# Patient Record
Sex: MALE | Race: WHITE | NOT HISPANIC OR LATINO | URBAN - METROPOLITAN AREA
[De-identification: names, ages, dates, MRNs, and addresses within clinical notes are randomized per-mention and may not be internally consistent; named-entity substitution may affect disease eponyms.]

---

## 2020-05-18 ENCOUNTER — INPATIENT (INPATIENT)
Facility: HOSPITAL | Age: 55
LOS: 3 days | Discharge: HOME | DRG: 392 | End: 2020-05-22
Attending: INTERNAL MEDICINE | Admitting: INTERNAL MEDICINE
Payer: MEDICARE

## 2020-05-18 ENCOUNTER — EMERGENCY (EMERGENCY)
Facility: HOSPITAL | Age: 55
LOS: 1 days | Discharge: HOME | End: 2020-05-18
Attending: STUDENT IN AN ORGANIZED HEALTH CARE EDUCATION/TRAINING PROGRAM
Payer: MEDICARE

## 2020-05-18 VITALS
HEART RATE: 71 BPM | OXYGEN SATURATION: 95 % | WEIGHT: 190.04 LBS | TEMPERATURE: 98 F | HEIGHT: 72 IN | SYSTOLIC BLOOD PRESSURE: 170 MMHG | DIASTOLIC BLOOD PRESSURE: 91 MMHG | RESPIRATION RATE: 18 BRPM

## 2020-05-18 VITALS
HEIGHT: 72 IN | RESPIRATION RATE: 20 BRPM | TEMPERATURE: 98 F | HEART RATE: 73 BPM | SYSTOLIC BLOOD PRESSURE: 176 MMHG | WEIGHT: 190.04 LBS | DIASTOLIC BLOOD PRESSURE: 90 MMHG | OXYGEN SATURATION: 97 %

## 2020-05-18 VITALS — HEART RATE: 81 BPM | OXYGEN SATURATION: 98 % | DIASTOLIC BLOOD PRESSURE: 89 MMHG | SYSTOLIC BLOOD PRESSURE: 159 MMHG

## 2020-05-18 LAB
ALBUMIN SERPL ELPH-MCNC: 5.2 G/DL — SIGNIFICANT CHANGE UP (ref 3.3–5.2)
ALBUMIN SERPL ELPH-MCNC: 5.3 G/DL — HIGH (ref 3.3–5.2)
ALP SERPL-CCNC: 94 U/L — SIGNIFICANT CHANGE UP (ref 40–120)
ALP SERPL-CCNC: 97 U/L — SIGNIFICANT CHANGE UP (ref 40–120)
ALT FLD-CCNC: 25 U/L — SIGNIFICANT CHANGE UP
ALT FLD-CCNC: 25 U/L — SIGNIFICANT CHANGE UP
ANION GAP SERPL CALC-SCNC: 15 MMOL/L — SIGNIFICANT CHANGE UP (ref 5–17)
ANION GAP SERPL CALC-SCNC: 18 MMOL/L — HIGH (ref 5–17)
APTT BLD: 29.9 SEC — SIGNIFICANT CHANGE UP (ref 27.5–36.3)
AST SERPL-CCNC: 27 U/L — SIGNIFICANT CHANGE UP
AST SERPL-CCNC: 27 U/L — SIGNIFICANT CHANGE UP
BASOPHILS # BLD AUTO: 0.02 K/UL — SIGNIFICANT CHANGE UP (ref 0–0.2)
BASOPHILS # BLD AUTO: 0.03 K/UL — SIGNIFICANT CHANGE UP (ref 0–0.2)
BASOPHILS NFR BLD AUTO: 0.2 % — SIGNIFICANT CHANGE UP (ref 0–2)
BASOPHILS NFR BLD AUTO: 0.3 % — SIGNIFICANT CHANGE UP (ref 0–2)
BILIRUB SERPL-MCNC: 0.4 MG/DL — SIGNIFICANT CHANGE UP (ref 0.4–2)
BILIRUB SERPL-MCNC: 0.6 MG/DL — SIGNIFICANT CHANGE UP (ref 0.4–2)
BLD GP AB SCN SERPL QL: SIGNIFICANT CHANGE UP
BUN SERPL-MCNC: 12 MG/DL — SIGNIFICANT CHANGE UP (ref 8–20)
BUN SERPL-MCNC: 15 MG/DL — SIGNIFICANT CHANGE UP (ref 8–20)
CALCIUM SERPL-MCNC: 10.1 MG/DL — SIGNIFICANT CHANGE UP (ref 8.6–10.2)
CALCIUM SERPL-MCNC: 9.5 MG/DL — SIGNIFICANT CHANGE UP (ref 8.6–10.2)
CHLORIDE SERPL-SCNC: 97 MMOL/L — LOW (ref 98–107)
CHLORIDE SERPL-SCNC: 98 MMOL/L — SIGNIFICANT CHANGE UP (ref 98–107)
CO2 SERPL-SCNC: 23 MMOL/L — SIGNIFICANT CHANGE UP (ref 22–29)
CO2 SERPL-SCNC: 24 MMOL/L — SIGNIFICANT CHANGE UP (ref 22–29)
CREAT SERPL-MCNC: 0.69 MG/DL — SIGNIFICANT CHANGE UP (ref 0.5–1.3)
CREAT SERPL-MCNC: 0.7 MG/DL — SIGNIFICANT CHANGE UP (ref 0.5–1.3)
EOSINOPHIL # BLD AUTO: 0 K/UL — SIGNIFICANT CHANGE UP (ref 0–0.5)
EOSINOPHIL # BLD AUTO: 0 K/UL — SIGNIFICANT CHANGE UP (ref 0–0.5)
EOSINOPHIL NFR BLD AUTO: 0 % — SIGNIFICANT CHANGE UP (ref 0–6)
EOSINOPHIL NFR BLD AUTO: 0 % — SIGNIFICANT CHANGE UP (ref 0–6)
GLUCOSE SERPL-MCNC: 128 MG/DL — HIGH (ref 70–99)
GLUCOSE SERPL-MCNC: 163 MG/DL — HIGH (ref 70–99)
HCT VFR BLD CALC: 41 % — SIGNIFICANT CHANGE UP (ref 39–50)
HCT VFR BLD CALC: 41.1 % — SIGNIFICANT CHANGE UP (ref 39–50)
HGB BLD-MCNC: 13.5 G/DL — SIGNIFICANT CHANGE UP (ref 13–17)
HGB BLD-MCNC: 13.6 G/DL — SIGNIFICANT CHANGE UP (ref 13–17)
IMM GRANULOCYTES NFR BLD AUTO: 0.2 % — SIGNIFICANT CHANGE UP (ref 0–1.5)
IMM GRANULOCYTES NFR BLD AUTO: 0.4 % — SIGNIFICANT CHANGE UP (ref 0–1.5)
INR BLD: 1.12 RATIO — SIGNIFICANT CHANGE UP (ref 0.88–1.16)
LACTATE BLDV-MCNC: 1.2 MMOL/L — SIGNIFICANT CHANGE UP (ref 0.5–2)
LIDOCAIN IGE QN: 10 U/L — LOW (ref 22–51)
LIDOCAIN IGE QN: 11 U/L — LOW (ref 22–51)
LYMPHOCYTES # BLD AUTO: 0.91 K/UL — LOW (ref 1–3.3)
LYMPHOCYTES # BLD AUTO: 1.27 K/UL — SIGNIFICANT CHANGE UP (ref 1–3.3)
LYMPHOCYTES # BLD AUTO: 11.3 % — LOW (ref 13–44)
LYMPHOCYTES # BLD AUTO: 8.1 % — LOW (ref 13–44)
MCHC RBC-ENTMCNC: 27.3 PG — SIGNIFICANT CHANGE UP (ref 27–34)
MCHC RBC-ENTMCNC: 27.4 PG — SIGNIFICANT CHANGE UP (ref 27–34)
MCHC RBC-ENTMCNC: 32.8 GM/DL — SIGNIFICANT CHANGE UP (ref 32–36)
MCHC RBC-ENTMCNC: 33.2 GM/DL — SIGNIFICANT CHANGE UP (ref 32–36)
MCV RBC AUTO: 82.7 FL — SIGNIFICANT CHANGE UP (ref 80–100)
MCV RBC AUTO: 83.2 FL — SIGNIFICANT CHANGE UP (ref 80–100)
MONOCYTES # BLD AUTO: 0.28 K/UL — SIGNIFICANT CHANGE UP (ref 0–0.9)
MONOCYTES # BLD AUTO: 0.55 K/UL — SIGNIFICANT CHANGE UP (ref 0–0.9)
MONOCYTES NFR BLD AUTO: 2.5 % — SIGNIFICANT CHANGE UP (ref 2–14)
MONOCYTES NFR BLD AUTO: 4.9 % — SIGNIFICANT CHANGE UP (ref 2–14)
NEUTROPHILS # BLD AUTO: 10.01 K/UL — HIGH (ref 1.8–7.4)
NEUTROPHILS # BLD AUTO: 9.34 K/UL — HIGH (ref 1.8–7.4)
NEUTROPHILS NFR BLD AUTO: 83.2 % — HIGH (ref 43–77)
NEUTROPHILS NFR BLD AUTO: 88.9 % — HIGH (ref 43–77)
PLATELET # BLD AUTO: 278 K/UL — SIGNIFICANT CHANGE UP (ref 150–400)
PLATELET # BLD AUTO: 279 K/UL — SIGNIFICANT CHANGE UP (ref 150–400)
POTASSIUM SERPL-MCNC: 3.7 MMOL/L — SIGNIFICANT CHANGE UP (ref 3.5–5.3)
POTASSIUM SERPL-MCNC: 3.9 MMOL/L — SIGNIFICANT CHANGE UP (ref 3.5–5.3)
POTASSIUM SERPL-SCNC: 3.7 MMOL/L — SIGNIFICANT CHANGE UP (ref 3.5–5.3)
POTASSIUM SERPL-SCNC: 3.9 MMOL/L — SIGNIFICANT CHANGE UP (ref 3.5–5.3)
PROT SERPL-MCNC: 8.3 G/DL — SIGNIFICANT CHANGE UP (ref 6.6–8.7)
PROT SERPL-MCNC: 8.6 G/DL — SIGNIFICANT CHANGE UP (ref 6.6–8.7)
PROTHROM AB SERPL-ACNC: 12.7 SEC — SIGNIFICANT CHANGE UP (ref 10–12.9)
RBC # BLD: 4.94 M/UL — SIGNIFICANT CHANGE UP (ref 4.2–5.8)
RBC # BLD: 4.96 M/UL — SIGNIFICANT CHANGE UP (ref 4.2–5.8)
RBC # FLD: 13.7 % — SIGNIFICANT CHANGE UP (ref 10.3–14.5)
RBC # FLD: 13.8 % — SIGNIFICANT CHANGE UP (ref 10.3–14.5)
SODIUM SERPL-SCNC: 137 MMOL/L — SIGNIFICANT CHANGE UP (ref 135–145)
SODIUM SERPL-SCNC: 138 MMOL/L — SIGNIFICANT CHANGE UP (ref 135–145)
TROPONIN T SERPL-MCNC: <0.01 NG/ML — SIGNIFICANT CHANGE UP (ref 0–0.06)
WBC # BLD: 11.22 K/UL — HIGH (ref 3.8–10.5)
WBC # BLD: 11.25 K/UL — HIGH (ref 3.8–10.5)
WBC # FLD AUTO: 11.22 K/UL — HIGH (ref 3.8–10.5)
WBC # FLD AUTO: 11.25 K/UL — HIGH (ref 3.8–10.5)

## 2020-05-18 PROCEDURE — 93010 ELECTROCARDIOGRAM REPORT: CPT | Mod: 76

## 2020-05-18 PROCEDURE — 74177 CT ABD & PELVIS W/CONTRAST: CPT | Mod: 26

## 2020-05-18 PROCEDURE — 71045 X-RAY EXAM CHEST 1 VIEW: CPT | Mod: 26

## 2020-05-18 PROCEDURE — 71260 CT THORAX DX C+: CPT | Mod: 26

## 2020-05-18 PROCEDURE — 99285 EMERGENCY DEPT VISIT HI MDM: CPT | Mod: CS

## 2020-05-18 PROCEDURE — 99285 EMERGENCY DEPT VISIT HI MDM: CPT

## 2020-05-18 RX ORDER — MORPHINE SULFATE 50 MG/1
4 CAPSULE, EXTENDED RELEASE ORAL ONCE
Refills: 0 | Status: DISCONTINUED | OUTPATIENT
Start: 2020-05-18 | End: 2020-05-18

## 2020-05-18 RX ORDER — HYDROMORPHONE HYDROCHLORIDE 2 MG/ML
1 INJECTION INTRAMUSCULAR; INTRAVENOUS; SUBCUTANEOUS ONCE
Refills: 0 | Status: DISCONTINUED | OUTPATIENT
Start: 2020-05-18 | End: 2020-05-18

## 2020-05-18 RX ORDER — HYDROMORPHONE HYDROCHLORIDE 2 MG/ML
0.5 INJECTION INTRAMUSCULAR; INTRAVENOUS; SUBCUTANEOUS ONCE
Refills: 0 | Status: DISCONTINUED | OUTPATIENT
Start: 2020-05-18 | End: 2020-05-18

## 2020-05-18 RX ORDER — METOCLOPRAMIDE HCL 10 MG
10 TABLET ORAL ONCE
Refills: 0 | Status: COMPLETED | OUTPATIENT
Start: 2020-05-18 | End: 2020-05-18

## 2020-05-18 RX ORDER — SODIUM CHLORIDE 9 MG/ML
1000 INJECTION INTRAMUSCULAR; INTRAVENOUS; SUBCUTANEOUS ONCE
Refills: 0 | Status: COMPLETED | OUTPATIENT
Start: 2020-05-18 | End: 2020-05-18

## 2020-05-18 RX ORDER — ONDANSETRON 8 MG/1
4 TABLET, FILM COATED ORAL ONCE
Refills: 0 | Status: COMPLETED | OUTPATIENT
Start: 2020-05-18 | End: 2020-05-18

## 2020-05-18 RX ORDER — PANTOPRAZOLE SODIUM 20 MG/1
80 TABLET, DELAYED RELEASE ORAL ONCE
Refills: 0 | Status: COMPLETED | OUTPATIENT
Start: 2020-05-18 | End: 2020-05-18

## 2020-05-18 RX ADMIN — Medication 10 MILLIGRAM(S): at 05:40

## 2020-05-18 RX ADMIN — ONDANSETRON 4 MILLIGRAM(S): 8 TABLET, FILM COATED ORAL at 03:28

## 2020-05-18 RX ADMIN — HYDROMORPHONE HYDROCHLORIDE 0.5 MILLIGRAM(S): 2 INJECTION INTRAMUSCULAR; INTRAVENOUS; SUBCUTANEOUS at 05:40

## 2020-05-18 RX ADMIN — SODIUM CHLORIDE 1000 MILLILITER(S): 9 INJECTION INTRAMUSCULAR; INTRAVENOUS; SUBCUTANEOUS at 03:28

## 2020-05-18 RX ADMIN — SODIUM CHLORIDE 1000 MILLILITER(S): 9 INJECTION INTRAMUSCULAR; INTRAVENOUS; SUBCUTANEOUS at 22:09

## 2020-05-18 RX ADMIN — SODIUM CHLORIDE 1000 MILLILITER(S): 9 INJECTION INTRAMUSCULAR; INTRAVENOUS; SUBCUTANEOUS at 04:28

## 2020-05-18 RX ADMIN — HYDROMORPHONE HYDROCHLORIDE 1 MILLIGRAM(S): 2 INJECTION INTRAMUSCULAR; INTRAVENOUS; SUBCUTANEOUS at 23:15

## 2020-05-18 RX ADMIN — SODIUM CHLORIDE 1000 MILLILITER(S): 9 INJECTION INTRAMUSCULAR; INTRAVENOUS; SUBCUTANEOUS at 23:09

## 2020-05-18 RX ADMIN — ONDANSETRON 4 MILLIGRAM(S): 8 TABLET, FILM COATED ORAL at 22:09

## 2020-05-18 RX ADMIN — PANTOPRAZOLE SODIUM 80 MILLIGRAM(S): 20 TABLET, DELAYED RELEASE ORAL at 22:09

## 2020-05-18 RX ADMIN — HYDROMORPHONE HYDROCHLORIDE 1 MILLIGRAM(S): 2 INJECTION INTRAMUSCULAR; INTRAVENOUS; SUBCUTANEOUS at 03:28

## 2020-05-18 NOTE — ED PROVIDER NOTE - PHYSICAL EXAMINATION
General:     NAD, well-nourished, well-appearing  Head:     NC/AT, EOMI, oral mucosa moist  Neck:     trachea midline  Lungs:     CTA b/l, no w/r/r  CVS:     S1S2, RRR, no m/g/r  Abd:     (+) TTP at epigastrium, no rebound or guarding, +BS, s/nd, no organomegaly  Ext:    2+ radial and pedal pulses, no c/c/e  Neuro: AAOx3, no sensory/motor deficits General:     NAD,  Head:     NC/AT, EOMI, oral mucosa moist  Neck:     trachea midline  Lungs:     CTA b/l, no w/r/r  CVS:     S1S2, RRR, no m/g/r  Abd:     (+) TTP at epigastrium, no rebound or guarding, +BS, s/nd, no organomegaly, palpable epigastic pulse  Ext:    2+ radial and pedal pulses, no c/c/e  Neuro: grossly intact

## 2020-05-18 NOTE — ED ADULT NURSE NOTE - OBJECTIVE STATEMENT
pt presents to Ed with abdominal pain, N/V x 2 days. pt tried taking tylenol for pain and reglan however no improvement in sx. pt with hx of esophageal ca and removal. pt medicated as ordered. given PO contrast at this time.

## 2020-05-18 NOTE — ED ADULT TRIAGE NOTE - CHIEF COMPLAINT QUOTE
pt presents to the ed a&ox3 c/o epigastric pain, nausea and vomiting x2 days. Pt states hx of esophageal ca 2016 and esophageal removal in 2016. pt also states has not taken htn medications x2 days, unable to keep it down.

## 2020-05-18 NOTE — ED ADULT NURSE REASSESSMENT NOTE - NS ED NURSE REASSESS COMMENT FT1
pt taken to and returned from CT without incident. upon return to unit pt reported increased nausea and pain. pt medicated for both pt currently sleeping at this time,

## 2020-05-18 NOTE — ED PROVIDER NOTE - ATTENDING CONTRIBUTION TO CARE
55 yo male presents for evaluation of 2 days of abdominal discomfort with nausea. I personally saw the patient with the PA, and completed the key components of the history and physical exam. I then discussed the management plan with the PA.

## 2020-05-18 NOTE — ED PROVIDER NOTE - PHYSICAL EXAMINATION
Const: Awake, alert and oriented. In no acute distress. Well appearing.  HEENT: NC/AT. Moist mucous membranes.  Eyes: No scleral icterus. EOMI.  Neck:. Soft and supple. Full ROM without pain.  Cardiac:  +S1/S2. No murmurs. Peripheral pulses 2+ and symmetric. No LE edema.  Resp: Speaking in full sentences. No evidence of respiratory distress. No wheezes, rales or rhonchi.  Abd: Soft, epigastric tenderness> diffuse tenderness on palpation, non-distended.   Back: Spine midline and non-tender. No CVAT.  Skin: No rashes, abrasions or lacerations.  Lymph: No cervical lymphadenopathy.  Neuro: Awake, alert & oriented x 3. Moves all extremities symmetrically.

## 2020-05-18 NOTE — ED PROVIDER NOTE - OBJECTIVE STATEMENT
pt is a 55 y/o male with a pmhx of esophageal cancer (removal of esophagus in 2016), cholecystomy, HTN presenting to the ed with abdominal pain x 2 days. pt admits pain is epigastric region, admits to nausea and multiple episodes of vomiting ( non-bloody, non-bilious). pt states last bowel movement was this morning, passing gas. pt denies cardiac hx. pt denies recent sick contacts, fevers. pt denies sob, palpitations, diarrhea, back pain, dysuria, melena, hematemesis, rectal bleeding pt is a 55 y/o male with a pmhx of esophageal cancer (removal of esophagus in 2016), cholecystomy, HTN presenting to the ed with abdominal pain x 2 days. pt admits pain is epigastric region and radiating into chest, admits to nausea and multiple episodes of vomiting ( non-bloody, non-bilious). pt states last bowel movement was this morning, passing gas. pt denies cardiac hx. pt denies recent sick contacts, fevers. pt denies sob, palpitations, diarrhea, back pain, dysuria, melena, hematemesis, rectal bleeding

## 2020-05-18 NOTE — ED ADULT NURSE NOTE - OBJECTIVE STATEMENT
Chief complaint of abdominal pain. Hx of esophageal resection and ca. Pt states he has had this pain before. +Nausea. Unable to tolerate PO. Denies fever, chills. Abdomen soft, tender, non-distended. Pt alert and oriented x3, respirations even and unlabored, skin warm and dry, color appropriate for ethnicity, speech clear, moving all extremities. Will continue to monitor.

## 2020-05-18 NOTE — ED ADULT NURSE NOTE - NS ED NURSE LEVEL OF CONSCIOUSNESS AFFECT
If still suffering through the next week or so, please call and will either get to PT or at least ortho opinion.    Calm

## 2020-05-18 NOTE — ED PROVIDER NOTE - PATIENT PORTAL LINK FT
You can access the FollowMyHealth Patient Portal offered by [facility] by registering at the following website: [email or web address]. By joining [employer]’s FollowMyHealth portal, you will also be able to view your health information using other applications (apps) compatible with our system.

## 2020-05-18 NOTE — ED PROVIDER NOTE - PROGRESS NOTE DETAILS
reviewed ct results, lab work and urine, pt reports improvement in sxs, abd soft nontender, f/u with GI, carfate and omprezole, incidental findings on ct discussed with patient

## 2020-05-18 NOTE — ED ADULT NURSE NOTE - NSIMPLEMENTINTERV_GEN_ALL_ED
Implemented All Universal Safety Interventions:  [place] to call system. Call bell, personal items and telephone within reach. Instruct patient to call for assistance. Room bathroom lighting operational. Non-slip footwear when patient is off stretcher. Physically safe environment: no spills, clutter or unnecessary equipment. Stretcher in lowest position, wheels locked, appropriate side rails in place.

## 2020-05-18 NOTE — ED ADULT TRIAGE NOTE - CHIEF COMPLAINT QUOTE
pt presents to the ed a&ox3 c/o abdominal pain, nausea and vomiting x3 days. Pt was seen here this am for the same symptoms. Pt unable to hold anything down including water, pt admits to not taking daily medications. no s/s of any other acute distress. hx esophageal removal and esophageal ca.

## 2020-05-18 NOTE — ED PROVIDER NOTE - NS ED ROS FT
Constitutional: (-) fever  (-)chills  (-)sweats  Eyes/ENT: (-) blurry vision, (-) epistaxis  (-)rhinorrhea   (-) sore throat    Cardiovascular: (-) chest pain, (-) palpitations (-) edema   Respiratory: (-) cough, (+) shortness of breath   Gastrointestinal: (+)nausea  (+)vomiting, (-) diarrhea  (+) abdominal pain   :  (-)dysuria, (-)frequency, (-)urgency, (-)hematuria  Musculoskeletal: (-) neck pain, (-) back pain, (-) joint pain  Integumentary: (-) rash, (-) edema  Neurological: (-) headache, (-) altered mental status  (-)LOC

## 2020-05-18 NOTE — ED ADULT NURSE NOTE - NSIMPLEMENTINTERV_GEN_ALL_ED
Implemented All Universal Safety Interventions:  [place] to call system. Call bell, personal items and telephone within reach. Instruct patient to call for assistance. Room bathroom lighting operational. Non-slip footwear when patient is off stretcher. Physically safe environment: no spills, clutter or unnecessary equipment. Stretcher in lowest position, wheels locked, appropriate side rails in place.

## 2020-05-18 NOTE — ED PROVIDER NOTE - SCRIBE NAME
Shaka Winters transferred to inpatient Hospice RM # [id number] from [phone] via bed  Reason for transfer: Comfort care with end of life measures  Explained reason for transfer to Family  Belongings: some belongings transferred with pt family, Kimmy Ramos to bring the rest to Hospice  Soft chart transferred with patient: no  Telemetry box number no, telemetry d/c.   Report given to:Milla via telephone      Electronically signed by Shweta Jett RN on 9/25/2018 at 1:24 PM Sana Resendiz

## 2020-05-18 NOTE — ED PROVIDER NOTE - PROGRESS NOTE DETAILS
PT evaluated by intake physician. HPI/PE/ROS as noted above. Will follow up plan per intake physician. reviewed ct results, lab work, will place patient in observation, second visit to ed not tolerating PO, iv fluids, anti-emetics, GI consult

## 2020-05-19 ENCOUNTER — TRANSCRIPTION ENCOUNTER (OUTPATIENT)
Age: 55
End: 2020-05-19

## 2020-05-19 DIAGNOSIS — R11.2 NAUSEA WITH VOMITING, UNSPECIFIED: ICD-10-CM

## 2020-05-19 DIAGNOSIS — Z90.49 ACQUIRED ABSENCE OF OTHER SPECIFIED PARTS OF DIGESTIVE TRACT: Chronic | ICD-10-CM

## 2020-05-19 DIAGNOSIS — Z85.01 PERSONAL HISTORY OF MALIGNANT NEOPLASM OF ESOPHAGUS: ICD-10-CM

## 2020-05-19 LAB
HCT VFR BLD CALC: 38.1 % — LOW (ref 39–50)
HGB BLD-MCNC: 12.7 G/DL — LOW (ref 13–17)
MCHC RBC-ENTMCNC: 27.7 PG — SIGNIFICANT CHANGE UP (ref 27–34)
MCHC RBC-ENTMCNC: 33.3 GM/DL — SIGNIFICANT CHANGE UP (ref 32–36)
MCV RBC AUTO: 83 FL — SIGNIFICANT CHANGE UP (ref 80–100)
PLATELET # BLD AUTO: 271 K/UL — SIGNIFICANT CHANGE UP (ref 150–400)
RBC # BLD: 4.59 M/UL — SIGNIFICANT CHANGE UP (ref 4.2–5.8)
RBC # FLD: 13.8 % — SIGNIFICANT CHANGE UP (ref 10.3–14.5)
SARS-COV-2 RNA SPEC QL NAA+PROBE: SIGNIFICANT CHANGE UP
WBC # BLD: 11.38 K/UL — HIGH (ref 3.8–10.5)
WBC # FLD AUTO: 11.38 K/UL — HIGH (ref 3.8–10.5)

## 2020-05-19 PROCEDURE — 99218: CPT | Mod: CS

## 2020-05-19 PROCEDURE — 74174 CTA ABD&PLVS W/CONTRAST: CPT | Mod: 26

## 2020-05-19 PROCEDURE — 99223 1ST HOSP IP/OBS HIGH 75: CPT | Mod: AI

## 2020-05-19 PROCEDURE — 71275 CT ANGIOGRAPHY CHEST: CPT | Mod: 26

## 2020-05-19 PROCEDURE — 99223 1ST HOSP IP/OBS HIGH 75: CPT

## 2020-05-19 RX ORDER — METOCLOPRAMIDE HCL 10 MG
10 TABLET ORAL EVERY 6 HOURS
Refills: 0 | Status: DISCONTINUED | OUTPATIENT
Start: 2020-05-19 | End: 2020-05-19

## 2020-05-19 RX ORDER — ENOXAPARIN SODIUM 100 MG/ML
40 INJECTION SUBCUTANEOUS DAILY
Refills: 0 | Status: DISCONTINUED | OUTPATIENT
Start: 2020-05-19 | End: 2020-05-19

## 2020-05-19 RX ORDER — METOCLOPRAMIDE HCL 10 MG
10 TABLET ORAL ONCE
Refills: 0 | Status: COMPLETED | OUTPATIENT
Start: 2020-05-19 | End: 2020-05-19

## 2020-05-19 RX ORDER — PANTOPRAZOLE SODIUM 20 MG/1
40 TABLET, DELAYED RELEASE ORAL EVERY 12 HOURS
Refills: 0 | Status: DISCONTINUED | OUTPATIENT
Start: 2020-05-19 | End: 2020-05-22

## 2020-05-19 RX ORDER — SUCRALFATE 1 G
1 TABLET ORAL ONCE
Refills: 0 | Status: COMPLETED | OUTPATIENT
Start: 2020-05-19 | End: 2020-05-19

## 2020-05-19 RX ORDER — MORPHINE SULFATE 50 MG/1
4 CAPSULE, EXTENDED RELEASE ORAL ONCE
Refills: 0 | Status: DISCONTINUED | OUTPATIENT
Start: 2020-05-19 | End: 2020-05-19

## 2020-05-19 RX ORDER — ONDANSETRON 8 MG/1
4 TABLET, FILM COATED ORAL EVERY 6 HOURS
Refills: 0 | Status: DISCONTINUED | OUTPATIENT
Start: 2020-05-19 | End: 2020-05-22

## 2020-05-19 RX ORDER — MORPHINE SULFATE 50 MG/1
2 CAPSULE, EXTENDED RELEASE ORAL EVERY 4 HOURS
Refills: 0 | Status: DISCONTINUED | OUTPATIENT
Start: 2020-05-19 | End: 2020-05-20

## 2020-05-19 RX ORDER — ACETAMINOPHEN 500 MG
650 TABLET ORAL ONCE
Refills: 0 | Status: COMPLETED | OUTPATIENT
Start: 2020-05-19 | End: 2020-05-19

## 2020-05-19 RX ORDER — AMLODIPINE BESYLATE 2.5 MG/1
5 TABLET ORAL DAILY
Refills: 0 | Status: DISCONTINUED | OUTPATIENT
Start: 2020-05-19 | End: 2020-05-22

## 2020-05-19 RX ORDER — SODIUM CHLORIDE 9 MG/ML
1000 INJECTION INTRAMUSCULAR; INTRAVENOUS; SUBCUTANEOUS
Refills: 0 | Status: DISCONTINUED | OUTPATIENT
Start: 2020-05-19 | End: 2020-05-22

## 2020-05-19 RX ADMIN — SODIUM CHLORIDE 125 MILLILITER(S): 9 INJECTION INTRAMUSCULAR; INTRAVENOUS; SUBCUTANEOUS at 07:56

## 2020-05-19 RX ADMIN — SODIUM CHLORIDE 125 MILLILITER(S): 9 INJECTION INTRAMUSCULAR; INTRAVENOUS; SUBCUTANEOUS at 02:26

## 2020-05-19 RX ADMIN — Medication 1 GRAM(S): at 02:26

## 2020-05-19 RX ADMIN — ONDANSETRON 4 MILLIGRAM(S): 8 TABLET, FILM COATED ORAL at 20:09

## 2020-05-19 RX ADMIN — AMLODIPINE BESYLATE 5 MILLIGRAM(S): 2.5 TABLET ORAL at 12:14

## 2020-05-19 RX ADMIN — ONDANSETRON 4 MILLIGRAM(S): 8 TABLET, FILM COATED ORAL at 13:41

## 2020-05-19 RX ADMIN — ENOXAPARIN SODIUM 40 MILLIGRAM(S): 100 INJECTION SUBCUTANEOUS at 15:44

## 2020-05-19 RX ADMIN — MORPHINE SULFATE 2 MILLIGRAM(S): 50 CAPSULE, EXTENDED RELEASE ORAL at 22:54

## 2020-05-19 RX ADMIN — PANTOPRAZOLE SODIUM 40 MILLIGRAM(S): 20 TABLET, DELAYED RELEASE ORAL at 15:45

## 2020-05-19 RX ADMIN — Medication 10 MILLIGRAM(S): at 12:01

## 2020-05-19 RX ADMIN — MORPHINE SULFATE 4 MILLIGRAM(S): 50 CAPSULE, EXTENDED RELEASE ORAL at 08:05

## 2020-05-19 RX ADMIN — Medication 10 MILLIGRAM(S): at 02:57

## 2020-05-19 RX ADMIN — Medication 104 MILLIGRAM(S): at 02:27

## 2020-05-19 RX ADMIN — MORPHINE SULFATE 2 MILLIGRAM(S): 50 CAPSULE, EXTENDED RELEASE ORAL at 15:45

## 2020-05-19 RX ADMIN — SODIUM CHLORIDE 125 MILLILITER(S): 9 INJECTION INTRAMUSCULAR; INTRAVENOUS; SUBCUTANEOUS at 13:20

## 2020-05-19 NOTE — H&P ADULT - ASSESSMENT
1. Gastroparesis vs. Upper GI bleed  -  pt presents with coffee-ground emesis likely from a bleed\  - H/H noted to be down (12.7/38.1) since arriving to ED yesterday.  - will consult GI for possible EGD eval  -pt on omeprazole 40mg at home. will switch to protonix 40mg during stay  - c/w reglan 10mg   - monitor daily cbc to trend H/H    2. Suspect for covid-19 Pna  - pt reports mild sob  - COVID panel pending  - CTA showed b/l opacities     3. leukocytosis  - pt is afebrile  - likely 2/2 underlying infection  - will trend CBC and monitor    4. HTN  - monitor bp  - continue on amlodipine 5mg once daily    ppx: will initiate Lovenox 40mg 1. Intractable N/V likely Gastroparesis  - admit to medicine   - continue IVF for hydration and clear liquid diet as tolerated  - antiemetic prn   - GI recs appreciated:  started Protonix 40mg IV BID, for possible EGD on this admission GI to determine   - monitor daily cbc to trend H/H    2. Mild leukocytosis  - pt is afebrile, likely reactive from above  - will trend CBC and monitor    3. HTN  - monitor bp  - continue on amlodipine 5mg once daily    4. Prophylactic measures  - DVT ppx: Lovenox 40mg SQ daily  - GI ppx: on PPI

## 2020-05-19 NOTE — H&P ADULT - NSHPREVIEWOFSYSTEMS_GEN_ALL_CORE
CONSTITUTIONAL: No weakness, fevers/chills, or change in weight, night sweats  EYES/ENT: +dry mouth. No visual changes;  No vertigo or throat pain   NECK: No pain or stiffness  RESPIRATORY: No cough, wheezing, hemoptysis; No shortness of breath  CARDIOVASCULAR: No chest pain or palpitations  GASTROINTESTINAL:  + generalized abdominal pain, + nausea, bilious vomiting, and hematemesis; No diarrhea or constipation. No melena or hematochezia.  GENITOURINARY: No dysuria, frequency or hematuria  NEUROLOGICAL: No numbness or weakness  SKIN: No itching, rashes CONSTITUTIONAL: No weakness, fevers/chills, or change in weight, night sweats  EYES/ENT: +dry mouth. No visual changes;  No vertigo or throat pain   NECK: No pain or stiffness  RESPIRATORY: + SOB. No cough or wheezing.  CARDIOVASCULAR: No chest pain or palpitations  GASTROINTESTINAL:  + generalized abdominal pain, + nausea, bilious vomiting, and hematemesis; No diarrhea or constipation. No melena or hematochezia.  GENITOURINARY: No dysuria, frequency or hematuria  NEUROLOGICAL: No numbness or weakness  SKIN: No itching, rashes

## 2020-05-19 NOTE — H&P ADULT - NSHPPHYSICALEXAM_GEN_ALL_CORE
GENERAL: NAD, well-groomed, well-developed  HEAD:  Atraumatic, Normocephalic  EYES: EOMI, PERRLA, conjunctiva and sclera clear  ENMT: No tonsillar erythema, exudates, or enlargement; Moist mucous membranes, Good dentition, No lesions  NECK: Supple, No JVD, Normal thyroid  RESPIRATORY: Clear to auscultation bilaterally; No rales, rhonchi, wheezing, or rubs  CARDIOVASCULAR: Regular rate; No murmurs, rubs, or gallops  GI: Soft, tender to palpation of upper abdomen and RLQ. BS present x 4  EXTREMITIES:  2+ Peripheral Pulses, No clubbing, cyanosis, or edema, FROM  LYMPH: No cervical, submandibular, supraclavicular, axial or femoral lymphadenopathy noted  RECTAL: No masses, prostate normal size and smooth, Guiac negative   BREAST: No palpatble masses, skin no lesions no retractions, no discharages. adenexa no palpable masses noted   GYN: uterus normal size, adenexa no palpable masses, no CMT, no uterine discharge   NERVOUS SYSTEM:  Alert & Oriented X3, Good concentration; Motor Strength 5/5 B/L upper and lower extremities; DTRs 2+ intact and symmetric  SKIN: No suspicious rashes or lesions noted Vital Signs Last 24 Hrs  T(C): 37.3 (19 May 2020 06:48), Max: 37.3 (19 May 2020 06:48)  T(F): 99.1 (19 May 2020 06:48), Max: 99.1 (19 May 2020 06:48)  HR: 76 (19 May 2020 06:48) (68 - 76)  BP: 134/79 (19 May 2020 06:48) (134/79 - 170/91)  BP(mean): --  RR: 20 (19 May 2020 06:48) (16 - 20)  SpO2: 97% (19 May 2020 06:48) (95% - 97%)      GENERAL: NAD, well-groomed, well-developed  HEAD:  Atraumatic, Normocephalic  EYES: EOMI, PERRLA, conjunctiva and sclera clear  ENMT: No tonsillar erythema, exudates, or enlargement; Moist mucous membranes, Good dentition, No lesions  NECK: Supple, No JVD, Normal thyroid  RESPIRATORY: Clear to auscultation bilaterally; No rales, rhonchi, wheezing, or rubs  CARDIOVASCULAR: Regular rate; No murmurs, rubs, or gallops  GI: Soft, tender to palpation of upper abdomen and RLQ. BS present x 4  EXTREMITIES:  2+ Peripheral Pulses, No clubbing, cyanosis, or edema, FROM  LYMPH: No cervical, submandibular, supraclavicular, axial or femoral lymphadenopathy noted  RECTAL: No masses, prostate normal size and smooth, Guiac negative   BREAST: No palpatble masses, skin no lesions no retractions, no discharages. adenexa no palpable masses noted   GYN: uterus normal size, adenexa no palpable masses, no CMT, no uterine discharge   NERVOUS SYSTEM:  Alert & Oriented X3, Good concentration; Motor Strength 5/5 B/L upper and lower extremities; DTRs 2+ intact and symmetric  SKIN: No suspicious rashes or lesions noted Vital Signs Last 24 Hrs  T(C): 37.3 (19 May 2020 06:48), Max: 37.3 (19 May 2020 06:48)  T(F): 99.1 (19 May 2020 06:48), Max: 99.1 (19 May 2020 06:48)  HR: 76 (19 May 2020 06:48) (68 - 76)  BP: 134/79 (19 May 2020 06:48) (134/79 - 170/91)  BP(mean): --  RR: 20 (19 May 2020 06:48) (16 - 20)  SpO2: 97% (19 May 2020 06:48) (95% - 97%)    GENERAL: NAD, well-groomed, well-developed  HEAD:  Atraumatic, Normocephalic  EYES: EOMI, PERRLA, conjunctiva and sclera clear  ENMT: No tonsillar erythema, exudates, or enlargement; Moist mucous membranes, Good dentition, No lesions  NECK: Supple, No JVD, Normal thyroid  RESPIRATORY: Clear to auscultation bilaterally; No rales, rhonchi, wheezing, or rubs  CARDIOVASCULAR: Regular rate; No murmurs, rubs, or gallops  GI: Soft, tender to palpation of upper abdomen and RLQ. BS present x 4  EXTREMITIES:  2+ Peripheral Pulses, No clubbing, cyanosis, or edema, FROM  LYMPH: No cervical, submandibular, supraclavicular, axial or femoral lymphadenopathy noted  NERVOUS SYSTEM:  Alert & Oriented X3, Good concentration; Motor Strength 5/5 B/L upper and lower extremities; DTRs 2+ intact and symmetric  SKIN: No suspicious rashes or lesions noted

## 2020-05-19 NOTE — ED CDU PROVIDER INITIAL DAY NOTE - PHYSICAL EXAMINATION
General:     NAD,  Head:     NC/AT, EOMI, oral mucosa moist  Neck:     trachea midline  Lungs:     CTA b/l, no w/r/r  CVS:     S1S2, RRR, no m/g/r  Abd:     (+) TTP at epigastrium, no rebound or guarding, +BS, s/nd, no organomegaly, palpable epigastic pulse  Ext:    2+ radial and pedal pulses, no c/c/e  Neuro: grossly intact

## 2020-05-19 NOTE — ED CDU PROVIDER INITIAL DAY NOTE - ATTENDING CONTRIBUTION TO CARE
55 y/o male with PMHx of Esophageal cancer(s/p resection @[facility], now on Omeprazole, Reglan, Erythromycin), HTN, HLD, CAD(s/p stent x7); presents to ED c/o intractable abd pain pain and vomiting.  A/P: IV fluids, antiemetics, GI consult

## 2020-05-19 NOTE — ED CDU PROVIDER DISPOSITION NOTE - ATTENDING CONTRIBUTION TO CARE
Pt. awake and alert. Pt. with mild abdominal discomfort. Pt. will be admitted under the medicine service. I, Dr. Quispe, performed a face to face bedside interview with this patient regarding history of present illness, review of symptoms and relevant past medical, social and family history.  I completed an independent physical examination.  I have also reviewed the ACP's note(s) and discussed the plan with the ACP.

## 2020-05-19 NOTE — CONSULT NOTE ADULT - PROBLEM SELECTOR RECOMMENDATION 9
Likely secondary to gastroparesis which is chronic and secondary to gastric pull-up surgery for esophageal cancer. Pt. unable to keep anything down. Recommend admission with IVF and IV Reglan. May need repeat EGD while here. Possibly tomorrow. Would rule out COVID as well if not already done. IV Pantoprazole 40 mg. IVP every 12 hours. Clear liquids as tolerated.

## 2020-05-19 NOTE — ED ADULT NURSE REASSESSMENT NOTE - NS ED NURSE REASSESS COMMENT FT1
Patient recd this morning, A/Ox3, VSS, respirations even and unlabored, reports cramp like pain throughout entire abdomen.  Patient states he can't take tylenol because he can't keep anything done.  Remains on monitor, will address pain control.
Patient sitting in chair watching TV, pain is controlled, VSS, will continue to monitor.
Pt c/o pain offered Tylenol, pt refused. PA made aware.
Pt ambulated to get water, water provided. Pt returned to [facility].  A & Ox 4
Pt c/o pain to abdominal and nausea, PA aware, pt medicated per PA orders, pt placed on observation was able to tolerate PO med.  NS at 125cc/hr infusing w/o incident.
Pt resting in stretcher, breathing even & unlabored. Vitals WNL pt offers no complaints at this time.
Assumed pt care at this time, pt A & OX4. Pt waiting for CT scan, aware of plan of care.

## 2020-05-19 NOTE — CONSULT NOTE ADULT - PROBLEM SELECTOR RECOMMENDATION 2
Status post distal esophagectomy with gastric pull-up in 2016 w/o adjuvant chemotherapy with recent EGD 8 to 9 months ago which showed no tumor recurrence. May proceed with EGD tomorrow if COVID ruled out. NPO after MN tonight. Repeat labs ordered for the AM.

## 2020-05-19 NOTE — ED CDU PROVIDER INITIAL DAY NOTE - OBJECTIVE STATEMENT
55 y/o male with PMHx of Esophageal cancer(s/p resection @[facility], now on Omeprazole, Reglan, Erythromycin), HTN, HLD, CAD(s/p stent x7); presents to ED c/o intractable abd pain pain and vomiting. Patient reports abdominal pain, unable to tolerate PO.  Patient was seen in [facility] ED last night, was given pain medications and fluids, then d/c home. Now back with worsening symptoms. patient reports after going home, abd pain recurred--epigastric, cramping, pressure, non-radiating, associated with coffee ground emesis x4.  denies melena or dark stools. Last bowel movement today, yellow-light brown in color.  denies chest pain.  PMH: CAD, HTN, HLD, GERD, hx of Esoph CA  GI: Dr. Combs ([place])  Surgeon: Dr. Tapia([facility]), last endoscopy was 8-9 months ago

## 2020-05-19 NOTE — H&P ADULT - NSHPLABSRESULTS_GEN_ALL_CORE
Complete Blood Count (05.19.20 @ 07:55)    WBC Count: 11.38 K/uL    RBC Count: 4.59 M/uL    Hemoglobin: 12.7 g/dL    Hematocrit: 38.1 %    Mean Cell Volume: 83.0 fl    Mean Cell Hemoglobin: 27.7 pg    Mean Cell Hemoglobin Conc: 33.3 gm/dL    Red Cell Distrib Width: 13.8 %    Platelet Count - Automated: 271 K/uL  Lipase, Serum (05.18.20 @ 22:38)    Lipase, Serum: 10 U/L  Comprehensive Metabolic Panel (05.18.20 @ 22:38)    Sodium, Serum: 137 mmol/L    Potassium, Serum: 3.7 mmol/L    Chloride, Serum: 98 mmol/L    Carbon Dioxide, Serum: 24.0 mmol/L    Anion Gap, Serum: 15 mmol/L    Blood Urea Nitrogen, Serum: 15.0 mg/dL    Creatinine, Serum: 0.69 mg/dL    Glucose, Serum: 128: Reference Range for Glucose has been amended as of 1/21/2020 mg/dL    Calcium, Total Serum: 9.5 mg/dL    Protein Total, Serum: 8.3 g/dL    Albumin, Serum: 5.2 g/dL    Bilirubin Total, Serum: 0.6 mg/dL    Alkaline Phosphatase, Serum: 94 U/L    Aspartate Aminotransferase (AST/SGOT): 27 U/L    Alanine Aminotransferase (ALT/SGPT): 25 U/L    eGFR if Non : 109: Interpretative comment  The units for eGFR are mL/min/1.73M2 (normalized body surface area). The  eGFR is calculated from a serum creatinine using the CKD-EPI equation.  Other variables required for calculation are race, age and sex. Among  patients with chronic kidney disease (CKD), the eGFR is useful in  determining the stage of disease according to KDOQI CKD classification.  All eGFR results are reported numerically with the following  interpretation.          GFR                    With                 Without     (ml/min/1.73 m2)    Kidney Damage       Kidney Damage        >= 90                    Stage 1                     Normal        60-89                    Stage 2                     Decreased GFR        30-59     Stage 3                     Stage 3        15-29                    Stage 4                     Stage 4        < 15                      Stage 5                     Stage 5  Each stage of CKD assumes that the associated GFR level has been in  effect for at least 3 months. Determination of stages one and two (with  eGFR > 59 ml/min/m2) requires estimation of kidney damage for at least 3  months as defined by structural or functional abnormalities.  Limitations: All estimates of GFR will be less accurate for patients at  extremes of muscle mass (including but not limited to frail elderly,  critically ill, or cancer patients), those with unusual diets, and those  with conditions associated with reduced secretion or extrarenal  elimination of creatinine. The eGFR equation is not recommended for use  in patients with unstable creatinine levels. mL/min/1.73M2    eGFR if African American: 126 mL/min/1.73M2    CTA Ab/P  < from: CT Angio Abdomen and Pelvis w/ IV Cont (05.19.20 @ 01:09) >    IMPRESSION:    No evidence of aortic dissection, aortic aneurysm or mural hematoma.     Small patchy groundglass airspace opacities at the lung bases which are not significantly changed on the prior study dated 5/18/2020. This may reflect atypical/viral pneumonia and/or small airways disease.    Status post gastric pull-through. No small bowel obstruction or active bowel inflammation.    < end of copied text >

## 2020-05-19 NOTE — ED CDU PROVIDER DISPOSITION NOTE - CLINICAL COURSE
55 y/o male presents with chief c/o intractable N/V since Sunday, s/p distal esophagectomy with gastric pull-up in 2016 w/o adjuvant chemotherapy or RT. Pt w/o emergent findings on CTA chest / abdomen / pelvis. Pt w/o improvement in symptoms during CDU stay and was recommended for admission by GI for IVF and possible repeat EGD.

## 2020-05-19 NOTE — H&P ADULT - ATTENDING COMMENTS
Pt seen and examined     PHYSICAL EXAM:  Vital Signs Last 24 Hrs  T(C): 37.3 (19 May 2020 06:48), Max: 37.3 (19 May 2020 06:48)  T(F): 99.1 (19 May 2020 06:48), Max: 99.1 (19 May 2020 06:48)  HR: 76 (19 May 2020 06:48) (68 - 76)  BP: 134/79 (19 May 2020 06:48) (134/79 - 170/91)  BP(mean): --  RR: 20 (19 May 2020 06:48) (16 - 20)  SpO2: 97% (19 May 2020 06:48) (95% - 97%)    GENERAL: NAD, well-groomed, well-developed  HEAD:  Atraumatic, Normocephalic  EYES: EOMI, PERRLA, conjunctiva and sclera clear  ENMT: No tonsillar erythema, exudates, or enlargement; Moist mucous membranes  NERVOUS SYSTEM:  Alert & Oriented X3, Good concentration; Motor Strength 5/5 B/L upper and lower extremities  CHEST/LUNG: Clear to auscultation bilaterally; No rales, rhonchi, wheezing  HEART: Regular rate and rhythm; No murmurs  ABDOMEN: Soft, Nontender, Nondistended; Bowel sounds present  EXTREMITIES:  2+ Peripheral Pulses, No clubbing, cyanosis, or edema    1) Intractable N/V likely Gastroparesis   - agree to continue IVF for hydration  - clear liquid diet for now  - antiemetic prn   - per GI Protonix IV BID, possible EGD in am, NPO past midnight    I agree with remaining assessment and plan above.

## 2020-05-19 NOTE — H&P ADULT - HISTORY OF PRESENT ILLNESS
55 y/o male with history of esophageal cancer s/p resection in 2016, HTN, HLD, CAD s/p stents x 7 presented to ED complaining of intractable generalized abdominal pain with associated inability to tolerate PO intake (solids and fluids), nausea and vomiting Pt reports that the pain started three days ago (5/16) with multiple episodes of bilious and coffee-ground emesis. Pt reports that abdominal pain came on gradually, sharp in nature, non-radiating and is generalized. Pt reports having 2 similar episodes of abdominal pain in the past, with the last on in 2018, in which he was started on erythromycin pt reports that erythromycin was stopped 10 days ago by .    Pt reports that last BM was yesterday, which was yellow in color. pt reports that their was no bleed with stool or constipation, however that the BM was difficult to pass.     ED course:  Pt was given pain medications and fluids, and d/c home. pt returned to ED with worsening symptoms and was placed on observation unit prior to admission. 55 y/o male with history of esophageal cancer s/p resection in 2016, HTN, cholecystomy s/p 2016 presented to ED complaining of intractable generalized abdominal pain with associated inability to tolerate PO intake (solids and fluids), nausea and vomiting Pt reports that the pain started three days ago (5/16) with multiple episodes of bilious and coffee-ground emesis. Pt reports that abdominal pain came on gradually, sharp in nature, non-radiating and is generalized. Pt reports having 2 similar episodes of abdominal pain in the past, with the last on in 2018, in which he was started on erythromycin pt reports that erythromycin was stopped 10 days ago by Dr jones, who is his oncologist at [facility].    Pt reports that last BM was yesterday, which was yellow in color. pt reports that their was no bleed with stool or constipation, however that the BM was difficult to pass.     ED course:  Pt was given pain medications and fluids, and d/c home. pt returned to ED with worsening symptoms and was placed on observation unit prior to admission. 53 y/o male with history of esophageal cancer s/p resection in 2016, HTN, cholecystomy s/p 2016 presented to ED complaining of intractable generalized abdominal pain with associated inability to tolerate PO intake (solids and fluids), nausea and vomiting Pt reports that the pain started three days ago (5/16) with multiple episodes of bilious and coffee-ground emesis. Pt reports that abdominal pain came on gradually, sharp in nature, non-radiating and is generalized. Pt reports having 2 similar episodes of abdominal pain in the past, with the last on in 2018, in which he was started on erythromycin pt reports that erythromycin was stopped 10 days ago by Dr Tapia who is his oncologist at [facility].  Pt reports that last BM was yesterday, which was yellow in color. pt reports that their was no bleed with stool or constipation, however that the BM was difficult to pass.     ED course:  Pt was given pain medications and fluids, and d/c home. pt returned to ED with worsening symptoms and was placed on observation unit waiting admission for further work-up. 55 y/o male with history of esophageal cancer s/p resection in 2016, HTN, cholecystomy s/p 2016 presented to ED complaining of intractable generalized abdominal pain with associated inability to tolerate PO intake (solids and fluids), nausea and vomiting Pt reports that the pain started three days ago (5/16) with multiple episodes of bilious and coffee-ground emesis. Pt reports that abdominal pain came on gradually, sharp in nature, non-radiating and is generalized. Pt reports having 2 similar episodes of abdominal pain in the past, with the last on in 2018, in which he was started on erythromycin pt reports that erythromycin was stopped 10 days ago by Dr Tapia who is his oncologist at [facility].  Pt reports that last BM was yesterday, which was yellow in color. pt reports that their was no bleed with stool or constipation, however that the BM was difficult to pass.     Pt also states has a mild sob, no cough, no wheeze, no chest pain, no fevers.    ED course:  Pt was given pain medications and fluids, and d/c home. pt returned to ED with worsening symptoms and was placed on observation unit waiting admission for further work-up.

## 2020-05-19 NOTE — CONSULT NOTE ADULT - SUBJECTIVE AND OBJECTIVE BOX
Patient is a 54y old  Male who presents with a chief complaint of intractable nausea and vomiting    HPI: 53 y/o male presents with chief c/o intractable N/V since Sunday, s/p distal esophagectomy with gastric pull-up in 2016 w/o adjuvant chemotherapy or RT. He has been suffering from gastroparesis since and has been intermittently treated with PO Reglan and Erythromycin. His OPT gastroenterologist is Dr. Combs and his last EGD was in done roughly 8 to 9 months ago which showed no evidence of tumor recurrence. CT Angio of chest, abdomen and pelvis negative for obstruction.      REVIEW OF SYSTEMS:  Constitutional: No fever, weight loss or fatigue  ENMT:  No difficulty hearing, tinnitus, vertigo; No sinus or throat pain  Respiratory: No cough, wheezing, chills or hemoptysis  Cardiovascular: No chest pain, palpitations, dizziness or leg swelling  Gastrointestinal: As per HPI. No abdominal or epigastric pain. No hematemesis; No diarrhea or constipation. No melena or hematochezia.  Skin: No itching, burning, rashes or lesions   Musculoskeletal: No joint pain or swelling; No muscle, back or extremity pain  Patient has no cardiopulmonary, peripheral vascular, musculoskeletal, dermatological, neurological, or psychological symptoms or complaints at this time.    PAST MEDICAL & SURGICAL HISTORY:  Esophageal Cancer s/p distal esophagectomy with gastric pull-up in 2016 w/o adjuvant chemotherapy or RT.      FAMILY HISTORY: N/C for GI disease      SOCIAL HISTORY:  Smoking Status: [ ] Current, [ x] Former, [ ] Never  Pack Years: > 20. Stopped smoking and drinking in 2007. + cocaine use stopped everything in 2007.    MEDICATIONS:  MEDICATIONS  (STANDING):  sodium chloride 0.9%. 1000 milliLiter(s) (125 mL/Hr) IV Continuous <Continuous>    MEDICATIONS  (PRN):      Allergies    No Known Allergies    Intolerances        Vital Signs Last 24 Hrs  T(C): 37.3 (19 May 2020 06:48), Max: 37.3 (19 May 2020 06:48)  T(F): 99.1 (19 May 2020 06:48), Max: 99.1 (19 May 2020 06:48)  HR: 76 (19 May 2020 06:48) (68 - 76)  BP: 134/79 (19 May 2020 06:48) (134/79 - 170/91)  BP(mean): --  RR: 20 (19 May 2020 06:48) (16 - 20)  SpO2: 97% (19 May 2020 06:48) (95% - 97%)        PHYSICAL EXAM:    General: Well developed; well nourished; in no acute distress  HEENT: MMM, conjunctiva pink and sclera anicteric.  Lungs: Clear bilaterally.  Cor: RRR S1, S2 only.  Gastrointestinal: Abdomen: Soft, non-tender non-distended; Normal bowel sounds; No rebound or guarding or HSM.  GONZALEZ: Yellow brown stool  Extremities: Normal range of motion, No clubbing, cyanosis or edema  Neurological: Alert and oriented x3  Skin: Warm and dry. No obvious rash      LABS:                        13.6   11.22 )-----------( 278      ( 18 May 2020 22:38 )             41.0     05-18    137  |  98  |  15.0  ----------------------------<  128<H>  3.7   |  24.0  |  0.69    Ca    9.5      18 May 2020 22:38    TPro  8.3  /  Alb  5.2  /  TBili  0.6  /  DBili  x   /  AST  27  /  ALT  25  /  AlkPhos  94  05-18          RADIOLOGY & ADDITIONAL STUDIES:   CT results noted above.

## 2020-05-19 NOTE — H&P ADULT - NSHPSOCIALHISTORY_GEN_ALL_CORE
Alcohol: stopped in 2007. reports history of drinking a 12-pack of beer per day.  Tobacco: quit in 2007. reports smoking 1 PPD for 20 years.  Drugs: denies current illicit drug use. reports past use of marijuana.

## 2020-05-20 ENCOUNTER — RESULT REVIEW (OUTPATIENT)
Age: 55
End: 2020-05-20

## 2020-05-20 LAB
ANION GAP SERPL CALC-SCNC: 13 MMOL/L — SIGNIFICANT CHANGE UP (ref 5–17)
APPEARANCE UR: CLEAR — SIGNIFICANT CHANGE UP
BASOPHILS # BLD AUTO: 0.03 K/UL — SIGNIFICANT CHANGE UP (ref 0–0.2)
BASOPHILS NFR BLD AUTO: 0.3 % — SIGNIFICANT CHANGE UP (ref 0–2)
BILIRUB UR-MCNC: NEGATIVE — SIGNIFICANT CHANGE UP
BUN SERPL-MCNC: 12 MG/DL — SIGNIFICANT CHANGE UP (ref 8–20)
CALCIUM SERPL-MCNC: 8.5 MG/DL — LOW (ref 8.6–10.2)
CHLORIDE SERPL-SCNC: 98 MMOL/L — SIGNIFICANT CHANGE UP (ref 98–107)
CO2 SERPL-SCNC: 24 MMOL/L — SIGNIFICANT CHANGE UP (ref 22–29)
COLOR SPEC: YELLOW — SIGNIFICANT CHANGE UP
CREAT SERPL-MCNC: 0.54 MG/DL — SIGNIFICANT CHANGE UP (ref 0.5–1.3)
DIFF PNL FLD: ABNORMAL
EOSINOPHIL # BLD AUTO: 0 K/UL — SIGNIFICANT CHANGE UP (ref 0–0.5)
EOSINOPHIL NFR BLD AUTO: 0 % — SIGNIFICANT CHANGE UP (ref 0–6)
EPI CELLS # UR: SIGNIFICANT CHANGE UP
GLUCOSE SERPL-MCNC: 114 MG/DL — HIGH (ref 70–99)
GLUCOSE UR QL: NEGATIVE MG/DL — SIGNIFICANT CHANGE UP
HCT VFR BLD CALC: 38.4 % — LOW (ref 39–50)
HGB BLD-MCNC: 12.7 G/DL — LOW (ref 13–17)
IMM GRANULOCYTES NFR BLD AUTO: 0.7 % — SIGNIFICANT CHANGE UP (ref 0–1.5)
KETONES UR-MCNC: ABNORMAL
LEUKOCYTE ESTERASE UR-ACNC: NEGATIVE — SIGNIFICANT CHANGE UP
LYMPHOCYTES # BLD AUTO: 1.54 K/UL — SIGNIFICANT CHANGE UP (ref 1–3.3)
LYMPHOCYTES # BLD AUTO: 16.5 % — SIGNIFICANT CHANGE UP (ref 13–44)
MAGNESIUM SERPL-MCNC: 2 MG/DL — SIGNIFICANT CHANGE UP (ref 1.6–2.6)
MCHC RBC-ENTMCNC: 27 PG — SIGNIFICANT CHANGE UP (ref 27–34)
MCHC RBC-ENTMCNC: 33.1 GM/DL — SIGNIFICANT CHANGE UP (ref 32–36)
MCV RBC AUTO: 81.7 FL — SIGNIFICANT CHANGE UP (ref 80–100)
MONOCYTES # BLD AUTO: 0.81 K/UL — SIGNIFICANT CHANGE UP (ref 0–0.9)
MONOCYTES NFR BLD AUTO: 8.7 % — SIGNIFICANT CHANGE UP (ref 2–14)
NEUTROPHILS # BLD AUTO: 6.9 K/UL — SIGNIFICANT CHANGE UP (ref 1.8–7.4)
NEUTROPHILS NFR BLD AUTO: 73.8 % — SIGNIFICANT CHANGE UP (ref 43–77)
NITRITE UR-MCNC: NEGATIVE — SIGNIFICANT CHANGE UP
PH UR: 6.5 — SIGNIFICANT CHANGE UP (ref 5–8)
PLATELET # BLD AUTO: 235 K/UL — SIGNIFICANT CHANGE UP (ref 150–400)
POTASSIUM SERPL-MCNC: 3.4 MMOL/L — LOW (ref 3.5–5.3)
POTASSIUM SERPL-SCNC: 3.4 MMOL/L — LOW (ref 3.5–5.3)
PROT UR-MCNC: 15 MG/DL
RBC # BLD: 4.7 M/UL — SIGNIFICANT CHANGE UP (ref 4.2–5.8)
RBC # FLD: 13.2 % — SIGNIFICANT CHANGE UP (ref 10.3–14.5)
RBC CASTS # UR COMP ASSIST: ABNORMAL /HPF (ref 0–4)
SODIUM SERPL-SCNC: 135 MMOL/L — SIGNIFICANT CHANGE UP (ref 135–145)
SP GR SPEC: 1.01 — SIGNIFICANT CHANGE UP (ref 1.01–1.02)
UROBILINOGEN FLD QL: NEGATIVE MG/DL — SIGNIFICANT CHANGE UP
WBC # BLD: 9.35 K/UL — SIGNIFICANT CHANGE UP (ref 3.8–10.5)
WBC # FLD AUTO: 9.35 K/UL — SIGNIFICANT CHANGE UP (ref 3.8–10.5)
WBC UR QL: NEGATIVE — SIGNIFICANT CHANGE UP

## 2020-05-20 PROCEDURE — 99233 SBSQ HOSP IP/OBS HIGH 50: CPT | Mod: 25

## 2020-05-20 PROCEDURE — 88305 TISSUE EXAM BY PATHOLOGIST: CPT | Mod: 26

## 2020-05-20 PROCEDURE — 88342 IMHCHEM/IMCYTCHM 1ST ANTB: CPT | Mod: 26

## 2020-05-20 PROCEDURE — 43239 EGD BIOPSY SINGLE/MULTIPLE: CPT

## 2020-05-20 PROCEDURE — 99232 SBSQ HOSP IP/OBS MODERATE 35: CPT

## 2020-05-20 RX ORDER — SUCRALFATE 1 G
1 TABLET ORAL
Refills: 0 | Status: DISCONTINUED | OUTPATIENT
Start: 2020-05-20 | End: 2020-05-22

## 2020-05-20 RX ORDER — METOCLOPRAMIDE HCL 10 MG
10 TABLET ORAL ONCE
Refills: 0 | Status: COMPLETED | OUTPATIENT
Start: 2020-05-20 | End: 2020-05-20

## 2020-05-20 RX ORDER — POTASSIUM CHLORIDE 20 MEQ
10 PACKET (EA) ORAL ONCE
Refills: 0 | Status: COMPLETED | OUTPATIENT
Start: 2020-05-20 | End: 2020-05-20

## 2020-05-20 RX ORDER — HYDRALAZINE HCL 50 MG
10 TABLET ORAL ONCE
Refills: 0 | Status: COMPLETED | OUTPATIENT
Start: 2020-05-20 | End: 2020-05-20

## 2020-05-20 RX ORDER — MORPHINE SULFATE 50 MG/1
3 CAPSULE, EXTENDED RELEASE ORAL EVERY 4 HOURS
Refills: 0 | Status: DISCONTINUED | OUTPATIENT
Start: 2020-05-20 | End: 2020-05-22

## 2020-05-20 RX ORDER — MORPHINE SULFATE 50 MG/1
2 CAPSULE, EXTENDED RELEASE ORAL EVERY 4 HOURS
Refills: 0 | Status: DISCONTINUED | OUTPATIENT
Start: 2020-05-20 | End: 2020-05-22

## 2020-05-20 RX ORDER — ONDANSETRON 8 MG/1
4 TABLET, FILM COATED ORAL ONCE
Refills: 0 | Status: COMPLETED | OUTPATIENT
Start: 2020-05-20 | End: 2020-05-20

## 2020-05-20 RX ORDER — HYDROMORPHONE HYDROCHLORIDE 2 MG/ML
1 INJECTION INTRAMUSCULAR; INTRAVENOUS; SUBCUTANEOUS EVERY 4 HOURS
Refills: 0 | Status: DISCONTINUED | OUTPATIENT
Start: 2020-05-20 | End: 2020-05-20

## 2020-05-20 RX ORDER — ACETAMINOPHEN 500 MG
1000 TABLET ORAL ONCE
Refills: 0 | Status: COMPLETED | OUTPATIENT
Start: 2020-05-20 | End: 2020-05-20

## 2020-05-20 RX ADMIN — HYDROMORPHONE HYDROCHLORIDE 1 MILLIGRAM(S): 2 INJECTION INTRAMUSCULAR; INTRAVENOUS; SUBCUTANEOUS at 05:19

## 2020-05-20 RX ADMIN — SODIUM CHLORIDE 125 MILLILITER(S): 9 INJECTION INTRAMUSCULAR; INTRAVENOUS; SUBCUTANEOUS at 00:35

## 2020-05-20 RX ADMIN — ONDANSETRON 4 MILLIGRAM(S): 8 TABLET, FILM COATED ORAL at 11:05

## 2020-05-20 RX ADMIN — MORPHINE SULFATE 3 MILLIGRAM(S): 50 CAPSULE, EXTENDED RELEASE ORAL at 20:40

## 2020-05-20 RX ADMIN — Medication 10 MILLIGRAM(S): at 02:18

## 2020-05-20 RX ADMIN — SODIUM CHLORIDE 125 MILLILITER(S): 9 INJECTION INTRAMUSCULAR; INTRAVENOUS; SUBCUTANEOUS at 08:35

## 2020-05-20 RX ADMIN — ONDANSETRON 4 MILLIGRAM(S): 8 TABLET, FILM COATED ORAL at 01:14

## 2020-05-20 RX ADMIN — Medication 100 MILLIEQUIVALENT(S): at 16:59

## 2020-05-20 RX ADMIN — Medication 10 MILLIGRAM(S): at 20:40

## 2020-05-20 RX ADMIN — HYDROMORPHONE HYDROCHLORIDE 1 MILLIGRAM(S): 2 INJECTION INTRAMUSCULAR; INTRAVENOUS; SUBCUTANEOUS at 11:05

## 2020-05-20 RX ADMIN — Medication 10 MILLIGRAM(S): at 01:14

## 2020-05-20 RX ADMIN — PANTOPRAZOLE SODIUM 40 MILLIGRAM(S): 20 TABLET, DELAYED RELEASE ORAL at 17:02

## 2020-05-20 RX ADMIN — PANTOPRAZOLE SODIUM 40 MILLIGRAM(S): 20 TABLET, DELAYED RELEASE ORAL at 04:54

## 2020-05-20 RX ADMIN — SODIUM CHLORIDE 125 MILLILITER(S): 9 INJECTION INTRAMUSCULAR; INTRAVENOUS; SUBCUTANEOUS at 19:56

## 2020-05-20 RX ADMIN — SODIUM CHLORIDE 125 MILLILITER(S): 9 INJECTION INTRAMUSCULAR; INTRAVENOUS; SUBCUTANEOUS at 16:58

## 2020-05-20 RX ADMIN — Medication 400 MILLIGRAM(S): at 19:54

## 2020-05-20 NOTE — PROGRESS NOTE ADULT - SUBJECTIVE AND OBJECTIVE BOX
Patient: MIAH GIL [id number] 54y Male                           Internal Medicine Hospitalist Progress Note    Seen in CDU.  Denies complaints.  No pain / nausea / vomiting at present.  On IVF.     ____________________PHYSICAL EXAM:  GENERAL:  NAD Alert and Oriented x 3 Pleasant.   HEENT: NCAT  CARDIOVASCULAR:  S1, S2  LUNGS: CTAB  ABDOMEN:  soft, (-) tenderness, (-) distension, (+) bowel sounds, (-) guarding, (-) rebound (-) rigidity  EXTREMITIES:  no cyanosis / clubbing / edema.   ____________________     VITALS:  Vital Signs Last 24 Hrs  T(C): 36.6 (20 May 2020 11:15), Max: 37.2 (20 May 2020 04:28)  T(F): 97.9 (20 May 2020 11:15), Max: 98.9 (20 May 2020 04:28)  HR: 70 (20 May 2020 11:15) (70 - 95)  BP: 165/89 (20 May 2020 11:15) (149/88 - 182/90)  BP(mean): --  RR: 18 (20 May 2020 11:15) (18 - 20)  SpO2: 96% (20 May 2020 11:15) (96% - 99%) Daily     Daily   CAPILLARY BLOOD GLUCOSE        I&O's Summary    19 May 2020 07:01  -  20 May 2020 07:00  --------------------------------------------------------  IN: 750 mL / OUT: 0 mL / NET: 750 mL          BACKGROUND:  HEALTH ISSUES - PROBLEM Dx:  History of esophageal cancer: History of esophageal cancer  Nausea and vomiting, intractability of vomiting not specified, unspecified vomiting type: Nausea and vomiting, intractability of vomiting not specified, unspecified vomiting type        Allergies    No Known Allergies    Intolerances      PAST MEDICAL & SURGICAL HISTORY:  Esophageal cancer  Hypertension  S/P cholecystectomy        LABS:                        12.7   9.35  )-----------( 235      ( 20 May 2020 07:23 )             38.4     05-20    135  |  98  |  12.0  ----------------------------<  114<H>  3.4<L>   |  24.0  |  0.54    Ca    8.5<L>      20 May 2020 07:23  Mg     2.0     05-    TPro  8.3  /  Alb  5.2  /  TBili  0.6  /  DBili  x   /  AST  27  /  ALT  25  /  AlkPhos  94  05-18    PT/INR - ( 18 May 2020 22:38 )   PT: 12.7 sec;   INR: 1.12 ratio         PTT - ( 18 May 2020 22:38 )  PTT:29.9 sec  LIVER FUNCTIONS - ( 18 May 2020 22:38 )  Alb: 5.2 g/dL / Pro: 8.3 g/dL / ALK PHOS: 94 U/L / ALT: 25 U/L / AST: 27 U/L / GGT: x           Urinalysis Basic - ( 20 May 2020 00:32 )    Color: Yellow / Appearance: Clear / S.010 / pH: x  Gluc: x / Ketone: Large  / Bili: Negative / Urobili: Negative mg/dL   Blood: x / Protein: 15 mg/dL / Nitrite: Negative   Leuk Esterase: Negative / RBC: 3-5 /HPF / WBC Negative   Sq Epi: x / Non Sq Epi: Occasional / Bacteria: x      CARDIAC MARKERS ( 18 May 2020 22:38 )  x     / <0.01 ng/mL / x     / x     / x              MEDICATIONS:  MEDICATIONS  (STANDING):  amLODIPine   Tablet 5 milliGRAM(s) Oral daily  pantoprazole  Injectable 40 milliGRAM(s) IV Push every 12 hours  sodium chloride 0.9%. 1000 milliLiter(s) (125 mL/Hr) IV Continuous <Continuous>    MEDICATIONS  (PRN):  HYDROmorphone  Injectable 1 milliGRAM(s) IV Push every 4 hours PRN Severe Pain (7 - 10)  ondansetron Injectable 4 milliGRAM(s) IV Push every 6 hours PRN Nausea and/or Vomiting

## 2020-05-20 NOTE — BRIEF OPERATIVE NOTE - OPERATION/FINDINGS
Patient ID: Nahed Stokes is a 81 y o  male  Assessment/Plan:    Parkinson's disease [facility])  Patient is a 81year-old left handed man who presents to establish care for his Parkinson's disease with symptom onset around 2013 (65yo) with right hand tremor and right shoulder pain  The patient's course is complicated by motor fluctuations with wearing off and possible dyskinesias  He found good tremor reduction with the addition of amantadine but is having some ankle swelling as a side effect  - Will decrease amantadine to 1 tab TID and monitor the ankle swelling   - Consider switching QHS sinemet to CR next time   - Pt with note worthy anxiety and depression (anhedonia ect) will try another SSRI in the future    -Would resume PT for gait and balance once able from a an insurance perspective       There are no diagnoses linked to this encounter  Total time spent today 60 minutes  Greater than 50% of total time was spent with the patient and / or family counseling and / or coordination of care    Subjective:    HPI    I had the pleasure of seeing your patient, Nahed Stokes in the [facility] at the [address] [place] [address] Neuroscience  As you know, Mr Darrick Hahn  is a 81y o  year old left handed male who presents to establish care for his Parkinson's disease  The patient reports that his symptoms began with right hand tremor and right shoulder pain around 2013  He has been evaluated by multiple neurologists in the past including most recently at the movement [address] [place] [address]  He is still following with a local, general neurologist as well                                   Previous medication trials:   - Sinemet 1 tab 5 times per day @ 8 11 3:30 7:30 QHS (current)  - Amantadine 1 tab 4 times per day  (ankle swelling) (current)   - Nupro patch: dizzy after one dose   - Mirapex: 2-3 doses caused leg swelling   - Azilect didn't want to try with his crohn's medications    Exposure to neuroleptics, pesticides, toxins, metals: No    Regarding motor symptoms:   Tremor: presenting symptom  Under decent control now  Variable  Slowness: very slow with everything  Stiffness: considerable on the right   Dystonia: right arm "locks" less often now  Changes in gait: slow  Balance issues   Falls: Yes, last week when on vacation  Once per week  PT helped  Freezing: not clear   Trouble with swallowing: no   Clumsiness/dexterity: yes     Regarding non-motor symptoms:   Anosmia: Yes  Constipation: yes  Urinary sx: frequency over night  Lightheadedness: sometimes  Changes in Mood: frustrated  No to much depression or anxiety  Trouble with sleep: poor  REM behavior Disorder: talk in your sleep  Memory trouble: No significant   Hallucinations: no     Regarding medication complication:   Wearing off: "I can tell when its time" lasts about 3 hours  Dyskinesias: Yes  Better with amantadine        Time since last dose: 2hr  Medications and timing:  - Sinemet 1 tab 5 times per day @ 8 11 3:30 7:30 QHS  - Amantadine 1 tab 4 times per day  (ankle swelling)      Objective:    Blood pressure 108/64, height 5' 11" (1 803 m), weight 73 5 kg (162 lb)  Physical Exam    Neurological Exam    GENERAL MEDICAL EXAMINATION:  General appearance: alert, in no apparent distress  Appropriately dressed and groomed  Conversing and interacting appropriately  HEENT: Sclera are non-injected  Mucous membranes are moist    CV: Heart rate is regular  Pulmonary: Breathing comfortably on RA   Musculoskeletal: No evidence of deformities  No contractures  No Edema  Skin: No visible rashes  Warm and well perfused  NEUROLOGICAL EXAMINATION:  Mental Status: Alert and oriented to person place and time  The patient has some difficulty recalling the details of his history, specifically regarding timing of prior medication trials and symptom onset  Attentive: able to name JUAN DAVID backward without difficulty   Language is fluent and appropriate with normal prosody  There were no paraphasic errors  Speech was mildly dysarthric  There was no pallilalia noted  Able to follow both midline and appendicular commands  Cranial Nerves:  II:  Visual fields full to finger counting  III-IV-VI: Full and conjugate, extra-ocular eye movements in all directions of gaze  No nystagmus or diplopia  Normal horizontal and vertical saccades (initiation and velocity)  Intact smooth pursuit  V: Symmetric perception of LT in V1-3  VII:   Some decreased activation of the left face  IX-X: Palate elevates symmetrically  XII: No tongue deviation or fasciculations    Motor: Overall  mildly reduced spontaneous movements  Normal muscle bulk throughout  Mild axial rigidity  Mild hypomimia was noted  Speech was not hypophonic  There were no dyskinesias or dystonia noted  No pronator drift or rebound  No asterixis or myoclonus noted      Rigidity - Upper Extremity (Right)  3   Rigdity - Upper Extremity (Left)   2   Rigidity - Lower Extremity (Right)  3   Rigidity - Lower Extremity (Left)   3   Finger Taps (Right)   3   Finger Taps (Left)   2   Hand Movement (Right)  3   Hand Movement (Left)   3   Pronation/Supination (Right)  2   Pronation/Supination (Left)   2   Toe Tapping (Right) 3   Toe Tapping (Left) 3   Leg Agility (Right)  2   Leg Agility (Left)   2   Postural Tremor (Right) 0   Postural Tremor (Left) 0   Kinetic Tremor (Right)  2cm   Kinetic Tremor (Left)  1cm   Rest tremor amplitude RUE 4cm   Rest tremor amplitude LUE 0   Rest tremor amplitude RLE 2cm   Reset tremor amplitude LLE 0   Lip/Jaw Tremor  0   Consistency of tremor >75%     Strength:    Delt Bi    Tri  WE  FE    FF          C5   C6   C7   C6   C7 C8/T1   R 5    5      5      5      5     5         L 5    5      5      5      5     5              IP   Quad [place]st  TA      L2   L3   L4-S1    L4    R 5     5      5          5     L 5     5     5           5       Reflexes:  2+ and symmetric throughout    Sensory: normal and symmetric perception of light touch and temperature  Coordination: Finger to nose without dysmetria bilaterally  No intention tremor  Gait: Able to rise from a chair without the use of arms - with some trouble  Posture was mild to moderately stooped  Narrow-base and stable stance  Normal initiation  Reduced stride length, step height and arm swing  The patient carries his right arm in a flexed posture  Turn completed in 4 steps  ROS:    Review of Systems   Constitutional: Positive for appetite change and unexpected weight change  Negative for fever  HENT: Positive for voice change  Negative for hearing loss, tinnitus and trouble swallowing  Eyes: Negative  Negative for photophobia and pain  Respiratory: Negative  Negative for shortness of breath  Cardiovascular: Negative  Negative for palpitations  Gastrointestinal: Negative  Negative for nausea and vomiting  Endocrine: Negative  Negative for cold intolerance and heat intolerance  Genitourinary: Positive for frequency and urgency  Negative for dysuria  Musculoskeletal: Negative  Negative for myalgias and neck pain  Skin: Negative  Negative for rash  Neurological: Positive for speech difficulty  Negative for dizziness, tremors, seizures, syncope, facial asymmetry, weakness, light-headedness, numbness and headaches  Clumsiness  Balance problems  Trouble falling asleep  Waking up at night  Facial numbness  Twitching    Hematological: Negative  Does not bruise/bleed easily  Psychiatric/Behavioral: Negative  Negative for confusion, hallucinations and sleep disturbance  Minor erythema at the surgical anastamosis in the chest at 25 cm.  No recurrent tumor or PUD.   No stricture.  No gastric dilitation or pyloric stenosis.  Normal Duodenum.

## 2020-05-20 NOTE — BRIEF OPERATIVE NOTE - NSICDXBRIEFPREOP_GEN_ALL_CORE_FT
PRE-OP DIAGNOSIS:  H/O esophagectomy 20-May-2020 16:11:56  Mo Rees  Nausea & vomiting 20-May-2020 16:11:38  Mo Rees

## 2020-05-20 NOTE — PROGRESS NOTE ADULT - SUBJECTIVE AND OBJECTIVE BOX
Patient is a 54y old  Male who presents with a chief complaint of nausea/vomitting (19 May 2020 08:34)      HPI:  53 y/o male with history of esophageal cancer s/p resection in 2016, HTN, cholecystomy s/p 2016. Still with upper abdominal pain.  Vomited again overnight- bilious material           REVIEW OF SYSTEMS:  Constitutional: No fever, weight loss or fatigue  ENMT:  No difficulty hearing, tinnitus, vertigo; No sinus or throat pain  Respiratory: No cough, wheezing, chills or hemoptysis  Cardiovascular: No chest pain, palpitations, dizziness or leg swelling  Gastrointestinal: Upper  abdominal  pain. + nausea, vomiting ; No diarrhea or constipation. No melena or hematochezia.  Skin: No itching, burning, rashes or lesions   Musculoskeletal: No joint pain or swelling; No muscle, back or extremity pain    PAST MEDICAL & SURGICAL HISTORY:  Esophageal cancer  Hypertension  S/P cholecystectomy      FAMILY HISTORY:  FH: HTN (hypertension)      SOCIAL HISTORY:  Smoking Status: [ ] Current, [ ] Former, [ ] Never  Pack Years:  [  ] EtOH-no  [  ] IVDA    MEDICATIONS:  MEDICATIONS  (STANDING):  amLODIPine   Tablet 5 milliGRAM(s) Oral daily  pantoprazole  Injectable 40 milliGRAM(s) IV Push every 12 hours  sodium chloride 0.9%. 1000 milliLiter(s) (125 mL/Hr) IV Continuous <Continuous>    MEDICATIONS  (PRN):  HYDROmorphone  Injectable 1 milliGRAM(s) IV Push every 4 hours PRN Severe Pain (7 - 10)  ondansetron Injectable 4 milliGRAM(s) IV Push every 6 hours PRN Nausea and/or Vomiting      Allergies    No Known Allergies    Intolerances        Vital Signs Last 24 Hrs  T(C): 36.7 (20 May 2020 07:58), Max: 37.2 (20 May 2020 04:28)  T(F): 98 (20 May 2020 07:58), Max: 98.9 (20 May 2020 04:28)  HR: 89 (20 May 2020 07:58) (71 - 95)  BP: 182/90 (20 May 2020 07:58) (149/88 - 182/90)  BP(mean): --  RR: 18 (20 May 2020 07:58) (18 - 20)  SpO2: 96% (20 May 2020 07:58) (96% - 99%)    05-19 @ 07:01  -  05-20 @ 07:00  --------------------------------------------------------  IN: 750 mL / OUT: 0 mL / NET: 750 mL          PHYSICAL EXAM:    General: Well developed; well nourished; in no acute distress  HEENT: MMM, conjunctiva and sclera clear  H- RRR  L- CTA  Gastrointestinal: Soft, non-tender non-distended; Normal bowel sounds; No rebound or guarding  Extremities: Normal range of motion, No clubbing, cyanosis or edema  Neurological: Alert and oriented x3  Skin: Warm and dry. No obvious rash      LABS:                        12.7   9.35  )-----------( 235      ( 20 May 2020 07:23 )             38.4     20 May 2020 07:23    135    |  98     |  12.0   ----------------------------<  114    3.4     |  24.0   |  0.54     Ca    8.5        20 May 2020 07:23  Mg     2.0       20 May 2020 07:23    TPro  8.3    /  Alb  5.2    /  TBili  0.6    /  DBili  x      /  AST  27     /  ALT  25     /  AlkPhos  94     / Amylase x      /Lipase 10     18 May 2020 22:38              RADIOLOGY & ADDITIONAL STUDIES:     < from: CT Angio Abdomen and Pelvis w/ IV Cont (05.19.20 @ 01:09) >  MPRESSION:    No evidence of aortic dissection, aortic aneurysm or mural hematoma.     Small patchy groundglass airspace opacities at the lung bases which are not significantly changed on the prior study dated 5/18/2020. This may reflect atypical/viral pneumonia and/or small airways disease.    Status post gastric pull-through. No small bowel obstruction or active bowel inflammation.                MARGRET HERNANDEZ M.D., ATTENDING RADIOLOGIST  This document has been electronically signed.May 19 2020  2:08AM        < end of copied text >

## 2020-05-20 NOTE — BRIEF OPERATIVE NOTE - COMMENTS
Tolerated well.  Normal post op anatomy.  No stricture or PUD or recurrent tumor.  Trial of Carafate and diet advancement.

## 2020-05-21 DIAGNOSIS — R10.10 UPPER ABDOMINAL PAIN, UNSPECIFIED: ICD-10-CM

## 2020-05-21 LAB
ANION GAP SERPL CALC-SCNC: 18 MMOL/L — HIGH (ref 5–17)
BUN SERPL-MCNC: 11 MG/DL — SIGNIFICANT CHANGE UP (ref 8–20)
CALCIUM SERPL-MCNC: 8.8 MG/DL — SIGNIFICANT CHANGE UP (ref 8.6–10.2)
CHLORIDE SERPL-SCNC: 96 MMOL/L — LOW (ref 98–107)
CO2 SERPL-SCNC: 20 MMOL/L — LOW (ref 22–29)
CREAT SERPL-MCNC: 0.6 MG/DL — SIGNIFICANT CHANGE UP (ref 0.5–1.3)
GLUCOSE SERPL-MCNC: 115 MG/DL — HIGH (ref 70–99)
HCT VFR BLD CALC: 39.8 % — SIGNIFICANT CHANGE UP (ref 39–50)
HGB BLD-MCNC: 13.4 G/DL — SIGNIFICANT CHANGE UP (ref 13–17)
MCHC RBC-ENTMCNC: 27.3 PG — SIGNIFICANT CHANGE UP (ref 27–34)
MCHC RBC-ENTMCNC: 33.7 GM/DL — SIGNIFICANT CHANGE UP (ref 32–36)
MCV RBC AUTO: 81.2 FL — SIGNIFICANT CHANGE UP (ref 80–100)
PLATELET # BLD AUTO: 283 K/UL — SIGNIFICANT CHANGE UP (ref 150–400)
POTASSIUM SERPL-MCNC: 3.1 MMOL/L — LOW (ref 3.5–5.3)
POTASSIUM SERPL-SCNC: 3.1 MMOL/L — LOW (ref 3.5–5.3)
RBC # BLD: 4.9 M/UL — SIGNIFICANT CHANGE UP (ref 4.2–5.8)
RBC # FLD: 13.3 % — SIGNIFICANT CHANGE UP (ref 10.3–14.5)
SODIUM SERPL-SCNC: 134 MMOL/L — LOW (ref 135–145)
WBC # BLD: 10.61 K/UL — HIGH (ref 3.8–10.5)
WBC # FLD AUTO: 10.61 K/UL — HIGH (ref 3.8–10.5)

## 2020-05-21 PROCEDURE — 99232 SBSQ HOSP IP/OBS MODERATE 35: CPT

## 2020-05-21 PROCEDURE — 99233 SBSQ HOSP IP/OBS HIGH 50: CPT

## 2020-05-21 RX ORDER — POTASSIUM CHLORIDE 20 MEQ
10 PACKET (EA) ORAL ONCE
Refills: 0 | Status: COMPLETED | OUTPATIENT
Start: 2020-05-21 | End: 2020-05-21

## 2020-05-21 RX ORDER — METOCLOPRAMIDE HCL 10 MG
10 TABLET ORAL ONCE
Refills: 0 | Status: COMPLETED | OUTPATIENT
Start: 2020-05-21 | End: 2020-05-21

## 2020-05-21 RX ORDER — METOCLOPRAMIDE HCL 10 MG
10 TABLET ORAL EVERY 6 HOURS
Refills: 0 | Status: DISCONTINUED | OUTPATIENT
Start: 2020-05-21 | End: 2020-05-22

## 2020-05-21 RX ORDER — POTASSIUM CHLORIDE 20 MEQ
40 PACKET (EA) ORAL EVERY 4 HOURS
Refills: 0 | Status: DISCONTINUED | OUTPATIENT
Start: 2020-05-21 | End: 2020-05-21

## 2020-05-21 RX ORDER — HYDRALAZINE HCL 50 MG
10 TABLET ORAL ONCE
Refills: 0 | Status: COMPLETED | OUTPATIENT
Start: 2020-05-21 | End: 2020-05-21

## 2020-05-21 RX ADMIN — MORPHINE SULFATE 3 MILLIGRAM(S): 50 CAPSULE, EXTENDED RELEASE ORAL at 02:29

## 2020-05-21 RX ADMIN — PANTOPRAZOLE SODIUM 40 MILLIGRAM(S): 20 TABLET, DELAYED RELEASE ORAL at 04:28

## 2020-05-21 RX ADMIN — MORPHINE SULFATE 3 MILLIGRAM(S): 50 CAPSULE, EXTENDED RELEASE ORAL at 06:52

## 2020-05-21 RX ADMIN — Medication 10 MILLIGRAM(S): at 04:50

## 2020-05-21 RX ADMIN — MORPHINE SULFATE 2 MILLIGRAM(S): 50 CAPSULE, EXTENDED RELEASE ORAL at 17:23

## 2020-05-21 RX ADMIN — MORPHINE SULFATE 2 MILLIGRAM(S): 50 CAPSULE, EXTENDED RELEASE ORAL at 11:58

## 2020-05-21 RX ADMIN — Medication 1 GRAM(S): at 23:07

## 2020-05-21 RX ADMIN — SODIUM CHLORIDE 125 MILLILITER(S): 9 INJECTION INTRAMUSCULAR; INTRAVENOUS; SUBCUTANEOUS at 17:22

## 2020-05-21 RX ADMIN — Medication 10 MILLIGRAM(S): at 23:07

## 2020-05-21 RX ADMIN — PANTOPRAZOLE SODIUM 40 MILLIGRAM(S): 20 TABLET, DELAYED RELEASE ORAL at 17:24

## 2020-05-21 RX ADMIN — Medication 100 MILLIEQUIVALENT(S): at 14:42

## 2020-05-21 RX ADMIN — Medication 10 MILLIGRAM(S): at 17:23

## 2020-05-21 RX ADMIN — MORPHINE SULFATE 3 MILLIGRAM(S): 50 CAPSULE, EXTENDED RELEASE ORAL at 21:24

## 2020-05-21 RX ADMIN — Medication 10 MILLIGRAM(S): at 11:59

## 2020-05-21 RX ADMIN — Medication 100 MILLIEQUIVALENT(S): at 17:23

## 2020-05-21 RX ADMIN — SODIUM CHLORIDE 125 MILLILITER(S): 9 INJECTION INTRAMUSCULAR; INTRAVENOUS; SUBCUTANEOUS at 04:27

## 2020-05-21 NOTE — PROGRESS NOTE ADULT - PROBLEM SELECTOR PLAN 2
unclear etiology but possibly just post op phenomena or gastric distention which is much less likely

## 2020-05-21 NOTE — PROGRESS NOTE ADULT - SUBJECTIVE AND OBJECTIVE BOX
Patient: MIAH GIL [id number] 54y Male                           Internal Medicine Hospitalist Progress Note    Ambulating independently.  Reports nausea / vomiting / abdominal pain improving.  Tolerating some po.    ____________________PHYSICAL EXAM:  GENERAL:  NAD Alert and Oriented x 3 Pleasant.   HEENT: NCAT  CARDIOVASCULAR:  S1, S2  LUNGS: CTAB  ABDOMEN:  soft, (-) tenderness, (-) distension, (+) bowel sounds, (-) guarding, (-) rebound (-) rigidity  EXTREMITIES:  no cyanosis / clubbing / edema.   ____________________     VITALS:  Vital Signs Last 24 Hrs  T(C): 36.9 (21 May 2020 08:34), Max: 37.3 (20 May 2020 19:58)  T(F): 98.5 (21 May 2020 08:34), Max: 99.2 (20 May 2020 19:58)  HR: 100 (21 May 2020 08:34) (73 - 100)  BP: 162/88 (21 May 2020 08:34) (135/75 - 181/93)  BP(mean): 122 (21 May 2020 04:56) (122 - 122)  RR: 18 (21 May 2020 08:34) (16 - 19)  SpO2: 97% (21 May 2020 08:34) (95% - 99%) Daily     Daily   CAPILLARY BLOOD GLUCOSE        I&O's Summary      LABS:                        13.4   10.61 )-----------( 283      ( 21 May 2020 08:00 )             39.8     05-21    134<L>  |  96<L>  |  11.0  ----------------------------<  115<H>  3.1<L>   |  20.0<L>  |  0.60    Ca    8.8      21 May 2020 08:00  Mg     2.0     05-20          Urinalysis Basic - ( 20 May 2020 00:32 )    Color: Yellow / Appearance: Clear / S.010 / pH: x  Gluc: x / Ketone: Large  / Bili: Negative / Urobili: Negative mg/dL   Blood: x / Protein: 15 mg/dL / Nitrite: Negative   Leuk Esterase: Negative / RBC: 3-5 /HPF / WBC Negative   Sq Epi: x / Non Sq Epi: Occasional / Bacteria: x              MEDICATIONS:  amLODIPine   Tablet 5 milliGRAM(s) Oral daily  metoclopramide Injectable 10 milliGRAM(s) IV Push every 6 hours  morphine  - Injectable 2 milliGRAM(s) IV Push every 4 hours PRN  morphine  - Injectable 3 milliGRAM(s) IV Push every 4 hours PRN  ondansetron Injectable 4 milliGRAM(s) IV Push every 6 hours PRN  pantoprazole  Injectable 40 milliGRAM(s) IV Push every 12 hours  sodium chloride 0.9%. 1000 milliLiter(s) IV Continuous <Continuous>  sucralfate suspension 1 Gram(s) Oral four times a day

## 2020-05-21 NOTE — PATIENT PROFILE ADULT - DISASTER - NSASFALLATTEMPTOOB_GEN_A_NUR
Contacted mom in regards to an e advice message. Pt was seen for reflux on 2/4/20 and had a follow up on 2/20/20. Pts symptoms resolved after a month of being on omeprazole 20mg. Mom states pt has been off omeprazole for about a month now. Her stomach/lower chest is bothering her again when she wakes up in the mornings for the last few weeks. No vomiting in the mornings.  Initially she would complain when she ran or jumped, and sometimes in the mornings. Mom is wondering if Annika should go back on the omeprazole. They are currently out of the medication. She is eating well. She stooling daily. No other illness. Please advise. Thank you!    Last WCE 12/20/19   no

## 2020-05-21 NOTE — PROGRESS NOTE ADULT - PROBLEM SELECTOR PLAN 1
possible intermittant impaired gastric emptying. Will try reglan which apparently has helped in the past with clear liquids.

## 2020-05-21 NOTE — PROGRESS NOTE ADULT - SUBJECTIVE AND OBJECTIVE BOX
Pt seen and examined f/u for lower chest and subxyphoid pain, nausea and vomiting.    EGD yesterday essentially unremarkable except post op changes. Still notes nausea immediately after any PO even pills and then regurgitation so still has not eaten. Still with same vague [ain.    REVIEW OF SYSTEMS:    CONSTITUTIONAL: No fever, weight loss, or fatigue  EYES: No eye pain, visual disturbances, or discharge  ENMT:  No difficulty hearing, tinnitus, vertigo; No sinus or throat pain  RESPIRATORY: No cough, wheezing, chills or hemoptysis; No shortness of breath  CARDIOVASCULAR: No chest pain, palpitations, dizziness, or leg swelling  GASTROINTESTINAL: as above    MEDICATIONS:  MEDICATIONS  (STANDING):  amLODIPine   Tablet 5 milliGRAM(s) Oral daily  pantoprazole  Injectable 40 milliGRAM(s) IV Push every 12 hours  sodium chloride 0.9%. 1000 milliLiter(s) (125 mL/Hr) IV Continuous <Continuous>  sucralfate suspension 1 Gram(s) Oral four times a day    MEDICATIONS  (PRN):  morphine  - Injectable 2 milliGRAM(s) IV Push every 4 hours PRN Mild to moderate pain  morphine  - Injectable 3 milliGRAM(s) IV Push every 4 hours PRN For severe pain  ondansetron Injectable 4 milliGRAM(s) IV Push every 6 hours PRN Nausea and/or Vomiting      Allergies    No Known Allergies    Intolerances        Vital Signs Last 24 Hrs  T(C): 36.7 (21 May 2020 06:07), Max: 37.3 (20 May 2020 19:58)  T(F): 98.1 (21 May 2020 06:07), Max: 99.2 (20 May 2020 19:58)  HR: 91 (21 May 2020 06:07) (70 - 91)  BP: 135/75 (21 May 2020 06:07) (135/75 - 182/90)  BP(mean): 122 (21 May 2020 04:56) (122 - 122)  RR: 19 (21 May 2020 06:07) (16 - 19)  SpO2: 96% (21 May 2020 06:07) (95% - 99%)      PHYSICAL EXAM:    General: Well developed; well nourished; in no acute distress  HEENT: MMM, conjunctiva and sclera clear  Gastrointestinal:Abdomen: Soft non-tender non-distended; Normal bowel sounds; No hepatosplenomegaly  Extremities: no cyanosis, clubbing or edema.  Skin: Warm and dry. No obvious rash    LABS:      CBC Full  -  ( 20 May 2020 07:23 )  WBC Count : 9.35 K/uL  RBC Count : 4.70 M/uL  Hemoglobin : 12.7 g/dL  Hematocrit : 38.4 %  Platelet Count - Automated : 235 K/uL  Mean Cell Volume : 81.7 fl  Mean Cell Hemoglobin : 27.0 pg  Mean Cell Hemoglobin Concentration : 33.1 gm/dL  Auto Neutrophil # : 6.90 K/uL  Auto Lymphocyte # : 1.54 K/uL  Auto Monocyte # : 0.81 K/uL  Auto Eosinophil # : 0.00 K/uL  Auto Basophil # : 0.03 K/uL  Auto Neutrophil % : 73.8 %  Auto Lymphocyte % : 16.5 %  Auto Monocyte % : 8.7 %  Auto Eosinophil % : 0.0 %  Auto Basophil % : 0.3 %        135  |  98  |  12.0  ----------------------------<  114<H>  3.4<L>   |  24.0  |  0.54    Ca    8.5<L>      20 May 2020 07:23  Mg     2.0                 Urinalysis Basic - ( 20 May 2020 00:32 )    Color: Yellow / Appearance: Clear / S.010 / pH: x  Gluc: x / Ketone: Large  / Bili: Negative / Urobili: Negative mg/dL   Blood: x / Protein: 15 mg/dL / Nitrite: Negative   Leuk Esterase: Negative / RBC: 3-5 /HPF / WBC Negative   Sq Epi: x / Non Sq Epi: Occasional / Bacteria: x

## 2020-05-22 ENCOUNTER — TRANSCRIPTION ENCOUNTER (OUTPATIENT)
Age: 55
End: 2020-05-22

## 2020-05-22 VITALS
TEMPERATURE: 98 F | RESPIRATION RATE: 16 BRPM | HEART RATE: 77 BPM | DIASTOLIC BLOOD PRESSURE: 83 MMHG | SYSTOLIC BLOOD PRESSURE: 142 MMHG | OXYGEN SATURATION: 97 %

## 2020-05-22 PROBLEM — Z00.00 ENCOUNTER FOR PREVENTIVE HEALTH EXAMINATION: Status: ACTIVE | Noted: 2020-05-22

## 2020-05-22 LAB — SURGICAL PATHOLOGY STUDY: SIGNIFICANT CHANGE UP

## 2020-05-22 PROCEDURE — 99239 HOSP IP/OBS DSCHRG MGMT >30: CPT

## 2020-05-22 PROCEDURE — 99233 SBSQ HOSP IP/OBS HIGH 50: CPT

## 2020-05-22 RX ORDER — METOCLOPRAMIDE HCL 10 MG
10 TABLET ORAL
Qty: 0 | Refills: 0 | DISCHARGE
Start: 2020-05-22

## 2020-05-22 RX ORDER — AMLODIPINE BESYLATE 2.5 MG/1
5 TABLET ORAL ONCE
Refills: 0 | Status: COMPLETED | OUTPATIENT
Start: 2020-05-22 | End: 2020-05-22

## 2020-05-22 RX ORDER — SUCRALFATE 1 G
10 TABLET ORAL
Qty: 0 | Refills: 0 | DISCHARGE
Start: 2020-05-22

## 2020-05-22 RX ORDER — METOCLOPRAMIDE HCL 10 MG
0 TABLET ORAL
Qty: 0 | Refills: 0 | DISCHARGE

## 2020-05-22 RX ADMIN — MORPHINE SULFATE 3 MILLIGRAM(S): 50 CAPSULE, EXTENDED RELEASE ORAL at 02:31

## 2020-05-22 RX ADMIN — Medication 1 GRAM(S): at 05:38

## 2020-05-22 RX ADMIN — SODIUM CHLORIDE 125 MILLILITER(S): 9 INJECTION INTRAMUSCULAR; INTRAVENOUS; SUBCUTANEOUS at 02:31

## 2020-05-22 RX ADMIN — AMLODIPINE BESYLATE 5 MILLIGRAM(S): 2.5 TABLET ORAL at 10:03

## 2020-05-22 RX ADMIN — PANTOPRAZOLE SODIUM 40 MILLIGRAM(S): 20 TABLET, DELAYED RELEASE ORAL at 05:39

## 2020-05-22 RX ADMIN — AMLODIPINE BESYLATE 5 MILLIGRAM(S): 2.5 TABLET ORAL at 05:38

## 2020-05-22 RX ADMIN — Medication 10 MILLIGRAM(S): at 05:38

## 2020-05-22 NOTE — PROGRESS NOTE ADULT - ASSESSMENT
53 y/o male with history of esophageal cancer s/p resection in 2016, HTN, cholecystomy s/p 2016 presented to ED complaining of intractable generalized abdominal pain with associated inability to tolerate PO intake (solids and fluids), nausea and vomiting Pt reports that the pain started three days ago (5/16) with multiple episodes of bilious and coffee-ground emesis. Pt reports that abdominal pain came on gradually, sharp in nature, non-radiating and is generalized. Pt reports having 2 similar episodes of abdominal pain in the past, with the last on in 2018, in which he was started on erythromycin pt reports that erythromycin was stopped 10 days ago by Dr [place] [facility] [place]  Pt was given pain medications and fluids, and d/c home.  He returned to ED with worsening symptoms.  s/p EGD showing: Minor erythema at the surgical anastamosis in the chest at 25 cm.  No recurrent tumor or PUD.   No stricture.  No gastric dilitation or pyloric stenosis.  Normal Duodenum.    # Intractable N/V likely Gastroparesis - seen by GI.  IVF, clear liquid diet.  Advance as tolerated  Antiemetics prn.  PPI.  EGD per GI.  # Abdominal Pain - review of [facility]SI - pt had been in Suboxone in near past.  Counselled pt on avoidance of opiates if possible.    # Mild leukocytosis - pt is afebrile, likely reactive from above - will trend CBC and monitor  # HTN - monitor bp - continue on amlodipine 5mg once daily  # Prophylactic measures- DVT ppx: Lovenox 40mg SQ daily - GI ppx: on PPI
55 y/o male with history of esophageal cancer s/p resection in 2016, HTN, cholecystomy s/p 2016 presented to ED complaining of intractable generalized abdominal pain with associated inability to tolerate PO intake (solids and fluids), nausea and vomiting Pt reports that the pain started three days ago (5/16) with multiple episodes of bilious and coffee-ground emesis. Pt reports that abdominal pain came on gradually, sharp in nature, non-radiating and is generalized. Pt reports having 2 similar episodes of abdominal pain in the past, with the last on in 2018, in which he was started on erythromycin pt reports that erythromycin was stopped 10 days ago by Dr Tapia who is his oncologist at [facility] [place].  Pt was given pain medications and fluids, and d/c home.  He returned to ED with worsening symptoms.      # Intractable N/V likely Gastroparesis 0 seen by GI.  IVF, clear liquid diet.  Antiemetics prn.  PPI.  EGD per GI.  # Mild leukocytosis - pt is afebrile, likely reactive from above - will trend CBC and monitor  # HTN - monitor bp - continue on amlodipine 5mg once daily  # Prophylactic measures- DVT ppx: Lovenox 40mg SQ daily - GI ppx: on PPI
55 y/o male with history of esophageal cancer s/p resection in 2016, HTN, cholecystomy s/p 2016 presented to ED complaining of intractable generalized abdominal pain with associated inability to tolerate PO intake (solids and fluids), nausea and vomiting Pt reports that the pain started three days ago (5/16) with multiple episodes of bilious and coffee-ground emesis. Pt reports that abdominal pain came on gradually, sharp in nature, non-radiating and is generalized. Pt reports having 2 similar episodes of abdominal pain in the past, with the last on in 2018, in which he was started on erythromycin pt reports that erythromycin was stopped 10 days ago by Dr Tapia who is his oncologist at [facility] [place].  Pt was given pain medications and fluids, and d/c home.  He returned to ED with worsening symptoms.  s/p EGD showing: Minor erythema at the surgical anastamosis in the chest at 25 cm.  No recurrent tumor or PUD.   No stricture.  No gastric dilitation or pyloric stenosis.  Normal Duodenum.    # Intractable N/V likely Gastroparesis - seen by GI.  Tolerated regular diet. Discussed with GI, will continue Reglan 10 mg TID and Carafate, follow up with private GI as outpatient.   # Abdominal Pain - review of [facility]SI - pt had been in Suboxone in near past.  Counselled pt on avoidance of opiates if possible.    # Mild leukocytosis - pt is afebrile, likely reactive from above -  # HTN - continue on amlodipine 5mg once daily    Dispo: DC home

## 2020-05-22 NOTE — PROGRESS NOTE ADULT - SUBJECTIVE AND OBJECTIVE BOX
CC: Nausea/Vomiting        INTERVAL HPI/OVERNIGHT EVENTS: Patient seen and examined    Vital Signs Last 24 Hrs  T(C): 36.7 (22 May 2020 07:46), Max: 36.8 (21 May 2020 15:54)  T(F): 98 (22 May 2020 07:46), Max: 98.3 (21 May 2020 15:54)  HR: 75 (22 May 2020 07:46) (75 - 81)  BP: 162/83 (22 May 2020 07:46) (162/83 - 169/89)  BP(mean): --  RR: 18 (22 May 2020 07:46) (16 - 18)  SpO2: 98% (22 May 2020 07:46) (95% - 98%)  I&O's Detail                                13.4   10.61 )-----------( 283      ( 21 May 2020 08:00 )             39.8     21 May 2020 08:00    134    |  96     |  11.0   ----------------------------<  115    3.1     |  20.0   |  0.60     Ca    8.8        21 May 2020 08:00        CAPILLARY BLOOD GLUCOSE              MEDICATIONS  (STANDING):  amLODIPine   Tablet 5 milliGRAM(s) Oral daily  metoclopramide Injectable 10 milliGRAM(s) IV Push every 6 hours  pantoprazole  Injectable 40 milliGRAM(s) IV Push every 12 hours  sucralfate suspension 1 Gram(s) Oral four times a day    MEDICATIONS  (PRN):  morphine  - Injectable 2 milliGRAM(s) IV Push every 4 hours PRN Mild to moderate pain  morphine  - Injectable 3 milliGRAM(s) IV Push every 4 hours PRN For severe pain  ondansetron Injectable 4 milliGRAM(s) IV Push every 6 hours PRN Nausea and/or Vomiting      RADIOLOGY & ADDITIONAL TESTS: CC: Nausea/Vomiting        INTERVAL HPI/OVERNIGHT EVENTS: Patient seen and examined. Ate chicken salad last night and ate breakfast without difficulty. Ambulates without difficulty. Wants to go home.     Vital Signs Last 24 Hrs  T(C): 36.7 (22 May 2020 07:46), Max: 36.8 (21 May 2020 15:54)  T(F): 98 (22 May 2020 07:46), Max: 98.3 (21 May 2020 15:54)  HR: 75 (22 May 2020 07:46) (75 - 81)  BP: 162/83 (22 May 2020 07:46) (162/83 - 169/89)  BP(mean): --  RR: 18 (22 May 2020 07:46) (16 - 18)  SpO2: 98% (22 May 2020 07:46) (95% - 98%)    ROS:  Constitutional, Eyes, ENT, Cardiovascular, Respiratory,  Gastrointestinal, Genitourinary, Musculoskeletal, Neurological, Integumentary, Psychiatric, Endocrine, Heme/Lymph are otherwise negative.    PHYSICAL EXAM:  GENERAL:  NAD Alert and Oriented x 3 Pleasant.   HEENT: NCAT  CARDIOVASCULAR:  S1, S2  LUNGS: CTAB  ABDOMEN:  soft, (-) tenderness, (-) distension, (+) bowel sounds, (-) guarding, (-) rebound (-) rigidity  EXTREMITIES:  no cyanosis / clubbing / edema.       I&O's Detail                                13.4   10.61 )-----------( 283      ( 21 May 2020 08:00 )             39.8     21 May 2020 08:00    134    |  96     |  11.0   ----------------------------<  115    3.1     |  20.0   |  0.60     Ca    8.8        21 May 2020 08:00        CAPILLARY BLOOD GLUCOSE              MEDICATIONS  (STANDING):  amLODIPine   Tablet 5 milliGRAM(s) Oral daily  metoclopramide Injectable 10 milliGRAM(s) IV Push every 6 hours  pantoprazole  Injectable 40 milliGRAM(s) IV Push every 12 hours  sucralfate suspension 1 Gram(s) Oral four times a day    MEDICATIONS  (PRN):  morphine  - Injectable 2 milliGRAM(s) IV Push every 4 hours PRN Mild to moderate pain  morphine  - Injectable 3 milliGRAM(s) IV Push every 4 hours PRN For severe pain  ondansetron Injectable 4 milliGRAM(s) IV Push every 6 hours PRN Nausea and/or Vomiting      RADIOLOGY & ADDITIONAL TESTS:

## 2020-05-22 NOTE — DISCHARGE NOTE PROVIDER - NSDCFUADDINST_GEN_ALL_CORE_FT
It is important to see your primary physician as well as the physicians noted below within the next week to perform a comprehensive medical review.  Call their offices for an appointment as soon as you leave the hospital.  If you do not have a primary physician, contact the [facility] Physician Referral Service at [phone].  Your medical issues appear to be stable at this time, but if your symptoms recur or worsen, contact your physicians and/or return to the hospital if necessary.  If you encounter any issues or questions with your medication, call your physicians before stopping the medications

## 2020-05-22 NOTE — DISCHARGE NOTE PROVIDER - NSDCMRMEDTOKEN_GEN_ALL_CORE_FT
amLODIPine 5 mg oral tablet: 1 tab(s) orally once a day  metoclopramide: 10 milligram(s) orally 3 times a day continue for 4-5 days then taper down to as needed  omeprazole 40 mg oral delayed release capsule: 1 cap(s) orally once a day  sucralfate 1 g/10 mL oral suspension: 10 milliliter(s) orally 4 times a day continue x 2 weeks, then stop

## 2020-05-22 NOTE — DISCHARGE NOTE PROVIDER - HOSPITAL COURSE
55 y/o male with history of esophageal cancer s/p resection in 2016, HTN, cholecystomy s/p 2016 presented to ED complaining of intractable generalized abdominal pain with associated inability to tolerate PO intake (solids and fluids), nausea and vomiting Pt reports having 2 similar episodes of abdominal pain in the past, with the last on in 2018, in which he was started on erythromycin pt reports that erythromycin was stopped 10 days ago by Dr Tapia who is his oncologist at [facility]. The patient was seen in consultation by GI. s/p EGD showing: Minor erythema at the surgical anastamosis in the chest at 25 cm.  No recurrent tumor or PUD.   No stricture.  No gastric dilitation or pyloric stenosis.  Normal Duodenum. Improved with Reglan. Now tolerating regular diet. The patient is medically stable for discharge. 53 y/o male with history of esophageal cancer s/p resection in 2016, HTN, cholecystomy s/p 2016 presented to ED complaining of intractable generalized abdominal pain with associated inability to tolerate PO intake (solids and fluids), nausea and vomiting Pt reports having 2 similar episodes of abdominal pain in the past, with the last on in 2018, in which he was started on erythromycin pt reports that erythromycin was stopped 10 days ago by Dr Tapia who is his oncologist at [facility]. The patient was seen in consultation by GI. s/p EGD showing: Minor erythema at the surgical anastamosis in the chest at 25 cm.  No recurrent tumor or PUD.   No stricture.  No gastric dilitation or pyloric stenosis.  Normal Duodenum. Improved with Reglan. Now tolerating regular diet. The patient is medically stable for discharge.         Disposition: Stable for discharge.  Outpatient followup discussed.    Total time spent on discharge is  35  minutes.

## 2020-05-22 NOTE — DISCHARGE NOTE PROVIDER - NSDCCPCAREPLAN_GEN_ALL_CORE_FT
PRINCIPAL DISCHARGE DIAGNOSIS  Diagnosis: Nausea and vomiting, intractability of vomiting not specified, unspecified vomiting type  Assessment and Plan of Treatment: Nausea and vomiting, intractability of vomiting not specified, unspecified vomiting type  > Due to gastroparesis  >s/p EGD showing: Minor erythema at the surgical anastamosis in the chest at 25 cm.  No recurrent tumor or PUD.   No stricture.  No gastric dilitation or pyloric stenosis.  Normal Duodenum.  >Continue Reglan 10 mg TID for 4-5 days, then decrease dose to as needed  >Continue Carafate for 2 weeks then may stop  >Follow up with GI 1-2 weeks, sooner if needed      SECONDARY DISCHARGE DIAGNOSES  Diagnosis: HTN (hypertension)  Assessment and Plan of Treatment: Resume home medication  Follow up with PMD for further management

## 2020-05-22 NOTE — DISCHARGE NOTE NURSING/CASE MANAGEMENT/SOCIAL WORK - PATIENT PORTAL LINK FT
You can access the FollowMyHealth Patient Portal offered by [facility] by registering at the following website: [email or web address]. By joining [employer]s FollowMyHealth portal, you will also be able to view your health information using other applications (apps) compatible with our system.

## 2020-05-22 NOTE — DISCHARGE NOTE PROVIDER - PROVIDER TOKENS
FREE:[LAST:[Dr. Eliot GAVIRIA],PHONE:[(   )    -],FAX:[(   )    -]],FREE:[LAST:[PMD],PHONE:[(   )    -],FAX:[(   )    -]]

## 2020-05-22 NOTE — DISCHARGE NOTE PROVIDER - CARE PROVIDER_API CALL
Dr. Mccabe GI,   Phone: (   )    -  Fax: (   )    -  Follow Up Time:     PMD,   Phone: (   )    -  Fax: (   )    -  Follow Up Time:

## 2020-05-22 NOTE — PROGRESS NOTE ADULT - ATTENDING COMMENTS
I have personally seen and examined patient.  Above note reviewed and discussed with NP, modified where appropriate.    No complaints.  Nausea, vomiting significantly improved.  PHYSICAL EXAMINATION:  GENERAL: NAD, Alert   CARDIOVASCULAR: S1, S2.  LUNGS: CTAB, - rales, - wheezing, - rhonchi.  BACK: - CVA tenderness.  ABDOMEN: Soft, - tenderness, - distension, + BS.  EXTREMITIES: - cyanosis, - clubbing, - edema.    A/P  See above  N/V due to suspected gastroparesis, now significantly improved.  d/c on Reglan.  Discussed outpatient f/u.  He acknowledges understanding.

## 2020-05-22 NOTE — PROGRESS NOTE ADULT - REASON FOR ADMISSION
nausea/vomitting

## 2020-05-22 NOTE — PROGRESS NOTE ADULT - SUBJECTIVE AND OBJECTIVE BOX
INTERVAL HPI/OVERNIGHT EVENTS:  Patient with esophageal cancer s/p esophagectomy who presents with N/V/abdominal pain secondary to gastroparesis.     ROS wnl     PAST MEDICAL & SURGICAL HISTORY:  Esophageal cancer  Hypertension  S/P cholecystectomy      Home Medications:  amLODIPine 5 mg oral tablet: 1 tab(s) orally once a day (19 May 2020 09:20)  omeprazole 40 mg oral delayed release capsule: 1 cap(s) orally once a day (19 May 2020 09:20)  Reglan:  (19 May 2020 09:20)      MEDICATIONS  (STANDING):  amLODIPine   Tablet 5 milliGRAM(s) Oral daily  metoclopramide Injectable 10 milliGRAM(s) IV Push every 6 hours  pantoprazole  Injectable 40 milliGRAM(s) IV Push every 12 hours  sodium chloride 0.9%. 1000 milliLiter(s) (125 mL/Hr) IV Continuous <Continuous>  sucralfate suspension 1 Gram(s) Oral four times a day    MEDICATIONS  (PRN):  morphine  - Injectable 2 milliGRAM(s) IV Push every 4 hours PRN Mild to moderate pain  morphine  - Injectable 3 milliGRAM(s) IV Push every 4 hours PRN For severe pain  ondansetron Injectable 4 milliGRAM(s) IV Push every 6 hours PRN Nausea and/or Vomiting      Allergies    No Known Allergies    Intolerances          PHYSICAL EXAM:   Vital Signs:  Vital Signs Last 24 Hrs  T(C): 36.7 (22 May 2020 07:46), Max: 36.8 (21 May 2020 15:54)  T(F): 98 (22 May 2020 07:46), Max: 98.3 (21 May 2020 15:54)  HR: 75 (22 May 2020 07:46) (75 - 81)  BP: 162/83 (22 May 2020 07:46) (162/83 - 169/89)  BP(mean): --  RR: 18 (22 May 2020 07:46) (16 - 18)  SpO2: 98% (22 May 2020 07:46) (95% - 98%)  Daily     Daily     GENERAL:  no distress  HEENT:  NC/AT,  anicteric  CHEST:   no increased effort, breath sounds clear  HEART:  Regular rhythm  ABDOMEN:  Soft, non-tender, non-distended, normoactive bowel sounds  EXTREMITIES  no cyanosis      LABS:                        13.4   10.61 )-----------( 283      ( 21 May 2020 08:00 )             39.8       Hemoglobin: 13.4 g/dL (05-21-20 @ 08:00)  Hemoglobin: 12.7 g/dL (05-20-20 @ 07:23)      05-21    134<L>  |  96<L>  |  11.0  ----------------------------<  115<H>  3.1<L>   |  20.0<L>  |  0.60    Ca    8.8      21 May 2020 08:00        RADIOLOGY & ADDITIONAL TESTS:  < from: CT Angio Abdomen and Pelvis w/ IV Cont (05.19.20 @ 01:09) >  FINDINGS:    There is no evidence of aortic dissection, aortic aneurysm or mural hematoma. Mild prominence of the ascending thoracic aorta, measuring 4 cm.    The thyroid is normal.    There is no supraclavicular, axillary, mediastinal or hilar lymphadenopathy.    The heart is normal in size. There is no pericardial effusion.    Small patchy groundglass airspace opacities at the lung bases which are not significantly changed on the prior study dated 5/18/2020.    There are no pulmonary nodules or focal consolidations. No pleural effusions are present. The tracheobronchial tree is patent.     The liver, spleen, pancreas, and biliary tree are unremarkable. The gallbladder is surgically absent.    The kidneys enhance symmetrically. There is no hydronephrosis. The adrenal glands are unremarkable.    The patient is status post gastric pull-through surgery. There is no bowel obstruction or ascites. The appendix is normal.    The bladder, prostate and seminal vesicles are unremarkable.    There is no abdominal or pelvic lymphadenopathy.    An infrarenal IVC filter is noted.    Partially visualized cervical fixation hardware. Chronic right-sided rib fractures noted. Chronic pubic deformity. Degenerative changes of the spine.    IMPRESSION:    No evidence of aortic dissection, aortic aneurysm or mural hematoma.   Small patchy groundglass airspace opacities at the lung bases which are not significantly changed on the prior study dated 5/18/2020. This may reflect atypical/viral pneumonia and/or small airways disease.  Status post gastric pull-through. No small bowel obstruction or active bowel inflammation.    < end of copied text >

## 2020-06-02 PROCEDURE — 88305 TISSUE EXAM BY PATHOLOGIST: CPT

## 2020-06-02 PROCEDURE — 85730 THROMBOPLASTIN TIME PARTIAL: CPT

## 2020-06-02 PROCEDURE — 96375 TX/PRO/DX INJ NEW DRUG ADDON: CPT | Mod: XU

## 2020-06-02 PROCEDURE — 84484 ASSAY OF TROPONIN QUANT: CPT

## 2020-06-02 PROCEDURE — 93005 ELECTROCARDIOGRAM TRACING: CPT

## 2020-06-02 PROCEDURE — 85610 PROTHROMBIN TIME: CPT

## 2020-06-02 PROCEDURE — 36415 COLL VENOUS BLD VENIPUNCTURE: CPT

## 2020-06-02 PROCEDURE — 80048 BASIC METABOLIC PNL TOTAL CA: CPT

## 2020-06-02 PROCEDURE — 71275 CT ANGIOGRAPHY CHEST: CPT

## 2020-06-02 PROCEDURE — 83690 ASSAY OF LIPASE: CPT

## 2020-06-02 PROCEDURE — 71260 CT THORAX DX C+: CPT

## 2020-06-02 PROCEDURE — 71045 X-RAY EXAM CHEST 1 VIEW: CPT

## 2020-06-02 PROCEDURE — 81001 URINALYSIS AUTO W/SCOPE: CPT

## 2020-06-02 PROCEDURE — 96376 TX/PRO/DX INJ SAME DRUG ADON: CPT | Mod: XU

## 2020-06-02 PROCEDURE — 86850 RBC ANTIBODY SCREEN: CPT

## 2020-06-02 PROCEDURE — 74174 CTA ABD&PLVS W/CONTRAST: CPT

## 2020-06-02 PROCEDURE — U0003: CPT

## 2020-06-02 PROCEDURE — 83605 ASSAY OF LACTIC ACID: CPT

## 2020-06-02 PROCEDURE — 96365 THER/PROPH/DIAG IV INF INIT: CPT | Mod: XU

## 2020-06-02 PROCEDURE — 99284 EMERGENCY DEPT VISIT MOD MDM: CPT | Mod: 25

## 2020-06-02 PROCEDURE — 80053 COMPREHEN METABOLIC PANEL: CPT

## 2020-06-02 PROCEDURE — 86900 BLOOD TYPING SEROLOGIC ABO: CPT

## 2020-06-02 PROCEDURE — 88342 IMHCHEM/IMCYTCHM 1ST ANTB: CPT

## 2020-06-02 PROCEDURE — 86901 BLOOD TYPING SEROLOGIC RH(D): CPT

## 2020-06-02 PROCEDURE — 74177 CT ABD & PELVIS W/CONTRAST: CPT

## 2020-06-02 PROCEDURE — G0378: CPT

## 2020-06-02 PROCEDURE — 99285 EMERGENCY DEPT VISIT HI MDM: CPT | Mod: 25

## 2020-06-02 PROCEDURE — 85027 COMPLETE CBC AUTOMATED: CPT

## 2020-06-02 PROCEDURE — 96361 HYDRATE IV INFUSION ADD-ON: CPT | Mod: XU

## 2020-06-02 PROCEDURE — 83735 ASSAY OF MAGNESIUM: CPT

## 2020-06-02 PROCEDURE — 96374 THER/PROPH/DIAG INJ IV PUSH: CPT | Mod: XU

## 2021-02-11 ENCOUNTER — INPATIENT (INPATIENT)
Facility: HOSPITAL | Age: 56
LOS: 0 days | Discharge: HOME | DRG: 392 | End: 2021-02-12
Attending: INTERNAL MEDICINE | Admitting: INTERNAL MEDICINE
Payer: COMMERCIAL

## 2021-02-11 VITALS
OXYGEN SATURATION: 98 % | HEART RATE: 86 BPM | RESPIRATION RATE: 20 BRPM | WEIGHT: 190.04 LBS | DIASTOLIC BLOOD PRESSURE: 94 MMHG | TEMPERATURE: 98 F | SYSTOLIC BLOOD PRESSURE: 163 MMHG | HEIGHT: 72 IN

## 2021-02-11 DIAGNOSIS — K31.84 GASTROPARESIS: ICD-10-CM

## 2021-02-11 DIAGNOSIS — Z90.49 ACQUIRED ABSENCE OF OTHER SPECIFIED PARTS OF DIGESTIVE TRACT: Chronic | ICD-10-CM

## 2021-02-11 DIAGNOSIS — Z98.890 OTHER SPECIFIED POSTPROCEDURAL STATES: Chronic | ICD-10-CM

## 2021-02-11 PROBLEM — C15.9 MALIGNANT NEOPLASM OF ESOPHAGUS, UNSPECIFIED: Chronic | Status: ACTIVE | Noted: 2020-05-19

## 2021-02-11 PROBLEM — I10 ESSENTIAL (PRIMARY) HYPERTENSION: Chronic | Status: ACTIVE | Noted: 2020-05-19

## 2021-02-11 LAB
ALBUMIN SERPL ELPH-MCNC: 4.9 G/DL — SIGNIFICANT CHANGE UP (ref 3.3–5.2)
ALP SERPL-CCNC: 130 U/L — HIGH (ref 40–120)
ALT FLD-CCNC: 34 U/L — SIGNIFICANT CHANGE UP
ANION GAP SERPL CALC-SCNC: 13 MMOL/L — SIGNIFICANT CHANGE UP (ref 5–17)
AST SERPL-CCNC: 22 U/L — SIGNIFICANT CHANGE UP
BASOPHILS # BLD AUTO: 0.03 K/UL — SIGNIFICANT CHANGE UP (ref 0–0.2)
BASOPHILS NFR BLD AUTO: 0.3 % — SIGNIFICANT CHANGE UP (ref 0–2)
BILIRUB SERPL-MCNC: 0.4 MG/DL — SIGNIFICANT CHANGE UP (ref 0.4–2)
BUN SERPL-MCNC: 12 MG/DL — SIGNIFICANT CHANGE UP (ref 8–20)
CALCIUM SERPL-MCNC: 9.7 MG/DL — SIGNIFICANT CHANGE UP (ref 8.6–10.2)
CHLORIDE SERPL-SCNC: 98 MMOL/L — SIGNIFICANT CHANGE UP (ref 98–107)
CO2 SERPL-SCNC: 23 MMOL/L — SIGNIFICANT CHANGE UP (ref 22–29)
CREAT SERPL-MCNC: 0.67 MG/DL — SIGNIFICANT CHANGE UP (ref 0.5–1.3)
EOSINOPHIL # BLD AUTO: 0 K/UL — SIGNIFICANT CHANGE UP (ref 0–0.5)
EOSINOPHIL NFR BLD AUTO: 0 % — SIGNIFICANT CHANGE UP (ref 0–6)
GLUCOSE SERPL-MCNC: 151 MG/DL — HIGH (ref 70–99)
HCT VFR BLD CALC: 39.9 % — SIGNIFICANT CHANGE UP (ref 39–50)
HGB BLD-MCNC: 12.9 G/DL — LOW (ref 13–17)
IMM GRANULOCYTES NFR BLD AUTO: 0.5 % — SIGNIFICANT CHANGE UP (ref 0–1.5)
LIDOCAIN IGE QN: 12 U/L — LOW (ref 22–51)
LYMPHOCYTES # BLD AUTO: 0.92 K/UL — LOW (ref 1–3.3)
LYMPHOCYTES # BLD AUTO: 8.3 % — LOW (ref 13–44)
MCHC RBC-ENTMCNC: 25.5 PG — LOW (ref 27–34)
MCHC RBC-ENTMCNC: 32.3 GM/DL — SIGNIFICANT CHANGE UP (ref 32–36)
MCV RBC AUTO: 79 FL — LOW (ref 80–100)
MONOCYTES # BLD AUTO: 0.46 K/UL — SIGNIFICANT CHANGE UP (ref 0–0.9)
MONOCYTES NFR BLD AUTO: 4.1 % — SIGNIFICANT CHANGE UP (ref 2–14)
NEUTROPHILS # BLD AUTO: 9.64 K/UL — HIGH (ref 1.8–7.4)
NEUTROPHILS NFR BLD AUTO: 86.8 % — HIGH (ref 43–77)
PLATELET # BLD AUTO: 288 K/UL — SIGNIFICANT CHANGE UP (ref 150–400)
POTASSIUM SERPL-MCNC: 4.2 MMOL/L — SIGNIFICANT CHANGE UP (ref 3.5–5.3)
POTASSIUM SERPL-SCNC: 4.2 MMOL/L — SIGNIFICANT CHANGE UP (ref 3.5–5.3)
PROT SERPL-MCNC: 8.5 G/DL — SIGNIFICANT CHANGE UP (ref 6.6–8.7)
RBC # BLD: 5.05 M/UL — SIGNIFICANT CHANGE UP (ref 4.2–5.8)
RBC # FLD: 14.2 % — SIGNIFICANT CHANGE UP (ref 10.3–14.5)
SARS-COV-2 RNA SPEC QL NAA+PROBE: SIGNIFICANT CHANGE UP
SODIUM SERPL-SCNC: 133 MMOL/L — LOW (ref 135–145)
TROPONIN T SERPL-MCNC: <0.01 NG/ML — SIGNIFICANT CHANGE UP (ref 0–0.06)
WBC # BLD: 11.11 K/UL — HIGH (ref 3.8–10.5)
WBC # FLD AUTO: 11.11 K/UL — HIGH (ref 3.8–10.5)

## 2021-02-11 PROCEDURE — 99285 EMERGENCY DEPT VISIT HI MDM: CPT

## 2021-02-11 PROCEDURE — 74177 CT ABD & PELVIS W/CONTRAST: CPT | Mod: 26

## 2021-02-11 PROCEDURE — 71045 X-RAY EXAM CHEST 1 VIEW: CPT | Mod: 26

## 2021-02-11 PROCEDURE — 99223 1ST HOSP IP/OBS HIGH 75: CPT

## 2021-02-11 RX ORDER — SUCRALFATE 1 G
1 TABLET ORAL
Refills: 0 | Status: DISCONTINUED | OUTPATIENT
Start: 2021-02-11 | End: 2021-02-12

## 2021-02-11 RX ORDER — KETOROLAC TROMETHAMINE 30 MG/ML
30 SYRINGE (ML) INJECTION ONCE
Refills: 0 | Status: DISCONTINUED | OUTPATIENT
Start: 2021-02-11 | End: 2021-02-11

## 2021-02-11 RX ORDER — MORPHINE SULFATE 50 MG/1
2 CAPSULE, EXTENDED RELEASE ORAL ONCE
Refills: 0 | Status: DISCONTINUED | OUTPATIENT
Start: 2021-02-11 | End: 2021-02-11

## 2021-02-11 RX ORDER — MORPHINE SULFATE 50 MG/1
4 CAPSULE, EXTENDED RELEASE ORAL ONCE
Refills: 0 | Status: DISCONTINUED | OUTPATIENT
Start: 2021-02-11 | End: 2021-02-11

## 2021-02-11 RX ORDER — FAMOTIDINE 10 MG/ML
20 INJECTION INTRAVENOUS ONCE
Refills: 0 | Status: COMPLETED | OUTPATIENT
Start: 2021-02-11 | End: 2021-02-11

## 2021-02-11 RX ORDER — AMLODIPINE BESYLATE 2.5 MG/1
5 TABLET ORAL DAILY
Refills: 0 | Status: DISCONTINUED | OUTPATIENT
Start: 2021-02-11 | End: 2021-02-12

## 2021-02-11 RX ORDER — SODIUM CHLORIDE 9 MG/ML
1000 INJECTION INTRAMUSCULAR; INTRAVENOUS; SUBCUTANEOUS ONCE
Refills: 0 | Status: COMPLETED | OUTPATIENT
Start: 2021-02-11 | End: 2021-02-11

## 2021-02-11 RX ORDER — ONDANSETRON 8 MG/1
4 TABLET, FILM COATED ORAL ONCE
Refills: 0 | Status: COMPLETED | OUTPATIENT
Start: 2021-02-11 | End: 2021-02-11

## 2021-02-11 RX ORDER — ONDANSETRON 8 MG/1
8 TABLET, FILM COATED ORAL ONCE
Refills: 0 | Status: COMPLETED | OUTPATIENT
Start: 2021-02-11 | End: 2021-02-11

## 2021-02-11 RX ORDER — LIDOCAINE 4 G/100G
5 CREAM TOPICAL ONCE
Refills: 0 | Status: COMPLETED | OUTPATIENT
Start: 2021-02-11 | End: 2021-02-11

## 2021-02-11 RX ORDER — METOCLOPRAMIDE HCL 10 MG
10 TABLET ORAL ONCE
Refills: 0 | Status: COMPLETED | OUTPATIENT
Start: 2021-02-11 | End: 2021-02-11

## 2021-02-11 RX ORDER — SUCRALFATE 1 G
1 TABLET ORAL ONCE
Refills: 0 | Status: COMPLETED | OUTPATIENT
Start: 2021-02-11 | End: 2021-02-11

## 2021-02-11 RX ORDER — MORPHINE SULFATE 50 MG/1
6 CAPSULE, EXTENDED RELEASE ORAL ONCE
Refills: 0 | Status: DISCONTINUED | OUTPATIENT
Start: 2021-02-11 | End: 2021-02-11

## 2021-02-11 RX ORDER — PROCHLORPERAZINE MALEATE 5 MG
10 TABLET ORAL ONCE
Refills: 0 | Status: COMPLETED | OUTPATIENT
Start: 2021-02-11 | End: 2021-02-11

## 2021-02-11 RX ORDER — METOCLOPRAMIDE HCL 10 MG
10 TABLET ORAL THREE TIMES A DAY
Refills: 0 | Status: DISCONTINUED | OUTPATIENT
Start: 2021-02-11 | End: 2021-02-12

## 2021-02-11 RX ORDER — ERYTHROMYCIN ETHYLSUCCINATE 400 MG
500 TABLET ORAL ONCE
Refills: 0 | Status: COMPLETED | OUTPATIENT
Start: 2021-02-11 | End: 2021-02-11

## 2021-02-11 RX ADMIN — ONDANSETRON 8 MILLIGRAM(S): 8 TABLET, FILM COATED ORAL at 05:43

## 2021-02-11 RX ADMIN — MORPHINE SULFATE 4 MILLIGRAM(S): 50 CAPSULE, EXTENDED RELEASE ORAL at 04:01

## 2021-02-11 RX ADMIN — Medication 10 MILLIGRAM(S): at 04:01

## 2021-02-11 RX ADMIN — Medication 30 MILLIGRAM(S): at 08:50

## 2021-02-11 RX ADMIN — MORPHINE SULFATE 2 MILLIGRAM(S): 50 CAPSULE, EXTENDED RELEASE ORAL at 19:57

## 2021-02-11 RX ADMIN — SODIUM CHLORIDE 1000 MILLILITER(S): 9 INJECTION INTRAMUSCULAR; INTRAVENOUS; SUBCUTANEOUS at 08:51

## 2021-02-11 RX ADMIN — Medication 104 MILLIGRAM(S): at 08:50

## 2021-02-11 RX ADMIN — Medication 10 MILLIGRAM(S): at 10:43

## 2021-02-11 RX ADMIN — MORPHINE SULFATE 6 MILLIGRAM(S): 50 CAPSULE, EXTENDED RELEASE ORAL at 08:50

## 2021-02-11 RX ADMIN — Medication 30 MILLILITER(S): at 09:48

## 2021-02-11 RX ADMIN — Medication 500 MILLIGRAM(S): at 13:56

## 2021-02-11 RX ADMIN — SODIUM CHLORIDE 1000 MILLILITER(S): 9 INJECTION INTRAMUSCULAR; INTRAVENOUS; SUBCUTANEOUS at 10:43

## 2021-02-11 RX ADMIN — LIDOCAINE 5 MILLILITER(S): 4 CREAM TOPICAL at 09:48

## 2021-02-11 RX ADMIN — SODIUM CHLORIDE 1000 MILLILITER(S): 9 INJECTION INTRAMUSCULAR; INTRAVENOUS; SUBCUTANEOUS at 05:01

## 2021-02-11 RX ADMIN — ONDANSETRON 4 MILLIGRAM(S): 8 TABLET, FILM COATED ORAL at 19:57

## 2021-02-11 RX ADMIN — Medication 25 MILLIGRAM(S): at 09:48

## 2021-02-11 RX ADMIN — Medication 250 MILLIGRAM(S): at 10:43

## 2021-02-11 RX ADMIN — Medication 1 GRAM(S): at 09:48

## 2021-02-11 RX ADMIN — FAMOTIDINE 20 MILLIGRAM(S): 10 INJECTION INTRAVENOUS at 04:01

## 2021-02-11 RX ADMIN — SODIUM CHLORIDE 1000 MILLILITER(S): 9 INJECTION INTRAMUSCULAR; INTRAVENOUS; SUBCUTANEOUS at 04:01

## 2021-02-11 RX ADMIN — MORPHINE SULFATE 4 MILLIGRAM(S): 50 CAPSULE, EXTENDED RELEASE ORAL at 05:43

## 2021-02-11 NOTE — ED PROVIDER NOTE - CLINICAL SUMMARY MEDICAL DECISION MAKING FREE TEXT BOX
55y M w/ hx gastroparesis, esophageal cancer s/p esophagectomy, presenting with epigastric pain and vomiting consistent with prior episodes of gastroparesis.  Pt appears well, stable VS.  Will check labs, EKG, CXR.  Treat with morphine, reglan, pepcid, fluids, reassess.

## 2021-02-11 NOTE — H&P ADULT - HISTORY OF PRESENT ILLNESS
56 yo male with history of esophageal cancer with esophagectomy in 2016 (no chemo/radiation) who is due for follow up with Dr. Tapia at [facility] and Dr. Combs for CT chest and endoscopy next week. Patient reports 2-3 days ago started having N/V and epigastric pain. vomiting bilious emesis with no blood and denies fever, chills, diarrhea, constipaiton. Patient has been having cough for past week. No sick contacts, no SOB. Denies chest pain. Denies leg pain/swelling.    In the ED received maalox, famotidine iv, viscous lidocaine, reglan iv, and multiple doses of morphine IV. s/p 2 L of NS.

## 2021-02-11 NOTE — ED ADULT TRIAGE NOTE - CHIEF COMPLAINT QUOTE
A&Ox4, PT C/O abd pain x1 day that radiates up to his chest and head.  +Nausea and vomiting.  Unable to tolerate PO intake.  Denies fevers, SOB or diarrhea.  Hx of esophagus CA with partial removal.

## 2021-02-11 NOTE — ED PROVIDER NOTE - PROGRESS NOTE DETAILS
Grady: Pt with persistent n/v and epigastric pain despite initial treatment.  Given additional morphine and zofran, but still unable to tolerate PO.  Will give compazine and reassess. Michael: Assessed patient after taking sign out from night team. Patient states that he is still not able to drink water. Will try Compazine to see if this improves the patient's symptoms. CT Abd/Pelvis reveals no acute pathology. Patient having difficulty keeping down PO medications. Plan to admit the patient to medicine for intractable vomiting.

## 2021-02-11 NOTE — ED ADULT NURSE REASSESSMENT NOTE - NS ED NURSE REASSESS COMMENT FT1
assumed care of tp @ 8161, report received from Danny LIN. pt AOx4 c/o 10/10 generalized abd pain and nausea. pt reports no relief from previous medications, unable to tolerate PO at this time. respirations even and unlabored, denies chest discomfort. abd soft and nondistended. skin WNL for race. MD murry at bedside for assessment, MD ordering additional medication for discomfort, see MAR. safety maintained.

## 2021-02-11 NOTE — ED PROVIDER NOTE - ATTENDING CONTRIBUTION TO CARE
54yo M with hx of esophageal cancer s/p esophagectomy with resulting gastroparesis presents to ED c/o increased nausea with multiple episodes of vomiting with resulting chest/abdominal pain since last evening.  Pt with no relief with meds at home secondary to vomiting. On exam pt awake and alert and appears uncomfortable, mm moist, Neck supple, Cor Reg, Lungs clear b/l, abd soft, +epigastric tenderness, no rebound, Ext FROM, Neuro non-focal, EKG with no acute changes, will check labs, CXR and re-eval after meds 56yo M with hx of esophageal cancer s/p esophagectomy with resulting gastroparesis presents to ED c/o increased nausea with multiple episodes of vomiting with resulting chest/abdominal pain since last evening.  Pt with no relief with meds at home secondary to vomiting. On exam pt awake and alert and appears uncomfortable, mm moist, Neck supple, Cor Reg, Lungs clear b/l, abd soft, +epigastric tenderness, no rebound, Ext FROM, Neuro non-focal, EKG with no acute changes, will check labs, CXR and re-eval after meds.

## 2021-02-11 NOTE — ED PROVIDER NOTE - PHYSICAL EXAMINATION
Constitutional: Awake, alert, in no acute distress  Eyes: no scleral icterus  HENT: normocephalic, atraumatic, moist oral mucosa  Neck: supple  CV: RRR, no murmur  Pulm: non-labored respirations, CTAB  Abdomen: soft, +epigastric tenderness, no rebound or guarding, non-distended, no CVAT  Extremities: no edema, no deformity  Skin: no rash, no jaundice  Neuro: AAOx3, moving all extremities equally

## 2021-02-11 NOTE — ED PROVIDER NOTE - RATE
History and Physical          Pt Name  Megan Acosta   Date of Birth 1991   Medical Record Number  [id number]      Age  23 y.o. PCP Letitia Mora MD   Admit date:  8/14/2017    Room Number  ER22/22  @ [facility]   Date of Service  8/14/2017     Admission Diagnoses:  DKA (diabetic ketoacidoses) [facility])     Certification: We are admitting Megna Acosta 23 y.o. female with a principle diagnosis of DKA (diabetic ketoacidoses) ([facility] [address]  This patient also suffers from other comorbidities listed below. I have a high level of concern for rapid decline in hemodynamics leading to life threatening complications      Assessment and plan:  DKA (diabetic ketoacidoses) ([facility] [address]  Admit to PCU   Generous IVF   IV Regular insulin per DKA protocol   Watch chemistry Q4H   Clear liquid diet and advance to diabetic diet tomorrow - if tolerated      Lactic acidosis  Most likely due to SIRS from non infectious systemic response to acidosis. We will repeat     Type 1 diabetes mellitus with diabetic autonomic neuropathy (HCC)  Pt reports that she was not able to afford Lantus which had worked well for her. She signed up with [employer] card and hopefully she will be able to get that drug. Meantime, I did NOT order basal insulin yet - since her gap is not closed. - this needs to be re-started     Menometrorrhagia  Pt reports her periods started yesterday     Marijuana abuse  Pt reported that she took [facility] store bought detox juices to help her stop taking Marijuana. ! Possible UTI with yeast   We will start her on PO fluconazole for five days     Severe protein jazz malnutrition   Body mass index is 17.14 kg/(m^2).           CODE STATUS  Full     Functional Status  Pt lives [place] [address] [place], [place] [email or web address] with her mother   She works at the [employer]: Pt's mother    Prophylaxis  SCD's   Discharge Plan: Home w/Family,     There are currently no Active Isolations Payor: SELF PAY / Plan: [facility] SELF PAY / Product Type: Self Pay /    Social issues  Date Comment    11:02 PM  No family here at this time       Prognosis  Fair      Subjective Data         History of Present Illness : The pt has had several hospitalizations within the last few months. She reports to me today that she was fine until this morning when she felt sick to her stomach and developed recurrent episodes of nausea and vomiting. She is now noted to have DKA with wide anion gap, ketone in her urine. The pt reports that she lost her insurance and now she was not able to purchase lantus which worked best for her, and she is obligated to use mixed 70/30 insulin     Workup also revealed yeast in her urine. Review of Systems - History obtained from the patient  General ROS: positive for  - fatigue  negative for - chills, fever, weight gain or weight loss  Psychological ROS: negative for - anxiety or behavioral disorder  Ophthalmic ROS: positive for - blurry vision  Respiratory ROS: no cough, shortness of breath, or wheezing  Cardiovascular ROS: no chest pain or dyspnea on exertion  Gastrointestinal ROS: positive for - abdominal pain and nausea/vomiting  negative for - hematemesis or melena  Genito-Urinary ROS: positive for - dysuria  Musculoskeletal ROS: negative  Neurological ROS: no TIA or stroke symptoms  Dermatological ROS: negative     Past Medical History:   Diagnosis Date    Chronic kidney disease     kidney stones    Depression     Diabetes ([facility] [address] 3/22/12    Gastrointestinal disorder     Pt reports having Acid Reflux.     Gastroparesis     Headaches, cluster     HX OTHER MEDICAL     Seasonal Allergies    Marijuana abuse     Other ill-defined conditions     \"constant menstural cycle\" x 2 years         Past Surgical History:   Procedure Laterality Date    HX APPENDECTOMY  9/11/14     Dr. Krzysztof Nair SKIN BIOPSY  2016         Social History   Substance Use Topics    Smoking status: Former Smoker Types: Cigarettes    Smokeless tobacco: Never Used    Alcohol use No         Family History   Problem Relation Age of Onset    Asthma Sister     Asthma Brother     Hypertension Mother     Heart Disease Father      Murmur    Diabetes Paternal Grandmother     Ovarian Cancer Maternal Grandmother      GM was diagnosed with DM and Ov Cancer at age 25    Cancer Maternal Grandmother      Uterine and Melanoma    Liver Disease Maternal Grandmother      Hepatitis C    Diabetes Maternal Grandmother     Heart Disease Other      great GM had Open Heart Surgery    Diabetes Maternal Aunt            Objective data     Comment:  Pt is lying in bed in no distress   Her nurse is at her side      Patient Vitals for the past 24 hrs:   Temp   08/14/17 1726 99.1 °F (37.3 °C)    Patient Vitals for the past 24 hrs:   Pulse   08/14/17 1900 (!) 112   08/14/17 1830 (!) 119   08/14/17 1800 (!) 136   08/14/17 1726 (!) 143    Patient Vitals for the past 24 hrs:   Resp   08/14/17 1900 17   08/14/17 1830 16   08/14/17 1800 27   08/14/17 1726 16    Patient Vitals for the past 24 hrs:   BP   08/14/17 1900 133/89   08/14/17 1830 (!) 148/98   08/14/17 1726 (!) 145/104        SpO2 Readings from Last 6 Encounters:   08/14/17 100%   07/05/17 100%   06/17/17 100%   06/02/17 100%   05/16/17 100%   03/17/17 100%         O2 Device: Room air   Body mass index is 17.14 kg/(m^2). - Wt Readings from Last 10 Encounters:   08/14/17 42.5 kg (93 lb 11.1 oz)   07/03/17 48.1 kg (106 lb 0.7 oz)   06/14/17 48.1 kg (106 lb)   05/31/17 49.9 kg (110 lb)   05/13/17 45.4 kg (100 lb)   03/17/17 48.4 kg (106 lb 11.2 oz)   03/01/17 51.7 kg (114 lb)   01/20/17 54.1 kg (119 lb 4.8 oz)   12/07/16 52.5 kg (115 lb 12.8 oz)   11/28/16 54.4 kg (120 lb)          Physical Exam:             General:  Alert, cooperative,   MAL  noursished,   well developed,   appears stated age    Ears/Eyes:  Hearing intact  Sclera anicteric.    Pupils equal   Mouth/Throat:  Mucous membranes normal pink and dry   Oral pharynx clear    Neck:     Lungs:  Trachea midline  Chest excursion symmetrical   Auscultation B/L Symmetrical with   Vesicular breath sounds     No Crepitations noted   Percussion note resonant on mid Clavicular line; no sign of pneumothorax        CVS:  Regular rate and rhythm   Tachycardia noted   no  murmur,   No click, rub or gallop  S1 normal   S2 normal   Pedal pulses  b/l symmetrical   Abdomen:    Soft, non-tender  Bowel sounds normal  No distension   Percussion note tympanitic   Extremities:    No cyanosis, jaundice  No edema noted   No sign of DVT/cord like lesion on palpation  No sign of acute trauma   Age appropriate OA changes noted. Skin:    Skin color, texture, turgor normal. no acute rash or lesions    Lymph nodes:     Musculoskeletal Muscle bulk B/L symmetrical   Neuro Cranial nerves are intact,   motor movement b/l symmetrical,   Sensory evaluation b/l symmetrical    Psych:  Alert and oriented, normal mood & affect given the clinical scenario          Medications reviewed     Current Facility-Administered Medications   Medication Dose Route Frequency    0.9% sodium chloride infusion 1,000 mL  1,000 mL IntraVENous CONTINUOUS    sodium chloride (NS) flush 5-10 mL  5-10 mL IntraVENous PRN    insulin regular (NOVOLIN R, HUMULIN R) 100 Units in 0.9% sodium chloride 100 mL infusion  0.1 Units/kg/hr IntraVENous CONTINUOUS    0.9% sodium chloride infusion 1,000 mL  1,000 mL IntraVENous ONCE    morphine 2 mg/mL injection        dextrose 5% - 0.45% NaCl with KCl 20 mEq/L infusion  200 mL/hr IntraVENous CONTINUOUS     Current Outpatient Prescriptions   Medication Sig    insulin syringe,safetyneedle 0.3 mL 29 x 1/2\" syrg With meals. Use for insulin    insulin NPH (NOVOLIN N, HUMULIN N) 100 unit/mL injection Take with breakfast and dinner.  insulin regular (NOVOLIN R, HUMULIN R) 100 unit/mL injection Take 5 units with meals.   If glucometer reading is <150 pre-meal, do not take.  Blood-Glucose Meter monitoring kit Use according instructions.  glucose blood VI test strips (BLOOD GLUCOSE TEST) strip Use  pre meal and when when ymptoms of high or low sugars are present.  famotidine (PEPCID) 20 mg tablet Take 1 Tab by mouth two (2) times a day.  acyclovir (ZOVIRAX) 5 % ointment Apply  to affected area five (5) times daily.  gabapentin (NEURONTIN) 600 mg tablet Take 600 mg by mouth three (3) times daily. Relevant other informations: Other medical conditions listed in this following [facility] hospital problem list section; all of these and other pertinent data were taken into consideration when treatment plan is developed and customized to this patient's unique overall circumstances and needs. We have reviewed available old medical records within the constraints of this admission process. Present on Admission:   DKA (diabetic ketoacidoses) ([facility] [address]   Menometrorrhagia   Lactic acidosis   Marijuana abuse   Type 1 diabetes mellitus with diabetic autonomic neuropathy ([facility]       Data Review:   Recent Days:  All Micro Results     None          Recent Labs      08/14/17   1805   WBC  13.7*   HGB  13.1   HCT  41.0   PLT  458*     Recent Labs      08/14/17   1805   NA  129*   K  3.7   CL  97   CO2  10*   GLU  322*   BUN  9   CREA  1.13*   CA  9.5   MG  2.1   ALB  4.7   TBILI  1.0   SGOT  42*   ALT  34      Lab Results   Component Value Date/Time    TSH 0.28 07/04/2017 03:40 AM         Care Plan discussed with: Patient/Family and Nurse   Other medical conditions are listed in the active hospital problem list section; these and other pertinent data were taken into consideration when the treatment plan was developed and customized to this patient's unique overall circumstances and needs. High complexity decision making was performed for this patient who is at high risk for decompensation with multiple organ involvement.      Today total floor/unit time was 45  minutes while caring for this patient and greater than 50% of that time was spent with patient (and/or family) coordinating patients clinical issues.      Zahra Soto MD MPH  FACP                               8/14/2017       __________________________________________________________     FOR SOUND PHYSICIAN ADMINISTRATIVE USE ONLY     MDM  Number of Diagnoses or Management Options  new, needed workup     Amount and/or Complexity of Data Reviewed  Clinical lab tests: reviewed and ordered  Tests in the radiology section of CPT®: reviewed  Independent visualization of images, tracings, or specimens: yes (EKG showing sinus tachycardia )    Risk of Complications, Morbidity, and/or Mortality  Presenting problems: high  Diagnostic procedures: moderate  Management options: high    Critical Care  Total time providing critical care: 30-74 minutes             NPT 0063 [facility] MD MPH FACP   11:00 PM  8/14/2017 63

## 2021-02-11 NOTE — ED ADULT NURSE REASSESSMENT NOTE - NS ED NURSE REASSESS COMMENT FT1
pt denies relief from nausea at this time, admits to emesis episode following PO medications. MD at bedside for reeval, new order received for CT. pt made aware. safety maintained.

## 2021-02-11 NOTE — ED PROVIDER NOTE - NS ED ROS FT
Constitutional: no fever, no chills  Eyes: no vision changes  ENT: no nasal congestion, no sore throat  CV: +chest pain  Resp: no cough, no shortness of breath  GI: +abdominal pain, +vomiting, no diarrhea  : no dysuria  MSK: no joint pain  Skin: no rash  Neuro: no headache, no weakness, no paresthesias

## 2021-02-11 NOTE — ED ADULT NURSE NOTE - OBJECTIVE STATEMENT
Pt presents to ED with abdominal pain/N/V and inability to tolerate PO intake. Pt states that he has PMHx of esophogeal ca s/p resection and has gastritis. Pt connected to cardiac monitor and MD HAIDER at bedside for eval.

## 2021-02-11 NOTE — ED PROVIDER NOTE - OBJECTIVE STATEMENT
55y M w/ hx esophageal cancer s/p esophagectomy, gastroparesis, cholecystectomy, HTN, presenting for chest/abdominal pain and vomiting.  Pt reports onset at 6 PM yesterday of epigastric discomfort radiating up to the neck, associated with multiple episodes of NBNB vomiting.  Pt has hx of prior similar episodes that were attributed to gastroparesis.  Is on erythromycin and reglan PRN; was unable to tolerate PO reglan.  Denies fever, chills, SOB, change in bowel habits.

## 2021-02-11 NOTE — H&P ADULT - ASSESSMENT
56 yo male with history of esophageal cancer with esophagectomy in 2016 (no chemo/radiation) who is due for follow up with Dr. Tapia at [facility] and Dr. Combs for CT chest and endoscopy next week. Patient reports 2-3 days ago started having N/V and epigastric pain. vomiting bilious emesis with no blood and denies fever, chills, diarrhea, constipaiton. Patient has been having cough for past week. No sick contacts, no SOB. Denies chest pain. Denies leg pain/swelling. In the ED received maalox, famotidine iv, viscous lidocaine, reglan iv, and multiple doses of morphine IV. s/p 2 L of NS.    #intractable N/V  - reglan TID prior to meals  - CLD for now  - advance as tolerates    #HTN  - continue amlodipine 5mg daily    #esophgeal cancer s/p esophagectomy  - contineu sucralfate  - consider GI consutl if N/V persistent  - otherwise outpatient follow up on discharge    #DVT ppx  - out of bed and ambulation

## 2021-02-12 ENCOUNTER — TRANSCRIPTION ENCOUNTER (OUTPATIENT)
Age: 56
End: 2021-02-12

## 2021-02-12 VITALS
SYSTOLIC BLOOD PRESSURE: 129 MMHG | RESPIRATION RATE: 20 BRPM | TEMPERATURE: 98 F | DIASTOLIC BLOOD PRESSURE: 80 MMHG | OXYGEN SATURATION: 95 % | HEART RATE: 99 BPM

## 2021-02-12 DIAGNOSIS — C15.9 MALIGNANT NEOPLASM OF ESOPHAGUS, UNSPECIFIED: ICD-10-CM

## 2021-02-12 LAB
ANION GAP SERPL CALC-SCNC: 16 MMOL/L — SIGNIFICANT CHANGE UP (ref 5–17)
BUN SERPL-MCNC: 18 MG/DL — SIGNIFICANT CHANGE UP (ref 8–20)
CALCIUM SERPL-MCNC: 9.2 MG/DL — SIGNIFICANT CHANGE UP (ref 8.6–10.2)
CHLORIDE SERPL-SCNC: 98 MMOL/L — SIGNIFICANT CHANGE UP (ref 98–107)
CO2 SERPL-SCNC: 23 MMOL/L — SIGNIFICANT CHANGE UP (ref 22–29)
CREAT SERPL-MCNC: 0.68 MG/DL — SIGNIFICANT CHANGE UP (ref 0.5–1.3)
GLUCOSE SERPL-MCNC: 121 MG/DL — HIGH (ref 70–99)
HCT VFR BLD CALC: 40.4 % — SIGNIFICANT CHANGE UP (ref 39–50)
HGB BLD-MCNC: 13 G/DL — SIGNIFICANT CHANGE UP (ref 13–17)
MCHC RBC-ENTMCNC: 25.2 PG — LOW (ref 27–34)
MCHC RBC-ENTMCNC: 32.2 GM/DL — SIGNIFICANT CHANGE UP (ref 32–36)
MCV RBC AUTO: 78.4 FL — LOW (ref 80–100)
PLATELET # BLD AUTO: 346 K/UL — SIGNIFICANT CHANGE UP (ref 150–400)
POTASSIUM SERPL-MCNC: 4.1 MMOL/L — SIGNIFICANT CHANGE UP (ref 3.5–5.3)
POTASSIUM SERPL-SCNC: 4.1 MMOL/L — SIGNIFICANT CHANGE UP (ref 3.5–5.3)
RBC # BLD: 5.15 M/UL — SIGNIFICANT CHANGE UP (ref 4.2–5.8)
RBC # FLD: 14.3 % — SIGNIFICANT CHANGE UP (ref 10.3–14.5)
SARS-COV-2 IGG SERPL QL IA: NEGATIVE — SIGNIFICANT CHANGE UP
SARS-COV-2 IGM SERPL IA-ACNC: 0.07 INDEX — SIGNIFICANT CHANGE UP
SODIUM SERPL-SCNC: 136 MMOL/L — SIGNIFICANT CHANGE UP (ref 135–145)
WBC # BLD: 12.61 K/UL — HIGH (ref 3.8–10.5)
WBC # FLD AUTO: 12.61 K/UL — HIGH (ref 3.8–10.5)

## 2021-02-12 PROCEDURE — 99239 HOSP IP/OBS DSCHRG MGMT >30: CPT

## 2021-02-12 PROCEDURE — 80048 BASIC METABOLIC PNL TOTAL CA: CPT

## 2021-02-12 PROCEDURE — 96376 TX/PRO/DX INJ SAME DRUG ADON: CPT

## 2021-02-12 PROCEDURE — 80053 COMPREHEN METABOLIC PANEL: CPT

## 2021-02-12 PROCEDURE — U0005: CPT

## 2021-02-12 PROCEDURE — 85025 COMPLETE CBC W/AUTO DIFF WBC: CPT

## 2021-02-12 PROCEDURE — 93005 ELECTROCARDIOGRAM TRACING: CPT

## 2021-02-12 PROCEDURE — 96366 THER/PROPH/DIAG IV INF ADDON: CPT

## 2021-02-12 PROCEDURE — U0003: CPT

## 2021-02-12 PROCEDURE — 99285 EMERGENCY DEPT VISIT HI MDM: CPT | Mod: 25

## 2021-02-12 PROCEDURE — 96365 THER/PROPH/DIAG IV INF INIT: CPT

## 2021-02-12 PROCEDURE — 96361 HYDRATE IV INFUSION ADD-ON: CPT

## 2021-02-12 PROCEDURE — 99284 EMERGENCY DEPT VISIT MOD MDM: CPT

## 2021-02-12 PROCEDURE — 84484 ASSAY OF TROPONIN QUANT: CPT

## 2021-02-12 PROCEDURE — 74177 CT ABD & PELVIS W/CONTRAST: CPT

## 2021-02-12 PROCEDURE — 96367 TX/PROPH/DG ADDL SEQ IV INF: CPT

## 2021-02-12 PROCEDURE — 71045 X-RAY EXAM CHEST 1 VIEW: CPT

## 2021-02-12 PROCEDURE — 86769 SARS-COV-2 COVID-19 ANTIBODY: CPT

## 2021-02-12 PROCEDURE — 83690 ASSAY OF LIPASE: CPT

## 2021-02-12 PROCEDURE — 96375 TX/PRO/DX INJ NEW DRUG ADDON: CPT

## 2021-02-12 PROCEDURE — 36415 COLL VENOUS BLD VENIPUNCTURE: CPT

## 2021-02-12 PROCEDURE — 85027 COMPLETE CBC AUTOMATED: CPT

## 2021-02-12 RX ORDER — MORPHINE SULFATE 50 MG/1
2 CAPSULE, EXTENDED RELEASE ORAL ONCE
Refills: 0 | Status: DISCONTINUED | OUTPATIENT
Start: 2021-02-12 | End: 2021-02-12

## 2021-02-12 RX ORDER — SUCRALFATE 1 G
10 TABLET ORAL
Qty: 800 | Refills: 0
Start: 2021-02-12 | End: 2021-03-03

## 2021-02-12 RX ADMIN — Medication 10 MILLIGRAM(S): at 11:53

## 2021-02-12 RX ADMIN — Medication 1 GRAM(S): at 01:45

## 2021-02-12 RX ADMIN — Medication 10 MILLIGRAM(S): at 05:53

## 2021-02-12 RX ADMIN — Medication 1 GRAM(S): at 05:53

## 2021-02-12 RX ADMIN — Medication 10 MILLIGRAM(S): at 00:25

## 2021-02-12 RX ADMIN — Medication 1 GRAM(S): at 11:53

## 2021-02-12 RX ADMIN — AMLODIPINE BESYLATE 5 MILLIGRAM(S): 2.5 TABLET ORAL at 05:53

## 2021-02-12 RX ADMIN — MORPHINE SULFATE 2 MILLIGRAM(S): 50 CAPSULE, EXTENDED RELEASE ORAL at 01:45

## 2021-02-12 NOTE — DISCHARGE NOTE PROVIDER - PROVIDER TOKENS
PROVIDER:[TOKEN:[[id number]]],PROVIDER:[TOKEN:[[id number]]],FREE:[LAST:[PMD],PHONE:[(   )    -],FAX:[(   )    -]]

## 2021-02-12 NOTE — DISCHARGE NOTE PROVIDER - NSDCPNSUBOBJ_GEN_ALL_CORE
54 yo male with history of esophageal cancer with esophagectomy in 2016 (no chemo/radiation) who is due for follow up with Dr. Tapia at [facility] and Dr. Combs for CT chest and endoscopy next week.  Patient states he has been eating heavily lately.  Starting 3 days ago, pt had multiple episodes of  bilious nausea and vomiting.   Had epigastric pain as well.  NO constipation, no diarrhea.  IN ER had maloxx, pepcid and reglan, morphine. Pain resolved this am.  At home pt take reglan and e Mycin  prn . ( AMounts to 2 times a week ) . COVID negative.  CT showed no acute  changes     Vital Signs Last 24 Hrs  T(C): 36.8 (02-12-21 @ 07:41), Max: 36.8 (02-12-21 @ 03:40)  T(F): 98.2 (02-12-21 @ 07:41), Max: 98.2 (02-12-21 @ 03:40)  HR: 99 (02-12-21 @ 07:41) (66 - 99)  BP: 129/80 (02-12-21 @ 07:41) (124/80 - 175/89)  BP(mean): --  RR: 20 (02-12-21 @ 07:41) (18 - 20)  SpO2: 95% (02-12-21 @ 07:41) (94% - 99%)    SUBJECTIVE:  pt states he overate at home and so ended up with vomtis and now its all resolved and that he feels better and that he knows his body and wants to go home    GENERAL: able to tolerate liquids well  HEAD:  Atraumatic, Normocephalic  EYES: EOMI, PERRLA, conjunctiva and sclera clear  NECK: Supple, No JVD, Normal thyroid  NERVOUS SYSTEM:  Alert & Oriented X3, Motor Strength 5/5 B/L upper and lower extremities  CHEST/LUNG: Clear to percussion bilaterally; No rales, rhonchi, wheezing, or rubs  HEART: Regular rate and rhythm; No murmurs, rubs, or gallops  ABDOMEN: Soft, Nontender, Nondistended; Bowel sounds present  EXTREMITIES:  2+ Peripheral Pulses, No clubbing, cyanosis, or edema  SKIN: No rashes or lesions    LABS:                        13.0   12.61 )-----------( 346      ( 12 Feb 2021 07:19 )             40.4     12 Feb 2021 07:19    136    |  98     |  18.0   ----------------------------<  121    4.1     |  23.0   |  0.68     Ca    9.2        12 Feb 2021 07:19    TPro  8.5    /  Alb  4.9    /  TBili  0.4    /  DBili  x      /  AST  22     /  ALT  34     /  AlkPhos  130    / Amylase x      /Lipase 12     11 Feb 2021 04:21    Assessment and Recommendation:    54 yo male with history of esophageal cancer with esophagectomy in 2016 (no chemo/radiation) who is due for follow up with Dr. Tapia at [facility] and Dr. Combs for CT chest and endoscopy next week. Patient reports 2-3 days ago started having N/V and epigastric pain. vomiting bilious emesis with no blood and denies fever, chills, diarrhea, constipation. Patient has been having cough for past week. No sick contacts, no SOB. Denies chest pain. Denies leg pain/swelling. In the ED received Maalox, famotidine iv, viscous lidocaine, Reglan iv, and multiple doses of morphine IV. s/p 2 L of NS.    #s/p Intractable N/V- resolved  - Reglan TID prior to meals  - liquids   - advance as tolerates  - takes erythromycin and reglan at home    #HTN  - continue amlodipine 5mg daily    #esophogeal cancer s/p esophagectomy  - continue sucralfate  - consider GI consult if N/V persistent  - otherwise outpatient follow up on discharge    discharge home  requesting I give sucralfate. scripts sent

## 2021-02-12 NOTE — CONSULT NOTE ADULT - PROBLEM SELECTOR RECOMMENDATION 9
nausea, vomting abdominal pain resolved  - May D/C home. F/U with Dr Nguyen for EGD next week  - may continue his current meds- prilosec 40 mg qd  reglan tid prn E mycin prn

## 2021-02-12 NOTE — DISCHARGE NOTE PROVIDER - NSDCCPCAREPLAN_GEN_ALL_CORE_FT
PRINCIPAL DISCHARGE DIAGNOSIS  Diagnosis: Intractable nausea and vomiting  Assessment and Plan of Treatment:       SECONDARY DISCHARGE DIAGNOSES  Diagnosis: Gastroparesis  Assessment and Plan of Treatment:     Diagnosis: Malignant neoplasm of esophagus, unspecified location  Assessment and Plan of Treatment: Malignant neoplasm of esophagus, unspecified location

## 2021-02-12 NOTE — DISCHARGE NOTE PROVIDER - NSDCMRMEDTOKEN_GEN_ALL_CORE_FT
amLODIPine 5 mg oral tablet: 1 tab(s) orally once a day  metoclopramide: 10 milligram(s) orally 3 times a day continue for 4-5 days then taper down to as needed  omeprazole 40 mg oral delayed release capsule: 1 cap(s) orally once a day  sucralfate 1 g/10 mL oral suspension: 10 milliliter(s) orally 4 times a day

## 2021-02-12 NOTE — CONSULT NOTE ADULT - SUBJECTIVE AND OBJECTIVE BOX
Patient is a 55y old  Male who presents with a chief complaint of     HPI:  56 yo male with history of esophageal cancer with esophagectomy in 2016 (no chemo/radiation) who is due for follow up with Dr. Tapia at \[facility]\"" and Dr. Combs for CT chest and endoscopy next week.  Patient states he has been eating heavily lately.  Starting 3 days ago, pt had multiple episodes of  bilious nausea and vomiting.   Had epigastric pain as well.  NO constipation, no diarrhea.  IN ER had maloxx, pepcid and reglan, morphine. Pain resolved this am.  At home pt take reglan and e Mycin  prn . ( AMounts to 2 times a week ) . COVID negative.  CT showed no acute  changes     REVIEW OF SYSTEMS:  Constitutional: No fever, weight loss or fatigue  ENMT:  No difficulty hearing, tinnitus, vertigo; No sinus or throat pain  Respiratory: No cough, wheezing, chills or hemoptysis  Cardiovascular: No chest pain, palpitations, dizziness or leg swelling  Gastrointestinal: No abdominal or epigastric pain. + nausea, vomiting No diarrhea or constipation. No melena or hematochezia.  Skin: No itching, burning, rashes or lesions   Musculoskeletal: No joint pain or swelling; No muscle, back or extremity pain    PAST MEDICAL & SURGICAL HISTORY:  Gastroparesis    Esophageal cancer    Hypertension    H/O esophagectomy    S/P cholecystectomy        FAMILY HISTORY:  FH: HTN (hypertension)        SOCIAL HISTORY:  Smoking Status: [ ] Current, [ ] Former, [ ] Never  Pack Years:  [  ] EtOH-no  [  ] IVDA    MEDICATIONS:  MEDICATIONS  (STANDING):  amLODIPine   Tablet 5 milliGRAM(s) Oral daily  metoclopramide Injectable 10 milliGRAM(s) IV Push three times a day  sucralfate 1 Gram(s) Oral four times a day    MEDICATIONS  (PRN):      Allergies    No Known Allergies    Intolerances        Vital Signs Last 24 Hrs  T(C): 36.8 (12 Feb 2021 07:41), Max: 36.8 (12 Feb 2021 03:40)  T(F): 98.2 (12 Feb 2021 07:41), Max: 98.2 (12 Feb 2021 03:40)  HR: 99 (12 Feb 2021 07:41) (66 - 99)  BP: 129/80 (12 Feb 2021 07:41) (124/80 - 175/89)  BP(mean): --  RR: 20 (12 Feb 2021 07:41) (18 - 20)  SpO2: 95% (12 Feb 2021 07:41) (94% - 99%)        PHYSICAL EXAM:    General: Well developed; well nourished; in no acute distress  HEENT: MMM, conjunctiva and sclera clear  H- RRR  L- CTA  Gastrointestinal: Soft, non-tender non-distended; Normal bowel sounds; No rebound or guarding  Extremities: Normal range of motion, No clubbing, cyanosis or edema  Neurological: Alert and oriented x3  Skin: Warm and dry. No obvious rash      LABS:                        13.0   12.61 )-----------( 346      ( 12 Feb 2021 07:19 )             40.4     12 Feb 2021 07:19    136    |  98     |  18.0   ----------------------------<  121    4.1     |  23.0   |  0.68     Ca    9.2        12 Feb 2021 07:19    TPro  8.5    /  Alb  4.9    /  TBili  0.4    /  DBili  x      /  AST  22     /  ALT  34     /  AlkPhos  130    / Amylase x      /Lipase 12     11 Feb 2021 04:21              RADIOLOGY & ADDITIONAL STUDIES:     < from: CT Abdomen and Pelvis w/ IV Cont (02.11.21 @ 12:24) >  No acute abdominal pathology.  A few nodular opacities in the right lower lobe, most likely infectious.    < end of copied text >

## 2021-02-12 NOTE — DISCHARGE NOTE NURSING/CASE MANAGEMENT/SOCIAL WORK - PATIENT PORTAL LINK FT
You can access the FollowMyHealth Patient Portal offered by [facility] by registering at the following website: [email or web address]. By joining [employer]’s FollowMyHealth portal, you will also be able to view your health information using other applications (apps) compatible with our system.

## 2021-02-12 NOTE — DISCHARGE NOTE PROVIDER - HOSPITAL COURSE
admitted with intractable N/V  resolved  Medically stable and agreeable with discharge and follow up plan. Patient was advised to return to ED if any symptoms occur or worsen.  time 55 mins

## 2021-02-12 NOTE — CHART NOTE - NSCHARTNOTEFT_GEN_A_CORE
Called by RN after pt c/o epigastric pain.  Pt with hx of esophageal ca, s/p esophagectomy admitted for gastroparesis without acute abdominal pathology on CT scan, been c/o epigastric pain controlled well with morphine.  Pt denies worsening of pain since admission, N/V better controlled with Reglan, denies hematochezia/ melena, radiation of pain, diarrhea.  Pt is scheduled for endoscopy/CT with GI/ Oncologists next week.  VSS.  Morphine 2mg IVP ordered.  Continue to monitor and reassess prn.

## 2021-02-12 NOTE — DISCHARGE NOTE PROVIDER - CARE PROVIDER_API CALL
KATELYN MERAZ  Gastroenterology  [address], [address]  [place] [address]  Phone: [phone]  Fax: [phone]  Follow Up Time:     EZEQUIEL SOLIS  Hematology/Oncology  [address]  [place] [address]  Phone: [phone]  Fax: [phone]  Follow Up Time:     PMD,   Phone: (   )    -  Fax: (   )    -  Follow Up Time:

## 2021-04-28 ENCOUNTER — INPATIENT (INPATIENT)
Facility: HOSPITAL | Age: 56
LOS: 2 days | Discharge: HOME | DRG: 392 | End: 2021-05-01
Attending: HOSPITALIST | Admitting: INTERNAL MEDICINE
Payer: MEDICARE

## 2021-04-28 ENCOUNTER — EMERGENCY (EMERGENCY)
Facility: HOSPITAL | Age: 56
LOS: 1 days | Discharge: LEFT BEFORE DISCHARGE | End: 2021-04-28
Payer: MEDICARE

## 2021-04-28 VITALS
TEMPERATURE: 98 F | WEIGHT: 195.99 LBS | SYSTOLIC BLOOD PRESSURE: 171 MMHG | HEIGHT: 72 IN | DIASTOLIC BLOOD PRESSURE: 96 MMHG | HEART RATE: 85 BPM | RESPIRATION RATE: 79 BRPM | OXYGEN SATURATION: 99 %

## 2021-04-28 VITALS — HEIGHT: 72 IN

## 2021-04-28 DIAGNOSIS — Z98.890 OTHER SPECIFIED POSTPROCEDURAL STATES: Chronic | ICD-10-CM

## 2021-04-28 DIAGNOSIS — Z90.49 ACQUIRED ABSENCE OF OTHER SPECIFIED PARTS OF DIGESTIVE TRACT: Chronic | ICD-10-CM

## 2021-04-28 LAB
ALBUMIN SERPL ELPH-MCNC: 5.2 G/DL — SIGNIFICANT CHANGE UP (ref 3.3–5.2)
ALP SERPL-CCNC: 117 U/L — SIGNIFICANT CHANGE UP (ref 40–120)
ALT FLD-CCNC: 29 U/L — SIGNIFICANT CHANGE UP
ANION GAP SERPL CALC-SCNC: 20 MMOL/L — HIGH (ref 5–17)
AST SERPL-CCNC: 30 U/L — SIGNIFICANT CHANGE UP
BASE EXCESS BLDV CALC-SCNC: -1.2 MMOL/L — SIGNIFICANT CHANGE UP (ref -2–2)
BASOPHILS # BLD AUTO: 0.06 K/UL — SIGNIFICANT CHANGE UP (ref 0–0.2)
BASOPHILS NFR BLD AUTO: 0.5 % — SIGNIFICANT CHANGE UP (ref 0–2)
BILIRUB SERPL-MCNC: 0.4 MG/DL — SIGNIFICANT CHANGE UP (ref 0.4–2)
BUN SERPL-MCNC: 10 MG/DL — SIGNIFICANT CHANGE UP (ref 8–20)
CA-I SERPL-SCNC: 1.19 MMOL/L — SIGNIFICANT CHANGE UP (ref 1.15–1.33)
CALCIUM SERPL-MCNC: 10.3 MG/DL — HIGH (ref 8.6–10.2)
CHLORIDE BLDV-SCNC: 104 MMOL/L — SIGNIFICANT CHANGE UP (ref 98–107)
CHLORIDE SERPL-SCNC: 100 MMOL/L — SIGNIFICANT CHANGE UP (ref 98–107)
CO2 SERPL-SCNC: 20 MMOL/L — LOW (ref 22–29)
CREAT SERPL-MCNC: 0.76 MG/DL — SIGNIFICANT CHANGE UP (ref 0.5–1.3)
EOSINOPHIL # BLD AUTO: 0 K/UL — SIGNIFICANT CHANGE UP (ref 0–0.5)
EOSINOPHIL NFR BLD AUTO: 0 % — SIGNIFICANT CHANGE UP (ref 0–6)
GAS PNL BLDV: 145 MMOL/L — SIGNIFICANT CHANGE UP (ref 135–145)
GAS PNL BLDV: SIGNIFICANT CHANGE UP
GAS PNL BLDV: SIGNIFICANT CHANGE UP
GLUCOSE BLDV-MCNC: 144 MG/DL — HIGH (ref 70–99)
GLUCOSE SERPL-MCNC: 144 MG/DL — HIGH (ref 70–99)
HCO3 BLDV-SCNC: 23 MMOL/L — SIGNIFICANT CHANGE UP (ref 21–29)
HCT VFR BLD CALC: 42.6 % — SIGNIFICANT CHANGE UP (ref 39–50)
HCT VFR BLDA CALC: 44 — SIGNIFICANT CHANGE UP (ref 39–50)
HGB BLD CALC-MCNC: 14.2 G/DL — SIGNIFICANT CHANGE UP (ref 13–17)
HGB BLD-MCNC: 13.5 G/DL — SIGNIFICANT CHANGE UP (ref 13–17)
IMM GRANULOCYTES NFR BLD AUTO: 0.2 % — SIGNIFICANT CHANGE UP (ref 0–1.5)
LACTATE BLDV-MCNC: 1.7 MMOL/L — SIGNIFICANT CHANGE UP (ref 0.5–2)
LACTATE BLDV-MCNC: 3 MMOL/L — HIGH (ref 0.5–2)
LIDOCAIN IGE QN: 15 U/L — LOW (ref 22–51)
LYMPHOCYTES # BLD AUTO: 1.36 K/UL — SIGNIFICANT CHANGE UP (ref 1–3.3)
LYMPHOCYTES # BLD AUTO: 11.2 % — LOW (ref 13–44)
MCHC RBC-ENTMCNC: 24.4 PG — LOW (ref 27–34)
MCHC RBC-ENTMCNC: 31.7 GM/DL — LOW (ref 32–36)
MCV RBC AUTO: 77 FL — LOW (ref 80–100)
MONOCYTES # BLD AUTO: 0.46 K/UL — SIGNIFICANT CHANGE UP (ref 0–0.9)
MONOCYTES NFR BLD AUTO: 3.8 % — SIGNIFICANT CHANGE UP (ref 2–14)
NEUTROPHILS # BLD AUTO: 10.19 K/UL — HIGH (ref 1.8–7.4)
NEUTROPHILS NFR BLD AUTO: 84.3 % — HIGH (ref 43–77)
OTHER CELLS CSF MANUAL: 15 ML/DL — LOW (ref 18–22)
PCO2 BLDV: 44 MMHG — SIGNIFICANT CHANGE UP (ref 35–50)
PH BLDV: 7.36 — SIGNIFICANT CHANGE UP (ref 7.32–7.43)
PLATELET # BLD AUTO: 353 K/UL — SIGNIFICANT CHANGE UP (ref 150–400)
PO2 BLDV: 45 MMHG — SIGNIFICANT CHANGE UP (ref 25–45)
POTASSIUM BLDV-SCNC: 3.8 MMOL/L — SIGNIFICANT CHANGE UP (ref 3.4–4.5)
POTASSIUM SERPL-MCNC: 3.8 MMOL/L — SIGNIFICANT CHANGE UP (ref 3.5–5.3)
POTASSIUM SERPL-SCNC: 3.8 MMOL/L — SIGNIFICANT CHANGE UP (ref 3.5–5.3)
PROT SERPL-MCNC: 8.8 G/DL — HIGH (ref 6.6–8.7)
RBC # BLD: 5.53 M/UL — SIGNIFICANT CHANGE UP (ref 4.2–5.8)
RBC # FLD: 14.9 % — HIGH (ref 10.3–14.5)
SAO2 % BLDV: 77 % — SIGNIFICANT CHANGE UP
SODIUM SERPL-SCNC: 140 MMOL/L — SIGNIFICANT CHANGE UP (ref 135–145)
WBC # BLD: 12.1 K/UL — HIGH (ref 3.8–10.5)
WBC # FLD AUTO: 12.1 K/UL — HIGH (ref 3.8–10.5)

## 2021-04-28 PROCEDURE — L9992: CPT

## 2021-04-28 PROCEDURE — G1004: CPT

## 2021-04-28 PROCEDURE — 74177 CT ABD & PELVIS W/CONTRAST: CPT | Mod: 26,ME

## 2021-04-28 RX ORDER — SODIUM CHLORIDE 9 MG/ML
1000 INJECTION INTRAMUSCULAR; INTRAVENOUS; SUBCUTANEOUS ONCE
Refills: 0 | Status: COMPLETED | OUTPATIENT
Start: 2021-04-28 | End: 2021-04-28

## 2021-04-28 RX ORDER — FAMOTIDINE 10 MG/ML
20 INJECTION INTRAVENOUS ONCE
Refills: 0 | Status: COMPLETED | OUTPATIENT
Start: 2021-04-28 | End: 2021-04-28

## 2021-04-28 RX ORDER — METOCLOPRAMIDE HCL 10 MG
10 TABLET ORAL ONCE
Refills: 0 | Status: COMPLETED | OUTPATIENT
Start: 2021-04-28 | End: 2021-04-28

## 2021-04-28 RX ORDER — MORPHINE SULFATE 50 MG/1
4 CAPSULE, EXTENDED RELEASE ORAL ONCE
Refills: 0 | Status: DISCONTINUED | OUTPATIENT
Start: 2021-04-28 | End: 2021-04-28

## 2021-04-28 RX ORDER — IOHEXOL 300 MG/ML
30 INJECTION, SOLUTION INTRAVENOUS ONCE
Refills: 0 | Status: COMPLETED | OUTPATIENT
Start: 2021-04-28 | End: 2021-04-28

## 2021-04-28 RX ORDER — ONDANSETRON 8 MG/1
4 TABLET, FILM COATED ORAL ONCE
Refills: 0 | Status: COMPLETED | OUTPATIENT
Start: 2021-04-28 | End: 2021-04-28

## 2021-04-28 RX ORDER — SODIUM CHLORIDE 9 MG/ML
1000 INJECTION, SOLUTION INTRAVENOUS ONCE
Refills: 0 | Status: COMPLETED | OUTPATIENT
Start: 2021-04-28 | End: 2021-04-28

## 2021-04-28 RX ADMIN — ONDANSETRON 4 MILLIGRAM(S): 8 TABLET, FILM COATED ORAL at 20:56

## 2021-04-28 RX ADMIN — SODIUM CHLORIDE 1000 MILLILITER(S): 9 INJECTION, SOLUTION INTRAVENOUS at 21:26

## 2021-04-28 RX ADMIN — Medication 104 MILLIGRAM(S): at 22:15

## 2021-04-28 RX ADMIN — IOHEXOL 30 MILLILITER(S): 300 INJECTION, SOLUTION INTRAVENOUS at 20:56

## 2021-04-28 RX ADMIN — SODIUM CHLORIDE 1000 MILLILITER(S): 9 INJECTION INTRAMUSCULAR; INTRAVENOUS; SUBCUTANEOUS at 20:56

## 2021-04-28 RX ADMIN — MORPHINE SULFATE 4 MILLIGRAM(S): 50 CAPSULE, EXTENDED RELEASE ORAL at 20:56

## 2021-04-28 RX ADMIN — FAMOTIDINE 20 MILLIGRAM(S): 10 INJECTION INTRAVENOUS at 22:15

## 2021-04-28 NOTE — ED ADULT NURSE NOTE - CHIEF COMPLAINT QUOTE
PT C/O of middle ABD pain associated with nausea and vomiting.  Was here early for same complaint but left without being seen by MD in hopes of feeling better at home.  NO PO intake since last night.  Hx of esophagus CA and had his Esophagus removed in 2016.  Denies fevers, chills or SOB.

## 2021-04-28 NOTE — ED STATDOCS - ATTENDING CONTRIBUTION TO CARE
I, Jovanna Zhao, performed the initial face to face bedside interview with this patient regarding history of present illness, review of symptoms and relevant past medical, social and family history.  I completed an independent physical examination.  I was the initial provider who evaluated this patient. I have signed out the follow up of any pending tests (i.e. labs, radiological studies) to the ACP.  I have communicated the patient’s plan of care and disposition with the ACP.

## 2021-04-28 NOTE — ED STATDOCS - PHYSICAL EXAMINATION
Head: atraumatic, normacephalic  Face: atraumatic, no crepitus no orbiral/maxillary/mandibular ttp  throat: uvula midline no exudates  eyes: perrla eomi  heart: rrr s1s2  lungs: ctab  abd: soft, nt nd +bs no rebound/guarding no cva ttp  skin: warm  LE: no swelling, no calf ttp  back: no midline cervical/thoracic/lumbar ttp Head: atraumatic, normacephalic  Face: atraumatic, no crepitus no orbiral/maxillary/mandibular ttp  throat: uvula midline no exudates  eyes: perrla eomi  heart: rrr s1s2  lungs: ctab  abd: soft, diffuse TTP, nd +bs no rebound/guarding no cva ttp  skin: warm  LE: no swelling, no calf ttp  back: no midline cervical/thoracic/lumbar ttp

## 2021-04-28 NOTE — ED STATDOCS - OBJECTIVE STATEMENT
56 y/o male with PMHx of Esophageal cancer s/p removal of esophagus in 2016, Gastroparesis, and HTN presents to ED c/o abdominal pain. Patient reports nausea, 6 episodes of vomiting, and abdominal pain that started 2 days ago, but worsened today. Patient reports he has episodes like this every couple months.  Patient had an episode of vomiting during exam.     Denies f/c/cp/sob/palpitations/cough/rash/headache/dizziness/d/c/dysuria/hematuria/urinary symptoms 56 y/o male with PMHx of Esophageal cancer s/p removal of esophagus in 2016, Gastroparesis, and HTN s/p cholecystectomy presents to ED c/o abdominal pain. Patient reports nausea, 6 episodes of vomiting, and abdominal pain that started 2 days ago, but worsened today. Patient reports he has episodes like this every couple months.  Patient had an episode of vomiting during exam.     Denies f/c/cp/sob/palpitations/cough/rash/headache/dizziness/d/c/dysuria/hematuria/urinary symptoms

## 2021-04-28 NOTE — ED STATDOCS - CLINICAL SUMMARY MEDICAL DECISION MAKING FREE TEXT BOX
gastroparesis vs sbo, given hx of abdominal surgery will check labs, CT abdomen, pain control, and re-assess.

## 2021-04-28 NOTE — ED ADULT TRIAGE NOTE - CHIEF COMPLAINT QUOTE
PT C/O of middle ABD pain associated with nausea and vomiting.  Was here early for same complaint but left without being seen by MD in hopes of feeling better at home.  NO PO intake since last night.  Hx of sephgas CA and had his Esophagus removed in 2016.  Denies fevers, chills or SOB. PT C/O of middle ABD pain associated with nausea and vomiting.  Was here early for same complaint but left without being seen by MD in hopes of feeling better at home.  NO PO intake since last night.  Hx of esophagus CA and had his Esophagus removed in 2016.  Denies fevers, chills or SOB.

## 2021-04-28 NOTE — ED ADULT NURSE NOTE - OBJECTIVE STATEMENT
Pt. respirations even and unlabored. Pt. complaining of upper/ generalized abdominal pain. Pt. states hes been N/V since last night. Pt. unable ot tolerate PO. HX of esophagus cancer.

## 2021-04-28 NOTE — ED STATDOCS - PROGRESS NOTE DETAILS
KALEIGH Rodriguez: PT evaluated by intake physician. HPI/PE/ROS as noted above. Will follow up plan per intake physician. Pt re-assessed, still noting nausea and abd pain. Pepcid ordered. Lactate 3.0, likely from frequent vomiting today. will trend lactate after 2L IVF KALEIGH Rodriguez: CT unremarkable. Pt re-assessed, still symptomatic, will keep pt in obs

## 2021-04-29 PROBLEM — K31.84 GASTROPARESIS: Chronic | Status: ACTIVE | Noted: 2021-02-11

## 2021-04-29 LAB
ALBUMIN SERPL ELPH-MCNC: 4.9 G/DL — SIGNIFICANT CHANGE UP (ref 3.3–5.2)
ALP SERPL-CCNC: 103 U/L — SIGNIFICANT CHANGE UP (ref 40–120)
ALT FLD-CCNC: 22 U/L — SIGNIFICANT CHANGE UP
ANION GAP SERPL CALC-SCNC: 15 MMOL/L — SIGNIFICANT CHANGE UP (ref 5–17)
AST SERPL-CCNC: 26 U/L — SIGNIFICANT CHANGE UP
BILIRUB SERPL-MCNC: 0.6 MG/DL — SIGNIFICANT CHANGE UP (ref 0.4–2)
BUN SERPL-MCNC: 9 MG/DL — SIGNIFICANT CHANGE UP (ref 8–20)
BUN SERPL-MCNC: 9 MG/DL — SIGNIFICANT CHANGE UP (ref 8–20)
CALCIUM SERPL-MCNC: 9.2 MG/DL — SIGNIFICANT CHANGE UP (ref 8.6–10.2)
CALCIUM SERPL-MCNC: 9.4 MG/DL — SIGNIFICANT CHANGE UP (ref 8.6–10.2)
CHLORIDE SERPL-SCNC: 98 MMOL/L — SIGNIFICANT CHANGE UP (ref 98–107)
CHLORIDE SERPL-SCNC: 99 MMOL/L — SIGNIFICANT CHANGE UP (ref 98–107)
CO2 SERPL-SCNC: 23 MMOL/L — SIGNIFICANT CHANGE UP (ref 22–29)
CO2 SERPL-SCNC: 24 MMOL/L — SIGNIFICANT CHANGE UP (ref 22–29)
CREAT SERPL-MCNC: 0.62 MG/DL — SIGNIFICANT CHANGE UP (ref 0.5–1.3)
CREAT SERPL-MCNC: 0.66 MG/DL — SIGNIFICANT CHANGE UP (ref 0.5–1.3)
GLUCOSE SERPL-MCNC: 144 MG/DL — HIGH (ref 70–99)
GLUCOSE SERPL-MCNC: 149 MG/DL — HIGH (ref 70–99)
HCT VFR BLD CALC: 38.9 % — LOW (ref 39–50)
HGB BLD-MCNC: 12.3 G/DL — LOW (ref 13–17)
MCHC RBC-ENTMCNC: 24.3 PG — LOW (ref 27–34)
MCHC RBC-ENTMCNC: 31.6 GM/DL — LOW (ref 32–36)
MCV RBC AUTO: 76.9 FL — LOW (ref 80–100)
PLATELET # BLD AUTO: 278 K/UL — SIGNIFICANT CHANGE UP (ref 150–400)
POTASSIUM SERPL-MCNC: 3.7 MMOL/L — SIGNIFICANT CHANGE UP (ref 3.5–5.3)
POTASSIUM SERPL-MCNC: 4 MMOL/L — SIGNIFICANT CHANGE UP (ref 3.5–5.3)
POTASSIUM SERPL-SCNC: 3.7 MMOL/L — SIGNIFICANT CHANGE UP (ref 3.5–5.3)
POTASSIUM SERPL-SCNC: 4 MMOL/L — SIGNIFICANT CHANGE UP (ref 3.5–5.3)
PROT SERPL-MCNC: 7.9 G/DL — SIGNIFICANT CHANGE UP (ref 6.6–8.7)
RBC # BLD: 5.06 M/UL — SIGNIFICANT CHANGE UP (ref 4.2–5.8)
RBC # FLD: 14.8 % — HIGH (ref 10.3–14.5)
SODIUM SERPL-SCNC: 136 MMOL/L — SIGNIFICANT CHANGE UP (ref 135–145)
SODIUM SERPL-SCNC: 137 MMOL/L — SIGNIFICANT CHANGE UP (ref 135–145)
WBC # BLD: 11.02 K/UL — HIGH (ref 3.8–10.5)
WBC # FLD AUTO: 11.02 K/UL — HIGH (ref 3.8–10.5)

## 2021-04-29 RX ORDER — AMLODIPINE BESYLATE 2.5 MG/1
5 TABLET ORAL DAILY
Refills: 0 | Status: DISCONTINUED | OUTPATIENT
Start: 2021-04-29 | End: 2021-05-01

## 2021-04-29 RX ORDER — OXYCODONE HYDROCHLORIDE 5 MG/1
5 TABLET ORAL EVERY 6 HOURS
Refills: 0 | Status: DISCONTINUED | OUTPATIENT
Start: 2021-04-29 | End: 2021-05-01

## 2021-04-29 RX ORDER — PANTOPRAZOLE SODIUM 20 MG/1
40 TABLET, DELAYED RELEASE ORAL
Refills: 0 | Status: DISCONTINUED | OUTPATIENT
Start: 2021-04-29 | End: 2021-04-30

## 2021-04-29 RX ORDER — SODIUM CHLORIDE 9 MG/ML
1000 INJECTION, SOLUTION INTRAVENOUS
Refills: 0 | Status: DISCONTINUED | OUTPATIENT
Start: 2021-04-29 | End: 2021-05-01

## 2021-04-29 RX ORDER — MORPHINE SULFATE 50 MG/1
4 CAPSULE, EXTENDED RELEASE ORAL ONCE
Refills: 0 | Status: DISCONTINUED | OUTPATIENT
Start: 2021-04-29 | End: 2021-04-29

## 2021-04-29 RX ORDER — METOCLOPRAMIDE HCL 10 MG
10 TABLET ORAL EVERY 8 HOURS
Refills: 0 | Status: DISCONTINUED | OUTPATIENT
Start: 2021-04-29 | End: 2021-04-30

## 2021-04-29 RX ORDER — ONDANSETRON 8 MG/1
4 TABLET, FILM COATED ORAL EVERY 6 HOURS
Refills: 0 | Status: DISCONTINUED | OUTPATIENT
Start: 2021-04-29 | End: 2021-04-30

## 2021-04-29 RX ORDER — KETOROLAC TROMETHAMINE 30 MG/ML
15 SYRINGE (ML) INJECTION ONCE
Refills: 0 | Status: DISCONTINUED | OUTPATIENT
Start: 2021-04-29 | End: 2021-04-29

## 2021-04-29 RX ORDER — FAMOTIDINE 10 MG/ML
20 INJECTION INTRAVENOUS DAILY
Refills: 0 | Status: DISCONTINUED | OUTPATIENT
Start: 2021-04-29 | End: 2021-05-01

## 2021-04-29 RX ORDER — ONDANSETRON 8 MG/1
4 TABLET, FILM COATED ORAL ONCE
Refills: 0 | Status: COMPLETED | OUTPATIENT
Start: 2021-04-29 | End: 2021-04-29

## 2021-04-29 RX ORDER — OXYCODONE HYDROCHLORIDE 5 MG/1
5 TABLET ORAL EVERY 4 HOURS
Refills: 0 | Status: DISCONTINUED | OUTPATIENT
Start: 2021-04-29 | End: 2021-04-29

## 2021-04-29 RX ADMIN — Medication 30 MILLILITER(S): at 00:58

## 2021-04-29 RX ADMIN — Medication 30 MILLILITER(S): at 21:29

## 2021-04-29 RX ADMIN — ONDANSETRON 4 MILLIGRAM(S): 8 TABLET, FILM COATED ORAL at 05:12

## 2021-04-29 RX ADMIN — ONDANSETRON 4 MILLIGRAM(S): 8 TABLET, FILM COATED ORAL at 18:07

## 2021-04-29 RX ADMIN — OXYCODONE HYDROCHLORIDE 5 MILLIGRAM(S): 5 TABLET ORAL at 21:29

## 2021-04-29 RX ADMIN — Medication 15 MILLIGRAM(S): at 00:58

## 2021-04-29 RX ADMIN — OXYCODONE HYDROCHLORIDE 5 MILLIGRAM(S): 5 TABLET ORAL at 14:30

## 2021-04-29 RX ADMIN — ONDANSETRON 4 MILLIGRAM(S): 8 TABLET, FILM COATED ORAL at 21:51

## 2021-04-29 RX ADMIN — OXYCODONE HYDROCHLORIDE 5 MILLIGRAM(S): 5 TABLET ORAL at 13:31

## 2021-04-29 RX ADMIN — OXYCODONE HYDROCHLORIDE 5 MILLIGRAM(S): 5 TABLET ORAL at 23:12

## 2021-04-29 RX ADMIN — MORPHINE SULFATE 4 MILLIGRAM(S): 50 CAPSULE, EXTENDED RELEASE ORAL at 05:12

## 2021-04-29 RX ADMIN — FAMOTIDINE 20 MILLIGRAM(S): 10 INJECTION INTRAVENOUS at 11:30

## 2021-04-29 RX ADMIN — Medication 104 MILLIGRAM(S): at 05:12

## 2021-04-29 RX ADMIN — ONDANSETRON 4 MILLIGRAM(S): 8 TABLET, FILM COATED ORAL at 00:59

## 2021-04-29 RX ADMIN — SODIUM CHLORIDE 100 MILLILITER(S): 9 INJECTION, SOLUTION INTRAVENOUS at 03:23

## 2021-04-29 RX ADMIN — PANTOPRAZOLE SODIUM 40 MILLIGRAM(S): 20 TABLET, DELAYED RELEASE ORAL at 06:14

## 2021-04-29 RX ADMIN — AMLODIPINE BESYLATE 5 MILLIGRAM(S): 2.5 TABLET ORAL at 10:49

## 2021-04-29 RX ADMIN — Medication 30 MILLILITER(S): at 11:30

## 2021-04-29 RX ADMIN — ONDANSETRON 4 MILLIGRAM(S): 8 TABLET, FILM COATED ORAL at 11:30

## 2021-04-29 RX ADMIN — OXYCODONE HYDROCHLORIDE 5 MILLIGRAM(S): 5 TABLET ORAL at 18:11

## 2021-04-29 RX ADMIN — OXYCODONE HYDROCHLORIDE 5 MILLIGRAM(S): 5 TABLET ORAL at 08:11

## 2021-04-29 NOTE — ED CDU PROVIDER INITIAL DAY NOTE - ATTENDING CONTRIBUTION TO CARE
Pt seen and evaluated with PA.  Agree with observation and treatment plan Pt seen and evaluated with ACP.  Agree with observation and treatment plan

## 2021-04-29 NOTE — ED CDU PROVIDER INITIAL DAY NOTE - MEDICAL DECISION MAKING DETAILS
Pt with intractable vomiting/nausea, inability to tolerate PO intake. Will keep in obs for IV hydration, meds and AM labs.

## 2021-04-29 NOTE — ED CDU PROVIDER INITIAL DAY NOTE - OBJECTIVE STATEMENT
56yo M pmhx of esophageal cancer s/p resection in 2016, gastroparesis, HTN, s/p cholecystomy 2016 presented to ED c/o nausea and vomiting. Patient reports nausea, 6 episodes of vomiting, and abdominal pain that started 2 days ago, but worsened today. Patient reports he has episodes like this every couple months, requiring admission in February for symptoms. Follows with GI Dr. Nick. In ED had ct scan which was negative for intraabdominal pathology. Given IV pepcid, zofran x 2, morphine, reglan and maalox without significant improvement. Pt still noting nausea and inability to tolerate PO intake. Will keep pt in obs for symptom control. Denies fever, hematemesis, diarrhea, recent travel, sick contacts.

## 2021-04-29 NOTE — ED CDU PROVIDER INITIAL DAY NOTE - PHYSICAL EXAMINATION
Gen: No acute distress  HEENT: Mucous membranes moist, pink conjunctivae, EOMI  CV: RRR, nl s1/s2.  Resp: CTAB, normal rate and effort  GI: Abdomen soft, diffusely ttp. No rebound, no guarding  : No CVAT  Neuro: A&O x 3, moving all 4 extremities  MSK: No spine or joint tenderness to palpation  Skin: No rashes. intact and perfused.

## 2021-04-30 ENCOUNTER — RESULT REVIEW (OUTPATIENT)
Age: 56
End: 2021-04-30

## 2021-04-30 DIAGNOSIS — C15.9 MALIGNANT NEOPLASM OF ESOPHAGUS, UNSPECIFIED: ICD-10-CM

## 2021-04-30 DIAGNOSIS — K31.84 GASTROPARESIS: ICD-10-CM

## 2021-04-30 DIAGNOSIS — R11.2 NAUSEA WITH VOMITING, UNSPECIFIED: ICD-10-CM

## 2021-04-30 LAB — SARS-COV-2 RNA SPEC QL NAA+PROBE: SIGNIFICANT CHANGE UP

## 2021-04-30 PROCEDURE — 88305 TISSUE EXAM BY PATHOLOGIST: CPT | Mod: 26

## 2021-04-30 PROCEDURE — 88342 IMHCHEM/IMCYTCHM 1ST ANTB: CPT | Mod: 26

## 2021-04-30 PROCEDURE — 99223 1ST HOSP IP/OBS HIGH 75: CPT | Mod: AI

## 2021-04-30 PROCEDURE — 99223 1ST HOSP IP/OBS HIGH 75: CPT | Mod: 25

## 2021-04-30 PROCEDURE — 43239 EGD BIOPSY SINGLE/MULTIPLE: CPT

## 2021-04-30 RX ORDER — ZOLPIDEM TARTRATE 10 MG/1
5 TABLET ORAL AT BEDTIME
Refills: 0 | Status: DISCONTINUED | OUTPATIENT
Start: 2021-04-30 | End: 2021-05-01

## 2021-04-30 RX ORDER — ONDANSETRON 8 MG/1
4 TABLET, FILM COATED ORAL EVERY 6 HOURS
Refills: 0 | Status: DISCONTINUED | OUTPATIENT
Start: 2021-04-30 | End: 2021-05-01

## 2021-04-30 RX ORDER — PANTOPRAZOLE SODIUM 20 MG/1
40 TABLET, DELAYED RELEASE ORAL
Refills: 0 | Status: DISCONTINUED | OUTPATIENT
Start: 2021-04-30 | End: 2021-05-01

## 2021-04-30 RX ORDER — HYDRALAZINE HCL 50 MG
10 TABLET ORAL EVERY 6 HOURS
Refills: 0 | Status: DISCONTINUED | OUTPATIENT
Start: 2021-04-30 | End: 2021-05-01

## 2021-04-30 RX ORDER — METOCLOPRAMIDE HCL 10 MG
10 TABLET ORAL EVERY 8 HOURS
Refills: 0 | Status: DISCONTINUED | OUTPATIENT
Start: 2021-04-30 | End: 2021-05-01

## 2021-04-30 RX ORDER — ONDANSETRON 8 MG/1
4 TABLET, FILM COATED ORAL EVERY 6 HOURS
Refills: 0 | Status: DISCONTINUED | OUTPATIENT
Start: 2021-04-30 | End: 2021-04-30

## 2021-04-30 RX ORDER — MORPHINE SULFATE 50 MG/1
2 CAPSULE, EXTENDED RELEASE ORAL EVERY 4 HOURS
Refills: 0 | Status: DISCONTINUED | OUTPATIENT
Start: 2021-04-30 | End: 2021-05-01

## 2021-04-30 RX ORDER — METOCLOPRAMIDE HCL 10 MG
5 TABLET ORAL THREE TIMES A DAY
Refills: 0 | Status: DISCONTINUED | OUTPATIENT
Start: 2021-04-30 | End: 2021-04-30

## 2021-04-30 RX ORDER — SUCRALFATE 1 G
1 TABLET ORAL EVERY 6 HOURS
Refills: 0 | Status: DISCONTINUED | OUTPATIENT
Start: 2021-04-30 | End: 2021-05-01

## 2021-04-30 RX ADMIN — ONDANSETRON 4 MILLIGRAM(S): 8 TABLET, FILM COATED ORAL at 06:54

## 2021-04-30 RX ADMIN — ONDANSETRON 4 MILLIGRAM(S): 8 TABLET, FILM COATED ORAL at 22:07

## 2021-04-30 RX ADMIN — MORPHINE SULFATE 2 MILLIGRAM(S): 50 CAPSULE, EXTENDED RELEASE ORAL at 22:37

## 2021-04-30 RX ADMIN — Medication 10 MILLIGRAM(S): at 01:00

## 2021-04-30 RX ADMIN — SODIUM CHLORIDE 100 MILLILITER(S): 9 INJECTION, SOLUTION INTRAVENOUS at 17:17

## 2021-04-30 RX ADMIN — SODIUM CHLORIDE 100 MILLILITER(S): 9 INJECTION, SOLUTION INTRAVENOUS at 05:05

## 2021-04-30 RX ADMIN — AMLODIPINE BESYLATE 5 MILLIGRAM(S): 2.5 TABLET ORAL at 05:04

## 2021-04-30 RX ADMIN — Medication 30 MILLILITER(S): at 05:04

## 2021-04-30 RX ADMIN — MORPHINE SULFATE 2 MILLIGRAM(S): 50 CAPSULE, EXTENDED RELEASE ORAL at 14:00

## 2021-04-30 RX ADMIN — MORPHINE SULFATE 2 MILLIGRAM(S): 50 CAPSULE, EXTENDED RELEASE ORAL at 22:07

## 2021-04-30 RX ADMIN — Medication 10 MILLIGRAM(S): at 12:54

## 2021-04-30 RX ADMIN — MORPHINE SULFATE 2 MILLIGRAM(S): 50 CAPSULE, EXTENDED RELEASE ORAL at 18:04

## 2021-04-30 RX ADMIN — FAMOTIDINE 20 MILLIGRAM(S): 10 INJECTION INTRAVENOUS at 12:54

## 2021-04-30 RX ADMIN — ZOLPIDEM TARTRATE 5 MILLIGRAM(S): 10 TABLET ORAL at 22:07

## 2021-04-30 RX ADMIN — Medication 104 MILLIGRAM(S): at 00:31

## 2021-04-30 RX ADMIN — PANTOPRAZOLE SODIUM 40 MILLIGRAM(S): 20 TABLET, DELAYED RELEASE ORAL at 17:12

## 2021-04-30 RX ADMIN — ONDANSETRON 4 MILLIGRAM(S): 8 TABLET, FILM COATED ORAL at 04:32

## 2021-04-30 RX ADMIN — PANTOPRAZOLE SODIUM 40 MILLIGRAM(S): 20 TABLET, DELAYED RELEASE ORAL at 05:04

## 2021-04-30 RX ADMIN — Medication 10 MILLIGRAM(S): at 22:07

## 2021-04-30 RX ADMIN — MORPHINE SULFATE 2 MILLIGRAM(S): 50 CAPSULE, EXTENDED RELEASE ORAL at 13:30

## 2021-04-30 RX ADMIN — OXYCODONE HYDROCHLORIDE 5 MILLIGRAM(S): 5 TABLET ORAL at 06:57

## 2021-04-30 RX ADMIN — MORPHINE SULFATE 2 MILLIGRAM(S): 50 CAPSULE, EXTENDED RELEASE ORAL at 17:34

## 2021-04-30 RX ADMIN — ONDANSETRON 4 MILLIGRAM(S): 8 TABLET, FILM COATED ORAL at 17:12

## 2021-04-30 RX ADMIN — OXYCODONE HYDROCHLORIDE 5 MILLIGRAM(S): 5 TABLET ORAL at 04:28

## 2021-04-30 NOTE — ED CDU PROVIDER DISPOSITION NOTE - CLINICAL COURSE
55 year old male with PMHx esophageal CA s/p esophagectomy presents to the ED for epigastric abd pain associated with nausea and vomiting. Pt placed in obs for 30 hours and still unable to tolerate PO. Pt seen by GI who will preform EGD on pt. Will admit.

## 2021-04-30 NOTE — ED CDU PROVIDER SUBSEQUENT DAY NOTE - MEDICAL DECISION MAKING DETAILS
Pt has been administered multiple rounds of Maalox, Pepcid, pain control, Reglan and zofran. Labs WNL, CT abdomen with no emergent findings. Pt will continue to be pain controlled, continue to PO challenge, will reassess in the morning for discharge vs. admission. Pt will continue to be pain controlled, continue to PO challenge, will reassess in the morning for discharge vs. admission.

## 2021-04-30 NOTE — ED ADULT NURSE REASSESSMENT NOTE - NS ED NURSE REASSESS COMMENT FT1
Assumed pt care at 0730.  Pt a&ox4 resting with VSS, reporting slight decrease in pain, was able to tolerate PO medication, will continue to monitor
Care endorsed to RILEY CHARLES. Patient in understanding of plan of care of being admitted to the hospital. Remains NPO pending EGD. Report gven to Endo RN. IVF infusing as per orders. RN with no further questions.
Pt status remains unchanged, still c/o N/V and abdominal pain. Pt states that he has been seen by GI and "scoped" multiple times but that they are never able to find something. Pt reports that this is a reoccurring problem since the surgery where part of the esophagus was removed. Pt pending GI consult.
Pt. aware he will be stay for observation for N/V. Pt. moved into stretcher into room and made comfortably. Pt. nausea under control at this time.
Received pt from Marcio LIN. Pt c/o N but no active emesis noted at this time, pt AAOx4 and offers no other complaints at this time. Will CTM.
Assumed care of the patient at 0730. Verbal report received from Danny LIN ED. Patient A&Ox4. Patient with persistent epigastric pain 10/10, +intermittent nausea, KALEIGH Hawkins notified, awaiting orders. PIV patent. IVF infusing as per orders. Remains NPO for possible EGD this am. Patient in understanding of plan of care. Patient with no further questions for the RN. Resting in comfort. Call bell within reach and encouraged to use when assistance needed. Will continue to monitor.

## 2021-04-30 NOTE — H&P ADULT - HISTORY OF PRESENT ILLNESS
Patient is a 54yo M with pmh of esophageal cancer s/p resection in 2016 with gastric pull-up, gastroparesis, HTN, s/p cholecystomy 2016 presents to the ed with intractable nausea and vomiting. Patient states this occurs frequently and he has required multiple similar admissions in the past. Patient was admitted as an obs initially and ct abd was within normal limits. Patient then seen by GI who recommended an egd and is now being admitted. Patient seen post egd and continues to complain of gas and pain.

## 2021-04-30 NOTE — H&P ADULT - NSHPREVIEWOFSYSTEMS_GEN_ALL_CORE
REVIEW OF SYSTEMS:    CONSTITUTIONAL: fatigue  EYES: No eye pain, visual disturbances, or discharge  ENMT:  No difficulty hearing, tinnitus, vertigo; No sinus or throat pain  NECK: No pain or stiffness  BREASTS: No pain, masses, or nipple discharge  RESPIRATORY: No cough, wheezing, chills or hemoptysis; No shortness of breath  CARDIOVASCULAR: No chest pain, palpitations, dizziness, or leg swelling  GASTROINTESTINAL: pain, nausea. gas  GENITOURINARY: No dysuria, frequency, hematuria, or incontinence  NEUROLOGICAL: No headaches, memory loss, loss of strength, numbness, or tremors  SKIN: No itching, burning, rashes, or lesions   LYMPH NODES: No enlarged glands  ENDOCRINE: No heat or cold intolerance; No hair loss  MUSCULOSKELETAL: No joint pain or swelling; No muscle, back, or extremity pain  PSYCHIATRIC: No depression, anxiety, mood swings, or difficulty sleeping  HEME/LYMPH: No easy bruising, or bleeding gums  ALLERY AND IMMUNOLOGIC: No hives or eczema

## 2021-04-30 NOTE — H&P ADULT - ASSESSMENT
#intractable n/v - s/p egd today with biopsy  - GI consult appreciated  - andrade reglan and zofran atc  - ppi and carafate  -> clears and advance as tolerated    #htn - norvasc  - iv hydralazine prn     #dvt prop - scds/ambualtion     #diet - clears

## 2021-04-30 NOTE — BRIEF OPERATIVE NOTE - NSICDXBRIEFPREOP_GEN_ALL_CORE_FT
PRE-OP DIAGNOSIS:  Nausea and vomiting 30-Apr-2021 11:17:15  Phuong Reinoso  Acute abdominal pain 30-Apr-2021 11:17:27  Phunog Reinoso

## 2021-04-30 NOTE — ED CDU PROVIDER SUBSEQUENT DAY NOTE - ATTENDING CONTRIBUTION TO CARE
I, Denis Avalos, performed the initial face to face bedside interview with this patient regarding history of present illness, review of symptoms and relevant past medical, social and family history.  I completed an independent physical examination.  I was the initial provider who evaluated this patient. I have signed out the follow up of any pending tests (i.e. labs, radiological studies) to the ACP.  I have communicated the patient’s plan of care and disposition with the ACP.

## 2021-04-30 NOTE — CONSULT NOTE ADULT - SUBJECTIVE AND OBJECTIVE BOX
Patient is a 55y old  Male who presents with a chief complaint of     HPI: Patient with hx of Esophageal cancer 2016- esophagectomy with gastric pull up, ?gastrparesis listed on H+P, HTN, S/P fela. .  Having nausea, vomiting X 4 days.  Started 4/26 with mild nausea.   Then non bloody emesis X 3 times  2 days ago. Started with severe upper abdominal pain.  Then 4-5 episodes of vomiting yesterday.   Last normal BM 2 days ago. CT  showed gastric pull up, no acute pathology.  Has been on pepcid, protonix,Maloxx, reglan . Having "dry heaves now and upper abdominal discomfort continues.  Has baseline heartburn at home, but controlled with Prilosec 40 mg qd       REVIEW OF SYSTEMS:  Constitutional: No fever, weight loss or fatigue  ENMT:  No difficulty hearing, tinnitus, vertigo; No sinus or throat pain  Respiratory: No cough, wheezing, chills or hemoptysis  Cardiovascular: No chest pain, palpitations, dizziness or leg swelling  Gastrointestinal: + abdominal pain. + nausea, vomiting ; No diarrhea or constipation. No melena or hematochezia.  Skin: No itching, burning, rashes or lesions   Musculoskeletal: No joint pain or swelling; No muscle, back or extremity pain    PAST MEDICAL & SURGICAL HISTORY:  Hypertension    Esophageal cancer    Gastroparesis    S/P cholecystectomy    H/O esophagectomy        FAMILY HISTORY:  FH: HTN (hypertension) (Father)        SOCIAL HISTORY:  Smoking Status: [ ] Current, [ ] Former, [ ] Never  Pack Years:  [  ] EtOH-no  [  ] IVDA    MEDICATIONS:  MEDICATIONS  (STANDING):  amLODIPine   Tablet 5 milliGRAM(s) Oral daily  famotidine Injectable 20 milliGRAM(s) IV Push daily  lactated ringers. 1000 milliLiter(s) (100 mL/Hr) IV Continuous <Continuous>  oxyCODONE    IR 5 milliGRAM(s) Oral every 6 hours  pantoprazole    Tablet 40 milliGRAM(s) Oral before breakfast    MEDICATIONS  (PRN):  aluminum hydroxide/magnesium hydroxide/simethicone Suspension 30 milliLiter(s) Oral every 6 hours PRN Dyspepsia  metoclopramide  IVPB 10 milliGRAM(s) IV Intermittent every 8 hours PRN vomiting  ondansetron Injectable 4 milliGRAM(s) IV Push every 6 hours PRN Nausea and/or Vomiting      Allergies    No Known Allergies    Intolerances        Vital Signs Last 24 Hrs  T(C): 37.1 (30 Apr 2021 04:37), Max: 37.1 (29 Apr 2021 17:23)  T(F): 98.7 (30 Apr 2021 04:37), Max: 98.8 (29 Apr 2021 17:23)  HR: 80 (30 Apr 2021 04:37) (79 - 88)  BP: 168/98 (30 Apr 2021 04:37) (133/82 - 171/92)  BP(mean): --  RR: 18 (29 Apr 2021 21:30) (18 - 19)  SpO2: 97% (30 Apr 2021 04:37) (96% - 98%)        PHYSICAL EXAM:    General: Well developed; well nourished; appears uncomfortable   HEENT: MMM, conjunctiva and sclera clear  H- RRR  L- CTA   Gastrointestinal: Soft, non-tender non-distended; Normal bowel sounds; No rebound or guarding  Extremities: Normal range of motion, No clubbing, cyanosis or edema  Neurological: Alert and oriented x3  Skin: Warm and dry. No obvious rash      LABS:                        12.3   11.02 )-----------( 278      ( 29 Apr 2021 06:15 )             38.9     29 Apr 2021 06:15    137    |  98     |  9.0    ----------------------------<  144    3.7     |  24.0   |  0.62     Ca    9.2        29 Apr 2021 06:15    TPro  7.9    /  Alb  4.9    /  TBili  0.6    /  DBili  x      /  AST  26     /  ALT  22     /  AlkPhos  103    / Amylase x      /Lipase x      29 Apr 2021 06:15              RADIOLOGY & ADDITIONAL STUDIES:     < from: CT Abdomen and Pelvis w/ Oral Cont and w/ IV Cont (04.28.21 @ 23:53) >  MPRESSION:  No acute intra-abdominal pathology on CT.      < end of copied text >

## 2021-04-30 NOTE — ED CDU PROVIDER SUBSEQUENT DAY NOTE - HISTORY
56yo M pmhx of esophageal cancer s/p resection in 2016, gastroparesis, HTN, presented to ED c/o nausea, vomiting and PO intolerable. 56yo M pmhx of esophageal cancer s/p resection in 2016, gastroparesis, HTN, presented to ED c/o nausea, vomiting and PO intolerable. Pt has been administered multiple rounds of Maalox, Pepcid, pain control, Reglan and zofran. Labs WNL, CT abdomen with no emergent findings.

## 2021-04-30 NOTE — CONSULT NOTE ADULT - PROBLEM SELECTOR RECOMMENDATION 9
Patient with upper abdominal pain   now with "dry heaves ". Last Egd months ago- normal post op stomach as per pt   zofran around the clock, reglan tid protonix bid and pepcid q hs  NPO for now ( possible EGD if schedule allows - pt is add on )

## 2021-04-30 NOTE — H&P ADULT - NSHPPHYSICALEXAM_GEN_ALL_CORE
PHYSICAL EXAM:    GENERAL: anixous, speaking in full sentences  HEAD:  Atraumatic, Normocephalic  EYES: EOMI, PERRLA, conjunctiva and sclera clear  ENMT: No tonsillar erythema, exudates, or enlargement; Moist mucous membranes, Good dentition, No lesions  NECK: Supple, No JVD, Normal thyroid  NERVOUS SYSTEM:  Alert & Oriented X3, Good concentration; Motor Strength 5/5 B/L upper and lower extremities;   CHEST/LUNG: Clear to percussion bilaterally; No rales, rhonchi, wheezing, or rubs  HEART: Regular rate and rhythm; No murmurs, rubs, or gallops  ABDOMEN: Soft, Nontender, Nondistended; Bowel sounds present  EXTREMITIES:  2+ Peripheral Pulses, No clubbing, cyanosis, or edema  LYMPH: No lymphadenopathy noted  SKIN: No rashes or lesions

## 2021-04-30 NOTE — BRIEF OPERATIVE NOTE - COMMENTS
A/P  S/P gastric pull up for esophageal cancer 2016  Has  erosion at the anastamosis site  Needs Carafate  and PPI bid, Zofran around the clock , Reglan tid around the clock   clear liquids, advance as tolerated

## 2021-04-30 NOTE — ED CDU PROVIDER SUBSEQUENT DAY NOTE - PROGRESS NOTE DETAILS
Pt comfortable at 8pm, stated he attempted to eat, was able to hold small amount of food down but was very nauseous, later he complained of nausea and pain was given medications and fell asleep. I will re-evaluate pt in the morning. Possible admission.

## 2021-04-30 NOTE — BRIEF OPERATIVE NOTE - OPERATION/FINDINGS
Patient  with neoesophagus ( gastric pull up )  .  The anastomosis site was 25 cm from incisors- sutures seen. There was an erosion at the anastamosis site. In the stomach, there was some dark bilious material. Biopsies taken of antrum and body r/o HP. D1, D2 mild duodenitis.

## 2021-04-30 NOTE — PATIENT PROFILE ADULT - HOW PATIENT ADDRESSED, PROFILE
Quality 265: Biopsy Follow-Up: Biopsy results reviewed, communicated, tracked, and documented
Detail Level: Detailed
Quality 402: Tobacco Use And Help With Quitting Among Adolescents: Patient screened for tobacco and never smoked
Quality 431: Preventive Care And Screening: Unhealthy Alcohol Use - Screening: Patient screened for unhealthy alcohol use using a single question and scores less than 2 times per year
Quality 110: Preventive Care And Screening: Influenza Immunization: Influenza Immunization Ordered or Recommended, but not Administered due to system reason
Esdras

## 2021-05-01 ENCOUNTER — TRANSCRIPTION ENCOUNTER (OUTPATIENT)
Age: 56
End: 2021-05-01

## 2021-05-01 VITALS
HEART RATE: 77 BPM | RESPIRATION RATE: 18 BRPM | OXYGEN SATURATION: 95 % | SYSTOLIC BLOOD PRESSURE: 147 MMHG | DIASTOLIC BLOOD PRESSURE: 87 MMHG | TEMPERATURE: 98 F

## 2021-05-01 LAB
ALBUMIN SERPL ELPH-MCNC: 4.3 G/DL — SIGNIFICANT CHANGE UP (ref 3.3–5.2)
ALP SERPL-CCNC: 93 U/L — SIGNIFICANT CHANGE UP (ref 40–120)
ALT FLD-CCNC: 22 U/L — SIGNIFICANT CHANGE UP
ANION GAP SERPL CALC-SCNC: 11 MMOL/L — SIGNIFICANT CHANGE UP (ref 5–17)
AST SERPL-CCNC: 17 U/L — SIGNIFICANT CHANGE UP
BASOPHILS # BLD AUTO: 0.03 K/UL — SIGNIFICANT CHANGE UP (ref 0–0.2)
BASOPHILS NFR BLD AUTO: 0.3 % — SIGNIFICANT CHANGE UP (ref 0–2)
BILIRUB SERPL-MCNC: 0.6 MG/DL — SIGNIFICANT CHANGE UP (ref 0.4–2)
BUN SERPL-MCNC: 16 MG/DL — SIGNIFICANT CHANGE UP (ref 8–20)
CALCIUM SERPL-MCNC: 8.9 MG/DL — SIGNIFICANT CHANGE UP (ref 8.6–10.2)
CHLORIDE SERPL-SCNC: 96 MMOL/L — LOW (ref 98–107)
CO2 SERPL-SCNC: 25 MMOL/L — SIGNIFICANT CHANGE UP (ref 22–29)
COVID-19 SPIKE DOMAIN AB INTERP: POSITIVE
COVID-19 SPIKE DOMAIN ANTIBODY RESULT: >250 U/ML — HIGH
CREAT SERPL-MCNC: 0.65 MG/DL — SIGNIFICANT CHANGE UP (ref 0.5–1.3)
EOSINOPHIL # BLD AUTO: 0 K/UL — SIGNIFICANT CHANGE UP (ref 0–0.5)
EOSINOPHIL NFR BLD AUTO: 0 % — SIGNIFICANT CHANGE UP (ref 0–6)
GLUCOSE SERPL-MCNC: 113 MG/DL — HIGH (ref 70–99)
HCT VFR BLD CALC: 37.3 % — LOW (ref 39–50)
HGB BLD-MCNC: 12.3 G/DL — LOW (ref 13–17)
IMM GRANULOCYTES NFR BLD AUTO: 0.5 % — SIGNIFICANT CHANGE UP (ref 0–1.5)
LYMPHOCYTES # BLD AUTO: 1.33 K/UL — SIGNIFICANT CHANGE UP (ref 1–3.3)
LYMPHOCYTES # BLD AUTO: 13.3 % — SIGNIFICANT CHANGE UP (ref 13–44)
MAGNESIUM SERPL-MCNC: 2.3 MG/DL — SIGNIFICANT CHANGE UP (ref 1.6–2.6)
MCHC RBC-ENTMCNC: 25.2 PG — LOW (ref 27–34)
MCHC RBC-ENTMCNC: 33 GM/DL — SIGNIFICANT CHANGE UP (ref 32–36)
MCV RBC AUTO: 76.3 FL — LOW (ref 80–100)
MONOCYTES # BLD AUTO: 0.92 K/UL — HIGH (ref 0–0.9)
MONOCYTES NFR BLD AUTO: 9.2 % — SIGNIFICANT CHANGE UP (ref 2–14)
NEUTROPHILS # BLD AUTO: 7.7 K/UL — HIGH (ref 1.8–7.4)
NEUTROPHILS NFR BLD AUTO: 76.7 % — SIGNIFICANT CHANGE UP (ref 43–77)
PLATELET # BLD AUTO: 300 K/UL — SIGNIFICANT CHANGE UP (ref 150–400)
POTASSIUM SERPL-MCNC: 4.4 MMOL/L — SIGNIFICANT CHANGE UP (ref 3.5–5.3)
POTASSIUM SERPL-SCNC: 4.4 MMOL/L — SIGNIFICANT CHANGE UP (ref 3.5–5.3)
PROT SERPL-MCNC: 7.2 G/DL — SIGNIFICANT CHANGE UP (ref 6.6–8.7)
RBC # BLD: 4.89 M/UL — SIGNIFICANT CHANGE UP (ref 4.2–5.8)
RBC # FLD: 15 % — HIGH (ref 10.3–14.5)
SARS-COV-2 IGG+IGM SERPL QL IA: >250 U/ML — HIGH
SARS-COV-2 IGG+IGM SERPL QL IA: POSITIVE
SODIUM SERPL-SCNC: 132 MMOL/L — LOW (ref 135–145)
WBC # BLD: 10.03 K/UL — SIGNIFICANT CHANGE UP (ref 3.8–10.5)
WBC # FLD AUTO: 10.03 K/UL — SIGNIFICANT CHANGE UP (ref 3.8–10.5)

## 2021-05-01 PROCEDURE — 88305 TISSUE EXAM BY PATHOLOGIST: CPT

## 2021-05-01 PROCEDURE — 83690 ASSAY OF LIPASE: CPT

## 2021-05-01 PROCEDURE — 85018 HEMOGLOBIN: CPT

## 2021-05-01 PROCEDURE — 82803 BLOOD GASES ANY COMBINATION: CPT

## 2021-05-01 PROCEDURE — 85027 COMPLETE CBC AUTOMATED: CPT

## 2021-05-01 PROCEDURE — 80053 COMPREHEN METABOLIC PANEL: CPT

## 2021-05-01 PROCEDURE — 82330 ASSAY OF CALCIUM: CPT

## 2021-05-01 PROCEDURE — 80048 BASIC METABOLIC PNL TOTAL CA: CPT

## 2021-05-01 PROCEDURE — U0005: CPT

## 2021-05-01 PROCEDURE — U0003: CPT

## 2021-05-01 PROCEDURE — 85025 COMPLETE CBC W/AUTO DIFF WBC: CPT

## 2021-05-01 PROCEDURE — 82435 ASSAY OF BLOOD CHLORIDE: CPT

## 2021-05-01 PROCEDURE — 88342 IMHCHEM/IMCYTCHM 1ST ANTB: CPT

## 2021-05-01 PROCEDURE — 82947 ASSAY GLUCOSE BLOOD QUANT: CPT

## 2021-05-01 PROCEDURE — 85014 HEMATOCRIT: CPT

## 2021-05-01 PROCEDURE — 83605 ASSAY OF LACTIC ACID: CPT

## 2021-05-01 PROCEDURE — 84132 ASSAY OF SERUM POTASSIUM: CPT

## 2021-05-01 PROCEDURE — 96375 TX/PRO/DX INJ NEW DRUG ADDON: CPT

## 2021-05-01 PROCEDURE — 99281 EMR DPT VST MAYX REQ PHY/QHP: CPT

## 2021-05-01 PROCEDURE — 36415 COLL VENOUS BLD VENIPUNCTURE: CPT

## 2021-05-01 PROCEDURE — 84295 ASSAY OF SERUM SODIUM: CPT

## 2021-05-01 PROCEDURE — 74177 CT ABD & PELVIS W/CONTRAST: CPT

## 2021-05-01 PROCEDURE — 96374 THER/PROPH/DIAG INJ IV PUSH: CPT

## 2021-05-01 PROCEDURE — 99285 EMERGENCY DEPT VISIT HI MDM: CPT | Mod: 25

## 2021-05-01 PROCEDURE — 86769 SARS-COV-2 COVID-19 ANTIBODY: CPT

## 2021-05-01 PROCEDURE — 99239 HOSP IP/OBS DSCHRG MGMT >30: CPT

## 2021-05-01 PROCEDURE — 83735 ASSAY OF MAGNESIUM: CPT

## 2021-05-01 PROCEDURE — 99233 SBSQ HOSP IP/OBS HIGH 50: CPT

## 2021-05-01 RX ORDER — AMLODIPINE BESYLATE 2.5 MG/1
10 TABLET ORAL DAILY
Refills: 0 | Status: DISCONTINUED | OUTPATIENT
Start: 2021-05-01 | End: 2021-05-01

## 2021-05-01 RX ORDER — SUCRALFATE 1 G
1 TABLET ORAL
Qty: 120 | Refills: 0
Start: 2021-05-01 | End: 2021-05-30

## 2021-05-01 RX ORDER — AMLODIPINE BESYLATE 2.5 MG/1
1 TABLET ORAL
Qty: 0 | Refills: 0 | DISCHARGE

## 2021-05-01 RX ORDER — METOCLOPRAMIDE HCL 10 MG
1 TABLET ORAL
Qty: 90 | Refills: 0
Start: 2021-05-01 | End: 2021-05-30

## 2021-05-01 RX ORDER — ONDANSETRON 8 MG/1
1 TABLET, FILM COATED ORAL
Qty: 120 | Refills: 0
Start: 2021-05-01 | End: 2021-05-30

## 2021-05-01 RX ORDER — OXYCODONE HYDROCHLORIDE 5 MG/1
1 TABLET ORAL
Qty: 12 | Refills: 0
Start: 2021-05-01 | End: 2021-05-03

## 2021-05-01 RX ORDER — AMLODIPINE BESYLATE 2.5 MG/1
1 TABLET ORAL
Qty: 0 | Refills: 0 | DISCHARGE
Start: 2021-05-01

## 2021-05-01 RX ADMIN — AMLODIPINE BESYLATE 5 MILLIGRAM(S): 2.5 TABLET ORAL at 06:25

## 2021-05-01 RX ADMIN — MORPHINE SULFATE 2 MILLIGRAM(S): 50 CAPSULE, EXTENDED RELEASE ORAL at 04:03

## 2021-05-01 RX ADMIN — Medication 10 MILLIGRAM(S): at 06:26

## 2021-05-01 RX ADMIN — MORPHINE SULFATE 2 MILLIGRAM(S): 50 CAPSULE, EXTENDED RELEASE ORAL at 03:33

## 2021-05-01 RX ADMIN — PANTOPRAZOLE SODIUM 40 MILLIGRAM(S): 20 TABLET, DELAYED RELEASE ORAL at 06:26

## 2021-05-01 RX ADMIN — MORPHINE SULFATE 2 MILLIGRAM(S): 50 CAPSULE, EXTENDED RELEASE ORAL at 07:50

## 2021-05-01 RX ADMIN — ONDANSETRON 4 MILLIGRAM(S): 8 TABLET, FILM COATED ORAL at 11:39

## 2021-05-01 RX ADMIN — ONDANSETRON 4 MILLIGRAM(S): 8 TABLET, FILM COATED ORAL at 06:25

## 2021-05-01 RX ADMIN — AMLODIPINE BESYLATE 10 MILLIGRAM(S): 2.5 TABLET ORAL at 11:39

## 2021-05-01 RX ADMIN — MORPHINE SULFATE 2 MILLIGRAM(S): 50 CAPSULE, EXTENDED RELEASE ORAL at 07:55

## 2021-05-01 NOTE — DISCHARGE NOTE PROVIDER - CARE PROVIDER_API CALL
Phuong Reinoso (DO)  Gastroenterology  [address], [address]  [place] [address]  Phone: [phone]  Fax: [phone]  Follow Up Time:

## 2021-05-01 NOTE — DISCHARGE NOTE NURSING/CASE MANAGEMENT/SOCIAL WORK - PATIENT PORTAL LINK FT
You can access the FollowMyHealth Patient Portal offered by [facility] by registering at the following website: [email or web address]. By joining [employer]’s FollowMyHealth portal, you will also be able to view your health information using other applications (apps) compatible with our system.

## 2021-05-01 NOTE — DISCHARGE NOTE PROVIDER - HOSPITAL COURSE
Patient is a 56yo M with pmh of esophageal cancer s/p resection in 2016 with gastric pull-up, gastroparesis, HTN, s/p cholecystomy 2016 presents to the ed with intractable nausea and vomiting. Patient states this occurs frequently and he has required multiple similar admissions in the past. Patient was admitted as an obs initially and ct abd was within normal limits. Patient then seen by GI who recommended an egd and is now being admitted. Patient seen post egd and continues to complain of gas and pain.       egd:  · Operative Findings  Patient  with neoesophagus ( gastric pull up )  .  The anastomosis site was 25 cm from incisors- sutures seen. There was an erosion at the anastamosis site. In the stomach, there was some dark bilious material. Biopsies taken of antrum and body r/o HP. D1, D2 mild duodenitis.    pe    GENERAL: nad  HEAD:  Atraumatic, Normocephalic  EYES: EOMI, PERRLA, conjunctiva and sclera clear  ENMT: No tonsillar erythema, exudates, or enlargement; Moist mucous membranes, Good dentition, No lesions  NECK: Supple, No JVD, Normal thyroid  NERVOUS SYSTEM:  Alert & Oriented X3, Good concentration; Motor Strength 5/5 B/L upper and lower extremities;   CHEST/LUNG: Clear to percussion bilaterally; No rales, rhonchi, wheezing, or rubs  HEART: Regular rate and rhythm; No murmurs, rubs, or gallops  ABDOMEN: Soft, Nontender, Nondistended; Bowel sounds present  EXTREMITIES:  2+ Peripheral Pulses, No clubbing, cyanosis, or edema  LYMPH: No lymphadenopathy noted  SKIN: No rashes or lesions    time spent on dc 34 minutes

## 2021-05-01 NOTE — PROGRESS NOTE ADULT - PROBLEM SELECTOR PLAN 1
hx of esophageal cancer  S/P gastric pull up in 2016  nausea better- advance to soft solid diet as tolerated  may D/C home on PPI bid, Pepci  40 mg q hs and carafate qid prn

## 2021-05-01 NOTE — DISCHARGE NOTE PROVIDER - NSDCCPCAREPLAN_GEN_ALL_CORE_FT
PRINCIPAL DISCHARGE DIAGNOSIS  Diagnosis: Gastroparesis  Assessment and Plan of Treatment:       SECONDARY DISCHARGE DIAGNOSES  Diagnosis: Intractable vomiting with nausea  Assessment and Plan of Treatment: Intractable vomiting with nausea

## 2021-05-01 NOTE — PROGRESS NOTE ADULT - SUBJECTIVE AND OBJECTIVE BOX
Patient is a 55y old  Male who presents with a chief complaint of nausea and vomiting (30 Apr 2021 08:08)      HPI:  Patient is a 54yo M with pmh of esophageal cancer s/p resection in 2016 with gastric pull-up, gastroparesis, HTN, s/p cholecystomy 2016 presents to the ed with intractable nausea and vomiting.  Yesterday nausea  continued. Today feels much better.  EGD showed erosion in the esophagus.     REVIEW OF SYSTEMS:  Constitutional: No fever, weight loss or fatigue  ENMT:  No difficulty hearing, tinnitus, vertigo; No sinus or throat pain  Respiratory: No cough, wheezing, chills or hemoptysis  Cardiovascular: No chest pain, palpitations, dizziness or leg swelling  Gastrointestinal: No abdominal or epigastric pain. + nausea, vomiting or hematemesis; No diarrhea or constipation. No melena or hematochezia.  Skin: No itching, burning, rashes or lesions   Musculoskeletal: No joint pain or swelling; No muscle, back or extremity pain    PAST MEDICAL & SURGICAL HISTORY:  Hypertension    Esophageal cancer    Gastroparesis    S/P cholecystectomy    H/O esophagectomy        FAMILY HISTORY:  FH: HTN (hypertension) (Father)        SOCIAL HISTORY:  Smoking Status: [ ] Current, [ ] Former, [ ] Never  Pack Years:  [  ] EtOH-no  [  ] IVDA    MEDICATIONS:  MEDICATIONS  (STANDING):  amLODIPine   Tablet 10 milliGRAM(s) Oral daily  famotidine Injectable 20 milliGRAM(s) IV Push daily  lactated ringers. 1000 milliLiter(s) (100 mL/Hr) IV Continuous <Continuous>  metoclopramide Injectable 10 milliGRAM(s) IV Push every 8 hours  ondansetron Injectable 4 milliGRAM(s) IV Push every 6 hours  oxyCODONE    IR 5 milliGRAM(s) Oral every 6 hours  pantoprazole  Injectable 40 milliGRAM(s) IV Push two times a day  sucralfate 1 Gram(s) Oral every 6 hours  zolpidem 5 milliGRAM(s) Oral at bedtime    MEDICATIONS  (PRN):  hydrALAZINE Injectable 10 milliGRAM(s) IV Push every 6 hours PRN for sbp above 160 mmhg  LORazepam   Injectable 0.5 milliGRAM(s) IV Push two times a day PRN Agitation  morphine  - Injectable 2 milliGRAM(s) IV Push every 4 hours PRN Moderate Pain (4 - 6)      Allergies    No Known Allergies    Intolerances        Vital Signs Last 24 Hrs  T(C): 37.3 (01 May 2021 04:57), Max: 37.3 (01 May 2021 04:57)  T(F): 99.1 (01 May 2021 04:57), Max: 99.1 (01 May 2021 04:57)  HR: 96 (01 May 2021 07:53) (82 - 96)  BP: 153/91 (01 May 2021 07:53) (113/65 - 167/92)  BP(mean): --  RR: 16 (01 May 2021 04:57) (16 - 20)  SpO2: 94% (01 May 2021 04:57) (94% - 97%)    04-30 @ 07:01  -  05-01 @ 07:00  --------------------------------------------------------  IN: 1200 mL / OUT: 0 mL / NET: 1200 mL          PHYSICAL EXAM:    General: Well developed; well nourished; in no acute distress  HEENT: MMM, conjunctiva and sclera clear  H- RRR  L- CTA  Gastrointestinal: Soft, non-tender non-distended; Normal bowel sounds; No rebound or guarding  Extremities: Normal range of motion, No clubbing, cyanosis or edema  Neurological: Alert and oriented x3  Skin: Warm and dry. No obvious rash      LABS:                        12.3   10.03 )-----------( 300      ( 01 May 2021 06:01 )             37.3     01 May 2021 06:01    132    |  96     |  16.0   ----------------------------<  113    4.4     |  25.0   |  0.65     Ca    8.9        01 May 2021 06:01  Mg     2.3       01 May 2021 06:01    TPro  7.2    /  Alb  4.3    /  TBili  0.6    /  DBili  x      /  AST  17     /  ALT  22     /  AlkPhos  93     / Amylase x      /Lipase x      01 May 2021 06:01              RADIOLOGY & ADDITIONAL STUDIES:     < from: CT Abdomen and Pelvis w/ Oral Cont and w/ IV Cont (04.28.21 @ 23:53) >  MPRESSION:  No acute intra-abdominal pathology on CT.    < end of copied text >

## 2021-05-01 NOTE — DISCHARGE NOTE PROVIDER - NSDCMRMEDTOKEN_GEN_ALL_CORE_FT
amLODIPine 10 mg oral tablet: 1 tab(s) orally once a day  omeprazole 40 mg oral delayed release capsule: 1 cap(s) orally once a day  oxyCODONE 5 mg oral tablet: 1 tab(s) orally every 6 hours, As Needed MDD:4 tabs  Reglan 10 mg oral tablet: 1 tab(s) orally every 8 hours   sucralfate 1 g oral tablet: 1 tab(s) orally every 6 hours  Zofran 4 mg oral tablet: 1 tab(s) orally every 6 hours

## 2021-05-05 LAB — SURGICAL PATHOLOGY STUDY: SIGNIFICANT CHANGE UP

## 2022-01-01 ENCOUNTER — EMERGENCY (EMERGENCY)
Facility: HOSPITAL | Age: 57
LOS: 1 days | Discharge: HOME | End: 2022-01-01
Attending: EMERGENCY MEDICINE
Payer: MEDICARE

## 2022-01-01 VITALS
SYSTOLIC BLOOD PRESSURE: 182 MMHG | RESPIRATION RATE: 24 BRPM | WEIGHT: 195.11 LBS | TEMPERATURE: 98 F | OXYGEN SATURATION: 96 % | HEIGHT: 72 IN | DIASTOLIC BLOOD PRESSURE: 101 MMHG | HEART RATE: 82 BPM

## 2022-01-01 DIAGNOSIS — Z98.890 OTHER SPECIFIED POSTPROCEDURAL STATES: Chronic | ICD-10-CM

## 2022-01-01 DIAGNOSIS — Z90.49 ACQUIRED ABSENCE OF OTHER SPECIFIED PARTS OF DIGESTIVE TRACT: Chronic | ICD-10-CM

## 2022-01-01 LAB
ALBUMIN SERPL ELPH-MCNC: 5 G/DL — SIGNIFICANT CHANGE UP (ref 3.3–5.2)
ALP SERPL-CCNC: 120 U/L — SIGNIFICANT CHANGE UP (ref 40–120)
ALT FLD-CCNC: 32 U/L — SIGNIFICANT CHANGE UP
ANION GAP SERPL CALC-SCNC: 18 MMOL/L — HIGH (ref 5–17)
ANISOCYTOSIS BLD QL: SLIGHT — SIGNIFICANT CHANGE UP
AST SERPL-CCNC: 32 U/L — SIGNIFICANT CHANGE UP
BASOPHILS # BLD AUTO: 0 K/UL — SIGNIFICANT CHANGE UP (ref 0–0.2)
BASOPHILS NFR BLD AUTO: 0 % — SIGNIFICANT CHANGE UP (ref 0–2)
BILIRUB SERPL-MCNC: 0.3 MG/DL — LOW (ref 0.4–2)
BUN SERPL-MCNC: 13.7 MG/DL — SIGNIFICANT CHANGE UP (ref 8–20)
CALCIUM SERPL-MCNC: 9.2 MG/DL — SIGNIFICANT CHANGE UP (ref 8.6–10.2)
CHLORIDE SERPL-SCNC: 100 MMOL/L — SIGNIFICANT CHANGE UP (ref 98–107)
CO2 SERPL-SCNC: 19 MMOL/L — LOW (ref 22–29)
CREAT SERPL-MCNC: 0.61 MG/DL — SIGNIFICANT CHANGE UP (ref 0.5–1.3)
EOSINOPHIL # BLD AUTO: 0 K/UL — SIGNIFICANT CHANGE UP (ref 0–0.5)
EOSINOPHIL NFR BLD AUTO: 0 % — SIGNIFICANT CHANGE UP (ref 0–6)
GIANT PLATELETS BLD QL SMEAR: PRESENT — SIGNIFICANT CHANGE UP
GLUCOSE SERPL-MCNC: 165 MG/DL — HIGH (ref 70–99)
HCT VFR BLD CALC: 38.8 % — LOW (ref 39–50)
HGB BLD-MCNC: 12.3 G/DL — LOW (ref 13–17)
LIDOCAIN IGE QN: 18 U/L — LOW (ref 22–51)
LYMPHOCYTES # BLD AUTO: 0.58 K/UL — LOW (ref 1–3.3)
LYMPHOCYTES # BLD AUTO: 5.2 % — LOW (ref 13–44)
MACROCYTES BLD QL: SLIGHT — SIGNIFICANT CHANGE UP
MANUAL SMEAR VERIFICATION: SIGNIFICANT CHANGE UP
MCHC RBC-ENTMCNC: 23.5 PG — LOW (ref 27–34)
MCHC RBC-ENTMCNC: 31.7 GM/DL — LOW (ref 32–36)
MCV RBC AUTO: 74 FL — LOW (ref 80–100)
MICROCYTES BLD QL: SLIGHT — SIGNIFICANT CHANGE UP
MONOCYTES # BLD AUTO: 0.39 K/UL — SIGNIFICANT CHANGE UP (ref 0–0.9)
MONOCYTES NFR BLD AUTO: 3.5 % — SIGNIFICANT CHANGE UP (ref 2–14)
NEUTROPHILS # BLD AUTO: 10.15 K/UL — HIGH (ref 1.8–7.4)
NEUTROPHILS NFR BLD AUTO: 91.3 % — HIGH (ref 43–77)
OVALOCYTES BLD QL SMEAR: SLIGHT — SIGNIFICANT CHANGE UP
PLAT MORPH BLD: NORMAL — SIGNIFICANT CHANGE UP
PLATELET # BLD AUTO: 246 K/UL — SIGNIFICANT CHANGE UP (ref 150–400)
PLATELET COUNT - ESTIMATE: NORMAL — SIGNIFICANT CHANGE UP
POIKILOCYTOSIS BLD QL AUTO: SLIGHT — SIGNIFICANT CHANGE UP
POLYCHROMASIA BLD QL SMEAR: SLIGHT — SIGNIFICANT CHANGE UP
POTASSIUM SERPL-MCNC: 4.3 MMOL/L — SIGNIFICANT CHANGE UP (ref 3.5–5.3)
POTASSIUM SERPL-SCNC: 4.3 MMOL/L — SIGNIFICANT CHANGE UP (ref 3.5–5.3)
PROT SERPL-MCNC: 8.5 G/DL — SIGNIFICANT CHANGE UP (ref 6.6–8.7)
RBC # BLD: 5.24 M/UL — SIGNIFICANT CHANGE UP (ref 4.2–5.8)
RBC # FLD: 16 % — HIGH (ref 10.3–14.5)
RBC BLD AUTO: NORMAL — SIGNIFICANT CHANGE UP
SODIUM SERPL-SCNC: 137 MMOL/L — SIGNIFICANT CHANGE UP (ref 135–145)
WBC # BLD: 11.12 K/UL — HIGH (ref 3.8–10.5)
WBC # FLD AUTO: 11.12 K/UL — HIGH (ref 3.8–10.5)

## 2022-01-01 PROCEDURE — 96375 TX/PRO/DX INJ NEW DRUG ADDON: CPT

## 2022-01-01 PROCEDURE — G1004: CPT

## 2022-01-01 PROCEDURE — 74177 CT ABD & PELVIS W/CONTRAST: CPT | Mod: MG

## 2022-01-01 PROCEDURE — 99284 EMERGENCY DEPT VISIT MOD MDM: CPT | Mod: 25

## 2022-01-01 PROCEDURE — 80053 COMPREHEN METABOLIC PANEL: CPT

## 2022-01-01 PROCEDURE — 83690 ASSAY OF LIPASE: CPT

## 2022-01-01 PROCEDURE — 36415 COLL VENOUS BLD VENIPUNCTURE: CPT

## 2022-01-01 PROCEDURE — 74177 CT ABD & PELVIS W/CONTRAST: CPT | Mod: 26,MG

## 2022-01-01 PROCEDURE — 99285 EMERGENCY DEPT VISIT HI MDM: CPT | Mod: GC

## 2022-01-01 PROCEDURE — 96366 THER/PROPH/DIAG IV INF ADDON: CPT

## 2022-01-01 PROCEDURE — 96365 THER/PROPH/DIAG IV INF INIT: CPT | Mod: XU

## 2022-01-01 PROCEDURE — 85025 COMPLETE CBC W/AUTO DIFF WBC: CPT

## 2022-01-01 RX ORDER — METOCLOPRAMIDE HCL 10 MG
10 TABLET ORAL ONCE
Refills: 0 | Status: COMPLETED | OUTPATIENT
Start: 2022-01-01 | End: 2022-01-01

## 2022-01-01 RX ORDER — MORPHINE SULFATE 50 MG/1
4 CAPSULE, EXTENDED RELEASE ORAL ONCE
Refills: 0 | Status: DISCONTINUED | OUTPATIENT
Start: 2022-01-01 | End: 2022-01-01

## 2022-01-01 RX ORDER — METOCLOPRAMIDE HCL 10 MG
10 TABLET ORAL ONCE
Refills: 0 | Status: DISCONTINUED | OUTPATIENT
Start: 2022-01-01 | End: 2022-01-01

## 2022-01-01 RX ORDER — SODIUM CHLORIDE 9 MG/ML
1000 INJECTION, SOLUTION INTRAVENOUS ONCE
Refills: 0 | Status: COMPLETED | OUTPATIENT
Start: 2022-01-01 | End: 2022-01-01

## 2022-01-01 RX ORDER — ACETAMINOPHEN 500 MG
1000 TABLET ORAL ONCE
Refills: 0 | Status: COMPLETED | OUTPATIENT
Start: 2022-01-01 | End: 2022-01-01

## 2022-01-01 RX ORDER — IOHEXOL 300 MG/ML
30 INJECTION, SOLUTION INTRAVENOUS ONCE
Refills: 0 | Status: COMPLETED | OUTPATIENT
Start: 2022-01-01 | End: 2022-01-01

## 2022-01-01 RX ORDER — FAMOTIDINE 10 MG/ML
20 INJECTION INTRAVENOUS ONCE
Refills: 0 | Status: COMPLETED | OUTPATIENT
Start: 2022-01-01 | End: 2022-01-01

## 2022-01-01 RX ADMIN — FAMOTIDINE 20 MILLIGRAM(S): 10 INJECTION INTRAVENOUS at 08:37

## 2022-01-01 RX ADMIN — Medication 104 MILLIGRAM(S): at 08:37

## 2022-01-01 RX ADMIN — SODIUM CHLORIDE 1000 MILLILITER(S): 9 INJECTION, SOLUTION INTRAVENOUS at 08:38

## 2022-01-01 RX ADMIN — Medication 10 MILLIGRAM(S): at 08:40

## 2022-01-01 RX ADMIN — MORPHINE SULFATE 4 MILLIGRAM(S): 50 CAPSULE, EXTENDED RELEASE ORAL at 12:46

## 2022-01-01 RX ADMIN — IOHEXOL 30 MILLILITER(S): 300 INJECTION, SOLUTION INTRAVENOUS at 10:23

## 2022-01-01 RX ADMIN — Medication 1000 MILLIGRAM(S): at 15:59

## 2022-01-01 RX ADMIN — Medication 400 MILLIGRAM(S): at 13:03

## 2022-01-01 RX ADMIN — MORPHINE SULFATE 4 MILLIGRAM(S): 50 CAPSULE, EXTENDED RELEASE ORAL at 13:03

## 2022-01-01 RX ADMIN — MORPHINE SULFATE 4 MILLIGRAM(S): 50 CAPSULE, EXTENDED RELEASE ORAL at 08:38

## 2022-01-01 RX ADMIN — SODIUM CHLORIDE 1000 MILLILITER(S): 9 INJECTION, SOLUTION INTRAVENOUS at 08:40

## 2022-01-01 RX ADMIN — MORPHINE SULFATE 4 MILLIGRAM(S): 50 CAPSULE, EXTENDED RELEASE ORAL at 16:00

## 2022-01-01 NOTE — ED PROVIDER NOTE - PATIENT PORTAL LINK FT
You can access the FollowMyHealth Patient Portal offered by [facility] by registering at the following website: [email or web address]. By joining [facility]’s FollowMyHealth portal, you will also be able to view your health information using other applications (apps) compatible with our system.

## 2022-01-01 NOTE — ED ADULT NURSE REASSESSMENT NOTE - NS ED NURSE REASSESS COMMENT FT1
pt c/o 9/10 pain dr Vincent Rodriguez give him 4mg more of morphine and 1000mg of tylenol iv pt received waiting for order

## 2022-01-01 NOTE — ED ADULT TRIAGE NOTE - CHIEF COMPLAINT QUOTE
had esophagus removed I 2016 c/o abd pain with n/v pt states at times he has trouble holding down foods since surgery. Mastectomy with axillary lymph node dissection

## 2022-01-01 NOTE — ED PROVIDER NOTE - ATTENDING CONTRIBUTION TO CARE
Pt with h/o esophageal cancer, HTN, gastroparesis p/w nausea and vomiting. Has been seen several times in the past for similar symptoms. Abd minimally tender epigastric region. CT a/p no acute findings. Patient now feeling better, tolerating PO. Will dc home with GI f/u. Ann Morillo DO     I performed a history and physical exam of the patient and discussed their management with the resident. I reviewed the resident's note and agree with the documented findings and plan of care. My medical decision making and observations are found above.

## 2022-01-01 NOTE — ED PROVIDER NOTE - NSFOLLOWUPINSTRUCTIONS_ED_ALL_ED_FT
follow with your GI doctor within 48 hours     Conversation had with patient regarding results of lab work and imaging. Patient agrees with plan for discharge at this time. Patient agrees to comply with follow up with PCP. Return to ED precautions and discharge instructions given to patient.      WHAT YOU NEED TO KNOW:    Acute nausea and vomiting start suddenly, worsen quickly, and last a short time.    DISCHARGE INSTRUCTIONS:    Seek care immediately if:   •You see blood in your vomit or your bowel movements.      •You have sudden, severe pain in your chest and upper abdomen after hard vomiting or retching.      •You have swelling in your neck and chest.       •You are dizzy, cold, and thirsty and your eyes and mouth are dry.      •You are urinating very little or not at all.      •You have muscle weakness, leg cramps, and trouble breathing.       •Your heart is beating much faster than normal.       •You continue to vomit for more than 48 hours.       Contact your healthcare provider if:   •You have frequent dry heaves (vomiting but nothing comes out).      •Your nausea and vomiting does not get better or go away after you use medicine.      •You have questions or concerns about your condition or treatment.      Medicines: You may need any of the following:   •Medicines may be given to calm your stomach and stop your vomiting. You may also need medicines to help you feel more relaxed or to stop nausea and vomiting caused by motion sickness.      •Gastrointestinal stimulants are used to help empty your stomach and bowels. This may help decrease nausea and vomiting.      •Take your medicine as directed. Contact your healthcare provider if you think your medicine is not helping or if you have side effects. Tell him or her if you are allergic to any medicine. Keep a list of the medicines, vitamins, and herbs you take. Include the amounts, and when and why you take them. Bring the list or the pill bottles to follow-up visits. Carry your medicine list with you in case of an emergency.      Prevent or manage acute nausea and vomiting:   •Do not drink alcohol. Alcohol may upset or irritate your stomach. Too much alcohol can also cause acute nausea and vomiting.      •Control stress. Headaches due to stress may cause nausea and vomiting. Find ways to relax and manage your stress. Get more rest and sleep.      •Drink more liquids as directed. Vomiting can lead to dehydration. It is important to drink more liquids to help replace lost body fluids. Ask your healthcare provider how much liquid to drink each day and which liquids are best for you. Your provider may recommend that you drink an oral rehydration solution (ORS). ORS contains water, salts, and sugar that are needed to replace the lost body fluids. Ask what kind of ORS to use, how much to drink, and where to get it.      •Eat smaller meals, more often. Eat small amounts of food every 2 to 3 hours, even if you are not hungry. Food in your stomach may decrease your nausea.      •Talk to your healthcare provider before you take over-the-counter (OTC) medicines. These medicines can cause serious problems if you use certain other medicines, or you have a medical condition. You may have problems if you use too much or use them for longer than the label says. Follow directions on the label carefully.      Follow up with your healthcare provider as directed: Write down your questions so you remember to ask them during your visits.

## 2022-01-01 NOTE — ED ADULT NURSE NOTE - CHIEF COMPLAINT QUOTE
had esophagus removed I 2016 c/o abd pain with n/v pt states at times he has trouble holding down foods since surgery.

## 2022-01-01 NOTE — ED PROVIDER NOTE - CLINICAL SUMMARY MEDICAL DECISION MAKING FREE TEXT BOX
55yo male with history of HTN, gastroparesis, s/p esophagectomy (2016) presenting with multiple episodes of nausea, vomiting, abdominal pain since last night 10PM. No cp, sob, fever, focal weakness. On examination abdomen is soft, mild diffuse ttp, non-distended, no guarding or rebound.  Labs, ecg, IVF, nausea/pain control, CT, reassess.

## 2022-01-01 NOTE — ED PROVIDER NOTE - OBJECTIVE STATEMENT
57yo male with history of HTN, gastroparesis, s/p esophagectomy (2016) presenting with multiple episodes of nausea, vomiting, abdominal pain since last night 10PM after eating hummus. Patient states these episodes are common and his symptoms are similar to past ED visits for intractable nausea and vomiting. Denies chest pain, shortness of breath, cough, fever, urinary symptoms, focal weakness.

## 2022-01-01 NOTE — ED PROVIDER NOTE - PHYSICAL EXAMINATION
General: Well appearing in no acute distress. Alert and cooperative.   Head: Normocephalic, atraumatic.  Eyes: PERRLA. No conjunctival injection. No scleral icterus. EOMI  ENMT: Atraumatic external nose and ears.  Neck: Soft and supple.   Cardiac: Regular rate and regular rhythm. No murmurs. Peripheral pulses 2+ and symmetric in all extremities. No LE edema.  Resp: Unlabored respiratory effort. Lungs CTAB. No wheezes.  Abd: Soft, mild diffuse ttp, non-distended. No guarding or rebound.   MSK: Spine midline and non-tender.  Skin: Warm and dry.   Neuro: AO x 3. Moves all extremities symmetrically. Motor strength and sensation grossly intact.

## 2022-01-01 NOTE — ED PROVIDER NOTE - PROGRESS NOTE DETAILS
Labs unremarkable, lipase negative. on reassessment patient with improved symptoms. no episodes on emesis since ED arrival. pending CT. -DO Christa CT shows Prior gastric pull-through. No bowel obstruction. On reassessment patient continues to feel improved. -Christa,  patient attempted to drink water and ambulate but feels nauseous again and requesting anti nausea meds. -Christa,  patient requesting to go home and will try PO medication at home. Patient has not had episodes of emesis and passed PO challenge. patient to follow with GI. -Christa, DO

## 2022-01-04 ENCOUNTER — EMERGENCY (EMERGENCY)
Facility: HOSPITAL | Age: 57
LOS: 1 days | End: 2022-01-04
Attending: EMERGENCY MEDICINE
Payer: MEDICARE

## 2022-01-04 VITALS
OXYGEN SATURATION: 96 % | SYSTOLIC BLOOD PRESSURE: 186 MMHG | DIASTOLIC BLOOD PRESSURE: 111 MMHG | RESPIRATION RATE: 20 BRPM | TEMPERATURE: 99 F | HEART RATE: 84 BPM | WEIGHT: 190.04 LBS | HEIGHT: 72 IN

## 2022-01-04 DIAGNOSIS — Z98.890 OTHER SPECIFIED POSTPROCEDURAL STATES: Chronic | ICD-10-CM

## 2022-01-04 DIAGNOSIS — Z90.49 ACQUIRED ABSENCE OF OTHER SPECIFIED PARTS OF DIGESTIVE TRACT: Chronic | ICD-10-CM

## 2022-01-04 PROCEDURE — 99284 EMERGENCY DEPT VISIT MOD MDM: CPT | Mod: GC

## 2022-01-04 PROCEDURE — 96376 TX/PRO/DX INJ SAME DRUG ADON: CPT

## 2022-01-04 PROCEDURE — 96374 THER/PROPH/DIAG INJ IV PUSH: CPT

## 2022-01-04 PROCEDURE — 96375 TX/PRO/DX INJ NEW DRUG ADDON: CPT

## 2022-01-04 PROCEDURE — 99284 EMERGENCY DEPT VISIT MOD MDM: CPT | Mod: 25

## 2022-01-04 RX ORDER — ERYTHROMYCIN ETHYLSUCCINATE 400 MG
1 TABLET ORAL
Qty: 20 | Refills: 0
Start: 2022-01-04 | End: 2022-01-08

## 2022-01-04 RX ORDER — METOCLOPRAMIDE HCL 10 MG
10 TABLET ORAL ONCE
Refills: 0 | Status: COMPLETED | OUTPATIENT
Start: 2022-01-04 | End: 2022-01-04

## 2022-01-04 RX ORDER — OXYCODONE AND ACETAMINOPHEN 5; 325 MG/1; MG/1
1 TABLET ORAL
Qty: 6 | Refills: 0
Start: 2022-01-04 | End: 2022-01-05

## 2022-01-04 RX ORDER — MORPHINE SULFATE 50 MG/1
4 CAPSULE, EXTENDED RELEASE ORAL ONCE
Refills: 0 | Status: DISCONTINUED | OUTPATIENT
Start: 2022-01-04 | End: 2022-01-04

## 2022-01-04 RX ORDER — SODIUM CHLORIDE 9 MG/ML
1000 INJECTION INTRAMUSCULAR; INTRAVENOUS; SUBCUTANEOUS ONCE
Refills: 0 | Status: COMPLETED | OUTPATIENT
Start: 2022-01-04 | End: 2022-01-04

## 2022-01-04 RX ORDER — OXYCODONE HYDROCHLORIDE 5 MG/1
1 TABLET ORAL
Qty: 6 | Refills: 0
Start: 2022-01-04 | End: 2022-01-05

## 2022-01-04 RX ORDER — ONDANSETRON 8 MG/1
4 TABLET, FILM COATED ORAL ONCE
Refills: 0 | Status: COMPLETED | OUTPATIENT
Start: 2022-01-04 | End: 2022-01-04

## 2022-01-04 RX ADMIN — MORPHINE SULFATE 4 MILLIGRAM(S): 50 CAPSULE, EXTENDED RELEASE ORAL at 06:09

## 2022-01-04 RX ADMIN — ONDANSETRON 4 MILLIGRAM(S): 8 TABLET, FILM COATED ORAL at 05:33

## 2022-01-04 RX ADMIN — Medication 10 MILLIGRAM(S): at 05:33

## 2022-01-04 RX ADMIN — SODIUM CHLORIDE 1000 MILLILITER(S): 9 INJECTION INTRAMUSCULAR; INTRAVENOUS; SUBCUTANEOUS at 05:33

## 2022-01-04 RX ADMIN — MORPHINE SULFATE 4 MILLIGRAM(S): 50 CAPSULE, EXTENDED RELEASE ORAL at 05:33

## 2022-01-04 NOTE — ED PROVIDER NOTE - OBJECTIVE STATEMENT
57yo M w/pmh esophageal ca s/p esophagectomy, HTN presents for abdominal pain, n/v. Reports onset after he ate hummus 3d ago on Saturday, presented to ER at that time, reports morphine helped his pain, CT at that time was negative, d/c'd feeling improved. Reports gradual worsening of the symptoms since then. Tried home zofran, reglan, and tylenol without relief. Reports inability to tolerate PO. Feels just like prior episodes.

## 2022-01-04 NOTE — ED ADULT TRIAGE NOTE - CHIEF COMPLAINT QUOTE
C/O of abd pain for the past few days associated with nausea, vomiting and diarrhea.  Was seen here Saturday for the same compliant and sent home. Hx of esophageal CA.

## 2022-01-04 NOTE — ED PROVIDER NOTE - DOMESTIC TRAVEL HIGH RISK QUESTION
[address] [facility] Karen Concepcion  MR#: [id number]  : 1960  ACCOUNT #: [de-identified]   DATE OF SERVICE: 2018    REFERRING PHYSICIAN:  Dr. Jacob Wong:  Chest pain, atrial fibrillation. FINDINGS:  Resting heart rate 57, blood pressure 127/74. Resting EKG shows sinus bradycardia, no ST or T-wave changes, otherwise normal EKG. PHARMACOLOGIC STRESS PORTION:  The patient underwent Lexiscan infusion 0.4 mg intravenously per protocol via the left antecubital IV and then swung her legs. The patient remained in sinus rhythm with no ST segment change, making this a negative EKG portion of the stress test.    PERFUSION IMAGING:  The patient received intravenous technetium-99m-sestamibi 10.8 mCi intravenously per protocol via the left antecubital IV for resting images and then 32.0 mCi via the same IV an hour later for stress images. Tomographic views of the left ventricle were obtained and compared. Cavity size is normal during both rest and stress images. There was no transient ischemic dilatation present. There was fairly uniform radiotracer uptake throughout the left ventricle myocardium during both stress and rest imaging. There were no perfusion image abnormalities concerning for ischemia or infarction. Gated analysis demonstrates normal LV size, normal systolic function, normal regional wall motion. Ejection fraction calculated at 67%. CONCLUSIONS:  1. Normal and low risk pharmacological nuclear stress test.  2.  No perfusion imaging abnormalities concerning for ischemia or infarction. 3.  Normal left ventricular size, normal systolic function, ejection fraction calculated at 67%.       MD RAMON Valentino / EDUIN  D: 2018 11:13     T: 2018 15:26  JOB #: [id number]  CC: Jaiden Sneed MD  CC: Gela Garcia MD No

## 2022-01-04 NOTE — ED PROVIDER NOTE - PROGRESS NOTE DETAILS
Resident Carolyne Hernandez: reassessed pt, pain slightly improved, still c/o abdominal pain, additional 4 morphine ordered. Resident Carolyne Hernandez: reassessed pt, symptoms improved. Ambulatory around ED.

## 2022-01-04 NOTE — ED PROVIDER NOTE - ATTENDING CONTRIBUTION TO CARE
I personally saw the patient with the resident, and completed the key components of the history and physical exam. I then discussed the management plan with the resident.   gen in nad resp clear cardiac no murmur abd soft nt neuro intact

## 2022-01-04 NOTE — ED PROVIDER NOTE - PHYSICAL EXAMINATION
General: well appearing, NAD  Head: NC/AT  Cardiac: RRR, no m/r/g  Respiratory: CTABL, equal chest wall expansions, no conversational dyspnea  Abdomen: soft, ND, diffusely ttp  Neuro: AAOx3,coordinated movement of all 4 extremities  Psych: calm, cooperative, normal affect  Skin: warm and dry

## 2022-01-04 NOTE — ED PROVIDER NOTE - CLINICAL SUMMARY MEDICAL DECISION MAKING FREE TEXT BOX
57yo M w/pmh esophageal ca s/p esophagectomy, HTN presents for abdominal pain, n/v similar to prior episodes, workup 3d ago including CT was negative. Zofran, reglan, morphine for symptom control.

## 2022-01-04 NOTE — ED PROVIDER NOTE - NS ED ROS FT
Constitutional: no fever, no sweats, and no chills.  CV: no chest pain, no edema.  Resp: no cough, no dyspnea  GI: +abdominal pain, +nausea and +vomiting.  MSK: no msk pain, no weakness  Skin: no lesions, and no rashes.  Neuro: no LOC, no headache, no sensory deficits, and no dizziness  ROS otherwise negative except as noted in HPI.

## 2022-01-04 NOTE — ED ADULT NURSE REASSESSMENT NOTE - NS ED NURSE REASSESS COMMENT FT1
Pt alert and oriented x 4, c/o of abd pain meds given as ordered. IV fluids given and nausea meds. MD d/c patient home.

## 2022-01-04 NOTE — ED PROVIDER NOTE - NSFOLLOWUPINSTRUCTIONS_ED_ALL_ED_FT
1. Take zofran and reglan as needed for nausea.  2. Take tylenol every 6 hours for pain. Add oxycodone as needed, maximum 1 pill every 8 hours.   3. Follow up with your doctor within 2-3 days.   4. Return to the ED for any new or worsening symptoms.

## 2022-01-04 NOTE — ED PROVIDER NOTE - PATIENT PORTAL LINK FT
You can access the FollowMyHealth Patient Portal offered by [facility] by registering at the following website: [email or web address]. By joining [employer]’s FollowMyHealth portal, you will also be able to view your health information using other applications (apps) compatible with our system.

## 2022-01-04 NOTE — ED PROVIDER NOTE - CARE PROVIDER_API CALL
Judd Moe)  Gastroenterology; Internal Medicine  [address] [place] [address]  Phone: [phone]  Fax: [phone]  Follow Up Time:

## 2022-03-24 ENCOUNTER — EMERGENCY (EMERGENCY)
Facility: HOSPITAL | Age: 57
LOS: 1 days | Discharge: HOME | End: 2022-03-24
Attending: EMERGENCY MEDICINE
Payer: MEDICARE

## 2022-03-24 VITALS
HEART RATE: 63 BPM | OXYGEN SATURATION: 98 % | WEIGHT: 195.99 LBS | SYSTOLIC BLOOD PRESSURE: 173 MMHG | TEMPERATURE: 98 F | RESPIRATION RATE: 20 BRPM | DIASTOLIC BLOOD PRESSURE: 98 MMHG | HEIGHT: 72 IN

## 2022-03-24 VITALS
OXYGEN SATURATION: 98 % | DIASTOLIC BLOOD PRESSURE: 92 MMHG | RESPIRATION RATE: 18 BRPM | TEMPERATURE: 99 F | SYSTOLIC BLOOD PRESSURE: 178 MMHG | HEART RATE: 71 BPM

## 2022-03-24 DIAGNOSIS — Z98.890 OTHER SPECIFIED POSTPROCEDURAL STATES: Chronic | ICD-10-CM

## 2022-03-24 DIAGNOSIS — Z90.49 ACQUIRED ABSENCE OF OTHER SPECIFIED PARTS OF DIGESTIVE TRACT: Chronic | ICD-10-CM

## 2022-03-24 LAB
ACANTHOCYTES BLD QL SMEAR: SLIGHT — SIGNIFICANT CHANGE UP
ALBUMIN SERPL ELPH-MCNC: 4.8 G/DL — SIGNIFICANT CHANGE UP (ref 3.3–5.2)
ALP SERPL-CCNC: 122 U/L — HIGH (ref 40–120)
ALT FLD-CCNC: 43 U/L — HIGH
ANION GAP SERPL CALC-SCNC: 17 MMOL/L — SIGNIFICANT CHANGE UP (ref 5–17)
AST SERPL-CCNC: 28 U/L — SIGNIFICANT CHANGE UP
BASOPHILS # BLD AUTO: 0.13 K/UL — SIGNIFICANT CHANGE UP (ref 0–0.2)
BASOPHILS NFR BLD AUTO: 0.9 % — SIGNIFICANT CHANGE UP (ref 0–2)
BILIRUB SERPL-MCNC: 0.3 MG/DL — LOW (ref 0.4–2)
BUN SERPL-MCNC: 9.6 MG/DL — SIGNIFICANT CHANGE UP (ref 8–20)
CALCIUM SERPL-MCNC: 9.4 MG/DL — SIGNIFICANT CHANGE UP (ref 8.6–10.2)
CHLORIDE SERPL-SCNC: 99 MMOL/L — SIGNIFICANT CHANGE UP (ref 98–107)
CO2 SERPL-SCNC: 21 MMOL/L — LOW (ref 22–29)
CREAT SERPL-MCNC: 0.74 MG/DL — SIGNIFICANT CHANGE UP (ref 0.5–1.3)
EGFR: 108 ML/MIN/1.73M2 — SIGNIFICANT CHANGE UP
ELLIPTOCYTES BLD QL SMEAR: SLIGHT — SIGNIFICANT CHANGE UP
EOSINOPHIL # BLD AUTO: 0.24 K/UL — SIGNIFICANT CHANGE UP (ref 0–0.5)
EOSINOPHIL NFR BLD AUTO: 1.7 % — SIGNIFICANT CHANGE UP (ref 0–6)
GIANT PLATELETS BLD QL SMEAR: PRESENT — SIGNIFICANT CHANGE UP
GLUCOSE SERPL-MCNC: 155 MG/DL — HIGH (ref 70–99)
HCT VFR BLD CALC: 38 % — LOW (ref 39–50)
HGB BLD-MCNC: 11.9 G/DL — LOW (ref 13–17)
LIDOCAIN IGE QN: 20 U/L — LOW (ref 22–51)
LYMPHOCYTES # BLD AUTO: 0.24 K/UL — LOW (ref 1–3.3)
LYMPHOCYTES # BLD AUTO: 1.7 % — LOW (ref 13–44)
MAGNESIUM SERPL-MCNC: 1.5 MG/DL — LOW (ref 1.6–2.6)
MANUAL SMEAR VERIFICATION: SIGNIFICANT CHANGE UP
MCHC RBC-ENTMCNC: 23.2 PG — LOW (ref 27–34)
MCHC RBC-ENTMCNC: 31.3 GM/DL — LOW (ref 32–36)
MCV RBC AUTO: 73.9 FL — LOW (ref 80–100)
MONOCYTES # BLD AUTO: 0.74 K/UL — SIGNIFICANT CHANGE UP (ref 0–0.9)
MONOCYTES NFR BLD AUTO: 5.2 % — SIGNIFICANT CHANGE UP (ref 2–14)
NEUTROPHILS # BLD AUTO: 12.82 K/UL — HIGH (ref 1.8–7.4)
NEUTROPHILS NFR BLD AUTO: 89.6 % — HIGH (ref 43–77)
NT-PROBNP SERPL-SCNC: 42 PG/ML — SIGNIFICANT CHANGE UP (ref 0–300)
OVALOCYTES BLD QL SMEAR: SLIGHT — SIGNIFICANT CHANGE UP
PLAT MORPH BLD: NORMAL — SIGNIFICANT CHANGE UP
PLATELET # BLD AUTO: 259 K/UL — SIGNIFICANT CHANGE UP (ref 150–400)
POIKILOCYTOSIS BLD QL AUTO: SLIGHT — SIGNIFICANT CHANGE UP
POLYCHROMASIA BLD QL SMEAR: SLIGHT — SIGNIFICANT CHANGE UP
POTASSIUM SERPL-MCNC: 3.6 MMOL/L — SIGNIFICANT CHANGE UP (ref 3.5–5.3)
POTASSIUM SERPL-SCNC: 3.6 MMOL/L — SIGNIFICANT CHANGE UP (ref 3.5–5.3)
PROT SERPL-MCNC: 8.3 G/DL — SIGNIFICANT CHANGE UP (ref 6.6–8.7)
RAPID RVP RESULT: SIGNIFICANT CHANGE UP
RBC # BLD: 5.14 M/UL — SIGNIFICANT CHANGE UP (ref 4.2–5.8)
RBC # FLD: 16.3 % — HIGH (ref 10.3–14.5)
RBC BLD AUTO: ABNORMAL
SARS-COV-2 RNA SPEC QL NAA+PROBE: SIGNIFICANT CHANGE UP
SODIUM SERPL-SCNC: 137 MMOL/L — SIGNIFICANT CHANGE UP (ref 135–145)
TROPONIN T SERPL-MCNC: <0.01 NG/ML — SIGNIFICANT CHANGE UP (ref 0–0.06)
TROPONIN T SERPL-MCNC: <0.01 NG/ML — SIGNIFICANT CHANGE UP (ref 0–0.06)
VARIANT LYMPHS # BLD: 0.9 % — SIGNIFICANT CHANGE UP (ref 0–6)
WBC # BLD: 14.31 K/UL — HIGH (ref 3.8–10.5)
WBC # FLD AUTO: 14.31 K/UL — HIGH (ref 3.8–10.5)

## 2022-03-24 PROCEDURE — 99285 EMERGENCY DEPT VISIT HI MDM: CPT | Mod: GC

## 2022-03-24 PROCEDURE — 96376 TX/PRO/DX INJ SAME DRUG ADON: CPT | Mod: XU

## 2022-03-24 PROCEDURE — 71045 X-RAY EXAM CHEST 1 VIEW: CPT

## 2022-03-24 PROCEDURE — 85025 COMPLETE CBC W/AUTO DIFF WBC: CPT

## 2022-03-24 PROCEDURE — 83880 ASSAY OF NATRIURETIC PEPTIDE: CPT

## 2022-03-24 PROCEDURE — 36415 COLL VENOUS BLD VENIPUNCTURE: CPT

## 2022-03-24 PROCEDURE — 99285 EMERGENCY DEPT VISIT HI MDM: CPT | Mod: 25

## 2022-03-24 PROCEDURE — 93010 ELECTROCARDIOGRAM REPORT: CPT

## 2022-03-24 PROCEDURE — 83735 ASSAY OF MAGNESIUM: CPT

## 2022-03-24 PROCEDURE — 93005 ELECTROCARDIOGRAM TRACING: CPT

## 2022-03-24 PROCEDURE — 96374 THER/PROPH/DIAG INJ IV PUSH: CPT | Mod: XU

## 2022-03-24 PROCEDURE — 0225U NFCT DS DNA&RNA 21 SARSCOV2: CPT

## 2022-03-24 PROCEDURE — 80053 COMPREHEN METABOLIC PANEL: CPT

## 2022-03-24 PROCEDURE — 71045 X-RAY EXAM CHEST 1 VIEW: CPT | Mod: 26

## 2022-03-24 PROCEDURE — 71275 CT ANGIOGRAPHY CHEST: CPT | Mod: 26,MA

## 2022-03-24 PROCEDURE — 84484 ASSAY OF TROPONIN QUANT: CPT

## 2022-03-24 PROCEDURE — 71275 CT ANGIOGRAPHY CHEST: CPT | Mod: MA

## 2022-03-24 PROCEDURE — 96375 TX/PRO/DX INJ NEW DRUG ADDON: CPT | Mod: XU

## 2022-03-24 PROCEDURE — 83690 ASSAY OF LIPASE: CPT

## 2022-03-24 RX ORDER — MAGNESIUM SULFATE 500 MG/ML
2 VIAL (ML) INJECTION ONCE
Refills: 0 | Status: COMPLETED | OUTPATIENT
Start: 2022-03-24 | End: 2022-03-24

## 2022-03-24 RX ORDER — ONDANSETRON 8 MG/1
4 TABLET, FILM COATED ORAL ONCE
Refills: 0 | Status: COMPLETED | OUTPATIENT
Start: 2022-03-24 | End: 2022-03-24

## 2022-03-24 RX ORDER — HYDROMORPHONE HYDROCHLORIDE 2 MG/ML
1 INJECTION INTRAMUSCULAR; INTRAVENOUS; SUBCUTANEOUS ONCE
Refills: 0 | Status: DISCONTINUED | OUTPATIENT
Start: 2022-03-24 | End: 2022-03-24

## 2022-03-24 RX ORDER — DEXAMETHASONE 0.5 MG/5ML
10 ELIXIR ORAL ONCE
Refills: 0 | Status: COMPLETED | OUTPATIENT
Start: 2022-03-24 | End: 2022-03-24

## 2022-03-24 RX ORDER — SODIUM CHLORIDE 9 MG/ML
1000 INJECTION, SOLUTION INTRAVENOUS ONCE
Refills: 0 | Status: COMPLETED | OUTPATIENT
Start: 2022-03-24 | End: 2022-03-24

## 2022-03-24 RX ORDER — METOCLOPRAMIDE HCL 10 MG
10 TABLET ORAL ONCE
Refills: 0 | Status: COMPLETED | OUTPATIENT
Start: 2022-03-24 | End: 2022-03-24

## 2022-03-24 RX ORDER — MORPHINE SULFATE 50 MG/1
4 CAPSULE, EXTENDED RELEASE ORAL ONCE
Refills: 0 | Status: DISCONTINUED | OUTPATIENT
Start: 2022-03-24 | End: 2022-03-24

## 2022-03-24 RX ADMIN — HYDROMORPHONE HYDROCHLORIDE 1 MILLIGRAM(S): 2 INJECTION INTRAMUSCULAR; INTRAVENOUS; SUBCUTANEOUS at 11:10

## 2022-03-24 RX ADMIN — HYDROMORPHONE HYDROCHLORIDE 1 MILLIGRAM(S): 2 INJECTION INTRAMUSCULAR; INTRAVENOUS; SUBCUTANEOUS at 12:35

## 2022-03-24 RX ADMIN — Medication 25 GRAM(S): at 10:05

## 2022-03-24 RX ADMIN — SODIUM CHLORIDE 1000 MILLILITER(S): 9 INJECTION, SOLUTION INTRAVENOUS at 10:05

## 2022-03-24 RX ADMIN — MORPHINE SULFATE 4 MILLIGRAM(S): 50 CAPSULE, EXTENDED RELEASE ORAL at 08:25

## 2022-03-24 RX ADMIN — Medication 102 MILLIGRAM(S): at 12:36

## 2022-03-24 RX ADMIN — SODIUM CHLORIDE 1000 MILLILITER(S): 9 INJECTION, SOLUTION INTRAVENOUS at 08:25

## 2022-03-24 RX ADMIN — ONDANSETRON 4 MILLIGRAM(S): 8 TABLET, FILM COATED ORAL at 08:25

## 2022-03-24 RX ADMIN — ONDANSETRON 4 MILLIGRAM(S): 8 TABLET, FILM COATED ORAL at 11:10

## 2022-03-24 RX ADMIN — Medication 104 MILLIGRAM(S): at 12:36

## 2022-03-24 RX ADMIN — Medication 110 MILLIGRAM(S): at 12:35

## 2022-03-24 RX ADMIN — HYDROMORPHONE HYDROCHLORIDE 1 MILLIGRAM(S): 2 INJECTION INTRAMUSCULAR; INTRAVENOUS; SUBCUTANEOUS at 15:45

## 2022-03-24 NOTE — ED ADULT NURSE NOTE - OBJECTIVE STATEMENT
Patient comes in complaining of abdominal pain, chest pain, and 6 episodes of vomiting since this morning. Pt states he has been feeling abd pain on and off since 2016 due to having an esophagectomy s/t to CA. Patient comes in complaining of abdominal pain, chest pain, and 6 episodes of vomiting since this morning. Pt states he has been feeling abd pain on and off since 2016 due to having an esophagectomy s/t to CA. Pt states the pain is relieved by coming to the hospital and getting IV pain meds, and is worsened by vomiting. Pt also endorses chest pain but says he thinks its due to vomiting. Pt denies diahhrea, fevers, headache, recent COVID exposure or recent travel. Patient is diaphoretic and grimacing with pain. Patient comes in complaining of abdominal pain, chest pain, and 6 episodes of vomiting since this morning. Pt states he has been feeling abd pain on and off since 2016 due to having an esophagectomy s/t to CA. Pt states the pain is relieved by coming to the hospital and getting IV pain meds, and is worsened by vomiting. Pt also endorses chest pain but says he thinks its due to vomiting. Pt denies diahhrea, fevers, headache, recent COVID exposure or recent travel. Patient is diaphoretic and grimacing with pain. currently NSR to 70 on cardiac monitor, sat to 97.

## 2022-03-24 NOTE — ED PROVIDER NOTE - PROGRESS NOTE DETAILS
labs reviewed, cxr normal. pending CTA and will replete mg. on reassessment patient stable. -DO Christa improved symptoms. dc with pcp follow up. -DO Christa

## 2022-03-24 NOTE — ED PROVIDER NOTE - CLINICAL SUMMARY MEDICAL DECISION MAKING FREE TEXT BOX
57yo male with history of HTN, gastroparesis, esophageal cancer, s/p esophagectomy (2016) presenting with nausea, multiple episodes of vomiting, chest pain since 2AM with no shortness of breath, palpitations, cough, fever. Similar symptoms in the past with multiple ED visits. RRR, lungs clear, abdomen soft, no LE edema. Labs, ecg, cxr, meds, reassess.

## 2022-03-24 NOTE — ED ADULT TRIAGE NOTE - CHIEF COMPLAINT QUOTE
Pt arrives to ED c/o vomiting since 2 am today " my esophagus is removed  I have esophagus CA- It happens a lot "

## 2022-03-24 NOTE — ED PROVIDER NOTE - PHYSICAL EXAMINATION
General: Nontoxic appearing in no acute distress. Alert and cooperative.   Head: Normocephalic, atraumatic.  Eyes: PERRLA. No conjunctival injection.   ENMT: Atraumatic external nose and ears.  Neck: Soft and supple.   Cardiac: Regular rate and regular rhythm. No murmurs. No LE edema.  Resp: Unlabored respiratory effort. Lungs CTAB. Speaking in full sentences.   Abd: Soft, non-tender, non-distended.   MSK: Spine midline and non-tender.   Skin: Warm and dry.   Neuro: AO x 3. Moves all extremities symmetrically. Motor strength and sensation grossly intact.

## 2022-03-24 NOTE — ED PROVIDER NOTE - PATIENT PORTAL LINK FT
You can access the FollowMyHealth Patient Portal offered by [facility] by registering at the following website: [email or web address]. By joining [employer]’s FollowMyHealth portal, you will also be able to view your health information using other applications (apps) compatible with our system.

## 2022-03-24 NOTE — ED ADULT NURSE REASSESSMENT NOTE - NS ED NURSE REASSESS COMMENT FT1
pt hemodynamically stable. Pt awaiting dispo from MD. Patient complains of pain, 1mg Dilaudid given. Pt verbalizes relief of pain. Will continue to monitor.

## 2022-03-24 NOTE — ED PROVIDER NOTE - ATTENDING CONTRIBUTION TO CARE
The patient seen and examined    Atypical chest pain    I, Denis Avalos, performed the initial face to face bedside interview with this patient regarding history of present illness, review of symptoms and relevant past medical, social and family history.  I completed an independent physical examination.  I was the initial provider who evaluated this patient. I have signed out the follow up of any pending tests (i.e. labs, radiological studies) to the resident.  I have communicated the patient’s plan of care and disposition with the resident.

## 2022-03-24 NOTE — ED PROVIDER NOTE - OBJECTIVE STATEMENT
57yo male with history of HTN, gastroparesis, esophageal cancer, s/p esophagectomy (2016) presenting with nausea, multiple episodes of vomiting, chest pain since 2AM with no shortness of breath, palpitations, cough, fever. Patient reports similar symptoms in the past. Patient denies new foods or change in medications. Denies headache, diarrhea, hematuria, dysuria, dark stools, focal neurologic symptoms.

## 2022-03-26 ENCOUNTER — EMERGENCY (EMERGENCY)
Facility: HOSPITAL | Age: 57
LOS: 1 days | Discharge: HOME | End: 2022-03-26
Attending: EMERGENCY MEDICINE
Payer: MEDICARE

## 2022-03-26 VITALS
DIASTOLIC BLOOD PRESSURE: 77 MMHG | RESPIRATION RATE: 18 BRPM | HEART RATE: 98 BPM | SYSTOLIC BLOOD PRESSURE: 122 MMHG | OXYGEN SATURATION: 97 % | TEMPERATURE: 98 F

## 2022-03-26 VITALS
RESPIRATION RATE: 16 BRPM | OXYGEN SATURATION: 98 % | HEART RATE: 109 BPM | SYSTOLIC BLOOD PRESSURE: 180 MMHG | WEIGHT: 195.11 LBS | HEIGHT: 72 IN | DIASTOLIC BLOOD PRESSURE: 117 MMHG

## 2022-03-26 DIAGNOSIS — Z90.49 ACQUIRED ABSENCE OF OTHER SPECIFIED PARTS OF DIGESTIVE TRACT: Chronic | ICD-10-CM

## 2022-03-26 DIAGNOSIS — Z98.890 OTHER SPECIFIED POSTPROCEDURAL STATES: Chronic | ICD-10-CM

## 2022-03-26 LAB
ALBUMIN SERPL ELPH-MCNC: 4.9 G/DL — SIGNIFICANT CHANGE UP (ref 3.3–5.2)
ALP SERPL-CCNC: 114 U/L — SIGNIFICANT CHANGE UP (ref 40–120)
ALT FLD-CCNC: 37 U/L — SIGNIFICANT CHANGE UP
ANION GAP SERPL CALC-SCNC: 18 MMOL/L — HIGH (ref 5–17)
AST SERPL-CCNC: 24 U/L — SIGNIFICANT CHANGE UP
BASOPHILS # BLD AUTO: 0.02 K/UL — SIGNIFICANT CHANGE UP (ref 0–0.2)
BASOPHILS NFR BLD AUTO: 0.1 % — SIGNIFICANT CHANGE UP (ref 0–2)
BILIRUB SERPL-MCNC: 0.5 MG/DL — SIGNIFICANT CHANGE UP (ref 0.4–2)
BUN SERPL-MCNC: 24.2 MG/DL — HIGH (ref 8–20)
CALCIUM SERPL-MCNC: 9.5 MG/DL — SIGNIFICANT CHANGE UP (ref 8.6–10.2)
CHLORIDE SERPL-SCNC: 97 MMOL/L — LOW (ref 98–107)
CO2 SERPL-SCNC: 19 MMOL/L — LOW (ref 22–29)
CREAT SERPL-MCNC: 0.76 MG/DL — SIGNIFICANT CHANGE UP (ref 0.5–1.3)
EGFR: 107 ML/MIN/1.73M2 — SIGNIFICANT CHANGE UP
EOSINOPHIL # BLD AUTO: 0 K/UL — SIGNIFICANT CHANGE UP (ref 0–0.5)
EOSINOPHIL NFR BLD AUTO: 0 % — SIGNIFICANT CHANGE UP (ref 0–6)
GLUCOSE SERPL-MCNC: 180 MG/DL — HIGH (ref 70–99)
HCT VFR BLD CALC: 41 % — SIGNIFICANT CHANGE UP (ref 39–50)
HGB BLD-MCNC: 13.3 G/DL — SIGNIFICANT CHANGE UP (ref 13–17)
IMM GRANULOCYTES NFR BLD AUTO: 0.5 % — SIGNIFICANT CHANGE UP (ref 0–1.5)
LIDOCAIN IGE QN: 11 U/L — LOW (ref 22–51)
LYMPHOCYTES # BLD AUTO: 1.1 K/UL — SIGNIFICANT CHANGE UP (ref 1–3.3)
LYMPHOCYTES # BLD AUTO: 7.1 % — LOW (ref 13–44)
MCHC RBC-ENTMCNC: 23.5 PG — LOW (ref 27–34)
MCHC RBC-ENTMCNC: 32.4 GM/DL — SIGNIFICANT CHANGE UP (ref 32–36)
MCV RBC AUTO: 72.6 FL — LOW (ref 80–100)
MONOCYTES # BLD AUTO: 0.9 K/UL — SIGNIFICANT CHANGE UP (ref 0–0.9)
MONOCYTES NFR BLD AUTO: 5.8 % — SIGNIFICANT CHANGE UP (ref 2–14)
NEUTROPHILS # BLD AUTO: 13.39 K/UL — HIGH (ref 1.8–7.4)
NEUTROPHILS NFR BLD AUTO: 86.5 % — HIGH (ref 43–77)
PLATELET # BLD AUTO: 346 K/UL — SIGNIFICANT CHANGE UP (ref 150–400)
POTASSIUM SERPL-MCNC: 4.2 MMOL/L — SIGNIFICANT CHANGE UP (ref 3.5–5.3)
POTASSIUM SERPL-SCNC: 4.2 MMOL/L — SIGNIFICANT CHANGE UP (ref 3.5–5.3)
PROT SERPL-MCNC: 8.6 G/DL — SIGNIFICANT CHANGE UP (ref 6.6–8.7)
RBC # BLD: 5.65 M/UL — SIGNIFICANT CHANGE UP (ref 4.2–5.8)
RBC # FLD: 17.3 % — HIGH (ref 10.3–14.5)
SODIUM SERPL-SCNC: 134 MMOL/L — LOW (ref 135–145)
WBC # BLD: 15.49 K/UL — HIGH (ref 3.8–10.5)
WBC # FLD AUTO: 15.49 K/UL — HIGH (ref 3.8–10.5)

## 2022-03-26 PROCEDURE — 80053 COMPREHEN METABOLIC PANEL: CPT

## 2022-03-26 PROCEDURE — 96375 TX/PRO/DX INJ NEW DRUG ADDON: CPT

## 2022-03-26 PROCEDURE — 71045 X-RAY EXAM CHEST 1 VIEW: CPT

## 2022-03-26 PROCEDURE — 71045 X-RAY EXAM CHEST 1 VIEW: CPT | Mod: 26

## 2022-03-26 PROCEDURE — 36415 COLL VENOUS BLD VENIPUNCTURE: CPT

## 2022-03-26 PROCEDURE — 99283 EMERGENCY DEPT VISIT LOW MDM: CPT | Mod: 25

## 2022-03-26 PROCEDURE — 99284 EMERGENCY DEPT VISIT MOD MDM: CPT

## 2022-03-26 PROCEDURE — 83690 ASSAY OF LIPASE: CPT

## 2022-03-26 PROCEDURE — 96374 THER/PROPH/DIAG INJ IV PUSH: CPT

## 2022-03-26 PROCEDURE — 85025 COMPLETE CBC W/AUTO DIFF WBC: CPT

## 2022-03-26 RX ORDER — HYDROMORPHONE HYDROCHLORIDE 2 MG/ML
0.5 INJECTION INTRAMUSCULAR; INTRAVENOUS; SUBCUTANEOUS ONCE
Refills: 0 | Status: DISCONTINUED | OUTPATIENT
Start: 2022-03-26 | End: 2022-03-26

## 2022-03-26 RX ORDER — OXYCODONE HYDROCHLORIDE 5 MG/1
1 TABLET ORAL
Qty: 12 | Refills: 0
Start: 2022-03-26 | End: 2022-03-28

## 2022-03-26 RX ORDER — SODIUM CHLORIDE 9 MG/ML
3 INJECTION INTRAMUSCULAR; INTRAVENOUS; SUBCUTANEOUS ONCE
Refills: 0 | Status: COMPLETED | OUTPATIENT
Start: 2022-03-26 | End: 2022-03-26

## 2022-03-26 RX ORDER — SODIUM CHLORIDE 9 MG/ML
2000 INJECTION, SOLUTION INTRAVENOUS ONCE
Refills: 0 | Status: COMPLETED | OUTPATIENT
Start: 2022-03-26 | End: 2022-03-26

## 2022-03-26 RX ORDER — PROCHLORPERAZINE MALEATE 5 MG
10 TABLET ORAL ONCE
Refills: 0 | Status: COMPLETED | OUTPATIENT
Start: 2022-03-26 | End: 2022-03-26

## 2022-03-26 RX ORDER — DIPHENHYDRAMINE HCL 50 MG
25 CAPSULE ORAL ONCE
Refills: 0 | Status: COMPLETED | OUTPATIENT
Start: 2022-03-26 | End: 2022-03-26

## 2022-03-26 RX ORDER — HYDROMORPHONE HYDROCHLORIDE 2 MG/ML
1 INJECTION INTRAMUSCULAR; INTRAVENOUS; SUBCUTANEOUS ONCE
Refills: 0 | Status: DISCONTINUED | OUTPATIENT
Start: 2022-03-26 | End: 2022-03-26

## 2022-03-26 RX ORDER — ONDANSETRON 8 MG/1
4 TABLET, FILM COATED ORAL ONCE
Refills: 0 | Status: COMPLETED | OUTPATIENT
Start: 2022-03-26 | End: 2022-03-26

## 2022-03-26 RX ADMIN — SODIUM CHLORIDE 1000 MILLILITER(S): 9 INJECTION, SOLUTION INTRAVENOUS at 02:26

## 2022-03-26 RX ADMIN — HYDROMORPHONE HYDROCHLORIDE 0.5 MILLIGRAM(S): 2 INJECTION INTRAMUSCULAR; INTRAVENOUS; SUBCUTANEOUS at 06:51

## 2022-03-26 RX ADMIN — HYDROMORPHONE HYDROCHLORIDE 0.5 MILLIGRAM(S): 2 INJECTION INTRAMUSCULAR; INTRAVENOUS; SUBCUTANEOUS at 07:40

## 2022-03-26 RX ADMIN — SODIUM CHLORIDE 3 MILLILITER(S): 9 INJECTION INTRAMUSCULAR; INTRAVENOUS; SUBCUTANEOUS at 02:22

## 2022-03-26 RX ADMIN — HYDROMORPHONE HYDROCHLORIDE 1 MILLIGRAM(S): 2 INJECTION INTRAMUSCULAR; INTRAVENOUS; SUBCUTANEOUS at 06:35

## 2022-03-26 RX ADMIN — HYDROMORPHONE HYDROCHLORIDE 1 MILLIGRAM(S): 2 INJECTION INTRAMUSCULAR; INTRAVENOUS; SUBCUTANEOUS at 02:26

## 2022-03-26 RX ADMIN — ONDANSETRON 4 MILLIGRAM(S): 8 TABLET, FILM COATED ORAL at 06:40

## 2022-03-26 RX ADMIN — Medication 25 MILLIGRAM(S): at 02:26

## 2022-03-26 RX ADMIN — Medication 104 MILLIGRAM(S): at 02:36

## 2022-03-26 NOTE — ED ADULT NURSE NOTE - OBJECTIVE STATEMENT
Presents to ED c/o N/V and abdominal pain s/p PMHx significant for cancer. As per pt this is a reoccurring problem and it comes in waves, not on pain control medication at home.

## 2022-03-26 NOTE — ED PROVIDER NOTE - OBJECTIVE STATEMENT
55 y/o male with PMHx of HTN, cholecystectomy gastroparesis, esophageal cancer, s/p esophagectomy (2016) c/o abdominal pain, N/V. Pt states every so often he has episode of N/V and when he is able to tolerate PO. Pt was here yesterday for similar but states he has been unable to tolerate PO at home. Pt denies chest pain, SOB.

## 2022-03-26 NOTE — ED ADULT NURSE NOTE - CHIEF COMPLAINT QUOTE
Ambulatory reporting severe abdominal pain and N/V related to surgical relocation of his stomach and removal of his esophagus related to esophageal CA in 2016. Patient just seen here Thursday for same, states this is a frequent problem.

## 2022-03-26 NOTE — ED PROVIDER NOTE - CLINICAL SUMMARY MEDICAL DECISION MAKING FREE TEXT BOX
labs and CT results reviewed from yesterday pt with intractable vomiting at home will hydrate, check labs, anti-emetics, pain meds, repeat chest x-ray, re-evaluate

## 2022-03-26 NOTE — ED PROVIDER NOTE - NSFOLLOWUPINSTRUCTIONS_ED_ALL_ED_FT
Fluids and diet as tolerated  Oxycontin 5 mg every 6 hrs as needed for pain  Start Doxycycline as instructed  Follow up with your medical doctor and GI on Monday  Return sooner for any problems

## 2022-03-26 NOTE — ED PROVIDER NOTE - PATIENT PORTAL LINK FT
You can access the FollowMyHealth Patient Portal offered by [facility] by registering at the following website: [email or web address]. By joining [facility]’s FollowMyHealth portal, you will also be able to view your health information using other applications (apps) compatible with our system.

## 2022-03-26 NOTE — ED ADULT TRIAGE NOTE - CHIEF COMPLAINT QUOTE
Pt calling needing refill on lisinopril 5mg and toprol 25 mg    Ambulatory reporting severe abdominal pain and N/V related to surgical relocation of his stomach and removal of his esophagus related to esophageal CA in 2016. Patient just seen here Thursday for same, states this is a frequent problem.

## 2022-03-26 NOTE — ED PROVIDER NOTE - CHIEF COMPLAINT
Writer called Efrem, son C2C,   Left message to return call to Triage RN.     On callback please give Efrem referral numbers:  Pulmonology: [phone]  Pain clinic: [phone].    Sharon Zaragoza RN  [facility]     The patient is a 56y Male complaining of abdominal pain.

## 2022-03-26 NOTE — ED PROVIDER NOTE - PROGRESS NOTE DETAILS
Pt tolerating po ginger ale without recurrent vomiting and feels well for d/c at this time.  Rx Oxycontin 5mg #12 with GI f/u as outpt

## 2022-09-02 ENCOUNTER — INPATIENT (INPATIENT)
Facility: HOSPITAL | Age: 57
LOS: 0 days | Discharge: LEFT BEFORE DISCHARGE | DRG: 392 | End: 2022-09-03
Attending: FAMILY MEDICINE | Admitting: HOSPITALIST
Payer: MEDICARE

## 2022-09-02 VITALS
TEMPERATURE: 98 F | HEIGHT: 72 IN | DIASTOLIC BLOOD PRESSURE: 96 MMHG | OXYGEN SATURATION: 99 % | HEART RATE: 68 BPM | RESPIRATION RATE: 20 BRPM | SYSTOLIC BLOOD PRESSURE: 176 MMHG

## 2022-09-02 DIAGNOSIS — Z90.49 ACQUIRED ABSENCE OF OTHER SPECIFIED PARTS OF DIGESTIVE TRACT: Chronic | ICD-10-CM

## 2022-09-02 DIAGNOSIS — R10.9 UNSPECIFIED ABDOMINAL PAIN: ICD-10-CM

## 2022-09-02 DIAGNOSIS — R11.2 NAUSEA WITH VOMITING, UNSPECIFIED: ICD-10-CM

## 2022-09-02 DIAGNOSIS — J96.90 RESPIRATORY FAILURE, UNSPECIFIED, UNSPECIFIED WHETHER WITH HYPOXIA OR HYPERCAPNIA: ICD-10-CM

## 2022-09-02 DIAGNOSIS — Z98.890 OTHER SPECIFIED POSTPROCEDURAL STATES: Chronic | ICD-10-CM

## 2022-09-02 LAB
ACANTHOCYTES BLD QL SMEAR: SLIGHT — SIGNIFICANT CHANGE UP
ALBUMIN SERPL ELPH-MCNC: 5.1 G/DL — SIGNIFICANT CHANGE UP (ref 3.3–5.2)
ALP SERPL-CCNC: 113 U/L — SIGNIFICANT CHANGE UP (ref 40–120)
ALT FLD-CCNC: 20 U/L — SIGNIFICANT CHANGE UP
ANION GAP SERPL CALC-SCNC: 15 MMOL/L — SIGNIFICANT CHANGE UP (ref 5–17)
ANISOCYTOSIS BLD QL: SLIGHT — SIGNIFICANT CHANGE UP
APTT BLD: 26.4 SEC — LOW (ref 27.5–35.5)
AST SERPL-CCNC: 22 U/L — SIGNIFICANT CHANGE UP
BASOPHILS # BLD AUTO: 0 K/UL — SIGNIFICANT CHANGE UP (ref 0–0.2)
BASOPHILS NFR BLD AUTO: 0 % — SIGNIFICANT CHANGE UP (ref 0–2)
BILIRUB SERPL-MCNC: 0.4 MG/DL — SIGNIFICANT CHANGE UP (ref 0.4–2)
BLD GP AB SCN SERPL QL: SIGNIFICANT CHANGE UP
BUN SERPL-MCNC: 13.6 MG/DL — SIGNIFICANT CHANGE UP (ref 8–20)
CALCIUM SERPL-MCNC: 9.6 MG/DL — SIGNIFICANT CHANGE UP (ref 8.4–10.5)
CHLORIDE SERPL-SCNC: 101 MMOL/L — SIGNIFICANT CHANGE UP (ref 98–107)
CO2 SERPL-SCNC: 24 MMOL/L — SIGNIFICANT CHANGE UP (ref 22–29)
CREAT SERPL-MCNC: 0.75 MG/DL — SIGNIFICANT CHANGE UP (ref 0.5–1.3)
EGFR: 107 ML/MIN/1.73M2 — SIGNIFICANT CHANGE UP
ELLIPTOCYTES BLD QL SMEAR: SIGNIFICANT CHANGE UP
EOSINOPHIL # BLD AUTO: 0 K/UL — SIGNIFICANT CHANGE UP (ref 0–0.5)
EOSINOPHIL NFR BLD AUTO: 0 % — SIGNIFICANT CHANGE UP (ref 0–6)
GIANT PLATELETS BLD QL SMEAR: PRESENT — SIGNIFICANT CHANGE UP
GLUCOSE SERPL-MCNC: 147 MG/DL — HIGH (ref 70–99)
HCT VFR BLD CALC: 39.6 % — SIGNIFICANT CHANGE UP (ref 39–50)
HGB BLD-MCNC: 12.1 G/DL — LOW (ref 13–17)
INR BLD: 1.01 RATIO — SIGNIFICANT CHANGE UP (ref 0.88–1.16)
LYMPHOCYTES # BLD AUTO: 0.11 K/UL — LOW (ref 1–3.3)
LYMPHOCYTES # BLD AUTO: 0.9 % — LOW (ref 13–44)
MANUAL SMEAR VERIFICATION: SIGNIFICANT CHANGE UP
MCHC RBC-ENTMCNC: 21.8 PG — LOW (ref 27–34)
MCHC RBC-ENTMCNC: 30.6 GM/DL — LOW (ref 32–36)
MCV RBC AUTO: 71.4 FL — LOW (ref 80–100)
MICROCYTES BLD QL: SLIGHT — SIGNIFICANT CHANGE UP
MONOCYTES # BLD AUTO: 0.11 K/UL — SIGNIFICANT CHANGE UP (ref 0–0.9)
MONOCYTES NFR BLD AUTO: 0.9 % — LOW (ref 2–14)
NEUTROPHILS # BLD AUTO: 12.24 K/UL — HIGH (ref 1.8–7.4)
NEUTROPHILS NFR BLD AUTO: 98.2 % — HIGH (ref 43–77)
PLAT MORPH BLD: NORMAL — SIGNIFICANT CHANGE UP
PLATELET # BLD AUTO: 278 K/UL — SIGNIFICANT CHANGE UP (ref 150–400)
POIKILOCYTOSIS BLD QL AUTO: SIGNIFICANT CHANGE UP
POLYCHROMASIA BLD QL SMEAR: SIGNIFICANT CHANGE UP
POTASSIUM SERPL-MCNC: 4 MMOL/L — SIGNIFICANT CHANGE UP (ref 3.5–5.3)
POTASSIUM SERPL-SCNC: 4 MMOL/L — SIGNIFICANT CHANGE UP (ref 3.5–5.3)
PROT SERPL-MCNC: 8.5 G/DL — SIGNIFICANT CHANGE UP (ref 6.6–8.7)
PROTHROM AB SERPL-ACNC: 11.7 SEC — SIGNIFICANT CHANGE UP (ref 10.5–13.4)
RBC # BLD: 5.55 M/UL — SIGNIFICANT CHANGE UP (ref 4.2–5.8)
RBC # FLD: 17.6 % — HIGH (ref 10.3–14.5)
RBC BLD AUTO: ABNORMAL
SARS-COV-2 RNA SPEC QL NAA+PROBE: SIGNIFICANT CHANGE UP
SODIUM SERPL-SCNC: 139 MMOL/L — SIGNIFICANT CHANGE UP (ref 135–145)
TROPONIN T SERPL-MCNC: <0.01 NG/ML — SIGNIFICANT CHANGE UP (ref 0–0.06)
WBC # BLD: 12.46 K/UL — HIGH (ref 3.8–10.5)
WBC # FLD AUTO: 12.46 K/UL — HIGH (ref 3.8–10.5)

## 2022-09-02 PROCEDURE — 93010 ELECTROCARDIOGRAM REPORT: CPT

## 2022-09-02 PROCEDURE — 99223 1ST HOSP IP/OBS HIGH 75: CPT | Mod: FS

## 2022-09-02 PROCEDURE — 71045 X-RAY EXAM CHEST 1 VIEW: CPT | Mod: 26

## 2022-09-02 PROCEDURE — 74176 CT ABD & PELVIS W/O CONTRAST: CPT | Mod: 26

## 2022-09-02 PROCEDURE — 99223 1ST HOSP IP/OBS HIGH 75: CPT

## 2022-09-02 PROCEDURE — 99285 EMERGENCY DEPT VISIT HI MDM: CPT | Mod: GC

## 2022-09-02 RX ORDER — ONDANSETRON 8 MG/1
4 TABLET, FILM COATED ORAL EVERY 8 HOURS
Refills: 0 | Status: DISCONTINUED | OUTPATIENT
Start: 2022-09-02 | End: 2022-09-03

## 2022-09-02 RX ORDER — METOCLOPRAMIDE HCL 10 MG
10 TABLET ORAL THREE TIMES A DAY
Refills: 0 | Status: DISCONTINUED | OUTPATIENT
Start: 2022-09-02 | End: 2022-09-03

## 2022-09-02 RX ORDER — METOCLOPRAMIDE HCL 10 MG
10 TABLET ORAL ONCE
Refills: 0 | Status: COMPLETED | OUTPATIENT
Start: 2022-09-02 | End: 2022-09-02

## 2022-09-02 RX ORDER — MORPHINE SULFATE 50 MG/1
4 CAPSULE, EXTENDED RELEASE ORAL ONCE
Refills: 0 | Status: DISCONTINUED | OUTPATIENT
Start: 2022-09-02 | End: 2022-09-02

## 2022-09-02 RX ORDER — PANTOPRAZOLE SODIUM 20 MG/1
40 TABLET, DELAYED RELEASE ORAL ONCE
Refills: 0 | Status: COMPLETED | OUTPATIENT
Start: 2022-09-02 | End: 2022-09-02

## 2022-09-02 RX ORDER — BUPRENORPHINE AND NALOXONE 2; .5 MG/1; MG/1
1 TABLET SUBLINGUAL DAILY
Refills: 0 | Status: DISCONTINUED | OUTPATIENT
Start: 2022-09-02 | End: 2022-09-03

## 2022-09-02 RX ORDER — HYDROMORPHONE HYDROCHLORIDE 2 MG/ML
1 INJECTION INTRAMUSCULAR; INTRAVENOUS; SUBCUTANEOUS ONCE
Refills: 0 | Status: DISCONTINUED | OUTPATIENT
Start: 2022-09-02 | End: 2022-09-02

## 2022-09-02 RX ORDER — INFLUENZA VIRUS VACCINE 15; 15; 15; 15 UG/.5ML; UG/.5ML; UG/.5ML; UG/.5ML
0.5 SUSPENSION INTRAMUSCULAR ONCE
Refills: 0 | Status: DISCONTINUED | OUTPATIENT
Start: 2022-09-02 | End: 2022-09-03

## 2022-09-02 RX ORDER — FAMOTIDINE 10 MG/ML
20 INJECTION INTRAVENOUS ONCE
Refills: 0 | Status: COMPLETED | OUTPATIENT
Start: 2022-09-02 | End: 2022-09-02

## 2022-09-02 RX ORDER — METOCLOPRAMIDE HCL 10 MG
10 TABLET ORAL EVERY 6 HOURS
Refills: 0 | Status: DISCONTINUED | OUTPATIENT
Start: 2022-09-02 | End: 2022-09-02

## 2022-09-02 RX ORDER — AMLODIPINE BESYLATE 2.5 MG/1
5 TABLET ORAL DAILY
Refills: 0 | Status: DISCONTINUED | OUTPATIENT
Start: 2022-09-02 | End: 2022-09-03

## 2022-09-02 RX ORDER — LANOLIN ALCOHOL/MO/W.PET/CERES
3 CREAM (GRAM) TOPICAL AT BEDTIME
Refills: 0 | Status: DISCONTINUED | OUTPATIENT
Start: 2022-09-02 | End: 2022-09-03

## 2022-09-02 RX ORDER — SODIUM CHLORIDE 9 MG/ML
1000 INJECTION, SOLUTION INTRAVENOUS
Refills: 0 | Status: COMPLETED | OUTPATIENT
Start: 2022-09-02 | End: 2022-09-02

## 2022-09-02 RX ORDER — PANTOPRAZOLE SODIUM 20 MG/1
40 TABLET, DELAYED RELEASE ORAL EVERY 12 HOURS
Refills: 0 | Status: DISCONTINUED | OUTPATIENT
Start: 2022-09-02 | End: 2022-09-03

## 2022-09-02 RX ORDER — ONDANSETRON 8 MG/1
4 TABLET, FILM COATED ORAL ONCE
Refills: 0 | Status: COMPLETED | OUTPATIENT
Start: 2022-09-02 | End: 2022-09-02

## 2022-09-02 RX ORDER — HEPARIN SODIUM 5000 [USP'U]/ML
5000 INJECTION INTRAVENOUS; SUBCUTANEOUS EVERY 12 HOURS
Refills: 0 | Status: DISCONTINUED | OUTPATIENT
Start: 2022-09-02 | End: 2022-09-03

## 2022-09-02 RX ORDER — SODIUM CHLORIDE 9 MG/ML
1000 INJECTION, SOLUTION INTRAVENOUS
Refills: 0 | Status: DISCONTINUED | OUTPATIENT
Start: 2022-09-02 | End: 2022-09-03

## 2022-09-02 RX ORDER — ACETAMINOPHEN 500 MG
650 TABLET ORAL EVERY 6 HOURS
Refills: 0 | Status: DISCONTINUED | OUTPATIENT
Start: 2022-09-02 | End: 2022-09-03

## 2022-09-02 RX ORDER — SODIUM CHLORIDE 9 MG/ML
1000 INJECTION, SOLUTION INTRAVENOUS ONCE
Refills: 0 | Status: COMPLETED | OUTPATIENT
Start: 2022-09-02 | End: 2022-09-02

## 2022-09-02 RX ADMIN — MORPHINE SULFATE 4 MILLIGRAM(S): 50 CAPSULE, EXTENDED RELEASE ORAL at 07:30

## 2022-09-02 RX ADMIN — MORPHINE SULFATE 4 MILLIGRAM(S): 50 CAPSULE, EXTENDED RELEASE ORAL at 05:39

## 2022-09-02 RX ADMIN — PANTOPRAZOLE SODIUM 40 MILLIGRAM(S): 20 TABLET, DELAYED RELEASE ORAL at 17:16

## 2022-09-02 RX ADMIN — Medication 10 MILLIGRAM(S): at 05:40

## 2022-09-02 RX ADMIN — SODIUM CHLORIDE 1000 MILLILITER(S): 9 INJECTION, SOLUTION INTRAVENOUS at 07:15

## 2022-09-02 RX ADMIN — SODIUM CHLORIDE 1000 MILLILITER(S): 9 INJECTION, SOLUTION INTRAVENOUS at 05:40

## 2022-09-02 RX ADMIN — ONDANSETRON 4 MILLIGRAM(S): 8 TABLET, FILM COATED ORAL at 08:15

## 2022-09-02 RX ADMIN — HYDROMORPHONE HYDROCHLORIDE 1 MILLIGRAM(S): 2 INJECTION INTRAMUSCULAR; INTRAVENOUS; SUBCUTANEOUS at 09:42

## 2022-09-02 RX ADMIN — SODIUM CHLORIDE 75 MILLILITER(S): 9 INJECTION, SOLUTION INTRAVENOUS at 08:15

## 2022-09-02 RX ADMIN — SODIUM CHLORIDE 125 MILLILITER(S): 9 INJECTION, SOLUTION INTRAVENOUS at 21:15

## 2022-09-02 RX ADMIN — PANTOPRAZOLE SODIUM 40 MILLIGRAM(S): 20 TABLET, DELAYED RELEASE ORAL at 08:15

## 2022-09-02 RX ADMIN — FAMOTIDINE 20 MILLIGRAM(S): 10 INJECTION INTRAVENOUS at 05:40

## 2022-09-02 RX ADMIN — HYDROMORPHONE HYDROCHLORIDE 1 MILLIGRAM(S): 2 INJECTION INTRAMUSCULAR; INTRAVENOUS; SUBCUTANEOUS at 07:30

## 2022-09-02 RX ADMIN — SODIUM CHLORIDE 125 MILLILITER(S): 9 INJECTION, SOLUTION INTRAVENOUS at 10:56

## 2022-09-02 RX ADMIN — ONDANSETRON 4 MILLIGRAM(S): 8 TABLET, FILM COATED ORAL at 07:38

## 2022-09-02 RX ADMIN — HYDROMORPHONE HYDROCHLORIDE 1 MILLIGRAM(S): 2 INJECTION INTRAMUSCULAR; INTRAVENOUS; SUBCUTANEOUS at 05:59

## 2022-09-02 RX ADMIN — Medication 10 MILLIGRAM(S): at 14:10

## 2022-09-02 RX ADMIN — HEPARIN SODIUM 5000 UNIT(S): 5000 INJECTION INTRAVENOUS; SUBCUTANEOUS at 17:16

## 2022-09-02 RX ADMIN — Medication 10 MILLIGRAM(S): at 21:21

## 2022-09-02 RX ADMIN — HYDROMORPHONE HYDROCHLORIDE 1 MILLIGRAM(S): 2 INJECTION INTRAMUSCULAR; INTRAVENOUS; SUBCUTANEOUS at 08:15

## 2022-09-02 NOTE — ED ADULT NURSE NOTE - NSIMPLEMENTINTERV_GEN_ALL_ED
Implemented All Universal Safety Interventions:  [place] to call system. Call bell, personal items and telephone within reach. Instruct patient to call for assistance. Room bathroom lighting operational. Non-slip footwear when patient is off stretcher. Physically safe environment: no spills, clutter or unnecessary equipment. Stretcher in lowest position, wheels locked, appropriate side rails in place.

## 2022-09-02 NOTE — ED ADULT NURSE NOTE - OBJECTIVE STATEMENT
57 year old male presents to ED from home with complaint of severe abdominal pain and vomiting x 5 hours, denies diarrhea.  A&Ox4  Patient with history of esophageal cancer and esophageal removal  in 2016.  Patient reports having similar episodes of abdominal pain in the past.  Pain score 10/10  Seen and evaluated by provider, orders obtained and noted.  Blood specimens obtained and sent to lab.  IVF LR bolus infusing.  Patient medicated as ordered.  Offers no further complaints at this time.  Wife at bedside.

## 2022-09-02 NOTE — CONSULT NOTE ADULT - NS ATTEND AMEND GEN_ALL_CORE FT
Patient seen and examined.  History of esophageal cancer status post esophagectomy in 2016 and now with gastroparesis.  Takes Reglan at home with good relief.  Admitted with nausea vomiting and abdominal pain.  Try PPI, IV Reglan and pain control.  May need eventual EGD.  May be done with his primary gastroenterologist Dr. Stanley.  He may be considered for pyloric channel dilation/Botox.  Can be all done as outpatient with his primary GI.

## 2022-09-02 NOTE — ED PROVIDER NOTE - CARE PLAN
Principal Discharge DX:	Nausea and vomiting   1 Principal Discharge DX:	Nausea and vomiting  Secondary Diagnosis:	Dysphagia

## 2022-09-02 NOTE — ED PROVIDER NOTE - PROGRESS NOTE DETAILS
Miguel A: Pt complaining of persistent nausea. Will remedicate. Signed out to Dr. Tse pending reassessment. Dedrick: Patient assessed after sign out. He got zofran, cannot tolerate PO, requesting more pain medication and admission.

## 2022-09-02 NOTE — ED ADULT NURSE REASSESSMENT NOTE - NS ED NURSE REASSESS COMMENT FT1
Pt received @ 0730, A&OX3, amb ad corine, c/o nausea and vomited once, with abd pain.  Pt medicated as ordered.  VSS.  Abd soft nondistended, nontender, moving all ext well.

## 2022-09-02 NOTE — ED PROVIDER NOTE - ATTENDING CONTRIBUTION TO CARE
Miguel A BUTLER: I performed a face to face evaluation of this patient and performed a full history and physical examination on the patient.  I agree with the resident's history, physical examination, and plan of the patient unless otherwise noted. My brief assessment is as follows:    57y M w/ hx esophageal CA s/p esophagectomy, presenting for nausea and vomiting. States that symptoms are consistent with prior similar episodes. Will check labs, CXR, treat symptomatically, reassess.

## 2022-09-02 NOTE — H&P ADULT - HISTORY OF PRESENT ILLNESS
57 year old male with pmh of gastroparesis, esophageal cancer s/p esophagectomy coming to hospital with complaints of abdominal pain assc w/ nausea and vomiting. per patient similar complaints in past and improved with "reglan/zofran and dilaudid". states this time was unable to take zofran reglan outpatient as ate too much. assc with epigastric pain denies blood in vomit or stool.

## 2022-09-02 NOTE — H&P ADULT - NSHPREVIEWOFSYSTEMS_GEN_ALL_CORE
Review of Systems:  CONSTITUTIONAL: No weakness, fevers or chills  EYES/ENT: No visual changes;  No vertigo or throat pain   NECK: No pain or stiffness  RESPIRATORY: No cough, wheezing, hemoptysis; No shortness of breath,   CARDIOVASCULAR: No chest pain or palpitations  GASTROINTESTINAL: +epigastric pain +nausea vomiting   GENITOURINARY: No dysuria, frequency or hematuria  NEUROLOGICAL: No numbness or weakness  SKIN: No itching, burning, rashes, or lesions

## 2022-09-02 NOTE — ED PROVIDER NOTE - PHYSICAL EXAMINATION
General: well appearing, NAD  Head: NC, AT  EENT: EOMI, no scleral icterus  Cardiac: RRR, no apparent murmurs, no lower extremity edema  Respiratory: CTABL, no respiratory distress   Abdomen: soft, ND, epigastric tenderness, nonperitonitic  MSK/Vascular: full ROM, soft compartments, warm extremities  Neuro: AAOx3, sensation to light touch intact  Psych: calm, cooperative

## 2022-09-02 NOTE — ED PROVIDER NOTE - OBJECTIVE STATEMENT
57 y m with pmh of gastroparesis, esophageal cancer sp esophagectomy in 2016 presenting for abdominal pain and n/v that started last night around 10. This happens often after he eats too much. Last night he ate too much and n/v prevented him from taking his zofran and reglan. Reglan typically works best. Associated with epigastric pain. Denies f/c, blood in vomit.

## 2022-09-02 NOTE — CONSULT NOTE ADULT - SUBJECTIVE AND OBJECTIVE BOX
Patient is a 57y old  Male who presents with a chief complaint of inability to tolerate po intake (02 Sep 2022 10:31)      HPI:  57 year old male with pmh of gastroparesis, esophageal cancer s/p esophagectomy coming to hospital with complaints of abdominal pain assc w/ nausea and vomiting. per patient similar complaints in past and improved with "reglan/zofran and dilaudid". states this time was unable to take zofran reglan outpatient as ate too much. assc with epigastric pain denies blood in vomit or stool.  (02 Sep 2022 10:31)            Analgesic Dosing for past 24 hours reviewed as below:    HYDROmorphone  Injectable   1 milliGRAM(s) IV Push (09-02-22 @ 05:59)    HYDROmorphone  Injectable   1 milliGRAM(s) IV Push (09-02-22 @ 08:15)    metoclopramide Injectable   10 milliGRAM(s) IV Push (09-02-22 @ 05:40)    morphine  - Injectable   4 milliGRAM(s) IV Push (09-02-22 @ 05:39)    ondansetron Injectable   4 milliGRAM(s) IV Push (09-02-22 @ 07:38)    ondansetron Injectable   4 milliGRAM(s) IV Push (09-02-22 @ 08:15)          T(C): 36.8 (09-02-22 @ 08:17), Max: 36.8 (09-02-22 @ 04:22)  HR: 68 (09-02-22 @ 08:17) (68 - 68)  BP: 165/82 (09-02-22 @ 08:17) (165/82 - 176/96)  RR: 18 (09-02-22 @ 08:17) (18 - 20)  SpO2: 98% (09-02-22 @ 08:17) (98% - 99%)        acetaminophen     Tablet .. 650 milliGRAM(s) Oral every 6 hours PRN  aluminum hydroxide/magnesium hydroxide/simethicone Suspension 30 milliLiter(s) Oral every 4 hours PRN  amLODIPine   Tablet 5 milliGRAM(s) Oral daily  heparin   Injectable 5000 Unit(s) SubCutaneous every 12 hours  influenza   Vaccine 0.5 milliLiter(s) IntraMuscular once  lactated ringers. 1000 milliLiter(s) IV Continuous <Continuous>  melatonin 3 milliGRAM(s) Oral at bedtime PRN  metoclopramide Injectable 10 milliGRAM(s) IV Push three times a day  ondansetron Injectable 4 milliGRAM(s) IV Push every 8 hours PRN  pantoprazole  Injectable 40 milliGRAM(s) IV Push every 12 hours                          12.1   12.46 )-----------( 278      ( 02 Sep 2022 05:45 )             39.6     09-02    139  |  101  |  13.6  ----------------------------<  147<H>  4.0   |  24.0  |  0.75    Ca    9.6      02 Sep 2022 05:45    TPro  8.5  /  Alb  5.1  /  TBili  0.4  /  DBili  x   /  AST  22  /  ALT  20  /  AlkPhos  113  09-02    PT/INR - ( 02 Sep 2022 05:45 )   PT: 11.7 sec;   INR: 1.01 ratio         PTT - ( 02 Sep 2022 05:45 )  PTT:26.4 sec      Pain Service   [phone] Patient is a 57y old  Male who presents with a chief complaint of inability to tolerate po intake (02 Sep 2022 10:31)      HPI:  57 year old male with pmh of gastroparesis, esophageal cancer s/p esophagectomy coming to hospital with complaints of abdominal pain assc w/ nausea and vomiting. per patient similar complaints in past and improved with "reglan/zofran and dilaudid". states this time was unable to take zofran reglan outpatient as ate too much. assc with epigastric pain denies blood in vomit or stool.  (02 Sep 2022 10:31)      Interval Hx:  Patient seen during rounds  Patient reports pain to be mod controlled on current medications  home meds reviewed with patient  ISTOP sig for suboxone 8mg 10 films/30days  initially patient denies being suboxone  then states he does take it but was trying to come off the medication  when asked if patient would like to cont suboxone as an inpatient, he responds yes  would like to cont suboxone as inpatient and desires to attempt to wean off again once discharged  c/o abdominal pain  pt informed that we can increase the freq of suboxone to daily PRN instead of every 3 days while he is having increased pain  Patient denies sedation with medications   he will return to home regimen when discharged      Analgesic Dosing for past 24 hours reviewed as below:    HYDROmorphone  Injectable   1 milliGRAM(s) IV Push (09-02-22 @ 05:59)    HYDROmorphone  Injectable   1 milliGRAM(s) IV Push (09-02-22 @ 08:15)    metoclopramide Injectable   10 milliGRAM(s) IV Push (09-02-22 @ 05:40)    morphine  - Injectable   4 milliGRAM(s) IV Push (09-02-22 @ 05:39)    ondansetron Injectable   4 milliGRAM(s) IV Push (09-02-22 @ 07:38)    ondansetron Injectable   4 milliGRAM(s) IV Push (09-02-22 @ 08:15)          T(C): 36.8 (09-02-22 @ 08:17), Max: 36.8 (09-02-22 @ 04:22)  HR: 68 (09-02-22 @ 08:17) (68 - 68)  BP: 165/82 (09-02-22 @ 08:17) (165/82 - 176/96)  RR: 18 (09-02-22 @ 08:17) (18 - 20)  SpO2: 98% (09-02-22 @ 08:17) (98% - 99%)        acetaminophen     Tablet .. 650 milliGRAM(s) Oral every 6 hours PRN  aluminum hydroxide/magnesium hydroxide/simethicone Suspension 30 milliLiter(s) Oral every 4 hours PRN  amLODIPine   Tablet 5 milliGRAM(s) Oral daily  heparin   Injectable 5000 Unit(s) SubCutaneous every 12 hours  influenza   Vaccine 0.5 milliLiter(s) IntraMuscular once  lactated ringers. 1000 milliLiter(s) IV Continuous <Continuous>  melatonin 3 milliGRAM(s) Oral at bedtime PRN  metoclopramide Injectable 10 milliGRAM(s) IV Push three times a day  ondansetron Injectable 4 milliGRAM(s) IV Push every 8 hours PRN  pantoprazole  Injectable 40 milliGRAM(s) IV Push every 12 hours                          12.1   12.46 )-----------( 278      ( 02 Sep 2022 05:45 )             39.6     09-02    139  |  101  |  13.6  ----------------------------<  147<H>  4.0   |  24.0  |  0.75    Ca    9.6      02 Sep 2022 05:45    TPro  8.5  /  Alb  5.1  /  TBili  0.4  /  DBili  x   /  AST  22  /  ALT  20  /  AlkPhos  113  09-02    PT/INR - ( 02 Sep 2022 05:45 )   PT: 11.7 sec;   INR: 1.01 ratio         PTT - ( 02 Sep 2022 05:45 )  PTT:26.4 sec      Pain Service   [phone]

## 2022-09-02 NOTE — ED ADULT TRIAGE NOTE - LOCATION:
Right arm; Nsaids Pregnancy And Lactation Text: These medications are considered safe up to 30 weeks gestation. It is excreted in breast milk.

## 2022-09-02 NOTE — H&P ADULT - NSHPPHYSICALEXAM_GEN_ALL_CORE
Vital Signs Last 24 Hrs  T(F): 98.2 (02 Sep 2022 08:17), Max: 98.2 (02 Sep 2022 04:22)  HR: 68 (02 Sep 2022 08:17) (68 - 68)  BP: 165/82 (02 Sep 2022 08:17) (165/82 - 176/96)  RR: 18 (02 Sep 2022 08:17) (18 - 20)  SpO2: 98% (02 Sep 2022 08:17) (98% - 99%)    Physical Exam:  Constitutional: NAD, awake and alert, well-developed  Neck: Soft and supple, No LAD, No JVD  Respiratory: cta b/l no wheezing no rhonchi  Cardiovascular: +s1/s2 no edema b/l le  Gastrointestinal: soft +epigastric pain nd bs+  Vascular: 2+ peripheral pulses  Neurological: A/O x 3, no focal deficits  Musculoskeletal: 5/5 strength b/l upper and lower extremities  : Contraindicated  Breasts: Contraindicated  Rectal: Contraindicated

## 2022-09-02 NOTE — PATIENT PROFILE ADULT - FUNCTIONAL ASSESSMENT - BASIC MOBILITY 6.
FLUOROSCOPY RETROGRADE UROGRAPHY



HISTORY: Stone and stent replacement



FINDINGS: Fluoroscopy was provided by radiology during retrograde urography by the urologist. Right u
reteroscopy was performed per the operative note. Laser was utilized on the right renal stone. A seco
nd right double-J ureteral stent was placed as the first one could not be removed. Please correlate w
ith the procedural report.







Fluoroscopy time: 54 seconds



Fluoroscopic images: 5



Signer Name: Uziel Garcia Jr, MD 

Signed: 2/15/2021 10:30 AM

Workstation Name: [id number] = No assist / stand by assistance

## 2022-09-02 NOTE — H&P ADULT - ASSESSMENT
57 year old male with pmh of gastroparesis, esophageal cancer s/p esophagectomy coming to hospital with complaints of abdominal pain assc w/ nausea and vomiting. per patient similar complaints in past and improved with "reglan/zofran and dilaudid". states this time was unable to take zofran reglan outpatient as ate too much. assc with epigastric pain denies blood in vomit or stool.     #intractable nausea and vomiting   - in patient with epigastric pain and inability to tolerate po intake  - probable 2/2 gastroparesis   - gi consult pending  - check ct a/p   - in patient with hx of esophagectomy  - reglan   - ivf  - pain mgmt consult pending  - avoiding opoid for now given increased chance to exacerbate gastroparesis    #HTN- Essential  - monitor blood pressure  - amlodipine    #GERD  - omeprazole    #DVT Prophylaxis  - heparin SC

## 2022-09-02 NOTE — ED ADULT TRIAGE NOTE - CHIEF COMPLAINT QUOTE
Pt reporting severe abdominal pain and vomiting x5 hours, denies diarrhea. Pt with h/o esophageal cancer and esophageal removal in 2016, currently no active cancer. Pt reporting similar episodes of abdominal pain in the past. Pt appears uncomfortable in triage r/t pain.

## 2022-09-02 NOTE — CONSULT NOTE ADULT - SUBJECTIVE AND OBJECTIVE BOX
HISTORY OF PRESENT ILLNESS: 57 y m with pmh of gastroparesis, esophageal cancer sp esophagectomy, gastric pull up in 2016 presenting to ED with abdominal pain, nausea, and vomiting started last night. Patient reports nausea and NBNB emesis last night and developed abdominal pain after vomiting. Reports having nausea last week and was resolved without any intervention. His last endoscopy (04/2021) revealed erosion at the anastomosis. He follows with Dr. Stanley and his last colonoscopy was 1 year ago and was reported normal per patient. At the time of my evaluation patient reports 10/10 upper abdominal and epigastric pain. Denies nausea. Last episode of emesis was in ED, bilious per patient. Denies fever, chills, chest pain, pressure, palpitation.       Review of Systems:  . Constitutional: No fever, chills, no weight gain, weight loss   · ENMT: no vertigo, no throat pain  . Neck: No pain or stiffness  · Respiratory and Thorax: No shortness of breath, no cough, no wheezing  · Cardiovascular: No chest pain, palpitation, no dizziness, no orthopnea,   · Gastrointestinal: see above.  · Genitourinary: No hematuria, no dysuria  · Musculoskeletal: Negative  · Neurological: no headache, no vision changes, no slurred speech  · Psychiatric: no agitation, no anxiety  · Hematology/Lymphatics: negative  · Endocrine: negative      PAST MEDICAL/SURGICAL HISTORY:  Hypertension    Esophageal cancer    Gastroparesis    S/P cholecystectomy    H/O esophagectomy      SOCIAL HISTORY:  - TOBACCO: Denies  - ALCOHOL: Denies  - ILLICIT DRUG USE: Denies    FAMILY HISTORY:  No known history of gastrointestinal or liver disease;  FH: HTN (hypertension) (Father)        HOME MEDICATIONS:  amLODIPine 5 mg oral tablet: 1 tab(s) orally once a day (02 Sep 2022 08:49)  omeprazole 40 mg oral delayed release capsule: 1 cap(s) orally once a day (02 Sep 2022 08:48)    INPATIENT MEDICATIONS:  MEDICATIONS  (STANDING):  amLODIPine   Tablet 5 milliGRAM(s) Oral daily    MEDICATIONS  (PRN):    ALLERGIES:  No Known Allergies    T(C): 36.8 (09-02-22 @ 08:17), Max: 36.8 (09-02-22 @ 04:22)  HR: 68 (09-02-22 @ 08:17) (68 - 68)  BP: 165/82 (09-02-22 @ 08:17) (165/82 - 176/96)  RR: 18 (09-02-22 @ 08:17) (18 - 20)  SpO2: 98% (09-02-22 @ 08:17) (98% - 99%)      PHYSICAL EXAM:  Constitutional: No acute distress  Neuro: Awake alert, oriented to person, place and situation, non-focal, speech clear and intact  HEENT: PERRL, anicteric sclerae,   Neck: supple, no JVD  CV: regular rate, regular rhythm, +S1S2,   Pulm/chest: lung sounds diminished bilaterally, no accessory muscle use noted  Abd: Soft, nondistended, +epigastric, RUQ and LUQ pain. No rigidity, rebound tenderness. +BS   Ext:  no Cyanosis, clubbing or edema  Skin: warm, no jaundice   Psych: calm, cooperative          LABS:             12.1   12.46 )-----------( 278      ( 09-02 @ 05:45 )             39.6       PT/INR - ( 02 Sep 2022 05:45 )   PT: 11.7 sec;   INR: 1.01 ratio         PTT - ( 02 Sep 2022 05:45 )  PTT:26.4 sec  09-02    139  |  101  |  13.6  ----------------------------<  147<H>  4.0   |  24.0  |  0.75    Ca    9.6      02 Sep 2022 05:45    TPro  8.5  /  Alb  5.1  /  TBili  0.4  /  DBili  x   /  AST  22  /  ALT  20  /  AlkPhos  113  09-02    LIVER FUNCTIONS - ( 02 Sep 2022 05:45 )  Alb: 5.1 g/dL / Pro: 8.5 g/dL / ALK PHOS: 113 U/L / ALT: 20 U/L / AST: 22 U/L / GGT: x

## 2022-09-02 NOTE — CONSULT NOTE ADULT - NSCONSULTADDITIONALINFOA_GEN_ALL_CORE
Reference #: [id number]    Rx Written	Rx Dispensed	Drug	Quantity	Days Supply	Prescriber Name  08/31/2022	09/01/2022	buprenorphine-naloxone 8-2 mg sl film	11	30	[place], Robb  07/27/2022	07/29/2022	buprenorphine-naloxone 8-2 mg sl film	11	30	Sal[place]bad, Robb  06/29/2022	06/29/2022	buprenorphine-naloxone 8-2 mg sl film	15	30	[place]bad, Robb  06/01/2022	06/01/2022	buprenorphine-naloxone 8-2 mg sl film	15	30	[place]bad, Robb  05/04/2022	05/04/2022	buprenorphine-naloxone 8-2 mg sl film	15	30	[place]bad, Robb  04/06/2022	04/07/2022	buprenorphine-naloxone 8-2 mg sl film	15	30	[place], Robb  09/03/2021	09/04/2021	buprenorphine-naloxone 8-2 mg sl film	15	30	Andrew Luu DO

## 2022-09-02 NOTE — CONSULT NOTE ADULT - ASSESSMENT
57 y m with pmh of gastroparesis, esophageal cancer sp esophagectomy, gastric pull up in 2016 presenting to ED with abdominal pain, nausea, and vomiting started last night. Patient reports nausea and NBNB emesis last night and developed abdominal pain after vomiting.       
57M  hx of suboxone use - see ISTOP  here w abdominal pain - working dx is gastroparesis  pt NPO, but ok for SL meds as per primary team  no indication for full agonist opioid therapy at this time  will cont suboxone dose at daily PRN freq    Suboxone ordered - 8mg qday PRN pain/withdrawal HOLD for sedation     if pain remains uncontrolled:  - If no contraindication to NSAIDs, recommend starting ketorolac IV 30mg [id number] standing x 4 doses.   - If no contraindication, Start acetaminophen IVPB 1000mg up to QID. HOLD for LFT dysfunction. MAX daily dose = 4 grams

## 2022-09-02 NOTE — CONSULT NOTE ADULT - PROBLEM SELECTOR RECOMMENDATION 9
Abdominal pain, nausea, vomiting most likely due to gastroparesis.   Reglan and Zofran around the clock.   Protonix BID  IV fluids for hydration  NPO

## 2022-09-02 NOTE — CONSULT NOTE ADULT - PROBLEM SELECTOR RECOMMENDATION 2
Reports pain started after nausea and vomiting last night.   Continue pain medicines as needed  Protonix  Zofran, Reglan  Last EGD on April 2021, revealed erosion at the anastomosis.

## 2022-09-03 VITALS
SYSTOLIC BLOOD PRESSURE: 157 MMHG | HEART RATE: 74 BPM | OXYGEN SATURATION: 98 % | TEMPERATURE: 98 F | DIASTOLIC BLOOD PRESSURE: 103 MMHG | RESPIRATION RATE: 18 BRPM

## 2022-09-03 LAB
ANION GAP SERPL CALC-SCNC: 14 MMOL/L — SIGNIFICANT CHANGE UP (ref 5–17)
BUN SERPL-MCNC: 12 MG/DL — SIGNIFICANT CHANGE UP (ref 8–20)
CALCIUM SERPL-MCNC: 9.3 MG/DL — SIGNIFICANT CHANGE UP (ref 8.4–10.5)
CHLORIDE SERPL-SCNC: 97 MMOL/L — LOW (ref 98–107)
CO2 SERPL-SCNC: 23 MMOL/L — SIGNIFICANT CHANGE UP (ref 22–29)
CREAT SERPL-MCNC: 0.62 MG/DL — SIGNIFICANT CHANGE UP (ref 0.5–1.3)
EGFR: 113 ML/MIN/1.73M2 — SIGNIFICANT CHANGE UP
GLUCOSE SERPL-MCNC: 125 MG/DL — HIGH (ref 70–99)
HCT VFR BLD CALC: 39.5 % — SIGNIFICANT CHANGE UP (ref 39–50)
HGB BLD-MCNC: 12.4 G/DL — LOW (ref 13–17)
MAGNESIUM SERPL-MCNC: 2 MG/DL — SIGNIFICANT CHANGE UP (ref 1.6–2.6)
MCHC RBC-ENTMCNC: 21.8 PG — LOW (ref 27–34)
MCHC RBC-ENTMCNC: 31.4 GM/DL — LOW (ref 32–36)
MCV RBC AUTO: 69.5 FL — LOW (ref 80–100)
PHOSPHATE SERPL-MCNC: 2.5 MG/DL — SIGNIFICANT CHANGE UP (ref 2.4–4.7)
PLATELET # BLD AUTO: 313 K/UL — SIGNIFICANT CHANGE UP (ref 150–400)
POTASSIUM SERPL-MCNC: 3.6 MMOL/L — SIGNIFICANT CHANGE UP (ref 3.5–5.3)
POTASSIUM SERPL-SCNC: 3.6 MMOL/L — SIGNIFICANT CHANGE UP (ref 3.5–5.3)
RBC # BLD: 5.68 M/UL — SIGNIFICANT CHANGE UP (ref 4.2–5.8)
RBC # FLD: 18.3 % — HIGH (ref 10.3–14.5)
SODIUM SERPL-SCNC: 134 MMOL/L — LOW (ref 135–145)
WBC # BLD: 12.37 K/UL — HIGH (ref 3.8–10.5)
WBC # FLD AUTO: 12.37 K/UL — HIGH (ref 3.8–10.5)

## 2022-09-03 PROCEDURE — 99285 EMERGENCY DEPT VISIT HI MDM: CPT | Mod: 25

## 2022-09-03 PROCEDURE — 86900 BLOOD TYPING SEROLOGIC ABO: CPT

## 2022-09-03 PROCEDURE — 85027 COMPLETE CBC AUTOMATED: CPT

## 2022-09-03 PROCEDURE — 86850 RBC ANTIBODY SCREEN: CPT

## 2022-09-03 PROCEDURE — 84484 ASSAY OF TROPONIN QUANT: CPT

## 2022-09-03 PROCEDURE — 99233 SBSQ HOSP IP/OBS HIGH 50: CPT

## 2022-09-03 PROCEDURE — 71045 X-RAY EXAM CHEST 1 VIEW: CPT

## 2022-09-03 PROCEDURE — 74176 CT ABD & PELVIS W/O CONTRAST: CPT

## 2022-09-03 PROCEDURE — 36415 COLL VENOUS BLD VENIPUNCTURE: CPT

## 2022-09-03 PROCEDURE — U0003: CPT

## 2022-09-03 PROCEDURE — 96374 THER/PROPH/DIAG INJ IV PUSH: CPT

## 2022-09-03 PROCEDURE — 85610 PROTHROMBIN TIME: CPT

## 2022-09-03 PROCEDURE — 93005 ELECTROCARDIOGRAM TRACING: CPT

## 2022-09-03 PROCEDURE — 83735 ASSAY OF MAGNESIUM: CPT

## 2022-09-03 PROCEDURE — 84100 ASSAY OF PHOSPHORUS: CPT

## 2022-09-03 PROCEDURE — 96375 TX/PRO/DX INJ NEW DRUG ADDON: CPT

## 2022-09-03 PROCEDURE — 99232 SBSQ HOSP IP/OBS MODERATE 35: CPT

## 2022-09-03 PROCEDURE — 80053 COMPREHEN METABOLIC PANEL: CPT

## 2022-09-03 PROCEDURE — 85730 THROMBOPLASTIN TIME PARTIAL: CPT

## 2022-09-03 PROCEDURE — U0005: CPT

## 2022-09-03 PROCEDURE — 85025 COMPLETE CBC W/AUTO DIFF WBC: CPT

## 2022-09-03 PROCEDURE — 86901 BLOOD TYPING SEROLOGIC RH(D): CPT

## 2022-09-03 PROCEDURE — 80048 BASIC METABOLIC PNL TOTAL CA: CPT

## 2022-09-03 RX ORDER — ACETAMINOPHEN 500 MG
1000 TABLET ORAL ONCE
Refills: 0 | Status: COMPLETED | OUTPATIENT
Start: 2022-09-03 | End: 2022-09-03

## 2022-09-03 RX ADMIN — HEPARIN SODIUM 5000 UNIT(S): 5000 INJECTION INTRAVENOUS; SUBCUTANEOUS at 05:32

## 2022-09-03 RX ADMIN — Medication 10 MILLIGRAM(S): at 14:23

## 2022-09-03 RX ADMIN — PANTOPRAZOLE SODIUM 40 MILLIGRAM(S): 20 TABLET, DELAYED RELEASE ORAL at 05:30

## 2022-09-03 RX ADMIN — HEPARIN SODIUM 5000 UNIT(S): 5000 INJECTION INTRAVENOUS; SUBCUTANEOUS at 17:59

## 2022-09-03 RX ADMIN — Medication 10 MILLIGRAM(S): at 05:31

## 2022-09-03 RX ADMIN — Medication 650 MILLIGRAM(S): at 21:31

## 2022-09-03 RX ADMIN — PANTOPRAZOLE SODIUM 40 MILLIGRAM(S): 20 TABLET, DELAYED RELEASE ORAL at 17:59

## 2022-09-03 RX ADMIN — Medication 400 MILLIGRAM(S): at 11:33

## 2022-09-03 RX ADMIN — Medication 10 MILLIGRAM(S): at 21:32

## 2022-09-03 RX ADMIN — SODIUM CHLORIDE 125 MILLILITER(S): 9 INJECTION, SOLUTION INTRAVENOUS at 06:51

## 2022-09-03 NOTE — PROGRESS NOTE ADULT - SUBJECTIVE AND OBJECTIVE BOX
HPI  Pt is a 56yo M admitted to [facility] for N/V likely secondary to gastroparesis.   Pt was seen and examined at bedside. Overnight symptom improving however nausea and vomit clear liquids. Epigastric pain noted, as much improved     Vital Signs Last 24 Hrs  T(C): 36.8 (03 Sep 2022 10:32), Max: 37.4 (02 Sep 2022 15:40)  T(F): 98.3 (03 Sep 2022 10:32), Max: 99.4 (02 Sep 2022 15:40)  HR: 74 (03 Sep 2022 10:32) (66 - 80)  BP: 142/84 (03 Sep 2022 10:32) (115/70 - 183/88)  BP(mean): --  RR: 18 (03 Sep 2022 10:32) (17 - 18)  SpO2: 96% (03 Sep 2022 10:32) (96% - 98%)    Parameters below as of 03 Sep 2022 10:32  Patient On (Oxygen Delivery Method): room air        I&O's Summary    02 Sep 2022 07:01  -  03 Sep 2022 07:00  --------------------------------------------------------  IN: 1000 mL / OUT: 0 mL / NET: 1000 mL        CAPILLARY BLOOD GLUCOSE          PHYSICAL EXAM:    Constitutional: NAD, awake and alert, well-developed  HEENT: PERR, EOMI, Normal Hearing, MMM  Neck: Soft and supple, No LAD, No JVD  Respiratory: Breath sounds are clear bilaterally, No wheezing, rales or rhonchi  Cardiovascular: S1 and S2,    Gastrointestinal: Bowel Sounds present, soft, epigastric and bilateral upper quadrant pain   Extremities: No peripheral edema  Vascular: 2+ peripheral pulses  Neurological: A/O x 3, no focal deficits    MEDICATIONS:  MEDICATIONS  (STANDING):  amLODIPine   Tablet 5 milliGRAM(s) Oral daily  heparin   Injectable 5000 Unit(s) SubCutaneous every 12 hours  influenza   Vaccine 0.5 milliLiter(s) IntraMuscular once  lactated ringers. 1000 milliLiter(s) (125 mL/Hr) IV Continuous <Continuous>  metoclopramide Injectable 10 milliGRAM(s) IV Push three times a day  pantoprazole  Injectable 40 milliGRAM(s) IV Push every 12 hours      LABS: All Labs Reviewed:                        12.4   12.37 )-----------( 313      ( 03 Sep 2022 05:15 )             39.5     09-03    134<L>  |  97<L>  |  12.0  ----------------------------<  125<H>  3.6   |  23.0  |  0.62    Ca    9.3      03 Sep 2022 05:15  Phos  2.5     09-03  Mg     2.0     09-03    TPro  8.5  /  Alb  5.1  /  TBili  0.4  /  DBili  x   /  AST  22  /  ALT  20  /  AlkPhos  113  09-02    PT/INR - ( 02 Sep 2022 05:45 )   PT: 11.7 sec;   INR: 1.01 ratio         PTT - ( 02 Sep 2022 05:45 )  PTT:26.4 sec  CARDIAC MARKERS ( 02 Sep 2022 05:45 )  x     / <0.01 ng/mL / x     / x     / x          Blood Culture:     RADIOLOGY/EKG:    DVT PPX:    ADVANCED DIRECTIVE:    DISPOSITION:
    Chief Complaint:  Patient is a 57y old  Male who presents with a chief complaint of inability to tolerate po intake (03 Sep 2022 10:55)  Much better now  Feels up to trying clears.       Interval Events / Subjective: Patient seen and examined at bedside      REVIEW OF SYSTEMS:   General: Negative  HEENT: Negative  CV: Negative  Respiratory: Negative  GI: See HPI  : Negative  MSK: Negative  Hematologic: Negative  Skin: Negative    MEDICATIONS:   MEDICATIONS  (STANDING):  amLODIPine   Tablet 5 milliGRAM(s) Oral daily  heparin   Injectable 5000 Unit(s) SubCutaneous every 12 hours  influenza   Vaccine 0.5 milliLiter(s) IntraMuscular once  lactated ringers. 1000 milliLiter(s) (125 mL/Hr) IV Continuous <Continuous>  metoclopramide Injectable 10 milliGRAM(s) IV Push three times a day  pantoprazole  Injectable 40 milliGRAM(s) IV Push every 12 hours    MEDICATIONS  (PRN):  acetaminophen     Tablet .. 650 milliGRAM(s) Oral every 6 hours PRN Temp greater or equal to 38C (100.4F), Mild Pain (1 - 3)  aluminum hydroxide/magnesium hydroxide/simethicone Suspension 30 milliLiter(s) Oral every 4 hours PRN Dyspepsia  buprenorphine 8 mG/naloxone 2 mG SL Film 1 Film(s) SubLingual daily PRN withdrawal or pain  melatonin 3 milliGRAM(s) Oral at bedtime PRN Insomnia  ondansetron Injectable 4 milliGRAM(s) IV Push every 8 hours PRN Nausea and/or Vomiting      ALLERGIES:   Allergies    No Known Allergies    Intolerances        VITAL SIGNS:   Vital Signs Last 24 Hrs  T(C): 36.5 (03 Sep 2022 16:58), Max: 36.8 (03 Sep 2022 10:32)  T(F): 97.7 (03 Sep 2022 16:58), Max: 98.3 (03 Sep 2022 10:32)  HR: 70 (03 Sep 2022 16:58) (67 - 76)  BP: 155/88 (03 Sep 2022 16:58) (142/84 - 155/88)  BP(mean): --  RR: 18 (03 Sep 2022 16:58) (18 - 18)  SpO2: 97% (03 Sep 2022 16:58) (96% - 98%)    Parameters below as of 03 Sep 2022 16:58  Patient On (Oxygen Delivery Method): room air      I&O's Summary    02 Sep 2022 07:01  -  03 Sep 2022 07:00  --------------------------------------------------------  IN: 1000 mL / OUT: 0 mL / NET: 1000 mL        PHYSICAL EXAM:   GENERAL:  Appears stated age, no distress  HEENT:  NC/AT,  conjunctivae clear, sclera -anicteric  CHEST:  Full & symmetric excursion, no increased effort, breath sounds clear  HEART:  Regular rhythm, S1, S2, no murmur/rub/S3/S4,  no edema  ABDOMEN:  Soft, non-tender, non-distended, normoactive bowel sounds,  no masses,  EXTREMITIES: No cyanosis, clubbing or edema  SKIN:  No rash/erythema/ecchymoses/petechiae/wounds/abscess/warm/dry  NEURO:  Alert, oriented    LABS:  CBC Full  -  ( 03 Sep 2022 05:15 )  WBC Count : 12.37 K/uL  RBC Count : 5.68 M/uL  Hemoglobin : 12.4 g/dL  Hematocrit : 39.5 %  Platelet Count - Automated : 313 K/uL  Mean Cell Volume : 69.5 fl  Mean Cell Hemoglobin : 21.8 pg  Mean Cell Hemoglobin Concentration : 31.4 gm/dL  Auto Neutrophil # : x  Auto Lymphocyte # : x  Auto Monocyte # : x  Auto Eosinophil # : x  Auto Basophil # : x  Auto Neutrophil % : x  Auto Lymphocyte % : x  Auto Monocyte % : x  Auto Eosinophil % : x  Auto Basophil % : x    09-03    134<L>  |  97<L>  |  12.0  ----------------------------<  125<H>  3.6   |  23.0  |  0.62    Ca    9.3      03 Sep 2022 05:15  Phos  2.5     09-03  Mg     2.0     09-03    TPro  8.5  /  Alb  5.1  /  TBili  0.4  /  DBili  x   /  AST  22  /  ALT  20  /  AlkPhos  113  09-02    LIVER FUNCTIONS - ( 02 Sep 2022 05:45 )  Alb: 5.1 g/dL / Pro: 8.5 g/dL / ALK PHOS: 113 U/L / ALT: 20 U/L / AST: 22 U/L / GGT: x           PT/INR - ( 02 Sep 2022 05:45 )   PT: 11.7 sec;   INR: 1.01 ratio         PTT - ( 02 Sep 2022 05:45 )  PTT:26.4 sec        RADIOLOGY & ADDITIONAL STUDIES (The following images were personally reviewed):

## 2022-09-03 NOTE — CHART NOTE - NSCHARTNOTEFT_GEN_A_CORE
PA NOTE-MEDICINE    Called by RN due to Pt requesting to leave AMA.  Pt is A & O x 4  Coherent, Fluent, Appropriate.  Pt states he needs to Go home due to a Family Emergency.  Explained to Pt that his condition may worsen up to and including possibility of death.  Pt Verbalized understanding of risk of leaving AMA and still chooses to do so.   Pt signed AMA form  Pt instructed to Return to ER if needed   IV Removed by RN  Pt left Hospital   Will sign out to AM team

## 2022-09-03 NOTE — PROGRESS NOTE ADULT - ASSESSMENT
57 M h/o esophageal cancer 6 y ago s/p total esophagectomy, gastric pullthrough at [facility], here with intractable N/V after dietary indiscretion.   Better now.   Plan  adv diet to full liquids  if ok tomorrow morning can go home  last EGD >1 y ago, will call [facility] for f/u after discharge
57 year old male with pmh of gastroparesis, esophageal cancer s/p esophagectomy coming to hospital with complaints of abdominal pain assc w/ nausea and vomiting. per patient similar complaints in past and improved with "reglan/zofran and dilaudid". states this time was unable to take zofran reglan outpatient as ate too much. assc with epigastric pain denies blood in vomit or stool.     *intractable nausea and vomiting   in patient with epigastric pain and inability to tolerate po intake  probable 2/2 gastroparesis   gi consult appreciated   ct showed mild perinephritic fat stranding   patient with hx of esophagectomy  Alternate between Zofran and reglan   ivf  pain mgmt consult appreciated   avoiding opoid for now given increased chance to exacerbate gastroparesis  Cont npo except meds per GI     *HTN  Essential  monitor blood pressure  amlodipine    *GERD  omeprazole    *DVT Prophylaxis  heparin SC

## 2022-11-17 ENCOUNTER — INPATIENT (INPATIENT)
Facility: HOSPITAL | Age: 57
LOS: 3 days | Discharge: HOME | DRG: 392 | End: 2022-11-21
Admitting: HOSPITALIST
Payer: MEDICARE

## 2022-11-17 ENCOUNTER — TRANSCRIPTION ENCOUNTER (OUTPATIENT)
Age: 57
End: 2022-11-17

## 2022-11-17 VITALS
RESPIRATION RATE: 20 BRPM | SYSTOLIC BLOOD PRESSURE: 195 MMHG | OXYGEN SATURATION: 98 % | DIASTOLIC BLOOD PRESSURE: 95 MMHG | HEART RATE: 66 BPM | WEIGHT: 190.04 LBS | TEMPERATURE: 98 F

## 2022-11-17 DIAGNOSIS — Z98.890 OTHER SPECIFIED POSTPROCEDURAL STATES: Chronic | ICD-10-CM

## 2022-11-17 DIAGNOSIS — Z90.49 ACQUIRED ABSENCE OF OTHER SPECIFIED PARTS OF DIGESTIVE TRACT: Chronic | ICD-10-CM

## 2022-11-17 DIAGNOSIS — R13.10 DYSPHAGIA, UNSPECIFIED: ICD-10-CM

## 2022-11-17 LAB
ALBUMIN SERPL ELPH-MCNC: 5.4 G/DL — HIGH (ref 3.3–5.2)
ALP SERPL-CCNC: 122 U/L — HIGH (ref 40–120)
ALT FLD-CCNC: 31 U/L — SIGNIFICANT CHANGE UP
ANION GAP SERPL CALC-SCNC: 16 MMOL/L — SIGNIFICANT CHANGE UP (ref 5–17)
ANISOCYTOSIS BLD QL: SLIGHT — SIGNIFICANT CHANGE UP
APTT BLD: 27 SEC — LOW (ref 27.5–35.5)
AST SERPL-CCNC: 26 U/L — SIGNIFICANT CHANGE UP
BASOPHILS # BLD AUTO: 0.23 K/UL — HIGH (ref 0–0.2)
BASOPHILS NFR BLD AUTO: 1.8 % — SIGNIFICANT CHANGE UP (ref 0–2)
BILIRUB SERPL-MCNC: 0.5 MG/DL — SIGNIFICANT CHANGE UP (ref 0.4–2)
BLD GP AB SCN SERPL QL: SIGNIFICANT CHANGE UP
BUN SERPL-MCNC: 12.2 MG/DL — SIGNIFICANT CHANGE UP (ref 8–20)
CALCIUM SERPL-MCNC: 9.8 MG/DL — SIGNIFICANT CHANGE UP (ref 8.4–10.5)
CHLORIDE SERPL-SCNC: 99 MMOL/L — SIGNIFICANT CHANGE UP (ref 96–108)
CO2 SERPL-SCNC: 22 MMOL/L — SIGNIFICANT CHANGE UP (ref 22–29)
CREAT SERPL-MCNC: 0.79 MG/DL — SIGNIFICANT CHANGE UP (ref 0.5–1.3)
EGFR: 105 ML/MIN/1.73M2 — SIGNIFICANT CHANGE UP
EOSINOPHIL # BLD AUTO: 0 K/UL — SIGNIFICANT CHANGE UP (ref 0–0.5)
EOSINOPHIL NFR BLD AUTO: 0 % — SIGNIFICANT CHANGE UP (ref 0–6)
FLUAV AG NPH QL: SIGNIFICANT CHANGE UP
FLUBV AG NPH QL: SIGNIFICANT CHANGE UP
GIANT PLATELETS BLD QL SMEAR: PRESENT — SIGNIFICANT CHANGE UP
GLUCOSE SERPL-MCNC: 162 MG/DL — HIGH (ref 70–99)
HCT VFR BLD CALC: 38.1 % — LOW (ref 39–50)
HGB BLD-MCNC: 12.4 G/DL — LOW (ref 13–17)
INR BLD: 1.14 RATIO — SIGNIFICANT CHANGE UP (ref 0.88–1.16)
LIDOCAIN IGE QN: 12 U/L — LOW (ref 22–51)
LYMPHOCYTES # BLD AUTO: 0.23 K/UL — LOW (ref 1–3.3)
LYMPHOCYTES # BLD AUTO: 1.8 % — LOW (ref 13–44)
MANUAL SMEAR VERIFICATION: SIGNIFICANT CHANGE UP
MCHC RBC-ENTMCNC: 23.3 PG — LOW (ref 27–34)
MCHC RBC-ENTMCNC: 32.5 GM/DL — SIGNIFICANT CHANGE UP (ref 32–36)
MCV RBC AUTO: 71.6 FL — LOW (ref 80–100)
MICROCYTES BLD QL: SLIGHT — SIGNIFICANT CHANGE UP
MONOCYTES # BLD AUTO: 0.23 K/UL — SIGNIFICANT CHANGE UP (ref 0–0.9)
MONOCYTES NFR BLD AUTO: 1.8 % — LOW (ref 2–14)
NEUTROPHILS # BLD AUTO: 12.1 K/UL — HIGH (ref 1.8–7.4)
NEUTROPHILS NFR BLD AUTO: 93.7 % — HIGH (ref 43–77)
OVALOCYTES BLD QL SMEAR: SLIGHT — SIGNIFICANT CHANGE UP
PLAT MORPH BLD: ABNORMAL
PLATELET # BLD AUTO: 322 K/UL — SIGNIFICANT CHANGE UP (ref 150–400)
POIKILOCYTOSIS BLD QL AUTO: SLIGHT — SIGNIFICANT CHANGE UP
POLYCHROMASIA BLD QL SMEAR: SLIGHT — SIGNIFICANT CHANGE UP
POTASSIUM SERPL-MCNC: 4 MMOL/L — SIGNIFICANT CHANGE UP (ref 3.5–5.3)
POTASSIUM SERPL-SCNC: 4 MMOL/L — SIGNIFICANT CHANGE UP (ref 3.5–5.3)
PROT SERPL-MCNC: 8.4 G/DL — SIGNIFICANT CHANGE UP (ref 6.6–8.7)
PROTHROM AB SERPL-ACNC: 13.3 SEC — SIGNIFICANT CHANGE UP (ref 10.5–13.4)
RBC # BLD: 5.32 M/UL — SIGNIFICANT CHANGE UP (ref 4.2–5.8)
RBC # FLD: 19.2 % — HIGH (ref 10.3–14.5)
RBC BLD AUTO: ABNORMAL
RSV RNA NPH QL NAA+NON-PROBE: SIGNIFICANT CHANGE UP
SARS-COV-2 RNA SPEC QL NAA+PROBE: SIGNIFICANT CHANGE UP
SODIUM SERPL-SCNC: 137 MMOL/L — SIGNIFICANT CHANGE UP (ref 135–145)
VARIANT LYMPHS # BLD: 0.9 % — SIGNIFICANT CHANGE UP (ref 0–6)
WBC # BLD: 12.91 K/UL — HIGH (ref 3.8–10.5)
WBC # FLD AUTO: 12.91 K/UL — HIGH (ref 3.8–10.5)

## 2022-11-17 PROCEDURE — 99223 1ST HOSP IP/OBS HIGH 75: CPT

## 2022-11-17 PROCEDURE — 99285 EMERGENCY DEPT VISIT HI MDM: CPT

## 2022-11-17 PROCEDURE — 93010 ELECTROCARDIOGRAM REPORT: CPT

## 2022-11-17 PROCEDURE — 74177 CT ABD & PELVIS W/CONTRAST: CPT | Mod: 26,MA

## 2022-11-17 PROCEDURE — 71260 CT THORAX DX C+: CPT | Mod: 26,MD

## 2022-11-17 RX ORDER — KETOROLAC TROMETHAMINE 30 MG/ML
25 SYRINGE (ML) INJECTION EVERY 6 HOURS
Refills: 0 | Status: DISCONTINUED | OUTPATIENT
Start: 2022-11-17 | End: 2022-11-17

## 2022-11-17 RX ORDER — PANTOPRAZOLE SODIUM 20 MG/1
40 TABLET, DELAYED RELEASE ORAL EVERY 12 HOURS
Refills: 0 | Status: DISCONTINUED | OUTPATIENT
Start: 2022-11-17 | End: 2022-11-21

## 2022-11-17 RX ORDER — MORPHINE SULFATE 50 MG/1
4 CAPSULE, EXTENDED RELEASE ORAL ONCE
Refills: 0 | Status: DISCONTINUED | OUTPATIENT
Start: 2022-11-17 | End: 2022-11-17

## 2022-11-17 RX ORDER — HYDROMORPHONE HYDROCHLORIDE 2 MG/ML
1 INJECTION INTRAMUSCULAR; INTRAVENOUS; SUBCUTANEOUS ONCE
Refills: 0 | Status: DISCONTINUED | OUTPATIENT
Start: 2022-11-17 | End: 2022-11-17

## 2022-11-17 RX ORDER — PANTOPRAZOLE SODIUM 20 MG/1
40 TABLET, DELAYED RELEASE ORAL ONCE
Refills: 0 | Status: COMPLETED | OUTPATIENT
Start: 2022-11-17 | End: 2022-11-17

## 2022-11-17 RX ORDER — METOCLOPRAMIDE HCL 10 MG
10 TABLET ORAL ONCE
Refills: 0 | Status: COMPLETED | OUTPATIENT
Start: 2022-11-17 | End: 2022-11-17

## 2022-11-17 RX ORDER — SODIUM CHLORIDE 9 MG/ML
1000 INJECTION INTRAMUSCULAR; INTRAVENOUS; SUBCUTANEOUS ONCE
Refills: 0 | Status: COMPLETED | OUTPATIENT
Start: 2022-11-17 | End: 2022-11-17

## 2022-11-17 RX ORDER — FAMOTIDINE 10 MG/ML
20 INJECTION INTRAVENOUS ONCE
Refills: 0 | Status: COMPLETED | OUTPATIENT
Start: 2022-11-17 | End: 2022-11-17

## 2022-11-17 RX ORDER — ENOXAPARIN SODIUM 100 MG/ML
40 INJECTION SUBCUTANEOUS EVERY 24 HOURS
Refills: 0 | Status: DISCONTINUED | OUTPATIENT
Start: 2022-11-17 | End: 2022-11-21

## 2022-11-17 RX ORDER — ACETAMINOPHEN 500 MG
650 TABLET ORAL EVERY 6 HOURS
Refills: 0 | Status: DISCONTINUED | OUTPATIENT
Start: 2022-11-17 | End: 2022-11-21

## 2022-11-17 RX ORDER — INFLUENZA VIRUS VACCINE 15; 15; 15; 15 UG/.5ML; UG/.5ML; UG/.5ML; UG/.5ML
0.5 SUSPENSION INTRAMUSCULAR ONCE
Refills: 0 | Status: DISCONTINUED | OUTPATIENT
Start: 2022-11-17 | End: 2022-11-21

## 2022-11-17 RX ORDER — ACETAMINOPHEN 500 MG
1000 TABLET ORAL ONCE
Refills: 0 | Status: COMPLETED | OUTPATIENT
Start: 2022-11-17 | End: 2022-11-17

## 2022-11-17 RX ORDER — KETOROLAC TROMETHAMINE 30 MG/ML
30 SYRINGE (ML) INJECTION ONCE
Refills: 0 | Status: DISCONTINUED | OUTPATIENT
Start: 2022-11-17 | End: 2022-11-17

## 2022-11-17 RX ORDER — HYDRALAZINE HCL 50 MG
10 TABLET ORAL EVERY 6 HOURS
Refills: 0 | Status: DISCONTINUED | OUTPATIENT
Start: 2022-11-17 | End: 2022-11-21

## 2022-11-17 RX ORDER — ONDANSETRON 8 MG/1
4 TABLET, FILM COATED ORAL EVERY 8 HOURS
Refills: 0 | Status: DISCONTINUED | OUTPATIENT
Start: 2022-11-17 | End: 2022-11-21

## 2022-11-17 RX ORDER — SODIUM CHLORIDE 9 MG/ML
1000 INJECTION INTRAMUSCULAR; INTRAVENOUS; SUBCUTANEOUS
Refills: 0 | Status: DISCONTINUED | OUTPATIENT
Start: 2022-11-17 | End: 2022-11-20

## 2022-11-17 RX ORDER — HYDROMORPHONE HYDROCHLORIDE 2 MG/ML
0.5 INJECTION INTRAMUSCULAR; INTRAVENOUS; SUBCUTANEOUS ONCE
Refills: 0 | Status: DISCONTINUED | OUTPATIENT
Start: 2022-11-17 | End: 2022-11-17

## 2022-11-17 RX ORDER — LANOLIN ALCOHOL/MO/W.PET/CERES
3 CREAM (GRAM) TOPICAL AT BEDTIME
Refills: 0 | Status: DISCONTINUED | OUTPATIENT
Start: 2022-11-17 | End: 2022-11-21

## 2022-11-17 RX ORDER — MORPHINE SULFATE 50 MG/1
2 CAPSULE, EXTENDED RELEASE ORAL ONCE
Refills: 0 | Status: DISCONTINUED | OUTPATIENT
Start: 2022-11-17 | End: 2022-11-17

## 2022-11-17 RX ORDER — ONDANSETRON 8 MG/1
4 TABLET, FILM COATED ORAL ONCE
Refills: 0 | Status: COMPLETED | OUTPATIENT
Start: 2022-11-17 | End: 2022-11-17

## 2022-11-17 RX ORDER — METOCLOPRAMIDE HCL 10 MG
10 TABLET ORAL EVERY 6 HOURS
Refills: 0 | Status: DISCONTINUED | OUTPATIENT
Start: 2022-11-17 | End: 2022-11-21

## 2022-11-17 RX ORDER — AMLODIPINE BESYLATE 2.5 MG/1
5 TABLET ORAL DAILY
Refills: 0 | Status: DISCONTINUED | OUTPATIENT
Start: 2022-11-17 | End: 2022-11-21

## 2022-11-17 RX ADMIN — Medication 10 MILLIGRAM(S): at 23:48

## 2022-11-17 RX ADMIN — ONDANSETRON 4 MILLIGRAM(S): 8 TABLET, FILM COATED ORAL at 21:35

## 2022-11-17 RX ADMIN — Medication 1000 MILLIGRAM(S): at 05:30

## 2022-11-17 RX ADMIN — PANTOPRAZOLE SODIUM 40 MILLIGRAM(S): 20 TABLET, DELAYED RELEASE ORAL at 21:38

## 2022-11-17 RX ADMIN — MORPHINE SULFATE 4 MILLIGRAM(S): 50 CAPSULE, EXTENDED RELEASE ORAL at 02:48

## 2022-11-17 RX ADMIN — Medication 30 MILLIGRAM(S): at 12:08

## 2022-11-17 RX ADMIN — Medication 400 MILLIGRAM(S): at 05:00

## 2022-11-17 RX ADMIN — Medication 400 MILLIGRAM(S): at 21:35

## 2022-11-17 RX ADMIN — PANTOPRAZOLE SODIUM 40 MILLIGRAM(S): 20 TABLET, DELAYED RELEASE ORAL at 08:43

## 2022-11-17 RX ADMIN — HYDROMORPHONE HYDROCHLORIDE 1 MILLIGRAM(S): 2 INJECTION INTRAMUSCULAR; INTRAVENOUS; SUBCUTANEOUS at 07:05

## 2022-11-17 RX ADMIN — Medication 30 MILLIGRAM(S): at 12:37

## 2022-11-17 RX ADMIN — SODIUM CHLORIDE 1000 MILLILITER(S): 9 INJECTION INTRAMUSCULAR; INTRAVENOUS; SUBCUTANEOUS at 01:51

## 2022-11-17 RX ADMIN — FAMOTIDINE 20 MILLIGRAM(S): 10 INJECTION INTRAVENOUS at 03:14

## 2022-11-17 RX ADMIN — PANTOPRAZOLE SODIUM 40 MILLIGRAM(S): 20 TABLET, DELAYED RELEASE ORAL at 17:07

## 2022-11-17 RX ADMIN — Medication 10 MILLIGRAM(S): at 17:07

## 2022-11-17 RX ADMIN — MORPHINE SULFATE 4 MILLIGRAM(S): 50 CAPSULE, EXTENDED RELEASE ORAL at 01:49

## 2022-11-17 RX ADMIN — Medication 1000 MILLIGRAM(S): at 22:46

## 2022-11-17 RX ADMIN — ONDANSETRON 4 MILLIGRAM(S): 8 TABLET, FILM COATED ORAL at 01:49

## 2022-11-17 RX ADMIN — Medication 10 MILLIGRAM(S): at 12:08

## 2022-11-17 RX ADMIN — SODIUM CHLORIDE 100 MILLILITER(S): 9 INJECTION INTRAMUSCULAR; INTRAVENOUS; SUBCUTANEOUS at 12:09

## 2022-11-17 RX ADMIN — HYDROMORPHONE HYDROCHLORIDE 1 MILLIGRAM(S): 2 INJECTION INTRAMUSCULAR; INTRAVENOUS; SUBCUTANEOUS at 04:30

## 2022-11-17 RX ADMIN — HYDROMORPHONE HYDROCHLORIDE 1 MILLIGRAM(S): 2 INJECTION INTRAMUSCULAR; INTRAVENOUS; SUBCUTANEOUS at 03:14

## 2022-11-17 RX ADMIN — HYDROMORPHONE HYDROCHLORIDE 1 MILLIGRAM(S): 2 INJECTION INTRAMUSCULAR; INTRAVENOUS; SUBCUTANEOUS at 17:32

## 2022-11-17 RX ADMIN — Medication 10 MILLIGRAM(S): at 04:05

## 2022-11-17 RX ADMIN — SODIUM CHLORIDE 100 MILLILITER(S): 9 INJECTION INTRAMUSCULAR; INTRAVENOUS; SUBCUTANEOUS at 08:43

## 2022-11-17 RX ADMIN — ENOXAPARIN SODIUM 40 MILLIGRAM(S): 100 INJECTION SUBCUTANEOUS at 12:09

## 2022-11-17 RX ADMIN — SODIUM CHLORIDE 1000 MILLILITER(S): 9 INJECTION INTRAMUSCULAR; INTRAVENOUS; SUBCUTANEOUS at 06:51

## 2022-11-17 NOTE — CONSULT NOTE ADULT - SUBJECTIVE AND OBJECTIVE BOX
Patient is a 57y old  Male who presents with a chief complaint of abdominal pain and nausea and vomiting    HPI: 58 y/o male with h/o distal esophagectomy and gastric pull up in 2016 presents with abdominal pain and N/V which started yesterday. He has had multiple attacks of abdominal pain in the past and states that he last had an EGD with Dr. Stanley roughly one year ago. He takes Reglan at home for presumed post-operative gastroparesis. His esophageal cancer was cured with surgery and he required no adjuvant chemotherapy or RT. is CT scans here were negative for acute pathology.      REVIEW OF SYSTEMS:  Constitutional: No fever, weight loss or fatigue  ENMT:  No difficulty hearing, tinnitus, vertigo; No sinus or throat pain  Respiratory: No cough, wheezing, chills or hemoptysis  Cardiovascular: No chest pain, palpitations, dizziness or leg swelling  Gastrointestinal: As per HPI.  Skin: No itching, burning, rashes or lesions   Musculoskeletal: No joint pain or swelling; No muscle, back or extremity pain  Patient has no cardiopulmonary, peripheral vascular, musculoskeletal, dermatological, neurological, or psychological symptoms or complaints at this time.    PAST MEDICAL & SURGICAL HISTORY:  Hypertension      Esophageal cancer      Gastroparesis      S/P cholecystectomy      H/O esophagectomy          FAMILY HISTORY:  FH: HTN (hypertension) (Father)        SOCIAL HISTORY:  Smoking Status: [ ] Current, [x ] Former, [ ] Never  Pack Years: > 20. Stopped smoking roughly 15 years ago, + ETOH in the past stopped in 2007. No drug abuse history.    MEDICATIONS: At Home:  Reglan  Omeprazole  Amlodopine    MEDICATIONS  (STANDING):    MEDICATIONS  (PRN):      Allergies    No Known Allergies    Intolerances        Vital Signs Last 24 Hrs  T(C): 36.9 (17 Nov 2022 07:56), Max: 36.9 (17 Nov 2022 07:56)  T(F): 98.5 (17 Nov 2022 07:56), Max: 98.5 (17 Nov 2022 07:56)  HR: 81 (17 Nov 2022 07:56) (66 - 81)  BP: 136/77 (17 Nov 2022 07:56) (136/77 - 196/104)  BP(mean): --  RR: 18 (17 Nov 2022 07:56) (17 - 20)  SpO2: 96% (17 Nov 2022 07:56) (96% - 98%)    Parameters below as of 17 Nov 2022 07:56  Patient On (Oxygen Delivery Method): room air            PHYSICAL EXAM:    General: Well developed; in no acute distress  HEENT: MMM, conjunctiva pink and sclera anicteric.  Lungs: Clear bilaterally.  Cor: RRR S1, S2 only.  Gastrointestinal: Abdomen: Soft + epigastric tenderness, non-distended; Normal bowel sounds; No rebound or guarding or HSM.  GONZALEZ: Not done not presently indicated.  Extremities: Normal range of motion, No clubbing, cyanosis or edema  Neurological: Alert and oriented x3  Skin: Warm and dry. No obvious rash      LABS:                        12.4   12.91 )-----------( 322      ( 17 Nov 2022 01:40 )             38.1     11-17    137  |  99  |  12.2  ----------------------------<  162<H>  4.0   |  22.0  |  0.79    Ca    9.8      17 Nov 2022 01:40    TPro  8.4  /  Alb  5.4<H>  /  TBili  0.5  /  DBili  x   /  AST  26  /  ALT  31  /  AlkPhos  122<H>  11-17          RADIOLOGY & ADDITIONAL STUDIES:   < from: CT Abdomen and Pelvis w/ IV Cont (11.17.22 @ 06:48) >  975 EXAM:  CT ABDOMEN AND PELVIS IC                        ACC: [id number] EXAM:  CT CHEST IC                          PROCEDURE DATE:  11/17/2022          INTERPRETATION:  CLINICAL INFORMATION: Upper abdominal pain, history of   esophagealcancer status post esophagectomy    COMPARISON: CT of March 24, 2022.    CONTRAST/COMPLICATIONS:  IV Contrast: Omnipaque 350 (accession [id number]), IV contrast documented   in associated exam (accession [id number])  96 cc administered   4 cc   discarded  Oral Contrast: NONE  Complications: None reported at time of study completion    PROCEDURE:  CT of the Chest, Abdomen and Pelvis was performed.  Sagittal and coronal reformats were performed.    FINDINGS:  CHEST:  LUNGS AND LARGE AIRWAYS: Patent central airways. Fibrotic changes at the   lung apices.  PLEURA: No pleural effusion.  VESSELS: Ascending aorta is mildly dilated, measuring 4.2 cm. No central   pulmonary embolism. Mild coronary atherosclerosis.  HEART: Heart size is normal. No pericardial effusion.  MEDIASTINUM AND SEVEN: Status post esophagectomy with gastric   pull-through. No lymphadenopathy.  CHEST WALL AND LOWER NECK: Within normal limits.    ABDOMEN AND PELVIS:  LIVER: Within normal limits.  BILE DUCTS: Normal caliber.  GALLBLADDER: Cholecystectomy.  SPLEEN: Within normal limits.  PANCREAS: Within normal limits.  ADRENALS: Within normal limits.  KIDNEYS/URETERS: There is an indeterminant 1 cm hypodense lesion within   the left interpolar kidney.  BLADDER: Incompletely distended.  REPRODUCTIVE ORGANS: The prostate gland is not enlarged.    BOWEL: No bowel obstruction. Appendix is unremarkable. The colon is   underdistended without significant fecal load. No convincing focal bowel   wall thickening or diverticulitis. Scattered diverticulosis.  PERITONEUM: No ascites.  VESSELS: IVC filter.  RETROPERITONEUM/LYMPH NODES: No lymphadenopathy.  ABDOMINAL WALL: Small fat-containing umbilical hernia  BONES: Old fractures of the left superior and inferior pubic rami.      IMPRESSION:    No explanation for upper abdominal pain on this CT.  Status post esophagectomy with gastric pull-through. No pneumomediastinum   or suspicious mass.    Status post cholecystectomy. No small bowel obstruction or suspicious   mass.            --- End of Report ---            MICHELLE SNYDER MD; Attending Radiologist  This document has been electronically signed. Nov 17 2022  7:09AM    < end of copied text >

## 2022-11-17 NOTE — ED ADULT NURSE NOTE - OBJECTIVE STATEMENT
patient is a 58 y/o/m w/pmhx HTN, esophageal ca s/p esophagectomy presents c/o abdominal pain and nausea/vomiting for the past few hours. last meal was last night. Tried his nausea meds-reglan and zofran but they have not worked and pt just throws them up. Pt here for same in the past. wife at bedside. denies any recent illness or sick contacts. denies fevers, chills, diarrhea, headaches, cp, sob

## 2022-11-17 NOTE — CHART NOTE - NSCHARTNOTEFT_GEN_A_CORE
RN called for abdominal pain.   56 yo male with hx of distal esophagectomy and gastric pull up in 2016 presented to the ED for nausea and vomiting. He took Reglan and omeprazole but his symptoms persisted. The patient states that he had 10 episodes of emesis yesterday, reports no emesis today but has nausea. He denies diarrhea, hemoptysis, hematemesis, fever, and chills.   He had a bowel movement yesterday that he describes as hard. He had a similar episode in August/ September was diagnosed with gastroparesis.  Patient admits to abdominal pain as "cramping", localized primarily around the epigastric region to the LUQ. He states that his abdominal pain occasionally radiates to his umbilical region, which is not uncommon since his surgery. He states that drinking water has made his pain worse, it is currently 6/10. He states that in the past reglan has helped, but it has not currently relieved any pain. Pt states his last colonoscopy/ EGD was last year with Dr. Stanley.    T(C): 36.8 (11-17-22 @ 19:17), Max: 37 (11-17-22 @ 15:34)  HR: 83 (11-17-22 @ 19:17) (66 - 90)  BP: 155/87 (11-17-22 @ 19:17) (136/77 - 196/104)  RR: 18 (11-17-22 @ 19:17) (17 - 20)  SpO2: 98% (11-17-22 @ 19:17) (96% - 99%)    CONSTITUTIONAL: Well groomed, no apparent distress  EYES: PERRLA and symmetric, EOMI, No conjunctival or scleral injection, non-icteric  ENMT: Oral mucosa with moist membranes. Normal dentition; no pharyngeal injection or exudates  RESP: No respiratory distress, no use of accessory muscles; CTA b/l, no WRR  CV: RRR, +S1S2, no MRG; no JVD; no peripheral edema  GI: abdominal discomfort noted to deep palpation, no hepatomegaly noted  LYMPH: No cervical LAD or tenderness; no axillary LAD or tenderness; no inguinal LAD or tenderness  SKIN: No rashes or ulcers noted; no subcutaneous nodules or induration palpable  PSYCH: Appropriate insight/judgment; A+O x 3, mood and affect appropriate, recent/remote memory intact    CT scan of the abdomen:                           12.4   12.91 )-----------( 322      ( 17 Nov 2022 01:40 )             38.1   11-17    137  |  99  |  12.2  ----------------------------<  162<H>  4.0   |  22.0  |  0.79    Ca    9.8      17 Nov 2022 01:40    TPro  8.4  /  Alb  5.4<H>  /  TBili  0.5  /  DBili  x   /  AST  26  /  ALT  31  /  AlkPhos  122<H>  11-17      Assessment/Plan:   Abdominal pain in the setting of esoghectomy  -check labs   -CT of the abdomen shows no acute etiology  -pantoprazole BID   -pain control recommended   -EGD for tomorrow RN called for abdominal pain.   58 yo male with hx of distal esophagectomy and gastric pull up in 2016 presented to the ED for nausea and vomiting. He took Reglan and omeprazole but his symptoms persisted. The patient states that he had 10 episodes of emesis yesterday, reports no emesis today but has nausea. He denies diarrhea, hemoptysis, hematemesis, fever, and chills.   He had a bowel movement yesterday that he describes as hard. He had a similar episode in August/ September was diagnosed with gastroparesis.  Patient admits to abdominal pain as "cramping", localized primarily around the epigastric region to the LUQ. He states that his abdominal pain occasionally radiates to his umbilical region, which is not uncommon since his surgery. He states that drinking water has made his pain worse, it is currently 6/10. He states that in the past reglan has helped, but it has not currently relieved any pain. Pt states his last colonoscopy/ EGD was last year with Dr. Stanley.    T(C): 36.8 (11-17-22 @ 19:17), Max: 37 (11-17-22 @ 15:34)  HR: 83 (11-17-22 @ 19:17) (66 - 90)  BP: 155/87 (11-17-22 @ 19:17) (136/77 - 196/104)  RR: 18 (11-17-22 @ 19:17) (17 - 20)  SpO2: 98% (11-17-22 @ 19:17) (96% - 99%)    CONSTITUTIONAL: Well groomed, no apparent distress  EYES: PERRLA and symmetric, EOMI, No conjunctival or scleral injection, non-icteric  ENMT: Oral mucosa with moist membranes. Normal dentition; no pharyngeal injection or exudates  RESP: No respiratory distress, no use of accessory muscles; CTA b/l, no WRR  CV: RRR, +S1S2, no MRG; no JVD; no peripheral edema  GI: abdominal discomfort noted to deep palpation, no hepatomegaly noted  LYMPH: No cervical LAD or tenderness; no axillary LAD or tenderness; no inguinal LAD or tenderness  SKIN: No rashes or ulcers noted; no subcutaneous nodules or induration palpable  PSYCH: Appropriate insight/judgment; A+O x 3, mood and affect appropriate, recent/remote memory intact    CT scan of the abdomen: IMPRESSION: No explanation for upper abdominal pain on this CT. Status post esophagectomy with gastric pull-through. No pneumomediastinum  or suspicious mass. Status post cholecystectomy. No small bowel obstruction or suspicious mass.                            12.4   12.91 )-----------( 322      ( 17 Nov 2022 01:40 )             38.1   11-17    137  |  99  |  12.2  ----------------------------<  162<H>  4.0   |  22.0  |  0.79    Ca    9.8      17 Nov 2022 01:40    TPro  8.4  /  Alb  5.4<H>  /  TBili  0.5  /  DBili  x   /  AST  26  /  ALT  31  /  AlkPhos  122<H>  11-17      Assessment/Plan:   Abdominal pain in the setting of esoghagectomy with gastric pull-through 2/2 esophageal cancer   -check labs   -CT of the abdomen shows no acute etiology  -pantoprazole BID   -pain control recommended   -EGD for tomorrow RN called for abdominal pain.   56 yo male with hx of distal esophagectomy and gastric pull up in 2016 presented to the ED for nausea and vomiting. He took Reglan and omeprazole but his symptoms persisted. The patient states that he had 10 episodes of emesis yesterday, reports no emesis today but has nausea. He denies diarrhea, hemoptysis, hematemesis, fever, and chills.   He had a bowel movement yesterday that he describes as hard. He had a similar episode in August/ September was diagnosed with gastroparesis.  Patient admits to abdominal pain as "cramping", localized primarily around the epigastric region to the LUQ. He states that his abdominal pain occasionally radiates to his umbilical region, which is not uncommon since his surgery. He states that drinking water has made his pain worse, it is currently 6/10. He states that in the past reglan has helped, but it has not currently relieved any pain. Pt states his last colonoscopy/ EGD was last year with Dr. Stanley.    T(C): 36.8 (11-17-22 @ 19:17), Max: 37 (11-17-22 @ 15:34)  HR: 83 (11-17-22 @ 19:17) (66 - 90)  BP: 155/87 (11-17-22 @ 19:17) (136/77 - 196/104)  RR: 18 (11-17-22 @ 19:17) (17 - 20)  SpO2: 98% (11-17-22 @ 19:17) (96% - 99%)    CONSTITUTIONAL: Well groomed, no apparent distress  EYES: PERRLA and symmetric, EOMI, No conjunctival or scleral injection, non-icteric  ENMT: Oral mucosa with moist membranes. Normal dentition; no pharyngeal injection or exudates  RESP: No respiratory distress, no use of accessory muscles; CTA b/l, no WRR  CV: RRR, +S1S2, no MRG; no JVD; no peripheral edema  GI: epigastric LUQ abdominal discomfort noted to deep palpation, no hepatomegaly noted  LYMPH: No cervical LAD or tenderness; no axillary LAD or tenderness; no inguinal LAD or tenderness  SKIN: No rashes or ulcers noted; no subcutaneous nodules or induration palpable  PSYCH: Appropriate insight/judgment; A+O x 3, mood and affect appropriate, recent/remote memory intact    CT scan of the abdomen: IMPRESSION: No explanation for upper abdominal pain on this CT. Status post esophagectomy with gastric pull-through. No pneumomediastinum  or suspicious mass. Status post cholecystectomy. No small bowel obstruction or suspicious mass.                            12.4   12.91 )-----------( 322      ( 17 Nov 2022 01:40 )             38.1   11-17    137  |  99  |  12.2  ----------------------------<  162<H>  4.0   |  22.0  |  0.79    Ca    9.8      17 Nov 2022 01:40    TPro  8.4  /  Alb  5.4<H>  /  TBili  0.5  /  DBili  x   /  AST  26  /  ALT  31  /  AlkPhos  122<H>  11-17      Assessment/Plan:   Abdominal pain in the setting of esoghagectomy with gastric pull-through 2/2 esophageal cancer   -check labs   -CT of the abdomen shows no acute etiology  -pantoprazole BID   -pain control recommended   -EGD for tomorrow  -pain management consult ordered    Addendum: diluad .25 given pt had worsening pain

## 2022-11-17 NOTE — PATIENT PROFILE ADULT - FUNCTIONAL SCREEN CURRENT LEVEL: COMMUNICATION, MLM
0 = understands/communicates without difficulty
Spine appears normal, range of motion is not limited, no muscle or joint tenderness

## 2022-11-17 NOTE — ED PROVIDER NOTE - OBJECTIVE STATEMENT
58 y/o male with PMHx of HTN, Esophageal cancer s/p esophagectomy presents to the ED c/o upper abdominal pain and N/V for the past few hours, states has not eaten anything that he is not supposed to. Has tried to take reglan and zofran however he states he just throws them up. Pt with multiple similar episodes in past. 58 y/o male with PMHx of HTN, Esophageal cancer s/p esophagectomy presents to the ED c/o upper abdominal pain and N/V for the past few hours, states has not eaten anything that he is not supposed to. Has tried to take reglan and zofran however he states he just throws them up. Pt with multiple similar episodes in past. Denies fever, diarrhea, CP, SOB.

## 2022-11-17 NOTE — H&P ADULT - COMMENTS
REVIEW OF SYSTEMS  General: denies weakness, malaise  Skin/Breast: no new rash  Ophthalmologic: no change in vision  ENMT: no dysphagia, throat pain or change in hearing  Respiratory and Thorax: no difficulty breathing or chest pain  Cardiovascular: no palpitations or PND, orthopnea  Gastrointestinal: as per HPI  Genitourinary: no difficulty urinating, no burning urination  Musculoskeletal: no myalgia/arthrlagia  Neurological: no weakness, numbness, change in gait  Psychiatric: no depression, anxiety  Hematology/Lymphatics: denies easy bruising or bleeding  Endocrine: no polyuria, polydipsia

## 2022-11-17 NOTE — PATIENT PROFILE ADULT - FALL HARM RISK - UNIVERSAL INTERVENTIONS
Bed in lowest position, wheels locked, appropriate side rails in place/Call bell, personal items and telephone in reach/Instruct patient to call for assistance before getting out of bed or chair/Non-slip footwear when patient is out of bed/[place] to call system/Physically safe environment - no spills, clutter or unnecessary equipment/Purposeful Proactive Rounding/Room/bathroom lighting operational, light cord in reach

## 2022-11-17 NOTE — H&P ADULT - HISTORY OF PRESENT ILLNESS
56 yo male with hx of distal esophagectomy and gastric pull up in 2016 presented to the ED for nausea and vomiting. Patient states symptoms started yesterday morning. He took Reglan and omeprazole but his symptoms persisted. He has vomited about 10 times and is not able to keep any food, liquids or medications down. It is associated with generalized abdominal pain.  He had a bowel movement yesterday that he describes as hard. He had a similar episode in September.   In the ED CT scan showed no acute pathology. GI was consulted and patient is planned for EGD tomorrow 58 yo male with hx of distal esophagectomy and gastric pull up in 2016 presented to the ED for nausea and vomiting. Patient states symptoms started yesterday morning. He took Reglan and omeprazole but his symptoms persisted. He has vomited about 10 times and is not able to keep any food, liquids or medications down. It is associated with generalized abdominal pain.  He had a bowel movement yesterday that he describes as hard. He had a similar episode in September was diagnosed with gastroparesis.  Denies fever and chills.   In the ED CT scan showed no acute pathology. GI was consulted and patient is planned for EGD tomorrow

## 2022-11-17 NOTE — H&P ADULT - NSHPPHYSICALEXAM_GEN_ALL_CORE
PHYSICAL EXAM:  Constitutional: No acute distress, alert and oriented by 3  HEENT: AT/NC, EOMI, PERRLA, Normal conjunctiva, no pharyngeal erythema, moist oral mucosa  Respiratory: CTA BL, equal breath sounds, no crackles or wheezing  Cardiovascular: RRR, no edema  Gastrointestinal: soft, Non-tender, Non-distended + Bowel sounds, no rebound or guarding  Genitourinary: no sanford  Musculoskeletal: No joint edema  Neurological: CN 2-12 grossly intact, no focal deficits  Skin: warm, dry and intact  Psychiatric: normal mood and affect

## 2022-11-17 NOTE — CONSULT NOTE ADULT - ASSESSMENT
Recurrent abdominal pain with N/V likely secondary to flare of gastroparesis with - CT scan here. OK for CLD today. IV Reglan 10 mg. IVP every 6 hours. IV Pantoprazole for GI mucosal cytoprotection. Probable EGD tomorrow. NPO after MN tonight. Repeat labs ordered for the AM. Case and management d/w Dr. Beard.

## 2022-11-17 NOTE — ED PROVIDER NOTE - PHYSICAL EXAMINATION
Gen: Well appearing in NAD  Head: NC/AT  Neck: trachea midline  Card: regular rate and rhythm  Resp:  CTAB  Abd: soft, non-distended, diffusely tender worse across upper abdomen  Ext: no deformities  Neuro:  A&O appears non focal  Skin:  Warm and dry as visualized  Psych:  Normal affect and mood Gen: Well appearing in moderate distress 2/2 pain  Head: NC/AT  Neck: trachea midline  Card: regular rate and rhythm  Resp:  CTAB  Abd: soft, non-distended, diffusely tender worse across upper abdomen  Ext: no deformities  Neuro:  A&O appears non focal  Skin:  Warm and dry as visualized  Psych:  Normal affect and mood

## 2022-11-17 NOTE — ED PROVIDER NOTE - CLINICAL SUMMARY MEDICAL DECISION MAKING FREE TEXT BOX
pt with hx of esophageal s/p esophagectomy p/w recurrent severe upper abdominal pain and N/V plan for labs, symptoms control, CT and likely admission for GI and possible endoscopy

## 2022-11-17 NOTE — ED ADULT TRIAGE NOTE - CHIEF COMPLAINT QUOTE
C/O mid abd pain associated with nausea and vomiting for the past few hours. PT has a hx of having his esophagus removed. States he periodically gets these flare ups.

## 2022-11-17 NOTE — H&P ADULT - ASSESSMENT
58 yo male with hx of distal esophagectomy and gastric pull up in 2016 presented to the ED for nausea and vomiting.    Nausea, vomiting and abdominal pain  CT scan noted  - EKG ordered to check qtc  - start Reglan atc per GI  - will monitor on tele and check ekgs daily while on Reglan  - PP IV BID  - CLD  - PRN pain controle  - GI consulted planned for EGD tomorrow  - zofran prn    HTN  - Amlodipine 5mg   - PRN IV Hydralazine for bp>180    Leukocytosis likely reactive  - check UA  - monitor    DVT ppx: Lovenox 56 yo male with hx of distal esophagectomy and gastric pull up in 2016 presented to the ED for nausea and vomiting.    Nausea, vomiting and abdominal pain likely due to gastroparesis in the setting of esophagectomy with gastric pull through for esophageal cancer.   CT scan noted  - EKG ordered to check qtc  - start Reglan atc per GI  - will monitor on tele and check ekgs daily while on Reglan  - PP IV BID  - CLD  - PRN pain controle  - GI consulted planned for EGD tomorrow  - zofran prn    HTN  - Amlodipine 5mg   - PRN IV Hydralazine for bp>180    Leukocytosis likely reactive  - check UA  - monitor    DVT ppx: Lovenox

## 2022-11-18 ENCOUNTER — RESULT REVIEW (OUTPATIENT)
Age: 57
End: 2022-11-18

## 2022-11-18 LAB
ALBUMIN SERPL ELPH-MCNC: 5 G/DL — SIGNIFICANT CHANGE UP (ref 3.3–5.2)
ALP SERPL-CCNC: 104 U/L — SIGNIFICANT CHANGE UP (ref 40–120)
ALT FLD-CCNC: 26 U/L — SIGNIFICANT CHANGE UP
ANION GAP SERPL CALC-SCNC: 12 MMOL/L — SIGNIFICANT CHANGE UP (ref 5–17)
ANION GAP SERPL CALC-SCNC: 14 MMOL/L — SIGNIFICANT CHANGE UP (ref 5–17)
AST SERPL-CCNC: 21 U/L — SIGNIFICANT CHANGE UP
BASOPHILS # BLD AUTO: 0.03 K/UL — SIGNIFICANT CHANGE UP (ref 0–0.2)
BASOPHILS NFR BLD AUTO: 0.2 % — SIGNIFICANT CHANGE UP (ref 0–2)
BILIRUB SERPL-MCNC: 0.6 MG/DL — SIGNIFICANT CHANGE UP (ref 0.4–2)
BUN SERPL-MCNC: 17.3 MG/DL — SIGNIFICANT CHANGE UP (ref 8–20)
BUN SERPL-MCNC: 17.4 MG/DL — SIGNIFICANT CHANGE UP (ref 8–20)
CALCIUM SERPL-MCNC: 9.2 MG/DL — SIGNIFICANT CHANGE UP (ref 8.4–10.5)
CALCIUM SERPL-MCNC: 9.4 MG/DL — SIGNIFICANT CHANGE UP (ref 8.4–10.5)
CHLORIDE SERPL-SCNC: 100 MMOL/L — SIGNIFICANT CHANGE UP (ref 96–108)
CHLORIDE SERPL-SCNC: 99 MMOL/L — SIGNIFICANT CHANGE UP (ref 96–108)
CO2 SERPL-SCNC: 22 MMOL/L — SIGNIFICANT CHANGE UP (ref 22–29)
CO2 SERPL-SCNC: 22 MMOL/L — SIGNIFICANT CHANGE UP (ref 22–29)
CREAT SERPL-MCNC: 0.59 MG/DL — SIGNIFICANT CHANGE UP (ref 0.5–1.3)
CREAT SERPL-MCNC: 0.65 MG/DL — SIGNIFICANT CHANGE UP (ref 0.5–1.3)
EGFR: 111 ML/MIN/1.73M2 — SIGNIFICANT CHANGE UP
EGFR: 115 ML/MIN/1.73M2 — SIGNIFICANT CHANGE UP
EOSINOPHIL # BLD AUTO: 0 K/UL — SIGNIFICANT CHANGE UP (ref 0–0.5)
EOSINOPHIL NFR BLD AUTO: 0 % — SIGNIFICANT CHANGE UP (ref 0–6)
GLUCOSE SERPL-MCNC: 122 MG/DL — HIGH (ref 70–99)
GLUCOSE SERPL-MCNC: 127 MG/DL — HIGH (ref 70–99)
HCT VFR BLD CALC: 37.7 % — LOW (ref 39–50)
HGB BLD-MCNC: 12.1 G/DL — LOW (ref 13–17)
IMM GRANULOCYTES NFR BLD AUTO: 0.9 % — SIGNIFICANT CHANGE UP (ref 0–0.9)
LYMPHOCYTES # BLD AUTO: 1.09 K/UL — SIGNIFICANT CHANGE UP (ref 1–3.3)
LYMPHOCYTES # BLD AUTO: 9 % — LOW (ref 13–44)
MCHC RBC-ENTMCNC: 23.2 PG — LOW (ref 27–34)
MCHC RBC-ENTMCNC: 32.1 GM/DL — SIGNIFICANT CHANGE UP (ref 32–36)
MCV RBC AUTO: 72.2 FL — LOW (ref 80–100)
MONOCYTES # BLD AUTO: 0.53 K/UL — SIGNIFICANT CHANGE UP (ref 0–0.9)
MONOCYTES NFR BLD AUTO: 4.4 % — SIGNIFICANT CHANGE UP (ref 2–14)
NEUTROPHILS # BLD AUTO: 10.31 K/UL — HIGH (ref 1.8–7.4)
NEUTROPHILS NFR BLD AUTO: 85.5 % — HIGH (ref 43–77)
PLATELET # BLD AUTO: 301 K/UL — SIGNIFICANT CHANGE UP (ref 150–400)
POTASSIUM SERPL-MCNC: 3.8 MMOL/L — SIGNIFICANT CHANGE UP (ref 3.5–5.3)
POTASSIUM SERPL-MCNC: 4.2 MMOL/L — SIGNIFICANT CHANGE UP (ref 3.5–5.3)
POTASSIUM SERPL-SCNC: 3.8 MMOL/L — SIGNIFICANT CHANGE UP (ref 3.5–5.3)
POTASSIUM SERPL-SCNC: 4.2 MMOL/L — SIGNIFICANT CHANGE UP (ref 3.5–5.3)
PROT SERPL-MCNC: 7.8 G/DL — SIGNIFICANT CHANGE UP (ref 6.6–8.7)
RBC # BLD: 5.22 M/UL — SIGNIFICANT CHANGE UP (ref 4.2–5.8)
RBC # FLD: 19.5 % — HIGH (ref 10.3–14.5)
SODIUM SERPL-SCNC: 134 MMOL/L — LOW (ref 135–145)
SODIUM SERPL-SCNC: 135 MMOL/L — SIGNIFICANT CHANGE UP (ref 135–145)
WBC # BLD: 12.07 K/UL — HIGH (ref 3.8–10.5)
WBC # FLD AUTO: 12.07 K/UL — HIGH (ref 3.8–10.5)

## 2022-11-18 PROCEDURE — 88305 TISSUE EXAM BY PATHOLOGIST: CPT | Mod: 26

## 2022-11-18 PROCEDURE — 99233 SBSQ HOSP IP/OBS HIGH 50: CPT

## 2022-11-18 PROCEDURE — 88342 IMHCHEM/IMCYTCHM 1ST ANTB: CPT | Mod: 26

## 2022-11-18 PROCEDURE — 43239 EGD BIOPSY SINGLE/MULTIPLE: CPT

## 2022-11-18 PROCEDURE — 74019 RADEX ABDOMEN 2 VIEWS: CPT | Mod: 26

## 2022-11-18 RX ORDER — HYDROMORPHONE HYDROCHLORIDE 2 MG/ML
0.5 INJECTION INTRAMUSCULAR; INTRAVENOUS; SUBCUTANEOUS ONCE
Refills: 0 | Status: DISCONTINUED | OUTPATIENT
Start: 2022-11-18 | End: 2022-11-18

## 2022-11-18 RX ORDER — HYDROMORPHONE HYDROCHLORIDE 2 MG/ML
1 INJECTION INTRAMUSCULAR; INTRAVENOUS; SUBCUTANEOUS
Refills: 0 | Status: DISCONTINUED | OUTPATIENT
Start: 2022-11-18 | End: 2022-11-21

## 2022-11-18 RX ORDER — TRAMADOL HYDROCHLORIDE 50 MG/1
50 TABLET ORAL EVERY 6 HOURS
Refills: 0 | Status: DISCONTINUED | OUTPATIENT
Start: 2022-11-18 | End: 2022-11-18

## 2022-11-18 RX ORDER — HYDROMORPHONE HYDROCHLORIDE 2 MG/ML
0.5 INJECTION INTRAMUSCULAR; INTRAVENOUS; SUBCUTANEOUS
Refills: 0 | Status: DISCONTINUED | OUTPATIENT
Start: 2022-11-18 | End: 2022-11-21

## 2022-11-18 RX ADMIN — SODIUM CHLORIDE 100 MILLILITER(S): 9 INJECTION INTRAMUSCULAR; INTRAVENOUS; SUBCUTANEOUS at 13:27

## 2022-11-18 RX ADMIN — PANTOPRAZOLE SODIUM 40 MILLIGRAM(S): 20 TABLET, DELAYED RELEASE ORAL at 17:48

## 2022-11-18 RX ADMIN — SODIUM CHLORIDE 100 MILLILITER(S): 9 INJECTION INTRAMUSCULAR; INTRAVENOUS; SUBCUTANEOUS at 07:18

## 2022-11-18 RX ADMIN — HYDROMORPHONE HYDROCHLORIDE 0.5 MILLIGRAM(S): 2 INJECTION INTRAMUSCULAR; INTRAVENOUS; SUBCUTANEOUS at 03:57

## 2022-11-18 RX ADMIN — HYDROMORPHONE HYDROCHLORIDE 0.5 MILLIGRAM(S): 2 INJECTION INTRAMUSCULAR; INTRAVENOUS; SUBCUTANEOUS at 04:35

## 2022-11-18 RX ADMIN — HYDROMORPHONE HYDROCHLORIDE 0.5 MILLIGRAM(S): 2 INJECTION INTRAMUSCULAR; INTRAVENOUS; SUBCUTANEOUS at 15:17

## 2022-11-18 RX ADMIN — ENOXAPARIN SODIUM 40 MILLIGRAM(S): 100 INJECTION SUBCUTANEOUS at 13:27

## 2022-11-18 RX ADMIN — HYDROMORPHONE HYDROCHLORIDE 0.5 MILLIGRAM(S): 2 INJECTION INTRAMUSCULAR; INTRAVENOUS; SUBCUTANEOUS at 09:35

## 2022-11-18 RX ADMIN — Medication 10 MILLIGRAM(S): at 05:03

## 2022-11-18 RX ADMIN — HYDROMORPHONE HYDROCHLORIDE 0.5 MILLIGRAM(S): 2 INJECTION INTRAMUSCULAR; INTRAVENOUS; SUBCUTANEOUS at 15:54

## 2022-11-18 RX ADMIN — HYDROMORPHONE HYDROCHLORIDE 1 MILLIGRAM(S): 2 INJECTION INTRAMUSCULAR; INTRAVENOUS; SUBCUTANEOUS at 20:40

## 2022-11-18 RX ADMIN — HYDROMORPHONE HYDROCHLORIDE 0.5 MILLIGRAM(S): 2 INJECTION INTRAMUSCULAR; INTRAVENOUS; SUBCUTANEOUS at 00:06

## 2022-11-18 RX ADMIN — Medication 10 MILLIGRAM(S): at 05:20

## 2022-11-18 RX ADMIN — Medication 10 MILLIGRAM(S): at 16:36

## 2022-11-18 RX ADMIN — Medication 10 MILLIGRAM(S): at 17:48

## 2022-11-18 RX ADMIN — HYDROMORPHONE HYDROCHLORIDE 1 MILLIGRAM(S): 2 INJECTION INTRAMUSCULAR; INTRAVENOUS; SUBCUTANEOUS at 22:41

## 2022-11-18 RX ADMIN — HYDROMORPHONE HYDROCHLORIDE 0.5 MILLIGRAM(S): 2 INJECTION INTRAMUSCULAR; INTRAVENOUS; SUBCUTANEOUS at 10:09

## 2022-11-18 RX ADMIN — HYDROMORPHONE HYDROCHLORIDE 1 MILLIGRAM(S): 2 INJECTION INTRAMUSCULAR; INTRAVENOUS; SUBCUTANEOUS at 19:58

## 2022-11-18 RX ADMIN — PANTOPRAZOLE SODIUM 40 MILLIGRAM(S): 20 TABLET, DELAYED RELEASE ORAL at 05:03

## 2022-11-18 RX ADMIN — Medication 10 MILLIGRAM(S): at 22:27

## 2022-11-18 RX ADMIN — Medication 10 MILLIGRAM(S): at 13:27

## 2022-11-18 RX ADMIN — HYDROMORPHONE HYDROCHLORIDE 0.5 MILLIGRAM(S): 2 INJECTION INTRAMUSCULAR; INTRAVENOUS; SUBCUTANEOUS at 01:11

## 2022-11-18 NOTE — PROGRESS NOTE ADULT - SUBJECTIVE AND OBJECTIVE BOX
MIAH GIL    [id number]    57y      Male    HPI:  abdominal pain/nausea for several days, now npo s/p EGD.  Patient has hx esophageal cancer, cured with surgery, no xrt/ctx.  Now likely gastroparesis s/p esophagectomy and gastric pull 2016, he reports this occurs several times per year, usually lasting 3-4 days, resolves completely.  He does not usually take pain medication at home.  Presently he is getting good relief with IVP dilaudid 0.5 or 1mg, not lasting >2 hrs.  No adverse effects with medication, he denies sedation.    REVIEW OF SYSTEMS:    CONSTITUTIONAL: pain  RESPIRATORY: No cough, wheezing, hemoptysis; No shortness of breath  CARDIOVASCULAR: No chest pain, palpitations  GASTROINTESTINAL: No abdominal or epigastric pain. No nausea, vomiting  NEUROLOGICAL: No headaches, memory loss, loss of strength.  MISCELLANEOUS:    Vital Signs Last 24 Hrs  T(C): 37.1 (18 Nov 2022 04:25), Max: 37.1 (18 Nov 2022 04:25)  T(F): 98.7 (18 Nov 2022 04:25), Max: 98.7 (18 Nov 2022 04:25)  HR: 69 (18 Nov 2022 05:15) (69 - 90)  BP: 178/86 (18 Nov 2022 05:15) (155/64 - 178/86)  BP(mean): --  RR: 18 (18 Nov 2022 04:25) (18 - 18)  SpO2: 98% (18 Nov 2022 04:25) (96% - 98%)    PHYSICAL EXAM:    Constitutional: No acute distress, alert and oriented by 3  HEENT: AT/NC, EOMI, PERRLA, Normal conjunctiva, no pharyngeal erythema, moist oral mucosa  Respiratory: CTA BL, equal breath sounds, no crackles or wheezing  Cardiovascular: RRR, no edema  Gastrointestinal: soft, Non-tender, Non-distended + Bowel sounds, no rebound or guarding  Musculoskeletal: No joint edema  Neurological: CN 2-12 grossly intact, no focal deficits  Skin: warm, dry and intact  Psychiatric: normal mood and affect    LABS:                        12.1   12.07 )-----------( 301      ( 18 Nov 2022 06:28 )             37.7     11-18    135  |  99  |  17.4  ----------------------------<  122<H>  4.2   |  22.0  |  0.59    Ca    9.4      18 Nov 2022 06:28  TPro  7.8  /  Alb  5.0  /  TBili  0.6  /  DBili  x   /  AST  21  /  ALT  26  /  AlkPhos  104  11-17  PT/INR - ( 17 Nov 2022 01:40 )   PT: 13.3 sec;   INR: 1.14 ratio    PTT - ( 17 Nov 2022 01:40 )  PTT:27.0 sec    MEDICATIONS  (STANDING):  amLODIPine   Tablet 5 milliGRAM(s) Oral daily  enoxaparin Injectable 40 milliGRAM(s) SubCutaneous every 24 hours  influenza   Vaccine 0.5 milliLiter(s) IntraMuscular once  metoclopramide Injectable 10 milliGRAM(s) IV Push every 6 hours  pantoprazole  Injectable 40 milliGRAM(s) IV Push every 12 hours  sodium chloride 0.9%. 1000 milliLiter(s) (100 mL/Hr) IV Continuous <Continuous>    MEDICATIONS  (PRN):  acetaminophen     Tablet .. 650 milliGRAM(s) Oral every 6 hours PRN Temp greater or equal to 38C (100.4F), Mild Pain (1 - 3)  aluminum hydroxide/magnesium hydroxide/simethicone Suspension 30 milliLiter(s) Oral every 4 hours PRN Dyspepsia  hydrALAZINE Injectable 10 milliGRAM(s) IV Push every 6 hours PRN hypertenson> 180 sbp  melatonin 3 milliGRAM(s) Oral at bedtime PRN Insomnia  ondansetron Injectable 4 milliGRAM(s) IV Push every 8 hours PRN Nausea and/or Vomiting  traMADol 50 milliGRAM(s) Oral every 6 hours PRN Severe Pain (7 - 10)        · Assessment	  56 yo male with past medical hx of distal esophagectomy and gastric pull up in 2016 presented to the ED for nausea and vomiting.     Nausea, vomiting and abdominal pain likely due to gastroparesis in the setting of esophagectomy with gastric pull through for esophageal cancer.     Plan:  - Sliding scale dilaudid SUBCUTANEOUS, which will last longer, 0.5mg/1mg prn q3hrs.   -Patient is amenable to SC injections.  -Will f/u, thank you!  -Italia Friedman NP-C  [phone]  -

## 2022-11-18 NOTE — PROGRESS NOTE ADULT - ASSESSMENT
58 yo male with past medical hx of distal esophagectomy and gastric pull up in 2016 presented to the ED for nausea and vomiting.     Nausea, vomiting and abdominal pain likely due to gastroparesis in the setting of esophagectomy with gastric pull through for esophageal cancer.   - for egd today   - start Reglan atc per GI  - PP IV BID  -- will consult pain manegemtn     HTN  - Amlodipine 5mg     Leukocytosis likely reactive  - check UA  - monitor    DVT ppx: Lovenox

## 2022-11-18 NOTE — PROGRESS NOTE ADULT - SUBJECTIVE AND OBJECTIVE BOX
MIAH GIL    [id number]    57y      Male    INTERVAL HPI/OVERNIGHT EVENTS: patient being seen for nausea and vomiting and is npo for egd. patient complains of pain     REVIEW OF SYSTEMS:    CONSTITUTIONAL: pain  RESPIRATORY: No cough, wheezing, hemoptysis; No shortness of breath  CARDIOVASCULAR: No chest pain, palpitations  GASTROINTESTINAL: No abdominal or epigastric pain. No nausea, vomiting  NEUROLOGICAL: No headaches, memory loss, loss of strength.  MISCELLANEOUS:      Vital Signs Last 24 Hrs  T(C): 37.1 (18 Nov 2022 04:25), Max: 37.1 (18 Nov 2022 04:25)  T(F): 98.7 (18 Nov 2022 04:25), Max: 98.7 (18 Nov 2022 04:25)  HR: 69 (18 Nov 2022 05:15) (69 - 90)  BP: 178/86 (18 Nov 2022 05:15) (155/64 - 178/86)  BP(mean): --  RR: 18 (18 Nov 2022 04:25) (18 - 18)  SpO2: 98% (18 Nov 2022 04:25) (96% - 98%)    Parameters below as of 18 Nov 2022 04:25  Patient On (Oxygen Delivery Method): room air        PHYSICAL EXAM:    Constitutional: No acute distress, alert and oriented by 3  HEENT: AT/NC, EOMI, PERRLA, Normal conjunctiva, no pharyngeal erythema, moist oral mucosa  Respiratory: CTA BL, equal breath sounds, no crackles or wheezing  Cardiovascular: RRR, no edema  Gastrointestinal: soft, Non-tender, Non-distended + Bowel sounds, no rebound or guarding  Musculoskeletal: No joint edema  Neurological: CN 2-12 grossly intact, no focal deficits  Skin: warm, dry and intact  Psychiatric: normal mood and affect    LABS:                        12.1   12.07 )-----------( 301      ( 18 Nov 2022 06:28 )             37.7     11-18    135  |  99  |  17.4  ----------------------------<  122<H>  4.2   |  22.0  |  0.59    Ca    9.4      18 Nov 2022 06:28    TPro  7.8  /  Alb  5.0  /  TBili  0.6  /  DBili  x   /  AST  21  /  ALT  26  /  AlkPhos  104  11-17    PT/INR - ( 17 Nov 2022 01:40 )   PT: 13.3 sec;   INR: 1.14 ratio         PTT - ( 17 Nov 2022 01:40 )  PTT:27.0 sec        MEDICATIONS  (STANDING):  amLODIPine   Tablet 5 milliGRAM(s) Oral daily  enoxaparin Injectable 40 milliGRAM(s) SubCutaneous every 24 hours  influenza   Vaccine 0.5 milliLiter(s) IntraMuscular once  metoclopramide Injectable 10 milliGRAM(s) IV Push every 6 hours  pantoprazole  Injectable 40 milliGRAM(s) IV Push every 12 hours  sodium chloride 0.9%. 1000 milliLiter(s) (100 mL/Hr) IV Continuous <Continuous>    MEDICATIONS  (PRN):  acetaminophen     Tablet .. 650 milliGRAM(s) Oral every 6 hours PRN Temp greater or equal to 38C (100.4F), Mild Pain (1 - 3)  aluminum hydroxide/magnesium hydroxide/simethicone Suspension 30 milliLiter(s) Oral every 4 hours PRN Dyspepsia  hydrALAZINE Injectable 10 milliGRAM(s) IV Push every 6 hours PRN hypertenson> 180 sbp  melatonin 3 milliGRAM(s) Oral at bedtime PRN Insomnia  ondansetron Injectable 4 milliGRAM(s) IV Push every 8 hours PRN Nausea and/or Vomiting  traMADol 50 milliGRAM(s) Oral every 6 hours PRN Severe Pain (7 - 10)      RADIOLOGY & ADDITIONAL TESTS:

## 2022-11-19 LAB
A1C WITH ESTIMATED AVERAGE GLUCOSE RESULT: 5.5 % — SIGNIFICANT CHANGE UP (ref 4–5.6)
ALBUMIN SERPL ELPH-MCNC: 4.5 G/DL — SIGNIFICANT CHANGE UP (ref 3.3–5.2)
ALP SERPL-CCNC: 86 U/L — SIGNIFICANT CHANGE UP (ref 40–120)
ALT FLD-CCNC: 19 U/L — SIGNIFICANT CHANGE UP
ANION GAP SERPL CALC-SCNC: 15 MMOL/L — SIGNIFICANT CHANGE UP (ref 5–17)
AST SERPL-CCNC: 14 U/L — SIGNIFICANT CHANGE UP
BILIRUB SERPL-MCNC: 0.4 MG/DL — SIGNIFICANT CHANGE UP (ref 0.4–2)
BUN SERPL-MCNC: 21 MG/DL — HIGH (ref 8–20)
CALCIUM SERPL-MCNC: 8.9 MG/DL — SIGNIFICANT CHANGE UP (ref 8.4–10.5)
CHLORIDE SERPL-SCNC: 102 MMOL/L — SIGNIFICANT CHANGE UP (ref 96–108)
CO2 SERPL-SCNC: 21 MMOL/L — LOW (ref 22–29)
CREAT SERPL-MCNC: 0.66 MG/DL — SIGNIFICANT CHANGE UP (ref 0.5–1.3)
EGFR: 111 ML/MIN/1.73M2 — SIGNIFICANT CHANGE UP
ESTIMATED AVERAGE GLUCOSE: 111 MG/DL — SIGNIFICANT CHANGE UP (ref 68–114)
GLUCOSE SERPL-MCNC: 115 MG/DL — HIGH (ref 70–99)
MAGNESIUM SERPL-MCNC: 1.9 MG/DL — SIGNIFICANT CHANGE UP (ref 1.8–2.6)
POTASSIUM SERPL-MCNC: 3.3 MMOL/L — LOW (ref 3.5–5.3)
POTASSIUM SERPL-SCNC: 3.3 MMOL/L — LOW (ref 3.5–5.3)
PROT SERPL-MCNC: 7 G/DL — SIGNIFICANT CHANGE UP (ref 6.6–8.7)
SODIUM SERPL-SCNC: 138 MMOL/L — SIGNIFICANT CHANGE UP (ref 135–145)

## 2022-11-19 PROCEDURE — 99233 SBSQ HOSP IP/OBS HIGH 50: CPT

## 2022-11-19 RX ORDER — POTASSIUM CHLORIDE 20 MEQ
40 PACKET (EA) ORAL EVERY 4 HOURS
Refills: 0 | Status: COMPLETED | OUTPATIENT
Start: 2022-11-19 | End: 2022-11-19

## 2022-11-19 RX ORDER — ERYTHROMYCIN ETHYLSUCCINATE 400 MG
250 TABLET ORAL
Refills: 0 | Status: DISCONTINUED | OUTPATIENT
Start: 2022-11-19 | End: 2022-11-21

## 2022-11-19 RX ORDER — ACETAMINOPHEN 500 MG
1000 TABLET ORAL ONCE
Refills: 0 | Status: DISCONTINUED | OUTPATIENT
Start: 2022-11-19 | End: 2022-11-21

## 2022-11-19 RX ADMIN — Medication 250 MILLIGRAM(S): at 18:25

## 2022-11-19 RX ADMIN — ENOXAPARIN SODIUM 40 MILLIGRAM(S): 100 INJECTION SUBCUTANEOUS at 13:10

## 2022-11-19 RX ADMIN — Medication 10 MILLIGRAM(S): at 16:40

## 2022-11-19 RX ADMIN — HYDROMORPHONE HYDROCHLORIDE 1 MILLIGRAM(S): 2 INJECTION INTRAMUSCULAR; INTRAVENOUS; SUBCUTANEOUS at 00:49

## 2022-11-19 RX ADMIN — HYDROMORPHONE HYDROCHLORIDE 1 MILLIGRAM(S): 2 INJECTION INTRAMUSCULAR; INTRAVENOUS; SUBCUTANEOUS at 04:45

## 2022-11-19 RX ADMIN — HYDROMORPHONE HYDROCHLORIDE 0.5 MILLIGRAM(S): 2 INJECTION INTRAMUSCULAR; INTRAVENOUS; SUBCUTANEOUS at 23:39

## 2022-11-19 RX ADMIN — SODIUM CHLORIDE 100 MILLILITER(S): 9 INJECTION INTRAMUSCULAR; INTRAVENOUS; SUBCUTANEOUS at 04:45

## 2022-11-19 RX ADMIN — Medication 10 MILLIGRAM(S): at 00:29

## 2022-11-19 RX ADMIN — HYDROMORPHONE HYDROCHLORIDE 1 MILLIGRAM(S): 2 INJECTION INTRAMUSCULAR; INTRAVENOUS; SUBCUTANEOUS at 20:41

## 2022-11-19 RX ADMIN — Medication 10 MILLIGRAM(S): at 13:10

## 2022-11-19 RX ADMIN — HYDROMORPHONE HYDROCHLORIDE 1 MILLIGRAM(S): 2 INJECTION INTRAMUSCULAR; INTRAVENOUS; SUBCUTANEOUS at 13:09

## 2022-11-19 RX ADMIN — Medication 10 MILLIGRAM(S): at 23:39

## 2022-11-19 RX ADMIN — HYDROMORPHONE HYDROCHLORIDE 1 MILLIGRAM(S): 2 INJECTION INTRAMUSCULAR; INTRAVENOUS; SUBCUTANEOUS at 16:30

## 2022-11-19 RX ADMIN — HYDROMORPHONE HYDROCHLORIDE 1 MILLIGRAM(S): 2 INJECTION INTRAMUSCULAR; INTRAVENOUS; SUBCUTANEOUS at 18:25

## 2022-11-19 RX ADMIN — HYDROMORPHONE HYDROCHLORIDE 1 MILLIGRAM(S): 2 INJECTION INTRAMUSCULAR; INTRAVENOUS; SUBCUTANEOUS at 05:00

## 2022-11-19 RX ADMIN — HYDROMORPHONE HYDROCHLORIDE 1 MILLIGRAM(S): 2 INJECTION INTRAMUSCULAR; INTRAVENOUS; SUBCUTANEOUS at 09:02

## 2022-11-19 RX ADMIN — PANTOPRAZOLE SODIUM 40 MILLIGRAM(S): 20 TABLET, DELAYED RELEASE ORAL at 04:45

## 2022-11-19 RX ADMIN — PANTOPRAZOLE SODIUM 40 MILLIGRAM(S): 20 TABLET, DELAYED RELEASE ORAL at 16:40

## 2022-11-19 RX ADMIN — Medication 10 MILLIGRAM(S): at 23:38

## 2022-11-19 RX ADMIN — HYDROMORPHONE HYDROCHLORIDE 1 MILLIGRAM(S): 2 INJECTION INTRAMUSCULAR; INTRAVENOUS; SUBCUTANEOUS at 00:05

## 2022-11-19 RX ADMIN — Medication 10 MILLIGRAM(S): at 04:45

## 2022-11-19 RX ADMIN — Medication 40 MILLIEQUIVALENT(S): at 16:40

## 2022-11-19 RX ADMIN — HYDROMORPHONE HYDROCHLORIDE 1 MILLIGRAM(S): 2 INJECTION INTRAMUSCULAR; INTRAVENOUS; SUBCUTANEOUS at 21:00

## 2022-11-19 RX ADMIN — HYDROMORPHONE HYDROCHLORIDE 1 MILLIGRAM(S): 2 INJECTION INTRAMUSCULAR; INTRAVENOUS; SUBCUTANEOUS at 09:56

## 2022-11-19 RX ADMIN — HYDROMORPHONE HYDROCHLORIDE 1 MILLIGRAM(S): 2 INJECTION INTRAMUSCULAR; INTRAVENOUS; SUBCUTANEOUS at 16:39

## 2022-11-19 NOTE — PROGRESS NOTE ADULT - ASSESSMENT
hx of esophageal ca s/p esophagectomy and gastric pullthrough. Recurrent abdominal pain with N/V likely secondary to flare of gastroparesis.  Neg CT abd/chest. EGD fairly unremarkable except for duod bulb mild dialtion. On Reglan and PPI. supportive care for now. usually takes 3-4 days to improve per pt.

## 2022-11-19 NOTE — PROGRESS NOTE ADULT - SUBJECTIVE AND OBJECTIVE BOX
MIAH GIL    [id number]    57y      Male 6T [id number]    HPI:  abdominal pain/nausea for several days, now npo s/p EGD without acute findings.  Patient has hx esophageal cancer, cured with surgery, no xrt/ctx.  Now likely gastroparesis s/p esophagectomy and gastric pull 2016, he reports this occurs several times per year, usually lasting 3-4 days, resolves completely.  He does not usually take pain medication at home.  Presently he is getting good relief with sliding scale SUBCUTANEOUS dilaudid, feels better-controlled with the adjustment in delivery and dose.  No adverse effects with medication, he denies sedation.  So far he is not tolerating p.o.    REVIEW OF SYSTEMS:  CONSTITUTIONAL: pain  RESPIRATORY: No cough, wheezing, hemoptysis; No shortness of breath  CARDIOVASCULAR: No chest pain, palpitations  GASTROINTESTINAL: No abdominal or epigastric pain. No nausea, vomiting  NEUROLOGICAL: No headaches, memory loss, loss of strength.    Vital Signs stable    PHYSICAL EXAM:  Constitutional: A & O, appears uncomfortable  HEENT: AT/NC, EOMI, PERRLA, Normal conjunctiva, no pharyngeal erythema, moist oral mucosa  Respiratory: CTA BL, equal breath sounds, no crackles or wheezing  Cardiovascular: RRR, no edema  Gastrointestinal: soft, Non-tender, Non-distended + Bowel sounds, no rebound or guarding  Musculoskeletal: No joint edema  Neurological: CN 2-12 grossly intact, no focal deficits  Skin: warm, dry and intact  Psychiatric: normal mood and affect    MEDICATIONS  (STANDING):  amLODIPine   Tablet 5 milliGRAM(s) Oral daily  enoxaparin Injectable 40 milliGRAM(s) SubCutaneous every 24 hours  influenza   Vaccine 0.5 milliLiter(s) IntraMuscular once  metoclopramide Injectable 10 milliGRAM(s) IV Push every 6 hours  pantoprazole  Injectable 40 milliGRAM(s) IV Push every 12 hours  sodium chloride 0.9%. 1000 milliLiter(s) (100 mL/Hr) IV Continuous <Continuous>    MEDICATIONS  (PRN):  acetaminophen     Tablet .. 650 milliGRAM(s) Oral every 6 hours PRN Temp greater or equal to 38C (100.4F), Mild Pain (1 - 3)  aluminum hydroxide/magnesium hydroxide/simethicone Suspension 30 milliLiter(s) Oral every 4 hours PRN Dyspepsia  hydrALAZINE Injectable 10 milliGRAM(s) IV Push every 6 hours PRN hypertenson> 180 sbp  melatonin 3 milliGRAM(s) Oral at bedtime PRN Insomnia  ondansetron Injectable 4 milliGRAM(s) IV Push every 8 hours PRN Nausea and/or Vomiting    Assessment	  56 yo male with past medical hx of distal esophagectomy and gastric pull up in 2016 presented to the ED for nausea and vomiting.   Likely gastroparesis s/p esophagectomy with gastric pull through for esophageal cancer.     Plan:  -Continue present management from Pain standpoint.  - Sliding scale dilaudid SUBCUTANEOUS, which will last longer, 0.5mg/1mg prn q3hrs.   -If able to convert to p.o. meds start hydromorphone 2mg/4mg prn abdominal pain q4hrs.  -Will f/u, thank you!  -Italia Friedman NP-C  [phone]  -

## 2022-11-19 NOTE — PROGRESS NOTE ADULT - SUBJECTIVE AND OBJECTIVE BOX
INTERVAL HPI/OVERNIGHT EVENTS:    CC: abdominal pain, intractable vomiting, h/o esophagectomy    Chart and course reviewed.  Reports still has abdominal pain  no acute findings on egd  just had some sips of water since am    Vital Signs Last 24 Hrs  T(C): 36.8 (19 Nov 2022 11:19), Max: 37.4 (18 Nov 2022 17:47)  T(F): 98.2 (19 Nov 2022 11:19), Max: 99.3 (18 Nov 2022 17:47)  HR: 101 (19 Nov 2022 11:19) (98 - 119)  BP: 168/97 (19 Nov 2022 11:19) (119/86 - 180/100)  BP(mean): --  RR: 18 (19 Nov 2022 11:19) (18 - 20)  SpO2: 97% (19 Nov 2022 11:19) (96% - 97%)    Parameters below as of 19 Nov 2022 11:19  Patient On (Oxygen Delivery Method): room air        PHYSICAL EXAM:    GENERAL: alert, not in distress  CHEST/LUNG: b/l air entry  HEART: reg  ABDOMEN: soft, bs+, non tender  EXTREMITIES:  no edema, tenderness    MEDICATIONS  (STANDING):  acetaminophen   IVPB .. 1000 milliGRAM(s) IV Intermittent once  amLODIPine   Tablet 5 milliGRAM(s) Oral daily  enoxaparin Injectable 40 milliGRAM(s) SubCutaneous every 24 hours  erythromycin     enteric coated 250 milliGRAM(s) Oral four times a day  influenza   Vaccine 0.5 milliLiter(s) IntraMuscular once  metoclopramide Injectable 10 milliGRAM(s) IV Push every 6 hours  pantoprazole  Injectable 40 milliGRAM(s) IV Push every 12 hours  potassium chloride    Tablet ER 40 milliEquivalent(s) Oral every 4 hours  sodium chloride 0.9%. 1000 milliLiter(s) (100 mL/Hr) IV Continuous <Continuous>    MEDICATIONS  (PRN):  acetaminophen     Tablet .. 650 milliGRAM(s) Oral every 6 hours PRN Temp greater or equal to 38C (100.4F), Mild Pain (1 - 3)  aluminum hydroxide/magnesium hydroxide/simethicone Suspension 30 milliLiter(s) Oral every 4 hours PRN Dyspepsia  hydrALAZINE Injectable 10 milliGRAM(s) IV Push every 6 hours PRN hypertenson> 180 sbp  HYDROmorphone  Injectable 0.5 milliGRAM(s) SubCutaneous every 3 hours PRN Moderate Pain (4 - 6)  HYDROmorphone  Injectable 1 milliGRAM(s) SubCutaneous every 3 hours PRN Severe Pain (7 - 10)  melatonin 3 milliGRAM(s) Oral at bedtime PRN Insomnia  ondansetron Injectable 4 milliGRAM(s) IV Push every 8 hours PRN Nausea and/or Vomiting      Allergies    No Known Allergies    Intolerances          LABS:                          12.1   12.07 )-----------( 301      ( 18 Nov 2022 06:28 )             37.7     11-19    138  |  102  |  21.0<H>  ----------------------------<  115<H>  3.3<L>   |  21.0<L>  |  0.66    Ca    8.9      19 Nov 2022 05:20  Mg     1.9     11-19    TPro  7.0  /  Alb  4.5  /  TBili  0.4  /  DBili  x   /  AST  14  /  ALT  19  /  AlkPhos  86  11-19          RADIOLOGY & ADDITIONAL TESTS:

## 2022-11-19 NOTE — PROGRESS NOTE ADULT - ASSESSMENT
57 yr old male with history of esophageal cancer s/p distal esophagectomy and gastric pull up in 2016 presented to the ED with complaints of abdominal pain, nausea and vomiting with inability to tolerate PO. No acute pathology noted on CT abdomen. GI was consulted, Reglan was given. EGD was done on 11/18, revealed no acute pathology, biopsies were taken. Pain management was consulted.  57 yr old male with history of esophageal cancer s/p distal esophagectomy and gastric pull up in 2016 presented to the ED with complaints of abdominal pain, nausea and vomiting with inability to tolerate PO. No acute pathology noted on CT abdomen. GI was consulted, Reglan was given. EGD was done on 11/18, revealed no acute pathology, biopsies were taken. Pain management was consulted.     1. Intractable vomiting, g/o esophagectomy:  s/p EGD  advance diet as tolerated  continue PPI  pain management following    2. Hypokalemia:  Replace potassium    3. Hypertension:  Amlodipine.     4. DVT ppx:  Lovenox    Discussed with patient and significant other at bedside.  Discharge when able to tolerate PO.

## 2022-11-19 NOTE — PROGRESS NOTE ADULT - SUBJECTIVE AND OBJECTIVE BOX
Patient is a 57y old  Male who presents with a chief complaint of abdominal pain and nausea and vomiting    S: doing about the same from nausea perspective. pain a bit better.       REVIEW OF SYSTEMS:  Constitutional: No fever, weight loss or fatigue  ENMT:  No difficulty hearing, tinnitus, vertigo; No sinus or throat pain  Respiratory: No cough, wheezing, chills or hemoptysis  Cardiovascular: No chest pain, palpitations, dizziness or leg swelling  Gastrointestinal: As per HPI.    PAST MEDICAL & SURGICAL HISTORY:  Hypertension      Esophageal cancer      Gastroparesis      S/P cholecystectomy      H/O esophagectomy          FAMILY HISTORY:  FH: HTN (hypertension) (Father)        SOCIAL HISTORY:  Smoking Status: [ ] Current, [x ] Former, [ ] Never  Pack Years: > 20. Stopped smoking roughly 15 years ago, + ETOH in the past stopped in 2007. No drug abuse history.    MEDICATIONS: At Home:  Reglan  Omeprazole  Amlodopine    MEDICATIONS  (STANDING):    MEDICATIONS  (PRN):      Allergies    No Known Allergies    Intolerances        Vital Signs Last 24 Hrs  T(C): 36.9 (17 Nov 2022 07:56), Max: 36.9 (17 Nov 2022 07:56)  T(F): 98.5 (17 Nov 2022 07:56), Max: 98.5 (17 Nov 2022 07:56)  HR: 81 (17 Nov 2022 07:56) (66 - 81)  BP: 136/77 (17 Nov 2022 07:56) (136/77 - 196/104)  BP(mean): --  RR: 18 (17 Nov 2022 07:56) (17 - 20)  SpO2: 96% (17 Nov 2022 07:56) (96% - 98%)    Parameters below as of 17 Nov 2022 07:56  Patient On (Oxygen Delivery Method): room air            PHYSICAL EXAM:    General: Well developed; in no acute distress  HEENT: MMM, conjunctiva pink and sclera anicteric.  Cor: RRR S1, S2 only.  Gastrointestinal: Abdomen: Soft + epigastric tenderness, non-distended; Normal bowel sounds; No rebound or guarding or HSM.  Extremities: Normal range of motion, No clubbing, cyanosis or edema  Skin: Warm and dry. No obvious rash      LABS:                        12.4   12.91 )-----------( 322      ( 17 Nov 2022 01:40 )             38.1     11-17    137  |  99  |  12.2  ----------------------------<  162<H>  4.0   |  22.0  |  0.79    Ca    9.8      17 Nov 2022 01:40    TPro  8.4  /  Alb  5.4<H>  /  TBili  0.5  /  DBili  x   /  AST  26  /  ALT  31  /  AlkPhos  122<H>  11-17          RADIOLOGY & ADDITIONAL STUDIES:   < from: CT Abdomen and Pelvis w/ IV Cont (11.17.22 @ 06:48) >  975 EXAM:  CT ABDOMEN AND PELVIS IC                        ACC: [id number] EXAM:  CT CHEST IC                          PROCEDURE DATE:  11/17/2022          INTERPRETATION:  CLINICAL INFORMATION: Upper abdominal pain, history of   esophagealcancer status post esophagectomy    COMPARISON: CT of March 24, 2022.    CONTRAST/COMPLICATIONS:  IV Contrast: Omnipaque 350 (accession [id number]), IV contrast documented   in associated exam (accession [id number])  96 cc administered   4 cc   discarded  Oral Contrast: NONE  Complications: None reported at time of study completion    PROCEDURE:  CT of the Chest, Abdomen and Pelvis was performed.  Sagittal and coronal reformats were performed.    FINDINGS:  CHEST:  LUNGS AND LARGE AIRWAYS: Patent central airways. Fibrotic changes at the   lung apices.  PLEURA: No pleural effusion.  VESSELS: Ascending aorta is mildly dilated, measuring 4.2 cm. No central   pulmonary embolism. Mild coronary atherosclerosis.  HEART: Heart size is normal. No pericardial effusion.  MEDIASTINUM AND SEVEN: Status post esophagectomy with gastric   pull-through. No lymphadenopathy.  CHEST WALL AND LOWER NECK: Within normal limits.    ABDOMEN AND PELVIS:  LIVER: Within normal limits.  BILE DUCTS: Normal caliber.  GALLBLADDER: Cholecystectomy.  SPLEEN: Within normal limits.  PANCREAS: Within normal limits.  ADRENALS: Within normal limits.  KIDNEYS/URETERS: There is an indeterminant 1 cm hypodense lesion within   the left interpolar kidney.  BLADDER: Incompletely distended.  REPRODUCTIVE ORGANS: The prostate gland is not enlarged.    BOWEL: No bowel obstruction. Appendix is unremarkable. The colon is   underdistended without significant fecal load. No convincing focal bowel   wall thickening or diverticulitis. Scattered diverticulosis.  PERITONEUM: No ascites.  VESSELS: IVC filter.  RETROPERITONEUM/LYMPH NODES: No lymphadenopathy.  ABDOMINAL WALL: Small fat-containing umbilical hernia  BONES: Old fractures of the left superior and inferior pubic rami.      IMPRESSION:    No explanation for upper abdominal pain on this CT.  Status post esophagectomy with gastric pull-through. No pneumomediastinum   or suspicious mass.    Status post cholecystectomy. No small bowel obstruction or suspicious   mass.            --- End of Report ---            MICHELLE SNYDER MD; Attending Radiologist  This document has been electronically signed. Nov 17 2022  7:09AM    < end of copied text >

## 2022-11-20 LAB
ANION GAP SERPL CALC-SCNC: 16 MMOL/L — SIGNIFICANT CHANGE UP (ref 5–17)
BASOPHILS # BLD AUTO: 0.04 K/UL — SIGNIFICANT CHANGE UP (ref 0–0.2)
BASOPHILS NFR BLD AUTO: 0.4 % — SIGNIFICANT CHANGE UP (ref 0–2)
BUN SERPL-MCNC: 13.6 MG/DL — SIGNIFICANT CHANGE UP (ref 8–20)
CALCIUM SERPL-MCNC: 8.8 MG/DL — SIGNIFICANT CHANGE UP (ref 8.4–10.5)
CHLORIDE SERPL-SCNC: 96 MMOL/L — SIGNIFICANT CHANGE UP (ref 96–108)
CO2 SERPL-SCNC: 22 MMOL/L — SIGNIFICANT CHANGE UP (ref 22–29)
CREAT SERPL-MCNC: 0.55 MG/DL — SIGNIFICANT CHANGE UP (ref 0.5–1.3)
EGFR: 117 ML/MIN/1.73M2 — SIGNIFICANT CHANGE UP
EOSINOPHIL # BLD AUTO: 0.01 K/UL — SIGNIFICANT CHANGE UP (ref 0–0.5)
EOSINOPHIL NFR BLD AUTO: 0.1 % — SIGNIFICANT CHANGE UP (ref 0–6)
GLUCOSE SERPL-MCNC: 111 MG/DL — HIGH (ref 70–99)
HCT VFR BLD CALC: 37.1 % — LOW (ref 39–50)
HGB BLD-MCNC: 11.6 G/DL — LOW (ref 13–17)
IMM GRANULOCYTES NFR BLD AUTO: 0.7 % — SIGNIFICANT CHANGE UP (ref 0–0.9)
LYMPHOCYTES # BLD AUTO: 1.74 K/UL — SIGNIFICANT CHANGE UP (ref 1–3.3)
LYMPHOCYTES # BLD AUTO: 16.3 % — SIGNIFICANT CHANGE UP (ref 13–44)
MAGNESIUM SERPL-MCNC: 2.1 MG/DL — SIGNIFICANT CHANGE UP (ref 1.6–2.6)
MCHC RBC-ENTMCNC: 22.8 PG — LOW (ref 27–34)
MCHC RBC-ENTMCNC: 31.3 GM/DL — LOW (ref 32–36)
MCV RBC AUTO: 73 FL — LOW (ref 80–100)
MONOCYTES # BLD AUTO: 0.81 K/UL — SIGNIFICANT CHANGE UP (ref 0–0.9)
MONOCYTES NFR BLD AUTO: 7.6 % — SIGNIFICANT CHANGE UP (ref 2–14)
NEUTROPHILS # BLD AUTO: 8 K/UL — HIGH (ref 1.8–7.4)
NEUTROPHILS NFR BLD AUTO: 74.9 % — SIGNIFICANT CHANGE UP (ref 43–77)
PHOSPHATE SERPL-MCNC: 2.6 MG/DL — SIGNIFICANT CHANGE UP (ref 2.4–4.7)
PLATELET # BLD AUTO: 343 K/UL — SIGNIFICANT CHANGE UP (ref 150–400)
POTASSIUM SERPL-MCNC: 3.3 MMOL/L — LOW (ref 3.5–5.3)
POTASSIUM SERPL-SCNC: 3.3 MMOL/L — LOW (ref 3.5–5.3)
RBC # BLD: 5.08 M/UL — SIGNIFICANT CHANGE UP (ref 4.2–5.8)
RBC # FLD: 19 % — HIGH (ref 10.3–14.5)
SODIUM SERPL-SCNC: 134 MMOL/L — LOW (ref 135–145)
WBC # BLD: 10.67 K/UL — HIGH (ref 3.8–10.5)
WBC # FLD AUTO: 10.67 K/UL — HIGH (ref 3.8–10.5)

## 2022-11-20 PROCEDURE — 99233 SBSQ HOSP IP/OBS HIGH 50: CPT

## 2022-11-20 RX ORDER — POTASSIUM CHLORIDE 20 MEQ
40 PACKET (EA) ORAL EVERY 4 HOURS
Refills: 0 | Status: COMPLETED | OUTPATIENT
Start: 2022-11-20 | End: 2022-11-20

## 2022-11-20 RX ADMIN — HYDROMORPHONE HYDROCHLORIDE 1 MILLIGRAM(S): 2 INJECTION INTRAMUSCULAR; INTRAVENOUS; SUBCUTANEOUS at 19:47

## 2022-11-20 RX ADMIN — Medication 10 MILLIGRAM(S): at 17:04

## 2022-11-20 RX ADMIN — Medication 250 MILLIGRAM(S): at 10:46

## 2022-11-20 RX ADMIN — HYDROMORPHONE HYDROCHLORIDE 1 MILLIGRAM(S): 2 INJECTION INTRAMUSCULAR; INTRAVENOUS; SUBCUTANEOUS at 10:45

## 2022-11-20 RX ADMIN — Medication 40 MILLIEQUIVALENT(S): at 10:47

## 2022-11-20 RX ADMIN — HYDROMORPHONE HYDROCHLORIDE 1 MILLIGRAM(S): 2 INJECTION INTRAMUSCULAR; INTRAVENOUS; SUBCUTANEOUS at 23:25

## 2022-11-20 RX ADMIN — ONDANSETRON 4 MILLIGRAM(S): 8 TABLET, FILM COATED ORAL at 14:41

## 2022-11-20 RX ADMIN — HYDROMORPHONE HYDROCHLORIDE 1 MILLIGRAM(S): 2 INJECTION INTRAMUSCULAR; INTRAVENOUS; SUBCUTANEOUS at 05:54

## 2022-11-20 RX ADMIN — PANTOPRAZOLE SODIUM 40 MILLIGRAM(S): 20 TABLET, DELAYED RELEASE ORAL at 17:04

## 2022-11-20 RX ADMIN — HYDROMORPHONE HYDROCHLORIDE 1 MILLIGRAM(S): 2 INJECTION INTRAMUSCULAR; INTRAVENOUS; SUBCUTANEOUS at 14:42

## 2022-11-20 RX ADMIN — HYDROMORPHONE HYDROCHLORIDE 0.5 MILLIGRAM(S): 2 INJECTION INTRAMUSCULAR; INTRAVENOUS; SUBCUTANEOUS at 00:00

## 2022-11-20 RX ADMIN — Medication 250 MILLIGRAM(S): at 17:04

## 2022-11-20 RX ADMIN — PANTOPRAZOLE SODIUM 40 MILLIGRAM(S): 20 TABLET, DELAYED RELEASE ORAL at 05:55

## 2022-11-20 RX ADMIN — HYDROMORPHONE HYDROCHLORIDE 1 MILLIGRAM(S): 2 INJECTION INTRAMUSCULAR; INTRAVENOUS; SUBCUTANEOUS at 20:05

## 2022-11-20 RX ADMIN — Medication 250 MILLIGRAM(S): at 23:07

## 2022-11-20 RX ADMIN — HYDROMORPHONE HYDROCHLORIDE 1 MILLIGRAM(S): 2 INJECTION INTRAMUSCULAR; INTRAVENOUS; SUBCUTANEOUS at 23:07

## 2022-11-20 RX ADMIN — Medication 10 MILLIGRAM(S): at 05:55

## 2022-11-20 RX ADMIN — HYDROMORPHONE HYDROCHLORIDE 1 MILLIGRAM(S): 2 INJECTION INTRAMUSCULAR; INTRAVENOUS; SUBCUTANEOUS at 02:53

## 2022-11-20 RX ADMIN — HYDROMORPHONE HYDROCHLORIDE 1 MILLIGRAM(S): 2 INJECTION INTRAMUSCULAR; INTRAVENOUS; SUBCUTANEOUS at 03:08

## 2022-11-20 RX ADMIN — ENOXAPARIN SODIUM 40 MILLIGRAM(S): 100 INJECTION SUBCUTANEOUS at 10:46

## 2022-11-20 RX ADMIN — Medication 10 MILLIGRAM(S): at 23:07

## 2022-11-20 RX ADMIN — Medication 40 MILLIEQUIVALENT(S): at 13:35

## 2022-11-20 RX ADMIN — Medication 650 MILLIGRAM(S): at 17:04

## 2022-11-20 NOTE — PROGRESS NOTE ADULT - SUBJECTIVE AND OBJECTIVE BOX
INTERVAL HPI/OVERNIGHT EVENTS:    CC:  abdominal pain, intractable vomiting, h/o esophagectomy, hypokalemia    no overnight events  doing better with clears   pain improved      Vital Signs Last 24 Hrs  T(C): 36.8 (20 Nov 2022 04:32), Max: 36.8 (19 Nov 2022 16:45)  T(F): 98.3 (20 Nov 2022 04:32), Max: 98.3 (19 Nov 2022 16:45)  HR: 100 (20 Nov 2022 04:32) (94 - 108)  BP: 161/88 (20 Nov 2022 04:32) (161/88 - 182/95)  BP(mean): --  RR: 18 (20 Nov 2022 04:32) (18 - 18)  SpO2: 96% (20 Nov 2022 04:32) (96% - 98%)        PHYSICAL EXAM:    GENERAL: alert, not in distress  CHEST/LUNG: b/l air entry  HEART: reg  ABDOMEN: soft, bs+  EXTREMITIES:  no edema, tenderness    MEDICATIONS  (STANDING):  acetaminophen   IVPB .. 1000 milliGRAM(s) IV Intermittent once  amLODIPine   Tablet 5 milliGRAM(s) Oral daily  enoxaparin Injectable 40 milliGRAM(s) SubCutaneous every 24 hours  erythromycin     enteric coated 250 milliGRAM(s) Oral four times a day  influenza   Vaccine 0.5 milliLiter(s) IntraMuscular once  metoclopramide Injectable 10 milliGRAM(s) IV Push every 6 hours  pantoprazole  Injectable 40 milliGRAM(s) IV Push every 12 hours  potassium chloride    Tablet ER 40 milliEquivalent(s) Oral every 4 hours    MEDICATIONS  (PRN):  acetaminophen     Tablet .. 650 milliGRAM(s) Oral every 6 hours PRN Temp greater or equal to 38C (100.4F), Mild Pain (1 - 3)  aluminum hydroxide/magnesium hydroxide/simethicone Suspension 30 milliLiter(s) Oral every 4 hours PRN Dyspepsia  hydrALAZINE Injectable 10 milliGRAM(s) IV Push every 6 hours PRN hypertenson> 180 sbp  HYDROmorphone  Injectable 0.5 milliGRAM(s) SubCutaneous every 3 hours PRN Moderate Pain (4 - 6)  HYDROmorphone  Injectable 1 milliGRAM(s) SubCutaneous every 3 hours PRN Severe Pain (7 - 10)  melatonin 3 milliGRAM(s) Oral at bedtime PRN Insomnia  ondansetron Injectable 4 milliGRAM(s) IV Push every 8 hours PRN Nausea and/or Vomiting      Allergies    No Known Allergies    Intolerances          LABS:                          11.6   10.67 )-----------( 343      ( 20 Nov 2022 05:55 )             37.1     11-20    134<L>  |  96  |  13.6  ----------------------------<  111<H>  3.3<L>   |  22.0  |  0.55    Ca    8.8      20 Nov 2022 05:55  Phos  2.6     11-20  Mg     2.1     11-20    TPro  7.0  /  Alb  4.5  /  TBili  0.4  /  DBili  x   /  AST  14  /  ALT  19  /  AlkPhos  86  11-19          RADIOLOGY & ADDITIONAL TESTS:

## 2022-11-20 NOTE — PROGRESS NOTE ADULT - ASSESSMENT
hx of esophageal ca s/p esophagectomy and gastric pullthrough. Recurrent abdominal pain with N/V likely secondary to flare of gastroparesis.  Neg CT abd/chest. EGD fairly unremarkable except for duod bulb mild dialtion. On Reglan and PPI. supportive care for now. usually takes 3-4 days to improve per pt.  improving  hx of esophageal ca s/p esophagectomy and gastric pullthrough. Recurrent abdominal pain with N/V likely secondary to flare of gastroparesis.  Neg CT abd/chest. EGD fairly unremarkable except for duod bulb mild dialtion. On Reglan and PPI. supportive care for now. usually takes 3-4 days to improve per pt.  improving, liquid diet today then adv tomorrow.

## 2022-11-20 NOTE — PROGRESS NOTE ADULT - ASSESSMENT
57 yr old male with history of esophageal cancer s/p distal esophagectomy and gastric pull up in 2016 presented to the ED with complaints of abdominal pain, nausea and vomiting with inability to tolerate PO. No acute pathology noted on CT abdomen. GI was consulted, Reglan was given. EGD was done on 11/18, revealed no acute pathology, biopsies were taken. Pain management was consulted.     1. Intractable vomiting, g/o esophagectomy:  s/p EGD  continue PPI  tolerating clears  advance to full liquids today  discontinue IVF  monitor lytes    2. Hypokalemia:  Replace potassium    3. Hypertension:  Amlodipine.     4. DVT ppx:  Lovenox    Discussed with patient and significant other at bedside.  Anticipate discharge in am.  Discharge when able to tolerate PO.

## 2022-11-20 NOTE — PROGRESS NOTE ADULT - SUBJECTIVE AND OBJECTIVE BOX
Patient is a 57y old  Male who presents with a chief complaint of abdominal pain and nausea and vomiting    S: nausea improved. less pain. oliver liquids        REVIEW OF SYSTEMS:  Constitutional: No fever, weight loss or fatigue  ENMT:  No difficulty hearing, tinnitus, vertigo; No sinus or throat pain  Respiratory: No cough, wheezing, chills or hemoptysis  Cardiovascular: No chest pain, palpitations, dizziness or leg swelling  Gastrointestinal: As per HPI.    PAST MEDICAL & SURGICAL HISTORY:  Hypertension      Esophageal cancer      Gastroparesis      S/P cholecystectomy      H/O esophagectomy          FAMILY HISTORY:  FH: HTN (hypertension) (Father)        SOCIAL HISTORY:  Smoking Status: [ ] Current, [x ] Former, [ ] Never  Pack Years: > 20. Stopped smoking roughly 15 years ago, + ETOH in the past stopped in 2007. No drug abuse history.    MEDICATIONS: At Home:  Reglan  Omeprazole  Amlodopine    MEDICATIONS  (STANDING):    MEDICATIONS  (PRN):      Allergies    No Known Allergies    Intolerances        Vital Signs Last 24 Hrs  T(C): 36.9 (17 Nov 2022 07:56), Max: 36.9 (17 Nov 2022 07:56)  T(F): 98.5 (17 Nov 2022 07:56), Max: 98.5 (17 Nov 2022 07:56)  HR: 81 (17 Nov 2022 07:56) (66 - 81)  BP: 136/77 (17 Nov 2022 07:56) (136/77 - 196/104)  BP(mean): --  RR: 18 (17 Nov 2022 07:56) (17 - 20)  SpO2: 96% (17 Nov 2022 07:56) (96% - 98%)    Parameters below as of 17 Nov 2022 07:56  Patient On (Oxygen Delivery Method): room air            PHYSICAL EXAM:    General: Well developed; in no acute distress  HEENT: MMM, conjunctiva pink and sclera anicteric.  Cor: RRR S1, S2 only.  Gastrointestinal: Abdomen: Soft + epigastric tenderness (improved), non-distended; Normal bowel sounds; No rebound or guarding or HSM.  Extremities: Normal range of motion, No clubbing, cyanosis or edema  Skin: Warm and dry. No obvious rash      LABS:                        12.4   12.91 )-----------( 322      ( 17 Nov 2022 01:40 )             38.1     11-17    137  |  99  |  12.2  ----------------------------<  162<H>  4.0   |  22.0  |  0.79    Ca    9.8      17 Nov 2022 01:40    TPro  8.4  /  Alb  5.4<H>  /  TBili  0.5  /  DBili  x   /  AST  26  /  ALT  31  /  AlkPhos  122<H>  11-17          RADIOLOGY & ADDITIONAL STUDIES:   < from: CT Abdomen and Pelvis w/ IV Cont (11.17.22 @ 06:48) >  975 EXAM:  CT ABDOMEN AND PELVIS IC                        ACC: [id number] EXAM:  CT CHEST IC                          PROCEDURE DATE:  11/17/2022          INTERPRETATION:  CLINICAL INFORMATION: Upper abdominal pain, history of   esophagealcancer status post esophagectomy    COMPARISON: CT of March 24, 2022.    CONTRAST/COMPLICATIONS:  IV Contrast: Omnipaque 350 (accession [id number]), IV contrast documented   in associated exam (accession [id number])  96 cc administered   4 cc   discarded  Oral Contrast: NONE  Complications: None reported at time of study completion    PROCEDURE:  CT of the Chest, Abdomen and Pelvis was performed.  Sagittal and coronal reformats were performed.    FINDINGS:  CHEST:  LUNGS AND LARGE AIRWAYS: Patent central airways. Fibrotic changes at the   lung apices.  PLEURA: No pleural effusion.  VESSELS: Ascending aorta is mildly dilated, measuring 4.2 cm. No central   pulmonary embolism. Mild coronary atherosclerosis.  HEART: Heart size is normal. No pericardial effusion.  MEDIASTINUM AND SEVEN: Status post esophagectomy with gastric   pull-through. No lymphadenopathy.  CHEST WALL AND LOWER NECK: Within normal limits.    ABDOMEN AND PELVIS:  LIVER: Within normal limits.  BILE DUCTS: Normal caliber.  GALLBLADDER: Cholecystectomy.  SPLEEN: Within normal limits.  PANCREAS: Within normal limits.  ADRENALS: Within normal limits.  KIDNEYS/URETERS: There is an indeterminant 1 cm hypodense lesion within   the left interpolar kidney.  BLADDER: Incompletely distended.  REPRODUCTIVE ORGANS: The prostate gland is not enlarged.    BOWEL: No bowel obstruction. Appendix is unremarkable. The colon is   underdistended without significant fecal load. No convincing focal bowel   wall thickening or diverticulitis. Scattered diverticulosis.  PERITONEUM: No ascites.  VESSELS: IVC filter.  RETROPERITONEUM/LYMPH NODES: No lymphadenopathy.  ABDOMINAL WALL: Small fat-containing umbilical hernia  BONES: Old fractures of the left superior and inferior pubic rami.      IMPRESSION:    No explanation for upper abdominal pain on this CT.  Status post esophagectomy with gastric pull-through. No pneumomediastinum   or suspicious mass.    Status post cholecystectomy. No small bowel obstruction or suspicious   mass.            --- End of Report ---            MICHELLE SNYDER MD; Attending Radiologist  This document has been electronically signed. Nov 17 2022  7:09AM    < end of copied text >   Patient is a 57y old  Male who presents with a chief complaint of abdominal pain and nausea and vomiting    S: nausea improved. less pain. oliver liquids        REVIEW OF SYSTEMS:  Constitutional: No fever, weight loss or fatigue  ENMT:  No difficulty hearing, tinnitus, vertigo; No sinus or throat pain  Respiratory: No cough, wheezing, chills or hemoptysis  Cardiovascular: No chest pain, palpitations, dizziness or leg swelling  Gastrointestinal: As per HPI.    PAST MEDICAL & SURGICAL HISTORY:  Hypertension      Esophageal cancer      Gastroparesis      S/P cholecystectomy      H/O esophagectomy          FAMILY HISTORY:  FH: HTN (hypertension) (Father)        SOCIAL HISTORY:  Smoking Status: [ ] Current, [x ] Former, [ ] Never  Pack Years: > 20. Stopped smoking roughly 15 years ago, + ETOH in the past stopped in 2007. No drug abuse history.    MEDICATIONS: At Home:  Reglan  Omeprazole  Amlodopine    MEDICATIONS  (STANDING):    MEDICATIONS  (PRN):      Allergies    No Known Allergies    Intolerances         Vital Signs Last 24 Hrs  T(C): 36.9 (17 Nov 2022 07:56), Max: 36.9 (17 Nov 2022 07:56)  T(F): 98.5 (17 Nov 2022 07:56), Max: 98.5 (17 Nov 2022 07:56)  HR: 81 (17 Nov 2022 07:56) (66 - 81)  BP: 136/77 (17 Nov 2022 07:56) (136/77 - 196/104)  BP(mean): --  RR: 18 (17 Nov 2022 07:56) (17 - 20)  SpO2: 96% (17 Nov 2022 07:56) (96% - 98%)    Parameters below as of 17 Nov 2022 07:56  Patient On (Oxygen Delivery Method): room air            PHYSICAL EXAM:    General: Well developed; in no acute distress  HEENT: MMM, conjunctiva pink and sclera anicteric.  Cor: RRR S1, S2 only.  Gastrointestinal: Abdomen: Soft + epigastric tenderness (improved), non-distended; Normal bowel sounds; No rebound or guarding or HSM.  Extremities: Normal range of motion, No clubbing, cyanosis or edema  Skin: Warm and dry. No obvious rash      LABS:                        12.4   12.91 )-----------( 322      ( 17 Nov 2022 01:40 )             38.1     11-17    137  |  99  |  12.2  ----------------------------<  162<H>  4.0   |  22.0  |  0.79    Ca    9.8      17 Nov 2022 01:40    TPro  8.4  /  Alb  5.4<H>  /  TBili  0.5  /  DBili  x   /  AST  26  /  ALT  31  /  AlkPhos  122<H>  11-17          RADIOLOGY & ADDITIONAL STUDIES:   < from: CT Abdomen and Pelvis w/ IV Cont (11.17.22 @ 06:48) >  975 EXAM:  CT ABDOMEN AND PELVIS IC                        ACC: [id number] EXAM:  CT CHEST IC                          PROCEDURE DATE:  11/17/2022          INTERPRETATION:  CLINICAL INFORMATION: Upper abdominal pain, history of   esophagealcancer status post esophagectomy    COMPARISON: CT of March 24, 2022.    CONTRAST/COMPLICATIONS:  IV Contrast: Omnipaque 350 (accession [id number]), IV contrast documented   in associated exam (accession [id number])  96 cc administered   4 cc   discarded  Oral Contrast: NONE  Complications: None reported at time of study completion    PROCEDURE:  CT of the Chest, Abdomen and Pelvis was performed.  Sagittal and coronal reformats were performed.    FINDINGS:  CHEST:  LUNGS AND LARGE AIRWAYS: Patent central airways. Fibrotic changes at the   lung apices.  PLEURA: No pleural effusion.  VESSELS: Ascending aorta is mildly dilated, measuring 4.2 cm. No central   pulmonary embolism. Mild coronary atherosclerosis.  HEART: Heart size is normal. No pericardial effusion.  MEDIASTINUM AND SEVEN: Status post esophagectomy with gastric   pull-through. No lymphadenopathy.  CHEST WALL AND LOWER NECK: Within normal limits.    ABDOMEN AND PELVIS:  LIVER: Within normal limits.  BILE DUCTS: Normal caliber.  GALLBLADDER: Cholecystectomy.  SPLEEN: Within normal limits.  PANCREAS: Within normal limits.  ADRENALS: Within normal limits.  KIDNEYS/URETERS: There is an indeterminant 1 cm hypodense lesion within   the left interpolar kidney.  BLADDER: Incompletely distended.  REPRODUCTIVE ORGANS: The prostate gland is not enlarged.    BOWEL: No bowel obstruction. Appendix is unremarkable. The colon is   underdistended without significant fecal load. No convincing focal bowel   wall thickening or diverticulitis. Scattered diverticulosis.  PERITONEUM: No ascites.  VESSELS: IVC filter.  RETROPERITONEUM/LYMPH NODES: No lymphadenopathy.  ABDOMINAL WALL: Small fat-containing umbilical hernia  BONES: Old fractures of the left superior and inferior pubic rami.      IMPRESSION:    No explanation for upper abdominal pain on this CT.  Status post esophagectomy with gastric pull-through. No pneumomediastinum   or suspicious mass.    Status post cholecystectomy. No small bowel obstruction or suspicious   mass.            --- End of Report ---            MICHELLE SNYDER MD; Attending Radiologist  This document has been electronically signed. Nov 17 2022  7:09AM    < end of copied text >

## 2022-11-21 ENCOUNTER — TRANSCRIPTION ENCOUNTER (OUTPATIENT)
Age: 57
End: 2022-11-21

## 2022-11-21 VITALS
SYSTOLIC BLOOD PRESSURE: 142 MMHG | TEMPERATURE: 98 F | DIASTOLIC BLOOD PRESSURE: 95 MMHG | OXYGEN SATURATION: 96 % | RESPIRATION RATE: 18 BRPM | HEART RATE: 95 BPM

## 2022-11-21 LAB
ANION GAP SERPL CALC-SCNC: 15 MMOL/L — SIGNIFICANT CHANGE UP (ref 5–17)
BUN SERPL-MCNC: 12.2 MG/DL — SIGNIFICANT CHANGE UP (ref 8–20)
CALCIUM SERPL-MCNC: 9.3 MG/DL — SIGNIFICANT CHANGE UP (ref 8.4–10.5)
CHLORIDE SERPL-SCNC: 92 MMOL/L — LOW (ref 96–108)
CO2 SERPL-SCNC: 25 MMOL/L — SIGNIFICANT CHANGE UP (ref 22–29)
CREAT SERPL-MCNC: 0.69 MG/DL — SIGNIFICANT CHANGE UP (ref 0.5–1.3)
EGFR: 109 ML/MIN/1.73M2 — SIGNIFICANT CHANGE UP
GLUCOSE SERPL-MCNC: 106 MG/DL — HIGH (ref 70–99)
MAGNESIUM SERPL-MCNC: 2.2 MG/DL — SIGNIFICANT CHANGE UP (ref 1.6–2.6)
PHOSPHATE SERPL-MCNC: 3.6 MG/DL — SIGNIFICANT CHANGE UP (ref 2.4–4.7)
POTASSIUM SERPL-MCNC: 3.2 MMOL/L — LOW (ref 3.5–5.3)
POTASSIUM SERPL-SCNC: 3.2 MMOL/L — LOW (ref 3.5–5.3)
SODIUM SERPL-SCNC: 132 MMOL/L — LOW (ref 135–145)

## 2022-11-21 PROCEDURE — 87637 SARSCOV2&INF A&B&RSV AMP PRB: CPT

## 2022-11-21 PROCEDURE — 88342 IMHCHEM/IMCYTCHM 1ST ANTB: CPT

## 2022-11-21 PROCEDURE — 74019 RADEX ABDOMEN 2 VIEWS: CPT

## 2022-11-21 PROCEDURE — 83036 HEMOGLOBIN GLYCOSYLATED A1C: CPT

## 2022-11-21 PROCEDURE — 84100 ASSAY OF PHOSPHORUS: CPT

## 2022-11-21 PROCEDURE — 86901 BLOOD TYPING SEROLOGIC RH(D): CPT

## 2022-11-21 PROCEDURE — 36415 COLL VENOUS BLD VENIPUNCTURE: CPT

## 2022-11-21 PROCEDURE — 83690 ASSAY OF LIPASE: CPT

## 2022-11-21 PROCEDURE — 86850 RBC ANTIBODY SCREEN: CPT

## 2022-11-21 PROCEDURE — 96365 THER/PROPH/DIAG IV INF INIT: CPT

## 2022-11-21 PROCEDURE — 85025 COMPLETE CBC W/AUTO DIFF WBC: CPT

## 2022-11-21 PROCEDURE — 83735 ASSAY OF MAGNESIUM: CPT

## 2022-11-21 PROCEDURE — 86900 BLOOD TYPING SEROLOGIC ABO: CPT

## 2022-11-21 PROCEDURE — 99239 HOSP IP/OBS DSCHRG MGMT >30: CPT

## 2022-11-21 PROCEDURE — 74177 CT ABD & PELVIS W/CONTRAST: CPT | Mod: MA

## 2022-11-21 PROCEDURE — 88305 TISSUE EXAM BY PATHOLOGIST: CPT

## 2022-11-21 PROCEDURE — 96375 TX/PRO/DX INJ NEW DRUG ADDON: CPT

## 2022-11-21 PROCEDURE — 99285 EMERGENCY DEPT VISIT HI MDM: CPT

## 2022-11-21 PROCEDURE — 85610 PROTHROMBIN TIME: CPT

## 2022-11-21 PROCEDURE — 71260 CT THORAX DX C+: CPT | Mod: MD

## 2022-11-21 PROCEDURE — 80048 BASIC METABOLIC PNL TOTAL CA: CPT

## 2022-11-21 PROCEDURE — 85730 THROMBOPLASTIN TIME PARTIAL: CPT

## 2022-11-21 PROCEDURE — 93005 ELECTROCARDIOGRAM TRACING: CPT

## 2022-11-21 PROCEDURE — 80053 COMPREHEN METABOLIC PANEL: CPT

## 2022-11-21 RX ORDER — ERYTHROMYCIN ETHYLSUCCINATE 400 MG
1 TABLET ORAL
Qty: 0 | Refills: 0 | DISCHARGE
Start: 2022-11-21

## 2022-11-21 RX ORDER — ONDANSETRON 8 MG/1
1 TABLET, FILM COATED ORAL
Qty: 15 | Refills: 0
Start: 2022-11-21 | End: 2022-11-25

## 2022-11-21 RX ORDER — AMLODIPINE BESYLATE 2.5 MG/1
1 TABLET ORAL
Qty: 30 | Refills: 0
Start: 2022-11-21 | End: 2022-12-20

## 2022-11-21 RX ORDER — POTASSIUM CHLORIDE 20 MEQ
40 PACKET (EA) ORAL EVERY 4 HOURS
Refills: 0 | Status: DISCONTINUED | OUTPATIENT
Start: 2022-11-21 | End: 2022-11-21

## 2022-11-21 RX ADMIN — AMLODIPINE BESYLATE 5 MILLIGRAM(S): 2.5 TABLET ORAL at 05:41

## 2022-11-21 RX ADMIN — HYDROMORPHONE HYDROCHLORIDE 1 MILLIGRAM(S): 2 INJECTION INTRAMUSCULAR; INTRAVENOUS; SUBCUTANEOUS at 05:41

## 2022-11-21 RX ADMIN — PANTOPRAZOLE SODIUM 40 MILLIGRAM(S): 20 TABLET, DELAYED RELEASE ORAL at 05:41

## 2022-11-21 RX ADMIN — Medication 40 MILLIEQUIVALENT(S): at 08:54

## 2022-11-21 RX ADMIN — HYDROMORPHONE HYDROCHLORIDE 1 MILLIGRAM(S): 2 INJECTION INTRAMUSCULAR; INTRAVENOUS; SUBCUTANEOUS at 06:00

## 2022-11-21 RX ADMIN — Medication 10 MILLIGRAM(S): at 05:41

## 2022-11-21 RX ADMIN — Medication 250 MILLIGRAM(S): at 05:41

## 2022-11-21 NOTE — PROGRESS NOTE ADULT - NS ATTEND AMEND GEN_ALL_CORE FT
57M  esophageal cancer, '16, cured s/p gastric pull-through  occ nausea/vomiting, with 15 lb rapid weight loss with each event  on standing erythromycin through [facility] doc for years  would benefit from Jade patel - esophageal motility expert  pt/fam made aware they should see her as outpatient  tolerating regular PO, stable for discharge

## 2022-11-21 NOTE — DISCHARGE NOTE PROVIDER - CARE PROVIDER_API CALL
Benjamin Baker)  Gastroenterology; Internal Medicine  [address], [address]  [place] [address]  Phone: [phone]  Fax: [phone]  Follow Up Time:

## 2022-11-21 NOTE — DISCHARGE NOTE NURSING/CASE MANAGEMENT/SOCIAL WORK - NSDCPEFALRISK_GEN_ALL_CORE
For information on Fall & Injury Prevention, visit: [email or web address] OR  [email or web address] OR  https://www.cdc.gov/steadi/patient.html

## 2022-11-21 NOTE — PROGRESS NOTE ADULT - ASSESSMENT
57M  here w N/V  tolerating PO this AM  has not required pain meds since 6am    Pain controlled  Pain service will sign off  Please reconsult as needed     can convert to p.o. meds start hydromorphone 2mg/4mg prn abdominal pain q4hrs.

## 2022-11-21 NOTE — PROGRESS NOTE ADULT - REASON FOR ADMISSION
Nausea and vomiting
abdominal pain
Abdominal pain
Nausea and vomiting

## 2022-11-21 NOTE — DISCHARGE NOTE PROVIDER - NSDCCPCAREPLAN_GEN_ALL_CORE_FT
PRINCIPAL DISCHARGE DIAGNOSIS  Diagnosis: Intractable nausea and vomiting  Assessment and Plan of Treatment: resolved  continue Zofran prn  GI follow up        SECONDARY DISCHARGE DIAGNOSES  Diagnosis: Abdominal pain  Assessment and Plan of Treatment:     Diagnosis: H/O esophagectomy  Assessment and Plan of Treatment: follow up with GI    Diagnosis: Hypertension  Assessment and Plan of Treatment: Amlodipine dose adjusted  monitor BP

## 2022-11-21 NOTE — PROGRESS NOTE ADULT - SUBJECTIVE AND OBJECTIVE BOX
Chief Complaint: This is a 57y old man patient being seen in follow-up consultation for nausea, vomiting     Interval HPI / 24H events: 57 year old M with hx of esophageal ca s/p esophagectomy and gastric pull through admitted with recurrent abdominal pain with nausea, and vomiting. Patient reports feeling better, tolerating diet. Denies nausea, vomiting, abdominal pain. Denies fever, chills, dizziness, shortness of breath.       Review of Systems:  . Constitutional: No fever, chills,  · ENMT: no vertigo, no throat pain  . Neck: No pain or stiffness  · Respiratory and Thorax: No shortness of breath, no cough, no wheezing  · Cardiovascular: No chest pain, palpitation, no dizziness, no orthopnea,   · Gastrointestinal: see above.  · Genitourinary: No hematuria, no dysuria  · Musculoskeletal: Negative  · Neurological: no headache, no vision changes, no slurred speech  · Psychiatric: no agitation, no anxiety  · Hematology/Lymphatics: negative  · Endocrine: negative      PAST MEDICAL/SURGICAL HISTORY:  Hypertension    Esophageal cancer    Gastroparesis    S/P cholecystectomy    H/O esophagectomy      MEDICATIONS  (STANDING):  acetaminophen   IVPB .. 1000 milliGRAM(s) IV Intermittent once  amLODIPine   Tablet 5 milliGRAM(s) Oral daily  enoxaparin Injectable 40 milliGRAM(s) SubCutaneous every 24 hours  erythromycin     enteric coated 250 milliGRAM(s) Oral four times a day  influenza   Vaccine 0.5 milliLiter(s) IntraMuscular once  metoclopramide Injectable 10 milliGRAM(s) IV Push every 6 hours  pantoprazole  Injectable 40 milliGRAM(s) IV Push every 12 hours  potassium chloride    Tablet ER 40 milliEquivalent(s) Oral every 4 hours    MEDICATIONS  (PRN):  acetaminophen     Tablet .. 650 milliGRAM(s) Oral every 6 hours PRN Temp greater or equal to 38C (100.4F), Mild Pain (1 - 3)  aluminum hydroxide/magnesium hydroxide/simethicone Suspension 30 milliLiter(s) Oral every 4 hours PRN Dyspepsia  hydrALAZINE Injectable 10 milliGRAM(s) IV Push every 6 hours PRN hypertenson> 180 sbp  HYDROmorphone  Injectable 0.5 milliGRAM(s) SubCutaneous every 3 hours PRN Moderate Pain (4 - 6)  HYDROmorphone  Injectable 1 milliGRAM(s) SubCutaneous every 3 hours PRN Severe Pain (7 - 10)  melatonin 3 milliGRAM(s) Oral at bedtime PRN Insomnia  ondansetron Injectable 4 milliGRAM(s) IV Push every 8 hours PRN Nausea and/or Vomiting    No Known Allergies    T(C): 36.7 (11-21-22 @ 12:40), Max: 36.9 (11-20-22 @ 16:31)  HR: 95 (11-21-22 @ 12:40) (79 - 100)  BP: 142/95 (11-21-22 @ 12:40) (136/84 - 172/94)  RR: 18 (11-21-22 @ 12:40) (18 - 19)  SpO2: 96% (11-21-22 @ 12:40) (96% - 100%)      PHYSICAL EXAM:  Constitutional: No acute distress  Neuro: Awake alert, oriented to person, place and situation, non-focal, speech clear and intact  HEENT: PERRL, anicteric sclerae  Neck: supple, no JVD  CV: regular rate, regular rhythm, +S1S2,   Pulm/chest: lung sounds CTA and equal bilaterally, no accessory muscle use noted  Abd: soft, NT, ND, +BS  Ext: no Cyanosis, clubbing or edema  Skin: warm, no jaundice   Psych: calm, appropriate affect        LABS:               11.6   10.67 )-----------( 343      ( 11-20 @ 05:55 )             37.1       11-21    132<L>  |  92<L>  |  12.2  ----------------------------<  106<H>  3.2<L>   |  25.0  |  0.69    Ca    9.3      21 Nov 2022 04:52  Phos  3.6     11-21  Mg     2.2     11-21    < from: EGD (11.18.22 @ 00:00) >    Findings:        Esophagus Additional esophagus findings Esophagus: GEJ at 24 cm. Normal, regular    Z line. There.        Duodenum Additional duodenum findings Stomach: there were surgical changes at 28    cm - a suture and possibly a diverticulum. The pylorus was at 50 cm. Four cold    forceps bx obtained throughout stomach to eval for H pylori. There was some    retained bile which was suctioned..        Duodenum: bulb somewhat distended though normal mucosa - wonder if when this    distends it causes nausea or alternatively wonder if it is distended from    downstream obstruction. Celiac cold forceps bx (2 in bulb, 4 in D2) were    obtained..        Other Interventions:        Impressions:        Esophagus: GEJ at 24 cm. Normal, regular Z line. There.        Stomach: there were surgical changes at 28 cm - a suture and possibly a    diverticulum. The pylorus was at 50 cm. Four cold forceps bx obtained throughout    stomach to eval for H pylori. .        Duodenum: bulb somewhat distended though normal mucosa - wonder if when this    distends it causes nausea or alternatively wonder if it is distended from    downstream obstruction. Celiac cold forceps bx (2 in bulb, 4 in D2) were    obtained. .        Plan:    [Description]    Await pathology results.    Return to floor for further management    < end of copied text >

## 2022-11-21 NOTE — DISCHARGE NOTE PROVIDER - NSDCMRMEDTOKEN_GEN_ALL_CORE_FT
amLODIPine 5 mg oral tablet: 1 tab(s) orally once a day  omeprazole 40 mg oral delayed release capsule: 1 cap(s) orally once a day

## 2022-11-21 NOTE — DISCHARGE NOTE NURSING/CASE MANAGEMENT/SOCIAL WORK - PATIENT PORTAL LINK FT
You can access the FollowMyHealth Patient Portal offered by [facility] by registering at the following website: [email or web address]. By joining [facility]’s FollowMyHealth portal, you will also be able to view your health information using other applications (apps) compatible with our system.

## 2022-11-21 NOTE — PROGRESS NOTE ADULT - SUBJECTIVE AND OBJECTIVE BOX
Interval Hx:  Patient seen during rounds  Patient reports pain to be controlled on current medications  Patient denies sedation with medications   tolerating PO this AM  due for going home today        Analgesic Dosing for past 24 hours reviewed as below:  acetaminophen     Tablet ..   650 milliGRAM(s) Oral (11-20-22 @ 17:04)    HYDROmorphone  Injectable   1 milliGRAM(s) SubCutaneous (11-21-22 @ 05:41)   1 milliGRAM(s) SubCutaneous (11-20-22 @ 23:07)   1 milliGRAM(s) SubCutaneous (11-20-22 @ 19:47)   1 milliGRAM(s) SubCutaneous (11-20-22 @ 14:42)    metoclopramide Injectable   10 milliGRAM(s) IV Push (11-21-22 @ 05:41)   10 milliGRAM(s) IV Push (11-20-22 @ 23:07)   10 milliGRAM(s) IV Push (11-20-22 @ 17:04)    ondansetron Injectable   4 milliGRAM(s) IV Push (11-20-22 @ 14:41)          T(C): 36.6 (11-21-22 @ 10:05), Max: 36.9 (11-20-22 @ 16:31)  HR: 99 (11-21-22 @ 10:05) (79 - 100)  BP: 136/84 (11-21-22 @ 10:05) (136/84 - 172/94)  RR: 19 (11-21-22 @ 10:05) (18 - 20)  SpO2: 100% (11-21-22 @ 10:05) (96% - 100%)        acetaminophen     Tablet .. 650 milliGRAM(s) Oral every 6 hours PRN  acetaminophen   IVPB .. 1000 milliGRAM(s) IV Intermittent once  aluminum hydroxide/magnesium hydroxide/simethicone Suspension 30 milliLiter(s) Oral every 4 hours PRN  amLODIPine   Tablet 5 milliGRAM(s) Oral daily  enoxaparin Injectable 40 milliGRAM(s) SubCutaneous every 24 hours  erythromycin     enteric coated 250 milliGRAM(s) Oral four times a day  hydrALAZINE Injectable 10 milliGRAM(s) IV Push every 6 hours PRN  HYDROmorphone  Injectable 0.5 milliGRAM(s) SubCutaneous every 3 hours PRN  HYDROmorphone  Injectable 1 milliGRAM(s) SubCutaneous every 3 hours PRN  influenza   Vaccine 0.5 milliLiter(s) IntraMuscular once  melatonin 3 milliGRAM(s) Oral at bedtime PRN  metoclopramide Injectable 10 milliGRAM(s) IV Push every 6 hours  ondansetron Injectable 4 milliGRAM(s) IV Push every 8 hours PRN  pantoprazole  Injectable 40 milliGRAM(s) IV Push every 12 hours  potassium chloride    Tablet ER 40 milliEquivalent(s) Oral every 4 hours                          11.6   10.67 )-----------( 343      ( 20 Nov 2022 05:55 )             37.1     11-21    132<L>  |  92<L>  |  12.2  ----------------------------<  106<H>  3.2<L>   |  25.0  |  0.69    Ca    9.3      21 Nov 2022 04:52  Phos  3.6     11-21  Mg     2.2     11-21            Pain Service   [phone]

## 2022-11-21 NOTE — PROGRESS NOTE ADULT - ASSESSMENT
57 yr old male with history of esophageal cancer s/p distal esophagectomy and gastric pull up in 2016 presented to the ED with complaints of abdominal pain, nausea and vomiting with inability to tolerate PO.    Plan:  Recurrent abdominal pain with N/V likely secondary to flare of gastroparesis. CT abdomen unremarkable. EGD with mild dilatation of duodenal bulb, otherwise normal study.   Advance diet as tolerating  Continue Protonix  Zofran as needed for nausea/vomiting.   Currently on Erythromycin. Continue Reglan  Follow up pathology report as outpatient.

## 2022-11-21 NOTE — PROGRESS NOTE ADULT - SUBJECTIVE AND OBJECTIVE BOX
INTERVAL HPI/OVERNIGHT EVENTS:    CC: abdominal pain, intractable vomiting, h/o esophagectomy, hypokalemia    No overnight events  tolerating diet    Vital Signs Last 24 Hrs  T(C): 36.6 (21 Nov 2022 10:05), Max: 36.9 (20 Nov 2022 16:31)  T(F): 97.9 (21 Nov 2022 10:05), Max: 98.4 (20 Nov 2022 16:31)  HR: 99 (21 Nov 2022 10:05) (79 - 100)  BP: 136/84 (21 Nov 2022 10:05) (136/84 - 172/94)  BP(mean): --  RR: 19 (21 Nov 2022 10:05) (18 - 19)  SpO2: 100% (21 Nov 2022 10:05) (97% - 100%)        PHYSICAL EXAM:    GENERAL: alert, not in distress  CHEST/LUNG: b/l air entry  HEART: reg  ABDOMEN: soft, bs+  EXTREMITIES:  no edema, tenderness    MEDICATIONS  (STANDING):  acetaminophen   IVPB .. 1000 milliGRAM(s) IV Intermittent once  amLODIPine   Tablet 5 milliGRAM(s) Oral daily  enoxaparin Injectable 40 milliGRAM(s) SubCutaneous every 24 hours  erythromycin     enteric coated 250 milliGRAM(s) Oral four times a day  influenza   Vaccine 0.5 milliLiter(s) IntraMuscular once  metoclopramide Injectable 10 milliGRAM(s) IV Push every 6 hours  pantoprazole  Injectable 40 milliGRAM(s) IV Push every 12 hours  potassium chloride    Tablet ER 40 milliEquivalent(s) Oral every 4 hours    MEDICATIONS  (PRN):  acetaminophen     Tablet .. 650 milliGRAM(s) Oral every 6 hours PRN Temp greater or equal to 38C (100.4F), Mild Pain (1 - 3)  aluminum hydroxide/magnesium hydroxide/simethicone Suspension 30 milliLiter(s) Oral every 4 hours PRN Dyspepsia  hydrALAZINE Injectable 10 milliGRAM(s) IV Push every 6 hours PRN hypertenson> 180 sbp  HYDROmorphone  Injectable 0.5 milliGRAM(s) SubCutaneous every 3 hours PRN Moderate Pain (4 - 6)  HYDROmorphone  Injectable 1 milliGRAM(s) SubCutaneous every 3 hours PRN Severe Pain (7 - 10)  melatonin 3 milliGRAM(s) Oral at bedtime PRN Insomnia  ondansetron Injectable 4 milliGRAM(s) IV Push every 8 hours PRN Nausea and/or Vomiting      Allergies    No Known Allergies    Intolerances          LABS:                          11.6   10.67 )-----------( 343      ( 20 Nov 2022 05:55 )             37.1     11-21    132<L>  |  92<L>  |  12.2  ----------------------------<  106<H>  3.2<L>   |  25.0  |  0.69    Ca    9.3      21 Nov 2022 04:52  Phos  3.6     11-21  Mg     2.2     11-21            RADIOLOGY & ADDITIONAL TESTS:

## 2022-11-21 NOTE — PROGRESS NOTE ADULT - ASSESSMENT
57 yr old male with history of esophageal cancer s/p distal esophagectomy and gastric pull up in 2016 presented to the ED with complaints of abdominal pain, nausea and vomiting with inability to tolerate PO. No acute pathology noted on CT abdomen. GI was consulted, Reglan was given. EGD was done on 11/18, revealed no acute pathology, biopsies were taken. Pain management was consulted.     1. Intractable vomiting, g/o esophagectomy:  s/p EGD  continue PPI  diet advanced, tolerating it well    2. Hypokalemia:  Replace potassium    3. Hypertension:  Amlodipine, increased dose.    4. DVT ppx:  Lovenox    Discussed with patient and RN.  Stable for discharge home today.

## 2022-11-21 NOTE — PROGRESS NOTE ADULT - PROVIDER SPECIALTY LIST ADULT
Hospitalist
Gastroenterology
Hospitalist
Pain Medicine
Gastroenterology
Hospitalist
Pain Medicine
Gastroenterology
Internal Medicine
Pain Medicine

## 2022-11-21 NOTE — DISCHARGE NOTE PROVIDER - HOSPITAL COURSE
57 yr old male with history of esophageal cancer s/p distal esophagectomy and gastric pull up in 2016 presented to the ED with complaints of abdominal pain, nausea and vomiting with inability to tolerate PO. No acute pathology noted on CT abdomen. GI was consulted, Reglan was given. EGD was done on 11/18, revealed no acute pathology, biopsies were taken. Pain management was consulted. His diet was slowly advanced, pain was controlled. Antihypertensives were adjusted. He improved clinically and is stable for discharge home.

## 2022-12-01 LAB — SURGICAL PATHOLOGY STUDY: SIGNIFICANT CHANGE UP

## 2023-01-02 ENCOUNTER — INPATIENT (INPATIENT)
Facility: HOSPITAL | Age: 58
LOS: 3 days | Discharge: HOME | DRG: 392 | End: 2023-01-06
Attending: FAMILY MEDICINE | Admitting: STUDENT IN AN ORGANIZED HEALTH CARE EDUCATION/TRAINING PROGRAM
Payer: MEDICARE

## 2023-01-02 VITALS
TEMPERATURE: 98 F | HEART RATE: 83 BPM | DIASTOLIC BLOOD PRESSURE: 107 MMHG | OXYGEN SATURATION: 99 % | RESPIRATION RATE: 18 BRPM | WEIGHT: 195.11 LBS | HEIGHT: 60 IN | SYSTOLIC BLOOD PRESSURE: 192 MMHG

## 2023-01-02 DIAGNOSIS — K31.84 GASTROPARESIS: ICD-10-CM

## 2023-01-02 DIAGNOSIS — Z98.890 OTHER SPECIFIED POSTPROCEDURAL STATES: Chronic | ICD-10-CM

## 2023-01-02 DIAGNOSIS — Z90.49 ACQUIRED ABSENCE OF OTHER SPECIFIED PARTS OF DIGESTIVE TRACT: Chronic | ICD-10-CM

## 2023-01-02 LAB
ALBUMIN SERPL ELPH-MCNC: 5.1 G/DL — SIGNIFICANT CHANGE UP (ref 3.3–5.2)
ALP SERPL-CCNC: 115 U/L — SIGNIFICANT CHANGE UP (ref 40–120)
ALT FLD-CCNC: 43 U/L — HIGH
ANION GAP SERPL CALC-SCNC: 17 MMOL/L — SIGNIFICANT CHANGE UP (ref 5–17)
AST SERPL-CCNC: 37 U/L — SIGNIFICANT CHANGE UP
BASE EXCESS BLDV CALC-SCNC: -1.9 MMOL/L — SIGNIFICANT CHANGE UP (ref -2–3)
BASOPHILS # BLD AUTO: 0.12 K/UL — SIGNIFICANT CHANGE UP (ref 0–0.2)
BASOPHILS NFR BLD AUTO: 0.9 % — SIGNIFICANT CHANGE UP (ref 0–2)
BILIRUB SERPL-MCNC: 0.4 MG/DL — SIGNIFICANT CHANGE UP (ref 0.4–2)
BUN SERPL-MCNC: 11.8 MG/DL — SIGNIFICANT CHANGE UP (ref 8–20)
CA-I SERPL-SCNC: 1.12 MMOL/L — LOW (ref 1.15–1.33)
CALCIUM SERPL-MCNC: 9.5 MG/DL — SIGNIFICANT CHANGE UP (ref 8.4–10.5)
CHLORIDE BLDV-SCNC: 102 MMOL/L — SIGNIFICANT CHANGE UP (ref 96–108)
CHLORIDE SERPL-SCNC: 102 MMOL/L — SIGNIFICANT CHANGE UP (ref 96–108)
CO2 SERPL-SCNC: 20 MMOL/L — LOW (ref 22–29)
CREAT SERPL-MCNC: 0.62 MG/DL — SIGNIFICANT CHANGE UP (ref 0.5–1.3)
EGFR: 113 ML/MIN/1.73M2 — SIGNIFICANT CHANGE UP
EOSINOPHIL # BLD AUTO: 0 K/UL — SIGNIFICANT CHANGE UP (ref 0–0.5)
EOSINOPHIL NFR BLD AUTO: 0 % — SIGNIFICANT CHANGE UP (ref 0–6)
FLUAV AG NPH QL: SIGNIFICANT CHANGE UP
FLUBV AG NPH QL: SIGNIFICANT CHANGE UP
GAS PNL BLDV: 133 MMOL/L — LOW (ref 136–145)
GAS PNL BLDV: SIGNIFICANT CHANGE UP
GIANT PLATELETS BLD QL SMEAR: PRESENT — SIGNIFICANT CHANGE UP
GLUCOSE BLDV-MCNC: 153 MG/DL — HIGH (ref 70–99)
GLUCOSE SERPL-MCNC: 142 MG/DL — HIGH (ref 70–99)
HCO3 BLDV-SCNC: 23 MMOL/L — SIGNIFICANT CHANGE UP (ref 22–29)
HCT VFR BLD CALC: 39.7 % — SIGNIFICANT CHANGE UP (ref 39–50)
HCT VFR BLDA CALC: 39 % — SIGNIFICANT CHANGE UP
HGB BLD CALC-MCNC: 13.1 G/DL — SIGNIFICANT CHANGE UP (ref 12.6–17.4)
HGB BLD-MCNC: 12.6 G/DL — LOW (ref 13–17)
LACTATE BLDV-MCNC: 1.7 MMOL/L — SIGNIFICANT CHANGE UP (ref 0.5–2)
LIDOCAIN IGE QN: 17 U/L — LOW (ref 22–51)
LYMPHOCYTES # BLD AUTO: 0.35 K/UL — LOW (ref 1–3.3)
LYMPHOCYTES # BLD AUTO: 2.6 % — LOW (ref 13–44)
MANUAL SMEAR VERIFICATION: SIGNIFICANT CHANGE UP
MCHC RBC-ENTMCNC: 23.4 PG — LOW (ref 27–34)
MCHC RBC-ENTMCNC: 31.7 GM/DL — LOW (ref 32–36)
MCV RBC AUTO: 73.8 FL — LOW (ref 80–100)
MONOCYTES # BLD AUTO: 0.47 K/UL — SIGNIFICANT CHANGE UP (ref 0–0.9)
MONOCYTES NFR BLD AUTO: 3.5 % — SIGNIFICANT CHANGE UP (ref 2–14)
NEUTROPHILS # BLD AUTO: 12.37 K/UL — HIGH (ref 1.8–7.4)
NEUTROPHILS NFR BLD AUTO: 93 % — HIGH (ref 43–77)
PCO2 BLDV: 37 MMHG — LOW (ref 42–55)
PH BLDV: 7.4 — SIGNIFICANT CHANGE UP (ref 7.32–7.43)
PLAT MORPH BLD: NORMAL — SIGNIFICANT CHANGE UP
PLATELET # BLD AUTO: 275 K/UL — SIGNIFICANT CHANGE UP (ref 150–400)
PO2 BLDV: 53 MMHG — HIGH (ref 25–45)
POTASSIUM BLDV-SCNC: 4.3 MMOL/L — SIGNIFICANT CHANGE UP (ref 3.5–5.1)
POTASSIUM SERPL-MCNC: 4.3 MMOL/L — SIGNIFICANT CHANGE UP (ref 3.5–5.3)
POTASSIUM SERPL-SCNC: 4.3 MMOL/L — SIGNIFICANT CHANGE UP (ref 3.5–5.3)
PROT SERPL-MCNC: 8.2 G/DL — SIGNIFICANT CHANGE UP (ref 6.6–8.7)
RBC # BLD: 5.38 M/UL — SIGNIFICANT CHANGE UP (ref 4.2–5.8)
RBC # FLD: 16.5 % — HIGH (ref 10.3–14.5)
RBC BLD AUTO: NORMAL — SIGNIFICANT CHANGE UP
RSV RNA NPH QL NAA+NON-PROBE: SIGNIFICANT CHANGE UP
SAO2 % BLDV: 86.5 % — SIGNIFICANT CHANGE UP
SARS-COV-2 RNA SPEC QL NAA+PROBE: SIGNIFICANT CHANGE UP
SODIUM SERPL-SCNC: 139 MMOL/L — SIGNIFICANT CHANGE UP (ref 135–145)
WBC # BLD: 13.3 K/UL — HIGH (ref 3.8–10.5)
WBC # FLD AUTO: 13.3 K/UL — HIGH (ref 3.8–10.5)

## 2023-01-02 PROCEDURE — 99223 1ST HOSP IP/OBS HIGH 75: CPT

## 2023-01-02 PROCEDURE — 71045 X-RAY EXAM CHEST 1 VIEW: CPT | Mod: 26

## 2023-01-02 PROCEDURE — 99285 EMERGENCY DEPT VISIT HI MDM: CPT

## 2023-01-02 RX ORDER — ERYTHROMYCIN ETHYLSUCCINATE 400 MG
250 TABLET ORAL ONCE
Refills: 0 | Status: COMPLETED | OUTPATIENT
Start: 2023-01-02 | End: 2023-01-02

## 2023-01-02 RX ORDER — HYDROMORPHONE HYDROCHLORIDE 2 MG/ML
1 INJECTION INTRAMUSCULAR; INTRAVENOUS; SUBCUTANEOUS ONCE
Refills: 0 | Status: DISCONTINUED | OUTPATIENT
Start: 2023-01-02 | End: 2023-01-02

## 2023-01-02 RX ORDER — LANOLIN ALCOHOL/MO/W.PET/CERES
3 CREAM (GRAM) TOPICAL AT BEDTIME
Refills: 0 | Status: DISCONTINUED | OUTPATIENT
Start: 2023-01-02 | End: 2023-01-06

## 2023-01-02 RX ORDER — HYDROMORPHONE HYDROCHLORIDE 2 MG/ML
0.5 INJECTION INTRAMUSCULAR; INTRAVENOUS; SUBCUTANEOUS EVERY 4 HOURS
Refills: 0 | Status: DISCONTINUED | OUTPATIENT
Start: 2023-01-02 | End: 2023-01-04

## 2023-01-02 RX ORDER — PANTOPRAZOLE SODIUM 20 MG/1
40 TABLET, DELAYED RELEASE ORAL DAILY
Refills: 0 | Status: DISCONTINUED | OUTPATIENT
Start: 2023-01-02 | End: 2023-01-04

## 2023-01-02 RX ORDER — ERYTHROMYCIN ETHYLSUCCINATE 400 MG
250 TABLET ORAL
Refills: 0 | Status: DISCONTINUED | OUTPATIENT
Start: 2023-01-02 | End: 2023-01-06

## 2023-01-02 RX ORDER — SODIUM CHLORIDE 9 MG/ML
1000 INJECTION, SOLUTION INTRAVENOUS
Refills: 0 | Status: DISCONTINUED | OUTPATIENT
Start: 2023-01-02 | End: 2023-01-06

## 2023-01-02 RX ORDER — METOCLOPRAMIDE HCL 10 MG
10 TABLET ORAL THREE TIMES A DAY
Refills: 0 | Status: DISCONTINUED | OUTPATIENT
Start: 2023-01-02 | End: 2023-01-06

## 2023-01-02 RX ORDER — METOCLOPRAMIDE HCL 10 MG
10 TABLET ORAL ONCE
Refills: 0 | Status: COMPLETED | OUTPATIENT
Start: 2023-01-02 | End: 2023-01-02

## 2023-01-02 RX ORDER — AMLODIPINE BESYLATE 2.5 MG/1
5 TABLET ORAL DAILY
Refills: 0 | Status: DISCONTINUED | OUTPATIENT
Start: 2023-01-02 | End: 2023-01-02

## 2023-01-02 RX ORDER — FAMOTIDINE 10 MG/ML
20 INJECTION INTRAVENOUS ONCE
Refills: 0 | Status: COMPLETED | OUTPATIENT
Start: 2023-01-02 | End: 2023-01-02

## 2023-01-02 RX ORDER — SODIUM CHLORIDE 9 MG/ML
1000 INJECTION INTRAMUSCULAR; INTRAVENOUS; SUBCUTANEOUS ONCE
Refills: 0 | Status: COMPLETED | OUTPATIENT
Start: 2023-01-02 | End: 2023-01-02

## 2023-01-02 RX ORDER — ACETAMINOPHEN 500 MG
1000 TABLET ORAL ONCE
Refills: 0 | Status: COMPLETED | OUTPATIENT
Start: 2023-01-02 | End: 2023-01-03

## 2023-01-02 RX ORDER — HYDRALAZINE HCL 50 MG
5 TABLET ORAL EVERY 6 HOURS
Refills: 0 | Status: DISCONTINUED | OUTPATIENT
Start: 2023-01-02 | End: 2023-01-03

## 2023-01-02 RX ORDER — AMLODIPINE BESYLATE 2.5 MG/1
10 TABLET ORAL DAILY
Refills: 0 | Status: DISCONTINUED | OUTPATIENT
Start: 2023-01-02 | End: 2023-01-06

## 2023-01-02 RX ORDER — ACETAMINOPHEN 500 MG
650 TABLET ORAL EVERY 6 HOURS
Refills: 0 | Status: DISCONTINUED | OUTPATIENT
Start: 2023-01-02 | End: 2023-01-04

## 2023-01-02 RX ORDER — ONDANSETRON 8 MG/1
4 TABLET, FILM COATED ORAL ONCE
Refills: 0 | Status: COMPLETED | OUTPATIENT
Start: 2023-01-02 | End: 2023-01-02

## 2023-01-02 RX ADMIN — HYDROMORPHONE HYDROCHLORIDE 1 MILLIGRAM(S): 2 INJECTION INTRAMUSCULAR; INTRAVENOUS; SUBCUTANEOUS at 18:08

## 2023-01-02 RX ADMIN — Medication 10 MILLIGRAM(S): at 19:54

## 2023-01-02 RX ADMIN — SODIUM CHLORIDE 1000 MILLILITER(S): 9 INJECTION INTRAMUSCULAR; INTRAVENOUS; SUBCUTANEOUS at 18:11

## 2023-01-02 RX ADMIN — ONDANSETRON 4 MILLIGRAM(S): 8 TABLET, FILM COATED ORAL at 22:59

## 2023-01-02 RX ADMIN — SODIUM CHLORIDE 1000 MILLILITER(S): 9 INJECTION INTRAMUSCULAR; INTRAVENOUS; SUBCUTANEOUS at 18:41

## 2023-01-02 RX ADMIN — FAMOTIDINE 20 MILLIGRAM(S): 10 INJECTION INTRAVENOUS at 18:06

## 2023-01-02 RX ADMIN — Medication 250 MILLIGRAM(S): at 22:21

## 2023-01-02 RX ADMIN — HYDROMORPHONE HYDROCHLORIDE 1 MILLIGRAM(S): 2 INJECTION INTRAMUSCULAR; INTRAVENOUS; SUBCUTANEOUS at 18:41

## 2023-01-02 RX ADMIN — HYDROMORPHONE HYDROCHLORIDE 0.5 MILLIGRAM(S): 2 INJECTION INTRAMUSCULAR; INTRAVENOUS; SUBCUTANEOUS at 22:59

## 2023-01-02 RX ADMIN — HYDROMORPHONE HYDROCHLORIDE 1 MILLIGRAM(S): 2 INJECTION INTRAMUSCULAR; INTRAVENOUS; SUBCUTANEOUS at 19:54

## 2023-01-02 NOTE — ED PROVIDER NOTE - OBJECTIVE STATEMENT
56 y/o M with PMH esophageal Ca s/p esophagectomy, gastroparesis, HTN presents for intractable epigastric abdominal pain, unable to tolerate his PO medication for nausea at home that started this morning. He notes dry heaves. Denies lower abdominal pain, chest pain, SOB, diarrhea. This is consistent with his usual exacerbations. He denies allergies to medications. He was recently here in November for similar symptoms.

## 2023-01-02 NOTE — ED PROVIDER NOTE - CLINICAL SUMMARY MEDICAL DECISION MAKING FREE TEXT BOX
56 y/o M with PMH esophageal Ca s/p esophagectomy and gastroparesis presents for intractable vomiting and epigastric pain consistent with his usual gastroparesis. Will check labs, CXR, pain and nausea control, IV hydration and reassess.

## 2023-01-02 NOTE — ED ADULT TRIAGE NOTE - CHIEF COMPLAINT QUOTE
Pt c/o abd pain since this AM with vomiting.  BP high in triage, pt states he did not take his BP meds this AM.

## 2023-01-02 NOTE — ED PROVIDER NOTE - PHYSICAL EXAMINATION
Const: Awake, alert and oriented. In no acute distress. Well appearing.  HEENT: NC/AT. Moist mucous membranes.  Eyes: No scleral icterus. EOMI.  Neck:. Soft and supple. Full ROM without pain.  Cardiac: Regular rate and regular rhythm. +S1/S2. No murmurs. Peripheral pulses 2+ and symmetric. No LE edema.  Resp: Speaking in full sentences. No evidence of respiratory distress. No wheezes, rales or rhonchi.  Abd: Soft, + epigastric TTP, non-distended. Normal bowel sounds in all 4 quadrants. No guarding or rebound.  Back: Spine midline and non-tender. No CVAT.  Skin: No rashes, abrasions or lacerations.  Neuro: Awake, alert & oriented x 3. Moves all extremities symmetrically.

## 2023-01-02 NOTE — ED ADULT NURSE NOTE - OBJECTIVE STATEMENT
56 y/o male complaining of abdominal pain. pmh of esophageal cancer. patient is ambulatory with steady gait no assistance necessary patient given pain medications as per md order. continuing to monitor

## 2023-01-02 NOTE — H&P ADULT - ASSESSMENT
56 y/o with intractable N/V, hx of esophageal cancer s/p esophagectomy, gastroparesis, HTN    Intractable N/V:  -Likely due to known history of gastroparesis from prior surgery  -Cont. supportive care with IVF, anti-emetics, IV PPI  -Has been seen by pain management in past, not on chronic opioids as o/p  -Trial of dilaudid/IV tylenol for now, avoid NSAIDs due to risk of ulcer/bleeding  -Abdomen benign, not a new diagnosis, no indication for imaging at this time  -Serial abdominal exams  -CLD as tolerated  -OOB, ambulate, DVT-P w. VC boots     HTN:  -Cont. Norvasc as will tolerate  -IV prn Hydralazine  -Current BP felt to be elevated due to pain  -Advance to DASH diet as tolerated     Esophagectomy/Gastric pull through:  -F/U with Oncology as directed post discharge    Discussed with Patient, ED staff

## 2023-01-02 NOTE — ED PROVIDER NOTE - NS ED ROS FT
Const: Denies fever, chills  HEENT: Denies blurry vision, sore throat  Neck: Denies neck pain/stiffness  Resp: Denies coughing, SOB  Cardiovascular: Denies CP, palpitations, LE edema  GI: + nausea, + vomiting, + abdominal pain, Denies diarrhea, constipation, blood in stool  : Denies urinary frequency/urgency/dysuria, hematuria  MSK: Denies back pain  Neuro: Denies HA, dizziness, numbness, weakness  Skin: Denies rashes. (4) excellent

## 2023-01-02 NOTE — ED PROVIDER NOTE - PROGRESS NOTE DETAILS
Staecy BUTLER: Patient with persistent nausea despite multiple antiemetics. Will admit to hospitalist.

## 2023-01-02 NOTE — ED PROVIDER NOTE - CADM POA CENTRAL LINE
No [Symptom and Test Evaluation] : symptom and test evaluation [Hyperlipidemia] : hyperlipidemia [Hypertension] : hypertension

## 2023-01-02 NOTE — ED ADULT NURSE REASSESSMENT NOTE - NS ED NURSE REASSESS COMMENT FT1
received report from day RN, assumed care of pt at change of shift.  pt is AOX4, no s/s acute distress noted.  reports nausea and pain - medication given.  awaiting admission.  wife at bedside

## 2023-01-02 NOTE — H&P ADULT - HISTORY OF PRESENT ILLNESS
58 y/o male with hx of HTN, Esophageal cancer s/p distal esophagectomy with gastric pull through in 2016 with no chemo/xrt. Patient with intermittent N/V from gastroparesis due to surgery and is on Erythromycin and Reglan PRN. Was last here in 8/22 with N/V and had EGD with no acute findings. Was treated with supportive care and antiemetics, analgesics and IVF that lead to resolution over 5 days. States he was doing well until yesterday when he ate cheetos and shortly after started having N/V that has continued to today. Denies any recent illnesses, fever, chills, travel or sick contacts. No blood in stool or vomitus. He was advised to see a Dr. Sainz in [facility] who is a gastric motility specialist bnut states she has been unavailable. In the ED patient with elevated BP from pain, RVP neg, labs at baseline, benign abdomen exam. Was given antiemetics, analgesics and IVF with some improvement in retching, but still unable to keep PO down at this time.

## 2023-01-03 LAB
ANION GAP SERPL CALC-SCNC: 16 MMOL/L — SIGNIFICANT CHANGE UP (ref 5–17)
BUN SERPL-MCNC: 11.3 MG/DL — SIGNIFICANT CHANGE UP (ref 8–20)
CALCIUM SERPL-MCNC: 9.2 MG/DL — SIGNIFICANT CHANGE UP (ref 8.4–10.5)
CHLORIDE SERPL-SCNC: 100 MMOL/L — SIGNIFICANT CHANGE UP (ref 96–108)
CO2 SERPL-SCNC: 19 MMOL/L — LOW (ref 22–29)
CREAT SERPL-MCNC: 0.68 MG/DL — SIGNIFICANT CHANGE UP (ref 0.5–1.3)
EGFR: 110 ML/MIN/1.73M2 — SIGNIFICANT CHANGE UP
GLUCOSE SERPL-MCNC: 174 MG/DL — HIGH (ref 70–99)
HCT VFR BLD CALC: 38 % — LOW (ref 39–50)
HGB BLD-MCNC: 11.9 G/DL — LOW (ref 13–17)
MAGNESIUM SERPL-MCNC: 1.7 MG/DL — SIGNIFICANT CHANGE UP (ref 1.6–2.6)
MCHC RBC-ENTMCNC: 23.3 PG — LOW (ref 27–34)
MCHC RBC-ENTMCNC: 31.3 GM/DL — LOW (ref 32–36)
MCV RBC AUTO: 74.4 FL — LOW (ref 80–100)
PHOSPHATE SERPL-MCNC: 3 MG/DL — SIGNIFICANT CHANGE UP (ref 2.4–4.7)
PLATELET # BLD AUTO: 367 K/UL — SIGNIFICANT CHANGE UP (ref 150–400)
POTASSIUM SERPL-MCNC: 3.5 MMOL/L — SIGNIFICANT CHANGE UP (ref 3.5–5.3)
POTASSIUM SERPL-SCNC: 3.5 MMOL/L — SIGNIFICANT CHANGE UP (ref 3.5–5.3)
RBC # BLD: 5.11 M/UL — SIGNIFICANT CHANGE UP (ref 4.2–5.8)
RBC # FLD: 17 % — HIGH (ref 10.3–14.5)
SODIUM SERPL-SCNC: 135 MMOL/L — SIGNIFICANT CHANGE UP (ref 135–145)
WBC # BLD: 13.63 K/UL — HIGH (ref 3.8–10.5)
WBC # FLD AUTO: 13.63 K/UL — HIGH (ref 3.8–10.5)

## 2023-01-03 PROCEDURE — 99233 SBSQ HOSP IP/OBS HIGH 50: CPT

## 2023-01-03 RX ORDER — SENNA PLUS 8.6 MG/1
2 TABLET ORAL AT BEDTIME
Refills: 0 | Status: DISCONTINUED | OUTPATIENT
Start: 2023-01-03 | End: 2023-01-06

## 2023-01-03 RX ORDER — ONDANSETRON 8 MG/1
4 TABLET, FILM COATED ORAL ONCE
Refills: 0 | Status: COMPLETED | OUTPATIENT
Start: 2023-01-03 | End: 2023-01-03

## 2023-01-03 RX ORDER — POTASSIUM CHLORIDE 20 MEQ
40 PACKET (EA) ORAL ONCE
Refills: 0 | Status: COMPLETED | OUTPATIENT
Start: 2023-01-03 | End: 2023-01-03

## 2023-01-03 RX ORDER — HYDRALAZINE HCL 50 MG
10 TABLET ORAL EVERY 4 HOURS
Refills: 0 | Status: DISCONTINUED | OUTPATIENT
Start: 2023-01-03 | End: 2023-01-06

## 2023-01-03 RX ORDER — POLYETHYLENE GLYCOL 3350 17 G/17G
17 POWDER, FOR SOLUTION ORAL DAILY
Refills: 0 | Status: DISCONTINUED | OUTPATIENT
Start: 2023-01-03 | End: 2023-01-06

## 2023-01-03 RX ORDER — HYDROMORPHONE HYDROCHLORIDE 2 MG/ML
1 INJECTION INTRAMUSCULAR; INTRAVENOUS; SUBCUTANEOUS EVERY 4 HOURS
Refills: 0 | Status: DISCONTINUED | OUTPATIENT
Start: 2023-01-03 | End: 2023-01-04

## 2023-01-03 RX ADMIN — HYDROMORPHONE HYDROCHLORIDE 0.5 MILLIGRAM(S): 2 INJECTION INTRAMUSCULAR; INTRAVENOUS; SUBCUTANEOUS at 13:05

## 2023-01-03 RX ADMIN — ONDANSETRON 4 MILLIGRAM(S): 8 TABLET, FILM COATED ORAL at 08:40

## 2023-01-03 RX ADMIN — Medication 400 MILLIGRAM(S): at 02:45

## 2023-01-03 RX ADMIN — HYDROMORPHONE HYDROCHLORIDE 0.5 MILLIGRAM(S): 2 INJECTION INTRAMUSCULAR; INTRAVENOUS; SUBCUTANEOUS at 11:48

## 2023-01-03 RX ADMIN — HYDROMORPHONE HYDROCHLORIDE 0.5 MILLIGRAM(S): 2 INJECTION INTRAMUSCULAR; INTRAVENOUS; SUBCUTANEOUS at 04:22

## 2023-01-03 RX ADMIN — Medication 10 MILLIGRAM(S): at 11:47

## 2023-01-03 RX ADMIN — HYDROMORPHONE HYDROCHLORIDE 0.5 MILLIGRAM(S): 2 INJECTION INTRAMUSCULAR; INTRAVENOUS; SUBCUTANEOUS at 08:26

## 2023-01-03 RX ADMIN — SODIUM CHLORIDE 75 MILLILITER(S): 9 INJECTION, SOLUTION INTRAVENOUS at 00:12

## 2023-01-03 RX ADMIN — Medication 10 MILLIGRAM(S): at 20:27

## 2023-01-03 RX ADMIN — Medication 5 MILLIGRAM(S): at 08:24

## 2023-01-03 RX ADMIN — PANTOPRAZOLE SODIUM 40 MILLIGRAM(S): 20 TABLET, DELAYED RELEASE ORAL at 11:47

## 2023-01-03 RX ADMIN — SODIUM CHLORIDE 100 MILLILITER(S): 9 INJECTION, SOLUTION INTRAVENOUS at 20:30

## 2023-01-03 RX ADMIN — HYDROMORPHONE HYDROCHLORIDE 1 MILLIGRAM(S): 2 INJECTION INTRAMUSCULAR; INTRAVENOUS; SUBCUTANEOUS at 20:27

## 2023-01-03 RX ADMIN — Medication 10 MILLIGRAM(S): at 05:52

## 2023-01-03 RX ADMIN — HYDROMORPHONE HYDROCHLORIDE 1 MILLIGRAM(S): 2 INJECTION INTRAMUSCULAR; INTRAVENOUS; SUBCUTANEOUS at 15:59

## 2023-01-03 RX ADMIN — HYDROMORPHONE HYDROCHLORIDE 0.5 MILLIGRAM(S): 2 INJECTION INTRAMUSCULAR; INTRAVENOUS; SUBCUTANEOUS at 08:36

## 2023-01-03 NOTE — PROGRESS NOTE ADULT - ASSESSMENT
58 y/o with hx of esophageal cancer s/p esophagectomy, gastroparesis, HTN presented with nausea/vomiting and abdominal pain.     1) Intractable N/V  -Likely due to known history of gastroparesis from prior surgery  -Cont. supportive care with IVF, anti-emetics, IV PPI  -Has been seen by pain management in past, not on chronic opioids as o/p  -Trial of dilaudid/tylenol for now, avoid NSAIDs due to risk of ulcer/bleeding  -Abdomen benign, not a new diagnosis, no indication for imaging at this time  -Serial abdominal exams  -CLD as tolerated    2) Esophagectomy/Gastric pull through  -F/U with Oncology as directed post discharge    3) HTN  -Cont. Norvasc   -IV prn Hydralazine  -Current BP felt to be elevated due to pain  -Advance to DASH diet as tolerated     DVT Prophylaxis -- Venodyne     Dispo: Home in 24-48 hours.

## 2023-01-04 LAB
ANION GAP SERPL CALC-SCNC: 13 MMOL/L — SIGNIFICANT CHANGE UP (ref 5–17)
BASOPHILS # BLD AUTO: 0.04 K/UL — SIGNIFICANT CHANGE UP (ref 0–0.2)
BASOPHILS NFR BLD AUTO: 0.3 % — SIGNIFICANT CHANGE UP (ref 0–2)
BUN SERPL-MCNC: 13.2 MG/DL — SIGNIFICANT CHANGE UP (ref 8–20)
CALCIUM SERPL-MCNC: 9.4 MG/DL — SIGNIFICANT CHANGE UP (ref 8.4–10.5)
CHLORIDE SERPL-SCNC: 100 MMOL/L — SIGNIFICANT CHANGE UP (ref 96–108)
CO2 SERPL-SCNC: 21 MMOL/L — LOW (ref 22–29)
CREAT SERPL-MCNC: 0.58 MG/DL — SIGNIFICANT CHANGE UP (ref 0.5–1.3)
EGFR: 115 ML/MIN/1.73M2 — SIGNIFICANT CHANGE UP
EOSINOPHIL # BLD AUTO: 0.01 K/UL — SIGNIFICANT CHANGE UP (ref 0–0.5)
EOSINOPHIL NFR BLD AUTO: 0.1 % — SIGNIFICANT CHANGE UP (ref 0–6)
GLUCOSE SERPL-MCNC: 131 MG/DL — HIGH (ref 70–99)
HCT VFR BLD CALC: 40.4 % — SIGNIFICANT CHANGE UP (ref 39–50)
HGB BLD-MCNC: 12.9 G/DL — LOW (ref 13–17)
IMM GRANULOCYTES NFR BLD AUTO: 0.5 % — SIGNIFICANT CHANGE UP (ref 0–0.9)
LYMPHOCYTES # BLD AUTO: 18.5 % — SIGNIFICANT CHANGE UP (ref 13–44)
LYMPHOCYTES # BLD AUTO: 2.41 K/UL — SIGNIFICANT CHANGE UP (ref 1–3.3)
MAGNESIUM SERPL-MCNC: 1.9 MG/DL — SIGNIFICANT CHANGE UP (ref 1.6–2.6)
MCHC RBC-ENTMCNC: 23.8 PG — LOW (ref 27–34)
MCHC RBC-ENTMCNC: 31.9 GM/DL — LOW (ref 32–36)
MCV RBC AUTO: 74.4 FL — LOW (ref 80–100)
MONOCYTES # BLD AUTO: 0.77 K/UL — SIGNIFICANT CHANGE UP (ref 0–0.9)
MONOCYTES NFR BLD AUTO: 5.9 % — SIGNIFICANT CHANGE UP (ref 2–14)
NEUTROPHILS # BLD AUTO: 9.76 K/UL — HIGH (ref 1.8–7.4)
NEUTROPHILS NFR BLD AUTO: 74.7 % — SIGNIFICANT CHANGE UP (ref 43–77)
PLATELET # BLD AUTO: 416 K/UL — HIGH (ref 150–400)
POTASSIUM SERPL-MCNC: 3.9 MMOL/L — SIGNIFICANT CHANGE UP (ref 3.5–5.3)
POTASSIUM SERPL-SCNC: 3.9 MMOL/L — SIGNIFICANT CHANGE UP (ref 3.5–5.3)
RBC # BLD: 5.43 M/UL — SIGNIFICANT CHANGE UP (ref 4.2–5.8)
RBC # FLD: 16.8 % — HIGH (ref 10.3–14.5)
SODIUM SERPL-SCNC: 134 MMOL/L — LOW (ref 135–145)
WBC # BLD: 13.05 K/UL — HIGH (ref 3.8–10.5)
WBC # FLD AUTO: 13.05 K/UL — HIGH (ref 3.8–10.5)

## 2023-01-04 PROCEDURE — 99233 SBSQ HOSP IP/OBS HIGH 50: CPT

## 2023-01-04 PROCEDURE — 99223 1ST HOSP IP/OBS HIGH 75: CPT

## 2023-01-04 RX ORDER — PANTOPRAZOLE SODIUM 20 MG/1
40 TABLET, DELAYED RELEASE ORAL EVERY 12 HOURS
Refills: 0 | Status: DISCONTINUED | OUTPATIENT
Start: 2023-01-04 | End: 2023-01-06

## 2023-01-04 RX ORDER — ACETAMINOPHEN 500 MG
1000 TABLET ORAL ONCE
Refills: 0 | Status: COMPLETED | OUTPATIENT
Start: 2023-01-04 | End: 2023-01-04

## 2023-01-04 RX ORDER — ONDANSETRON 8 MG/1
4 TABLET, FILM COATED ORAL ONCE
Refills: 0 | Status: COMPLETED | OUTPATIENT
Start: 2023-01-04 | End: 2023-01-04

## 2023-01-04 RX ORDER — BUPRENORPHINE AND NALOXONE 2; .5 MG/1; MG/1
1 TABLET SUBLINGUAL DAILY
Refills: 0 | Status: DISCONTINUED | OUTPATIENT
Start: 2023-01-04 | End: 2023-01-04

## 2023-01-04 RX ORDER — ONDANSETRON 8 MG/1
4 TABLET, FILM COATED ORAL EVERY 6 HOURS
Refills: 0 | Status: COMPLETED | OUTPATIENT
Start: 2023-01-04 | End: 2023-01-06

## 2023-01-04 RX ORDER — BUPRENORPHINE AND NALOXONE 2; .5 MG/1; MG/1
1 TABLET SUBLINGUAL EVERY 8 HOURS
Refills: 0 | Status: DISCONTINUED | OUTPATIENT
Start: 2023-01-04 | End: 2023-01-05

## 2023-01-04 RX ORDER — ACETAMINOPHEN 500 MG
975 TABLET ORAL EVERY 8 HOURS
Refills: 0 | Status: DISCONTINUED | OUTPATIENT
Start: 2023-01-04 | End: 2023-01-06

## 2023-01-04 RX ORDER — LIDOCAINE 4 G/100G
1 CREAM TOPICAL DAILY
Refills: 0 | Status: DISCONTINUED | OUTPATIENT
Start: 2023-01-04 | End: 2023-01-06

## 2023-01-04 RX ORDER — HYDROMORPHONE HYDROCHLORIDE 2 MG/ML
0.5 INJECTION INTRAMUSCULAR; INTRAVENOUS; SUBCUTANEOUS ONCE
Refills: 0 | Status: DISCONTINUED | OUTPATIENT
Start: 2023-01-04 | End: 2023-01-04

## 2023-01-04 RX ORDER — METHOCARBAMOL 500 MG/1
750 TABLET, FILM COATED ORAL EVERY 8 HOURS
Refills: 0 | Status: DISCONTINUED | OUTPATIENT
Start: 2023-01-04 | End: 2023-01-06

## 2023-01-04 RX ADMIN — Medication 400 MILLIGRAM(S): at 20:07

## 2023-01-04 RX ADMIN — SODIUM CHLORIDE 100 MILLILITER(S): 9 INJECTION, SOLUTION INTRAVENOUS at 15:26

## 2023-01-04 RX ADMIN — BUPRENORPHINE AND NALOXONE 1 FILM(S): 2; .5 TABLET SUBLINGUAL at 12:15

## 2023-01-04 RX ADMIN — ONDANSETRON 4 MILLIGRAM(S): 8 TABLET, FILM COATED ORAL at 03:11

## 2023-01-04 RX ADMIN — ONDANSETRON 4 MILLIGRAM(S): 8 TABLET, FILM COATED ORAL at 11:51

## 2023-01-04 RX ADMIN — HYDROMORPHONE HYDROCHLORIDE 0.5 MILLIGRAM(S): 2 INJECTION INTRAMUSCULAR; INTRAVENOUS; SUBCUTANEOUS at 03:12

## 2023-01-04 RX ADMIN — HYDROMORPHONE HYDROCHLORIDE 1 MILLIGRAM(S): 2 INJECTION INTRAMUSCULAR; INTRAVENOUS; SUBCUTANEOUS at 05:35

## 2023-01-04 RX ADMIN — METHOCARBAMOL 215 MILLIGRAM(S): 500 TABLET, FILM COATED ORAL at 23:31

## 2023-01-04 RX ADMIN — ONDANSETRON 4 MILLIGRAM(S): 8 TABLET, FILM COATED ORAL at 23:30

## 2023-01-04 RX ADMIN — LIDOCAINE 1 PATCH: 4 CREAM TOPICAL at 14:01

## 2023-01-04 RX ADMIN — Medication 1000 MILLIGRAM(S): at 10:17

## 2023-01-04 RX ADMIN — BUPRENORPHINE AND NALOXONE 1 FILM(S): 2; .5 TABLET SUBLINGUAL at 11:55

## 2023-01-04 RX ADMIN — Medication 10 MILLIGRAM(S): at 21:59

## 2023-01-04 RX ADMIN — HYDROMORPHONE HYDROCHLORIDE 1 MILLIGRAM(S): 2 INJECTION INTRAMUSCULAR; INTRAVENOUS; SUBCUTANEOUS at 00:49

## 2023-01-04 RX ADMIN — Medication 10 MILLIGRAM(S): at 05:35

## 2023-01-04 RX ADMIN — PANTOPRAZOLE SODIUM 40 MILLIGRAM(S): 20 TABLET, DELAYED RELEASE ORAL at 21:59

## 2023-01-04 RX ADMIN — ONDANSETRON 4 MILLIGRAM(S): 8 TABLET, FILM COATED ORAL at 17:19

## 2023-01-04 RX ADMIN — Medication 10 MILLIGRAM(S): at 14:01

## 2023-01-04 RX ADMIN — LIDOCAINE 1 PATCH: 4 CREAM TOPICAL at 19:07

## 2023-01-04 RX ADMIN — PANTOPRAZOLE SODIUM 40 MILLIGRAM(S): 20 TABLET, DELAYED RELEASE ORAL at 11:56

## 2023-01-04 RX ADMIN — Medication 400 MILLIGRAM(S): at 08:57

## 2023-01-04 RX ADMIN — METHOCARBAMOL 215 MILLIGRAM(S): 500 TABLET, FILM COATED ORAL at 15:26

## 2023-01-04 NOTE — CONSULT NOTE ADULT - NS ATTEND AMEND GEN_ALL_CORE FT
Patient seen and examined today.  History of esophageal cancer status post esophagectomy with gastric pull-up.  Multiple episodes of nausea and vomiting.  Some dietary noncompliance may have activated but in the past when he had pyloric dilation it has yielded good results.  We will plan for EGD tomorrow with pre pyloric Botox injection and balloon dilation.

## 2023-01-04 NOTE — CONSULT NOTE ADULT - SUBJECTIVE AND OBJECTIVE BOX
Patient is a 57y old  Male who presents with a chief complaint of Intractable N/V (04 Jan 2023 12:37)      HPI:  56 y/o male with hx of HTN, Esophageal cancer s/p distal esophagectomy with gastric pull through in 2016 with no chemo/xrt. Patient with intermittent N/V from gastroparesis due to surgery and is on Erythromycin and Reglan PRN. Was last here in 8/22 with N/V and had EGD with no acute findings. Was treated with supportive care and antiemetics, analgesics and IVF that lead to resolution over 5 days. States he was doing well until yesterday when he ate cheetos and shortly after started having N/V that has continued to today. Denies any recent illnesses, fever, chills, travel or sick contacts. No blood in stool or vomitus. He was advised to see a Dr. Sainz in [facility] who is a gastric motility specialist bnut states she has been unavailable. In the ED patient with elevated BP from pain, RVP neg, labs at baseline, benign abdomen exam. Was given antiemetics, analgesics and IVF with some improvement in retching, but still unable to keep PO down at this time.  (02 Jan 2023 23:32)            Analgesic Dosing for past 24 hours reviewed as below:    acetaminophen   IVPB ..   400 mL/Hr IV Intermittent (01-04-23 @ 08:57)    buprenorphine 8 mG/naloxone 2 mG SL Film   1 Film(s) SubLingual (01-04-23 @ 11:55)    HYDROmorphone  Injectable   1 milliGRAM(s) IV Push (01-04-23 @ 05:35)   1 milliGRAM(s) IV Push (01-04-23 @ 00:49)   1 milliGRAM(s) IV Push (01-03-23 @ 20:27)   1 milliGRAM(s) IV Push (01-03-23 @ 15:59)    HYDROmorphone  Injectable   0.5 milliGRAM(s) IV Push (01-04-23 @ 03:12)    metoclopramide Injectable   10 milliGRAM(s) IV Push (01-04-23 @ 05:35)   10 milliGRAM(s) IV Push (01-03-23 @ 20:27)    ondansetron Injectable   4 milliGRAM(s) IV Push (01-04-23 @ 11:51)    ondansetron Injectable   4 milliGRAM(s) IV Push (01-04-23 @ 03:11)          T(C): 36.9 (01-04-23 @ 10:19), Max: 37.3 (01-04-23 @ 04:39)  HR: 86 (01-04-23 @ 10:19) (84 - 87)  BP: 149/83 (01-04-23 @ 10:19) (148/84 - 197/108)  RR: 18 (01-04-23 @ 10:19) (16 - 18)  SpO2: 97% (01-04-23 @ 10:19) (97% - 97%)        acetaminophen  Suppository .. 975 milliGRAM(s) Rectal every 8 hours  aluminum hydroxide/magnesium hydroxide/simethicone Suspension 30 milliLiter(s) Oral every 4 hours PRN  amLODIPine   Tablet 10 milliGRAM(s) Oral daily  buprenorphine 8 mG/naloxone 2 mG SL Film 1 Film(s) SubLingual daily  dextrose 5% + sodium chloride 0.9%. 1000 milliLiter(s) IV Continuous <Continuous>  erythromycin     enteric coated 250 milliGRAM(s) Oral two times a day  hydrALAZINE Injectable 10 milliGRAM(s) IV Push every 4 hours PRN  lidocaine   4% Patch 1 Patch Transdermal daily  melatonin 3 milliGRAM(s) Oral at bedtime PRN  metoclopramide Injectable 10 milliGRAM(s) IV Push three times a day  ondansetron Injectable 4 milliGRAM(s) IV Push every 6 hours  pantoprazole  Injectable 40 milliGRAM(s) IV Push every 12 hours  polyethylene glycol 3350 17 Gram(s) Oral daily PRN  senna 2 Tablet(s) Oral at bedtime                          12.9   13.05 )-----------( 416      ( 04 Jan 2023 05:49 )             40.4     01-04    134<L>  |  100  |  13.2  ----------------------------<  131<H>  3.9   |  21.0<L>  |  0.58    Ca    9.4      04 Jan 2023 05:49  Phos  3.0     01-03  Mg     1.9     01-04    TPro  8.2  /  Alb  5.1  /  TBili  0.4  /  DBili  x   /  AST  37  /  ALT  43<H>  /  AlkPhos  115  01-02          Pain Service   [phone] Patient is a 57y old  Male who presents with a chief complaint of Intractable N/V (04 Jan 2023 12:37)      HPI:  58 y/o male with hx of HTN, Esophageal cancer s/p distal esophagectomy with gastric pull through in 2016 with no chemo/xrt. Patient with intermittent N/V from gastroparesis due to surgery and is on Erythromycin and Reglan PRN. Was last here in 8/22 with N/V and had EGD with no acute findings. Was treated with supportive care and antiemetics, analgesics and IVF that lead to resolution over 5 days. States he was doing well until yesterday when he ate cheetos and shortly after started having N/V that has continued to today. Denies any recent illnesses, fever, chills, travel or sick contacts. No blood in stool or vomitus. He was advised to see a Dr. Sainz in [facility] who is a gastric motility specialist bnut states she has been unavailable. In the ED patient with elevated BP from pain, RVP neg, labs at baseline, benign abdomen exam. Was given antiemetics, analgesics and IVF with some improvement in retching, but still unable to keep PO down at this time.  (02 Jan 2023 23:32)      Interval Hx:  Patient seen during rounds  Patient reports pain to be mod controlled on current medications  Patient denies sedation with medications       Analgesic Dosing for past 24 hours reviewed as below:    acetaminophen   IVPB ..   400 mL/Hr IV Intermittent (01-04-23 @ 08:57)    buprenorphine 8 mG/naloxone 2 mG SL Film   1 Film(s) SubLingual (01-04-23 @ 11:55)    HYDROmorphone  Injectable   1 milliGRAM(s) IV Push (01-04-23 @ 05:35)   1 milliGRAM(s) IV Push (01-04-23 @ 00:49)   1 milliGRAM(s) IV Push (01-03-23 @ 20:27)   1 milliGRAM(s) IV Push (01-03-23 @ 15:59)    HYDROmorphone  Injectable   0.5 milliGRAM(s) IV Push (01-04-23 @ 03:12)    metoclopramide Injectable   10 milliGRAM(s) IV Push (01-04-23 @ 05:35)   10 milliGRAM(s) IV Push (01-03-23 @ 20:27)    ondansetron Injectable   4 milliGRAM(s) IV Push (01-04-23 @ 11:51)    ondansetron Injectable   4 milliGRAM(s) IV Push (01-04-23 @ 03:11)          T(C): 36.9 (01-04-23 @ 10:19), Max: 37.3 (01-04-23 @ 04:39)  HR: 86 (01-04-23 @ 10:19) (84 - 87)  BP: 149/83 (01-04-23 @ 10:19) (148/84 - 197/108)  RR: 18 (01-04-23 @ 10:19) (16 - 18)  SpO2: 97% (01-04-23 @ 10:19) (97% - 97%)        acetaminophen  Suppository .. 975 milliGRAM(s) Rectal every 8 hours  aluminum hydroxide/magnesium hydroxide/simethicone Suspension 30 milliLiter(s) Oral every 4 hours PRN  amLODIPine   Tablet 10 milliGRAM(s) Oral daily  buprenorphine 8 mG/naloxone 2 mG SL Film 1 Film(s) SubLingual daily  dextrose 5% + sodium chloride 0.9%. 1000 milliLiter(s) IV Continuous <Continuous>  erythromycin     enteric coated 250 milliGRAM(s) Oral two times a day  hydrALAZINE Injectable 10 milliGRAM(s) IV Push every 4 hours PRN  lidocaine   4% Patch 1 Patch Transdermal daily  melatonin 3 milliGRAM(s) Oral at bedtime PRN  metoclopramide Injectable 10 milliGRAM(s) IV Push three times a day  ondansetron Injectable 4 milliGRAM(s) IV Push every 6 hours  pantoprazole  Injectable 40 milliGRAM(s) IV Push every 12 hours  polyethylene glycol 3350 17 Gram(s) Oral daily PRN  senna 2 Tablet(s) Oral at bedtime                          12.9   13.05 )-----------( 416      ( 04 Jan 2023 05:49 )             40.4     01-04    134<L>  |  100  |  13.2  ----------------------------<  131<H>  3.9   |  21.0<L>  |  0.58    Ca    9.4      04 Jan 2023 05:49  Phos  3.0     01-03  Mg     1.9     01-04    TPro  8.2  /  Alb  5.1  /  TBili  0.4  /  DBili  x   /  AST  37  /  ALT  43<H>  /  AlkPhos  115  01-02          Pain Service   [phone]

## 2023-01-04 NOTE — CONSULT NOTE ADULT - ASSESSMENT
57M  hx of suboxone use - see ISTOP  here w abdominal pain - working dx is gastroparesis  pt NPO, but ok for SL meds as per primary team  no indication for full agonist opioid therapy at this time  will cont suboxone dose at daily PRN freq    Suboxone ordered - 8mg qday PRN pain/withdrawal HOLD for sedation     cont acetaminophen  Suppository .. 975 milliGRAM(s) Rectal every 8 hours    if pain remains uncontrolled:  - If no contraindication to NSAIDs, recommend starting ketorolac IV 30mg [id number] standing x 4 doses.  57M  hx of suboxone use - see ISTOP  here w abdominal pain - working dx is gastroparesis  pt NPO, but ok for SL meds as per primary team  no indication for full agonist opioid therapy at this time    will change suboxone dose to PRN at tid freq  will order robaxin 750mg tid prn muscle spasms     cont acetaminophen  Suppository .. 975 milliGRAM(s) Rectal every 8 hours    if pain remains uncontrolled:  - If no contraindication to NSAIDs, recommend starting ketorolac IV 30mg [id number] standing x 4 doses.

## 2023-01-04 NOTE — CONSULT NOTE ADULT - SUBJECTIVE AND OBJECTIVE BOX
This is a 57 year old male with a history of esophageal carcinoma s/p esophagectomy with gastric pull through (2016), gastroparesis here with nausea and vomiting.  Patient was last hospitalized at [facility] for n/v in November in 2022, at that time he had an EGD which revealed a distended duodenum, (HP and celiac disease negative), otherwise normal.   Patient takes erythromycin and reglan PRN.    This is a 57 year old male with a history of esophageal carcinoma s/p esophagectomy with gastric pull through (2016), gastroparesis here with nausea and vomiting.  Patient was last hospitalized at [facility] for n/v in November in 2022, at that time he had an EGD which revealed a distended duodenum, (HP and celiac disease negative), otherwise normal.   Patient takes erythromycin and Reglan PRN.       History of prior cholecystectomy.    This is a 57 year old male with a history of esophageal carcinoma s/p esophagectomy with gastric pull through (2016), gastroparesis, hx of cholecystectomy here with nausea, vomiting and abdominal pain. He has intermittent abdominal pain in the epigastric region.  Patient was last hospitalized at [facility] for N/V in November in 2022, at that time he had an EGD which revealed a distended duodenum, (HP and celiac disease negative), otherwise normal. He has had a few flares of nausea  and vomiting this year. He takes omeprazole 40 mg qd, Reglan (PRN) and erythromycin. He recently traveled to [place] and has been eating bigger meals than usual.   He states that whenever he eats processed meats or fatty foods he feels worse.  He reports having a pyloric channel dilation about three years ago at [facility] and it significantly improved his symptoms.   He had a colonoscopy about a year and a half ago by Dr. Stanley results are not available for review.

## 2023-01-04 NOTE — PROGRESS NOTE ADULT - ASSESSMENT
56 y/o with hx of esophageal cancer s/p esophagectomy, gastroparesis, HTN presented with nausea/vomiting and abdominal pain.     1) Intractable N/V  -Likely due to known history of gastroparesis from prior surgery  -Abdomen benign, not a new diagnosis, no indication for imaging at this time  -Cont. supportive care with IVF, anti-emetics, IV PPI  -Changed Dilaudid to Suboxone (takes at home)  -Tylenol 975 mg TID  -Robaxin PRN  -Pain Management Consult appreciated   -Serial abdominal exams  -CLD as tolerated  -GI Consult appreciated: plan for EGD tomorrow with pre pyloric Botox injection and balloon dilation.  -Patient is at low risk for cardiovascular event during this low risk procedure. RCRI score is 0 and is associated with 0.4% of major cardiac event. He does have the ability to perform > 4 MET levels of activity and has no active cardiac conditions. He may proceed with proposed procedure with no additional cardiac testing or procedures.     2) Esophagectomy/Gastric pull through  -F/U with Oncology as directed post discharge    3) HTN  -Norvasc ordered however patient is not taking it    -IV prn Hydralazine  -Current BP felt to be elevated due to pain  -Advance to DASH diet as tolerated     DVT Prophylaxis -- Venodyne     Dispo: Home in 48 hours.     Updated patient's girlfriend at bedside.

## 2023-01-04 NOTE — PATIENT PROFILE ADULT - FALL HARM RISK - UNIVERSAL INTERVENTIONS
Bed in lowest position, wheels locked, appropriate side rails in place/Call bell, personal items and telephone in reach/Instruct patient to call for assistance before getting out of bed or chair/Non-slip footwear when patient is out of bed/[place] to call system/Physically safe environment - no spills, clutter or unnecessary equipment/Purposeful Proactive Rounding/Room/bathroom lighting operational, light cord in reach

## 2023-01-04 NOTE — CONSULT NOTE ADULT - ASSESSMENT
This is a 57 year old male with a history of esophageal carcinoma s/p esophagectomy with gastric pull through (2016), gastroparesis here with nausea and vomiting.   This is a 57 year old male with a history of esophageal carcinoma s/p esophagectomy with gastric pull through (2016), gastroparesis here with nausea, vomiting and abdominal pain.  He has been intermittently symptomatic this year, despite being on Reglan, erythromycin and a PPI.   Will plan for EGD tomorrow with pre pyloric botox injection and balloon dilation.   Discussed the procedure with both the patient and his fiancee. Discussed the benefits and risks of the procedure including bleeding, perforation, infections, anesthesia side effect.  On a clear liquid diet. NPO after midnight.   Continue with PPI.

## 2023-01-04 NOTE — CONSULT NOTE ADULT - NSCONSULTADDITIONALINFOA_GEN_ALL_CORE
Reference #: [id number]    Rx Written	Rx Dispensed	Drug	Quantity	Days Supply	Prescriber Name  08/31/2022	09/01/2022	buprenorphine-naloxone 8-2 mg sl film	11	30	[place], Robb  07/27/2022	07/29/2022	buprenorphine-naloxone 8-2 mg sl film	11	30	[place]bad, Robb  06/29/2022	06/29/2022	buprenorphine-naloxone 8-2 mg sl film	15	30	[place]bad, Robb  06/01/2022	06/01/2022	buprenorphine-naloxone 8-2 mg sl film	15	30	[place]bad, Robb  05/04/2022	05/04/2022	buprenorphine-naloxone 8-2 mg sl film	15	30	[place]bad, Robb  04/06/2022	04/07/2022	buprenorphine-naloxone 8-2 mg sl film	15	30	[place], Robb  09/03/2021	09/04/2021	buprenorphine-naloxone 8-2 mg sl film	15	30	Andrew Luu DO

## 2023-01-05 ENCOUNTER — TRANSCRIPTION ENCOUNTER (OUTPATIENT)
Age: 58
End: 2023-01-05

## 2023-01-05 LAB
ALBUMIN SERPL ELPH-MCNC: 4.5 G/DL — SIGNIFICANT CHANGE UP (ref 3.3–5.2)
ALP SERPL-CCNC: 97 U/L — SIGNIFICANT CHANGE UP (ref 40–120)
ALT FLD-CCNC: 26 U/L — SIGNIFICANT CHANGE UP
ANION GAP SERPL CALC-SCNC: 13 MMOL/L — SIGNIFICANT CHANGE UP (ref 5–17)
APPEARANCE UR: CLEAR — SIGNIFICANT CHANGE UP
APTT BLD: 27.4 SEC — LOW (ref 27.5–35.5)
AST SERPL-CCNC: 18 U/L — SIGNIFICANT CHANGE UP
BACTERIA # UR AUTO: NEGATIVE — SIGNIFICANT CHANGE UP
BILIRUB SERPL-MCNC: 0.5 MG/DL — SIGNIFICANT CHANGE UP (ref 0.4–2)
BILIRUB UR-MCNC: NEGATIVE — SIGNIFICANT CHANGE UP
BLD GP AB SCN SERPL QL: SIGNIFICANT CHANGE UP
BUN SERPL-MCNC: 9 MG/DL — SIGNIFICANT CHANGE UP (ref 8–20)
CALCIUM SERPL-MCNC: 9.1 MG/DL — SIGNIFICANT CHANGE UP (ref 8.4–10.5)
CHLORIDE SERPL-SCNC: 96 MMOL/L — SIGNIFICANT CHANGE UP (ref 96–108)
CO2 SERPL-SCNC: 22 MMOL/L — SIGNIFICANT CHANGE UP (ref 22–29)
COLOR SPEC: YELLOW — SIGNIFICANT CHANGE UP
CREAT SERPL-MCNC: 0.55 MG/DL — SIGNIFICANT CHANGE UP (ref 0.5–1.3)
DIFF PNL FLD: NEGATIVE — SIGNIFICANT CHANGE UP
EGFR: 117 ML/MIN/1.73M2 — SIGNIFICANT CHANGE UP
EPI CELLS # UR: SIGNIFICANT CHANGE UP
GLUCOSE SERPL-MCNC: 132 MG/DL — HIGH (ref 70–99)
GLUCOSE UR QL: NEGATIVE MG/DL — SIGNIFICANT CHANGE UP
HCT VFR BLD CALC: 38.4 % — LOW (ref 39–50)
HGB BLD-MCNC: 12.1 G/DL — LOW (ref 13–17)
INR BLD: 1.16 RATIO — SIGNIFICANT CHANGE UP (ref 0.88–1.16)
KETONES UR-MCNC: ABNORMAL
LACTATE BLDV-MCNC: 1.3 MMOL/L — SIGNIFICANT CHANGE UP (ref 0.5–2)
LEUKOCYTE ESTERASE UR-ACNC: NEGATIVE — SIGNIFICANT CHANGE UP
MCHC RBC-ENTMCNC: 23.1 PG — LOW (ref 27–34)
MCHC RBC-ENTMCNC: 31.5 GM/DL — LOW (ref 32–36)
MCV RBC AUTO: 73.4 FL — LOW (ref 80–100)
NITRITE UR-MCNC: NEGATIVE — SIGNIFICANT CHANGE UP
PH UR: 6.5 — SIGNIFICANT CHANGE UP (ref 5–8)
PLATELET # BLD AUTO: 354 K/UL — SIGNIFICANT CHANGE UP (ref 150–400)
POTASSIUM SERPL-MCNC: 3.3 MMOL/L — LOW (ref 3.5–5.3)
POTASSIUM SERPL-SCNC: 3.3 MMOL/L — LOW (ref 3.5–5.3)
PROCALCITONIN SERPL-MCNC: 0.05 NG/ML — SIGNIFICANT CHANGE UP (ref 0.02–0.1)
PROT SERPL-MCNC: 7.7 G/DL — SIGNIFICANT CHANGE UP (ref 6.6–8.7)
PROT UR-MCNC: NEGATIVE — SIGNIFICANT CHANGE UP
PROTHROM AB SERPL-ACNC: 13.5 SEC — HIGH (ref 10.5–13.4)
RBC # BLD: 5.23 M/UL — SIGNIFICANT CHANGE UP (ref 4.2–5.8)
RBC # FLD: 15.9 % — HIGH (ref 10.3–14.5)
RBC CASTS # UR COMP ASSIST: SIGNIFICANT CHANGE UP /HPF (ref 0–4)
SODIUM SERPL-SCNC: 131 MMOL/L — LOW (ref 135–145)
SP GR SPEC: 1.01 — SIGNIFICANT CHANGE UP (ref 1.01–1.02)
UROBILINOGEN FLD QL: NEGATIVE MG/DL — SIGNIFICANT CHANGE UP
WBC # BLD: 9.55 K/UL — SIGNIFICANT CHANGE UP (ref 3.8–10.5)
WBC # FLD AUTO: 9.55 K/UL — SIGNIFICANT CHANGE UP (ref 3.8–10.5)
WBC UR QL: SIGNIFICANT CHANGE UP /HPF (ref 0–5)

## 2023-01-05 PROCEDURE — 99233 SBSQ HOSP IP/OBS HIGH 50: CPT

## 2023-01-05 RX ORDER — POTASSIUM CHLORIDE 20 MEQ
20 PACKET (EA) ORAL
Refills: 0 | Status: COMPLETED | OUTPATIENT
Start: 2023-01-05 | End: 2023-01-05

## 2023-01-05 RX ORDER — SODIUM CHLORIDE 9 MG/ML
2000 INJECTION, SOLUTION INTRAVENOUS ONCE
Refills: 0 | Status: DISCONTINUED | OUTPATIENT
Start: 2023-01-05 | End: 2023-01-05

## 2023-01-05 RX ORDER — ACETAMINOPHEN 500 MG
650 TABLET ORAL ONCE
Refills: 0 | Status: COMPLETED | OUTPATIENT
Start: 2023-01-05 | End: 2023-01-05

## 2023-01-05 RX ORDER — ACETAMINOPHEN 500 MG
1000 TABLET ORAL ONCE
Refills: 0 | Status: COMPLETED | OUTPATIENT
Start: 2023-01-05 | End: 2023-01-05

## 2023-01-05 RX ORDER — ONABOTULINUMTOXINA 100 UNIT
100 VIAL (EA) INJECTION ONCE
Refills: 0 | Status: DISCONTINUED | OUTPATIENT
Start: 2023-01-05 | End: 2023-01-06

## 2023-01-05 RX ORDER — BUPRENORPHINE AND NALOXONE 2; .5 MG/1; MG/1
2 TABLET SUBLINGUAL ONCE
Refills: 0 | Status: DISCONTINUED | OUTPATIENT
Start: 2023-01-05 | End: 2023-01-05

## 2023-01-05 RX ORDER — BUPRENORPHINE AND NALOXONE 2; .5 MG/1; MG/1
1 TABLET SUBLINGUAL EVERY 24 HOURS
Refills: 0 | Status: DISCONTINUED | OUTPATIENT
Start: 2023-01-05 | End: 2023-01-06

## 2023-01-05 RX ADMIN — Medication 10 MILLIGRAM(S): at 14:03

## 2023-01-05 RX ADMIN — ONDANSETRON 4 MILLIGRAM(S): 8 TABLET, FILM COATED ORAL at 04:32

## 2023-01-05 RX ADMIN — METHOCARBAMOL 215 MILLIGRAM(S): 500 TABLET, FILM COATED ORAL at 10:00

## 2023-01-05 RX ADMIN — PANTOPRAZOLE SODIUM 40 MILLIGRAM(S): 20 TABLET, DELAYED RELEASE ORAL at 11:50

## 2023-01-05 RX ADMIN — Medication 400 MILLIGRAM(S): at 04:32

## 2023-01-05 RX ADMIN — LIDOCAINE 1 PATCH: 4 CREAM TOPICAL at 19:37

## 2023-01-05 RX ADMIN — LIDOCAINE 1 PATCH: 4 CREAM TOPICAL at 23:38

## 2023-01-05 RX ADMIN — Medication 975 MILLIGRAM(S): at 17:08

## 2023-01-05 RX ADMIN — Medication 50 MILLIEQUIVALENT(S): at 11:50

## 2023-01-05 RX ADMIN — Medication 10 MILLIGRAM(S): at 05:56

## 2023-01-05 RX ADMIN — ONDANSETRON 4 MILLIGRAM(S): 8 TABLET, FILM COATED ORAL at 11:49

## 2023-01-05 RX ADMIN — Medication 50 MILLIEQUIVALENT(S): at 07:58

## 2023-01-05 RX ADMIN — ONDANSETRON 4 MILLIGRAM(S): 8 TABLET, FILM COATED ORAL at 17:02

## 2023-01-05 RX ADMIN — LIDOCAINE 1 PATCH: 4 CREAM TOPICAL at 11:48

## 2023-01-05 RX ADMIN — Medication 10 MILLIGRAM(S): at 04:32

## 2023-01-05 RX ADMIN — BUPRENORPHINE AND NALOXONE 2 TABLET(S): 2; .5 TABLET SUBLINGUAL at 17:01

## 2023-01-05 RX ADMIN — SODIUM CHLORIDE 100 MILLILITER(S): 9 INJECTION, SOLUTION INTRAVENOUS at 17:18

## 2023-01-05 RX ADMIN — Medication 10 MILLIGRAM(S): at 22:29

## 2023-01-05 NOTE — PROGRESS NOTE ADULT - ASSESSMENT
57M  hx of suboxone use - see ISTOP  here w abdominal pain - working dx is gastroparesis  pt NPO, but ok for SL meds as per primary team  no indication for full agonist opioid therapy at this time    will change suboxone dose to PRN at tid freq  will order robaxin 750mg tid prn muscle spasms   cont acetaminophen  Suppository .. 975 milliGRAM(s) Rectal every 8 hours    Pain controlled  Pain service will sign off  Please reconsult as needed     no rx needed on discharge  patient to resume home suboxone dosing at discharge

## 2023-01-05 NOTE — PROGRESS NOTE ADULT - ASSESSMENT
57 year old male with a history of esophageal cancer s/p gastric pull up here for management of gastroparesis      Gastroparesis  Patient presented to endoscopy today for EGD with dilation and botox. He was found to be febrile to 101.3 and 101.2.  He denies acute complaints, in light of new fever, procedure postponed.   Medicine team aware and will begin infectious work up  If he remains stable overnight will plan for tentative EGD tomorrow   Okay for clears with aspiration precautions  NPO after midnight

## 2023-01-05 NOTE — DISCHARGE NOTE NURSING/CASE MANAGEMENT/SOCIAL WORK - PATIENT PORTAL LINK FT
You can access the FollowMyHealth Patient Portal offered by [facility] by registering at the following website: [email or web address]. By joining [facility]’s FollowMyHealth portal, you will also be able to view your health information using other applications (apps) compatible with our system.

## 2023-01-05 NOTE — DISCHARGE NOTE NURSING/CASE MANAGEMENT/SOCIAL WORK - NSDCPEFALRISK_GEN_ALL_CORE
For information on Fall & Injury Prevention, visit: [email or web address] OR  [email or web address] OR  https://www.cdc.gov/steadi/patient.html

## 2023-01-05 NOTE — PROGRESS NOTE ADULT - SUBJECTIVE AND OBJECTIVE BOX
Chief Complaint:  Patient is a 57y old  Male who presents with a chief complaint of Intractable N/V (05 Jan 2023 12:41)      Interval Events / Subjective: Patient seen and examined in endoscopy. He presented for egd and was found to be febrile. He denies nausea, vomiting, fever, chills, cough, chest pain or diarrhea.     No previous documented fever noted.       REVIEW OF SYSTEMS:   General: Negative  HEENT: Negative  CV: Negative  Respiratory: Negative  GI: See HPI  : Negative  MSK: Negative  Hematologic: Negative  Skin: Negative    MEDICATIONS:   MEDICATIONS  (STANDING):  acetaminophen     Tablet .. 650 milliGRAM(s) Oral once  acetaminophen  Suppository .. 975 milliGRAM(s) Rectal every 8 hours  amLODIPine   Tablet 10 milliGRAM(s) Oral daily  buprenorphine 2 mG/naloxone 0.5 mG SL  Tablet 2 Tablet(s) SubLingual once  dextrose 5% + sodium chloride 0.9%. 1000 milliLiter(s) (100 mL/Hr) IV Continuous <Continuous>  erythromycin     enteric coated 250 milliGRAM(s) Oral two times a day  lactated ringers Bolus 2000 milliLiter(s) IV Bolus once  lidocaine   4% Patch 1 Patch Transdermal daily  metoclopramide Injectable 10 milliGRAM(s) IV Push three times a day  onabotulinumtoxinA Injectable 100 Unit(s) IntraMuscular once  ondansetron Injectable 4 milliGRAM(s) IV Push every 6 hours  pantoprazole  Injectable 40 milliGRAM(s) IV Push every 12 hours  senna 2 Tablet(s) Oral at bedtime    MEDICATIONS  (PRN):  aluminum hydroxide/magnesium hydroxide/simethicone Suspension 30 milliLiter(s) Oral every 4 hours PRN Dyspepsia  buprenorphine 8 mG/naloxone 2 mG SL Film 1 Film(s) SubLingual every 24 hours PRN severe pain  hydrALAZINE Injectable 10 milliGRAM(s) IV Push every 4 hours PRN If SBP > 160  melatonin 3 milliGRAM(s) Oral at bedtime PRN Insomnia  methocarbamol IVPB 750 milliGRAM(s) IV Intermittent every 8 hours PRN Muscle Spasm  polyethylene glycol 3350 17 Gram(s) Oral daily PRN Constipation      ALLERGIES:   Allergies    No Known Allergies    Intolerances        VITAL SIGNS:   Vital Signs Last 24 Hrs  T(C): 36.7 (05 Jan 2023 11:40), Max: 37.2 (04 Jan 2023 15:26)  T(F): 98.1 (05 Jan 2023 11:40), Max: 99 (04 Jan 2023 15:26)  HR: 103 (05 Jan 2023 11:40) (89 - 103)  BP: 149/97 (05 Jan 2023 11:40) (148/75 - 186/91)  BP(mean): --  RR: 20 (05 Jan 2023 11:40) (18 - 20)  SpO2: 96% (05 Jan 2023 11:40) (96% - 98%)    Parameters below as of 05 Jan 2023 11:40  Patient On (Oxygen Delivery Method): room air      I&O's Summary    04 Jan 2023 07:01  -  05 Jan 2023 07:00  --------------------------------------------------------  IN: 1200 mL / OUT: 0 mL / NET: 1200 mL        PHYSICAL EXAM:   GENERAL:  No acute distress  HEENT:  NC/AT,  conjunctivae clear, sclera -anicteric  CHEST:  Full & symmetric excursion, no increased effort, breath sounds clear  HEART:  Regular rhythm, tachycardic   ABDOMEN:  Soft, non-tender, non-distended, normoactive bowel sounds,  no rebound or guarding  EXTREMITIES: No cyanosis, clubbing or edema  SKIN:  warm/dry  NEURO:  calm, cooperative    LABS:  CBC Full  -  ( 05 Jan 2023 06:04 )  WBC Count : 9.55 K/uL  RBC Count : 5.23 M/uL  Hemoglobin : 12.1 g/dL  Hematocrit : 38.4 %  Platelet Count - Automated : 354 K/uL  Mean Cell Volume : 73.4 fl  Mean Cell Hemoglobin : 23.1 pg  Mean Cell Hemoglobin Concentration : 31.5 gm/dL  Auto Neutrophil # : x  Auto Lymphocyte # : x  Auto Monocyte # : x  Auto Eosinophil # : x  Auto Basophil # : x  Auto Neutrophil % : x  Auto Lymphocyte % : x  Auto Monocyte % : x  Auto Eosinophil % : x  Auto Basophil % : x    01-05    131<L>  |  96  |  9.0  ----------------------------<  132<H>  3.3<L>   |  22.0  |  0.55    Ca    9.1      05 Jan 2023 06:04  Mg     1.9     01-04    TPro  7.7  /  Alb  4.5  /  TBili  0.5  /  DBili  x   /  AST  18  /  ALT  26  /  AlkPhos  97  01-05    LIVER FUNCTIONS - ( 05 Jan 2023 06:04 )  Alb: 4.5 g/dL / Pro: 7.7 g/dL / ALK PHOS: 97 U/L / ALT: 26 U/L / AST: 18 U/L / GGT: x           PT/INR - ( 05 Jan 2023 06:04 )   PT: 13.5 sec;   INR: 1.16 ratio         PTT - ( 05 Jan 2023 06:04 )  PTT:27.4 sec        RADIOLOGY & ADDITIONAL STUDIES (The following images were personally reviewed):    
Gastroparesis    HPI:  56 y/o male with hx of HTN, Esophageal cancer s/p distal esophagectomy with gastric pull through in 2016 with no chemo/xrt. Patient with intermittent N/V from gastroparesis due to surgery and is on Erythromycin and Reglan PRN. Was last here in 8/22 with N/V and had EGD with no acute findings. Was treated with supportive care and antiemetics, analgesics and IVF that lead to resolution over 5 days. States he was doing well until yesterday when he ate cheetos and shortly after started having N/V that has continued to today. Denies any recent illnesses, fever, chills, travel or sick contacts. No blood in stool or vomitus. He was advised [place] [facility] [place] bnut states she has been unavailable. In the ED patient with elevated BP from pain, RVP neg, labs at baseline, benign abdomen exam. Was given antiemetics, analgesics and IVF with some improvement in retching, but still unable to keep PO down at this time.  (02 Jan 2023 23:32)    Interval History:  Patient was seen and examined at bedside around 12:30 pm.  Abdominal pain is better.  Denies nausea or vomiting but is gagging at times.   Able to tolerate water.   Denies chest pain, palpitations, shortness of breath, headache, dizziness or visual symptoms.    ROS:  As per interval history otherwise unremarkable.    PHYSICAL EXAM:  Vital Signs   T(C): 36.9 (03 Jan 2023 10:35), Max: 37.4 (02 Jan 2023 20:39)  T(F): 98.4 (03 Jan 2023 10:35), Max: 99.4 (02 Jan 2023 20:39)  HR: 97 (03 Jan 2023 10:35) (73 - 98)  BP: 163/80 (03 Jan 2023 10:35) (109/63 - 195/115)  BP(mean): 118 (02 Jan 2023 23:32) (118 - 118)  RR: 18 (03 Jan 2023 10:35) (18 - 20)  SpO2: 97% (03 Jan 2023 10:35) (96% - 99%)  Parameters below as of 03 Jan 2023 07:49  Patient On (Oxygen Delivery Method): room air  General: Middle aged male sitting in bed comfortably. No acute distress  HEENT: EOMI. Clear conjunctivae. Moist mucus membrane  Neck: Supple.   Chest: CTA bilaterally. No wheezing, rales or rhonchi.   Heart: Normal S1 & S2. RRR.   Abdomen: Non distended. Soft. Non-tender. + BS  Ext: No pedal edema. No calf tenderness   Neuro: AAO x 3. No focal deficit. No speech disorder  Skin: Warm and Dry  Psychiatry: Normal mood and affect    LABS:                        11.9   13.63 )-----------( 367      ( 03 Jan 2023 02:03 )             38.0     01-03    135  |  100  |  11.3  ----------------------------<  174<H>  3.5   |  19.0<L>  |  0.68    Ca    9.2      03 Jan 2023 02:03  Phos  3.0     01-03  Mg     1.7     01-03    TPro  8.2  /  Alb  5.1  /  TBili  0.4  /  DBili  x   /  AST  37  /  ALT  43<H>  /  AlkPhos  115  01-02    RADIOLOGY & ADDITIONAL STUDIES:  Reviewed     MEDICATIONS  (STANDING):  amLODIPine   Tablet 10 milliGRAM(s) Oral daily  dextrose 5% + sodium chloride 0.9%. 1000 milliLiter(s) (100 mL/Hr) IV Continuous <Continuous>  erythromycin     enteric coated 250 milliGRAM(s) Oral two times a day  metoclopramide Injectable 10 milliGRAM(s) IV Push three times a day  pantoprazole  Injectable 40 milliGRAM(s) IV Push daily  senna 2 Tablet(s) Oral at bedtime    MEDICATIONS  (PRN):  acetaminophen     Tablet .. 650 milliGRAM(s) Oral every 6 hours PRN Temp greater or equal to 38C (100.4F), Mild Pain (1 - 3)  aluminum hydroxide/magnesium hydroxide/simethicone Suspension 30 milliLiter(s) Oral every 4 hours PRN Dyspepsia  hydrALAZINE Injectable 10 milliGRAM(s) IV Push every 4 hours PRN If SBP > 160  HYDROmorphone  Injectable 1 milliGRAM(s) IV Push every 4 hours PRN Severe Pain (7 - 10)  HYDROmorphone  Injectable 0.5 milliGRAM(s) IV Push every 4 hours PRN Moderate Pain (4 - 6)  melatonin 3 milliGRAM(s) Oral at bedtime PRN Insomnia  polyethylene glycol 3350 17 Gram(s) Oral daily PRN Constipation      
Interval Hx:  Patient seen during rounds  Patient reports pain to be controlled on current medications  Patient denies sedation with medications     Analgesic Dosing for past 24 hours reviewed as below:    acetaminophen   IVPB ..   400 mL/Hr IV Intermittent (01-04-23 @ 20:07)    acetaminophen   IVPB ..   400 mL/Hr IV Intermittent (01-05-23 @ 04:32)    methocarbamol IVPB   215 mL/Hr IV Intermittent (01-05-23 @ 10:00)   215 mL/Hr IV Intermittent (01-04-23 @ 23:31)   215 mL/Hr IV Intermittent (01-04-23 @ 15:26)    metoclopramide Injectable   10 milliGRAM(s) IV Push (01-05-23 @ 04:32)   10 milliGRAM(s) IV Push (01-04-23 @ 21:59)   10 milliGRAM(s) IV Push (01-04-23 @ 14:01)    ondansetron Injectable   4 milliGRAM(s) IV Push (01-05-23 @ 11:49)   4 milliGRAM(s) IV Push (01-05-23 @ 04:32)   4 milliGRAM(s) IV Push (01-04-23 @ 23:30)   4 milliGRAM(s) IV Push (01-04-23 @ 17:19)          T(C): 36.7 (01-05-23 @ 11:40), Max: 37.2 (01-04-23 @ 15:26)  HR: 103 (01-05-23 @ 11:40) (89 - 103)  BP: 149/97 (01-05-23 @ 11:40) (148/75 - 186/91)  RR: 20 (01-05-23 @ 11:40) (18 - 20)  SpO2: 96% (01-05-23 @ 11:40) (96% - 98%)      01-04-23 @ 07:01  -  01-05-23 @ 07:00  --------------------------------------------------------  IN: 1200 mL / OUT: 0 mL / NET: 1200 mL        acetaminophen  Suppository .. 975 milliGRAM(s) Rectal every 8 hours  aluminum hydroxide/magnesium hydroxide/simethicone Suspension 30 milliLiter(s) Oral every 4 hours PRN  amLODIPine   Tablet 10 milliGRAM(s) Oral daily  buprenorphine 2 mG/naloxone 0.5 mG SL Film 2 Film(s) SubLingual once  buprenorphine 8 mG/naloxone 2 mG SL Film 1 Film(s) SubLingual every 8 hours PRN  dextrose 5% + sodium chloride 0.9%. 1000 milliLiter(s) IV Continuous <Continuous>  erythromycin     enteric coated 250 milliGRAM(s) Oral two times a day  hydrALAZINE Injectable 10 milliGRAM(s) IV Push every 4 hours PRN  lidocaine   4% Patch 1 Patch Transdermal daily  melatonin 3 milliGRAM(s) Oral at bedtime PRN  methocarbamol IVPB 750 milliGRAM(s) IV Intermittent every 8 hours PRN  metoclopramide Injectable 10 milliGRAM(s) IV Push three times a day  ondansetron Injectable 4 milliGRAM(s) IV Push every 6 hours  pantoprazole  Injectable 40 milliGRAM(s) IV Push every 12 hours  polyethylene glycol 3350 17 Gram(s) Oral daily PRN  senna 2 Tablet(s) Oral at bedtime                          12.1   9.55  )-----------( 354      ( 05 Jan 2023 06:04 )             38.4     01-05    131<L>  |  96  |  9.0  ----------------------------<  132<H>  3.3<L>   |  22.0  |  0.55    Ca    9.1      05 Jan 2023 06:04  Mg     1.9     01-04    TPro  7.7  /  Alb  4.5  /  TBili  0.5  /  DBili  x   /  AST  18  /  ALT  26  /  AlkPhos  97  01-05    PT/INR - ( 05 Jan 2023 06:04 )   PT: 13.5 sec;   INR: 1.16 ratio         PTT - ( 05 Jan 2023 06:04 )  PTT:27.4 sec      Pain Service   [phone]
Gastroparesis    HPI:  58 y/o male with hx of HTN, Esophageal cancer s/p distal esophagectomy with gastric pull through in 2016 with no chemo/xrt. Patient with intermittent N/V from gastroparesis due to surgery and is on Erythromycin and Reglan PRN. Was last here in 8/22 with N/V and had EGD with no acute findings. Was treated with supportive care and antiemetics, analgesics and IVF that lead to resolution over 5 days. States he was doing well until yesterday when he ate cheetos and shortly after started having N/V that has continued to today. Denies any recent illnesses, fever, chills, travel or sick contacts. No blood in stool or vomitus. He was advised to see a Dr. Sainz in [facility] [place] who is a gastric motility specialist bnut states she has been unavailable. In the ED patient with elevated BP from pain, RVP neg, labs at baseline, benign abdomen exam. Was given antiemetics, analgesics and IVF with some improvement in retching, but still unable to keep PO down at this time.  (02 Jan 2023 23:32)    Interval History:  Patient was seen and examined at bedside around 8:30 am.  Resting comfortably in bed.  Was given IV Tylenol x 2 overnight.  Does not like Suboxone.  Asking for Opioids.   Nausea / vomiting are controlled.     ROS:  As per interval history otherwise unremarkable.    PHYSICAL EXAM:  Vital Signs   T(C): 36.7 (05 Jan 2023 04:42), Max: 37.2 (04 Jan 2023 15:26)  T(F): 98 (05 Jan 2023 04:42), Max: 99 (04 Jan 2023 15:26)  HR: 90 (05 Jan 2023 08:04) (89 - 94)  BP: 162/93 (05 Jan 2023 08:04) (148/75 - 186/91)  RR: 19 (05 Jan 2023 04:42) (18 - 20)  SpO2: 96% (05 Jan 2023 04:42) (96% - 98%)  Parameters below as of 05 Jan 2023 04:42  Patient On (Oxygen Delivery Method): room air  General: Middle aged male sitting in bed comfortably. No acute distress  HEENT: EOMI. Clear conjunctivae. Moist mucus membrane  Neck: Supple.   Chest: CTA bilaterally. No wheezing, rales or rhonchi.   Heart: Normal S1 & S2. RRR.   Abdomen: Non distended. Soft. Non-tender. + BS  Ext: No pedal edema. No calf tenderness   Neuro: AAO x 3. No focal deficit. No speech disorder  Skin: Warm and Dry  Psychiatry: Normal mood and affect    LABS:                        12.1   9.55  )-----------( 354      ( 05 Jan 2023 06:04 )             38.4     01-05    131<L>  |  96  |  9.0  ----------------------------<  132<H>  3.3<L>   |  22.0  |  0.55    Ca    9.1      05 Jan 2023 06:04  Mg     1.9     01-04    TPro  7.7  /  Alb  4.5  /  TBili  0.5  /  DBili  x   /  AST  18  /  ALT  26  /  AlkPhos  97  01-05    PT/INR - ( 05 Jan 2023 06:04 )   PT: 13.5 sec;   INR: 1.16 ratio       PTT - ( 05 Jan 2023 06:04 )  PTT:27.4 sec    RADIOLOGY & ADDITIONAL STUDIES:  Reviewed     MEDICATIONS  (STANDING):  acetaminophen  Suppository .. 975 milliGRAM(s) Rectal every 8 hours  amLODIPine   Tablet 10 milliGRAM(s) Oral daily  buprenorphine 2 mG/naloxone 0.5 mG SL  Tablet 2 Tablet(s) SubLingual once  dextrose 5% + sodium chloride 0.9%. 1000 milliLiter(s) (100 mL/Hr) IV Continuous <Continuous>  erythromycin     enteric coated 250 milliGRAM(s) Oral two times a day  lidocaine   4% Patch 1 Patch Transdermal daily  metoclopramide Injectable 10 milliGRAM(s) IV Push three times a day  ondansetron Injectable 4 milliGRAM(s) IV Push every 6 hours  pantoprazole  Injectable 40 milliGRAM(s) IV Push every 12 hours  potassium chloride  20 mEq/100 mL IVPB 20 milliEquivalent(s) IV Intermittent every 2 hours  senna 2 Tablet(s) Oral at bedtime    MEDICATIONS  (PRN):  aluminum hydroxide/magnesium hydroxide/simethicone Suspension 30 milliLiter(s) Oral every 4 hours PRN Dyspepsia  buprenorphine 8 mG/naloxone 2 mG SL Film 1 Film(s) SubLingual every 8 hours PRN severe pain  hydrALAZINE Injectable 10 milliGRAM(s) IV Push every 4 hours PRN If SBP > 160  melatonin 3 milliGRAM(s) Oral at bedtime PRN Insomnia  methocarbamol IVPB 750 milliGRAM(s) IV Intermittent every 8 hours PRN Muscle Spasm  polyethylene glycol 3350 17 Gram(s) Oral daily PRN Constipation          
Gastroparesis    HPI:  56 y/o male with hx of HTN, Esophageal cancer s/p distal esophagectomy with gastric pull through in 2016 with no chemo/xrt. Patient with intermittent N/V from gastroparesis due to surgery and is on Erythromycin and Reglan PRN. Was last here in 8/22 with N/V and had EGD with no acute findings. Was treated with supportive care and antiemetics, analgesics and IVF that lead to resolution over 5 days. States he was doing well until yesterday when he ate cheetos and shortly after started having N/V that has continued to today. Denies any recent illnesses, fever, chills, travel or sick contacts. No blood in stool or vomitus. He was advised to [place] a [place]ain[place] [facility] [place] a [place] motility specialist bnut states she has been unavailable. In the ED patient with elevated BP from pain, RVP neg, labs at baseline, benign abdomen exam. Was given antiemetics, analgesics and IVF with some improvement in retching, but still unable to keep PO down at this time.  (02 Jan 2023 23:32)    Interval History:  Patient was seen and examined at bedside around 9 am.  Looks comfortable however unable to tolerate PO. Complaining of pain with oral intake.   Nausea or vomiting are controlled.     ROS:  As per interval history otherwise unremarkable.    PHYSICAL EXAM:  Vital Signs   T(C): 37.2 (04 Jan 2023 15:26), Max: 37.3 (04 Jan 2023 04:39)  T(F): 99 (04 Jan 2023 15:26), Max: 99.2 (04 Jan 2023 04:39)  HR: 91 (04 Jan 2023 15:26) (84 - 91)  BP: 157/93 (04 Jan 2023 15:26) (148/84 - 197/108)  RR: 18 (04 Jan 2023 15:26) (16 - 18)  SpO2: 97% (04 Jan 2023 15:26) (97% - 97%)  Parameters below as of 04 Jan 2023 15:26  Patient On (Oxygen Delivery Method): room air  General: Middle aged male sitting in bed comfortably. No acute distress  HEENT: EOMI. Clear conjunctivae. Moist mucus membrane  Neck: Supple.   Chest: CTA bilaterally. No wheezing, rales or rhonchi.   Heart: Normal S1 & S2. RRR.   Abdomen: Non distended. Soft. Non-tender. + BS  Ext: No pedal edema. No calf tenderness   Neuro: AAO x 3. No focal deficit. No speech disorder  Skin: Warm and Dry  Psychiatry: Normal mood and affect    LABS:                        12.9   13.05 )-----------( 416      ( 04 Jan 2023 05:49 )             40.4     01-04    134<L>  |  100  |  13.2  ----------------------------<  131<H>  3.9   |  21.0<L>  |  0.58    Ca    9.4      04 Jan 2023 05:49  Phos  3.0     01-03  Mg     1.9     01-04    TPro  8.2  /  Alb  5.1  /  TBili  0.4  /  DBili  x   /  AST  37  /  ALT  43<H>  /  AlkPhos  115  01-02    RADIOLOGY & ADDITIONAL STUDIES:  Reviewed     MEDICATIONS  (STANDING):  acetaminophen  Suppository .. 975 milliGRAM(s) Rectal every 8 hours  amLODIPine   Tablet 10 milliGRAM(s) Oral daily  dextrose 5% + sodium chloride 0.9%. 1000 milliLiter(s) (100 mL/Hr) IV Continuous <Continuous>  erythromycin     enteric coated 250 milliGRAM(s) Oral two times a day  lidocaine   4% Patch 1 Patch Transdermal daily  metoclopramide Injectable 10 milliGRAM(s) IV Push three times a day  ondansetron Injectable 4 milliGRAM(s) IV Push every 6 hours  pantoprazole  Injectable 40 milliGRAM(s) IV Push every 12 hours  senna 2 Tablet(s) Oral at bedtime    MEDICATIONS  (PRN):  aluminum hydroxide/magnesium hydroxide/simethicone Suspension 30 milliLiter(s) Oral every 4 hours PRN Dyspepsia  buprenorphine 8 mG/naloxone 2 mG SL Film 1 Film(s) SubLingual every 8 hours PRN severe pain  hydrALAZINE Injectable 10 milliGRAM(s) IV Push every 4 hours PRN If SBP > 160  melatonin 3 milliGRAM(s) Oral at bedtime PRN Insomnia  methocarbamol IVPB 750 milliGRAM(s) IV Intermittent every 8 hours PRN Muscle Spasm  polyethylene glycol 3350 17 Gram(s) Oral daily PRN Constipation

## 2023-01-05 NOTE — PROGRESS NOTE ADULT - ASSESSMENT
58 y/o with hx of esophageal cancer s/p esophagectomy, gastroparesis, HTN presented with nausea/vomiting and abdominal pain.     1) Intractable N/V  -Likely due to known history of gastroparesis from prior surgery  -Abdomen benign, not a new diagnosis, no indication for imaging at this time  -Cont. supportive care with IVF, anti-emetics, IV PPI  -Changed Dilaudid to Suboxone (takes at home) however he does not like Suboxone and asking for Dilaudid.   -Tylenol 975 mg TID  -Robaxin PRN  -Pain Management Consult appreciated   -Serial abdominal exams  -NPO for EGD today   -GI Consult appreciated: plan for EGD today with pre pyloric Botox injection and balloon dilation.  -Patient is at low risk for cardiovascular event during this low risk procedure. RCRI score is 0 and is associated with 0.4% of major cardiac event. He does have the ability to perform > 4 MET levels of activity and has no active cardiac conditions. He may proceed with proposed procedure with no additional cardiac testing or procedures.     2) Esophagectomy/Gastric pull through  -F/U with Oncology as directed post discharge    3) HTN  -Norvasc ordered however patient is not taking it    -IV prn Hydralazine  -Current BP felt to be elevated due to ? pain / stress  -Advance to DASH diet as tolerated     DVT Prophylaxis -- Venodyne     Dispo: Home in 24 hours.      58 y/o with hx of esophageal cancer s/p esophagectomy, gastroparesis, HTN presented with nausea/vomiting and abdominal pain.     1) Intractable N/V  -Likely due to known history of gastroparesis from prior surgery  -Abdomen benign, not a new diagnosis, no indication for imaging at this time  -Cont. supportive care with IVF, anti-emetics, IV PPI  -Changed Dilaudid to Suboxone (takes at home) however he does not like Suboxone and asking for Dilaudid.   -Tylenol 975 mg TID  -Robaxin PRN  -Pain Management Consult appreciated   -Serial abdominal exams  -NPO for EGD today   -GI Consult appreciated: plan for EGD today with pre pyloric Botox injection and balloon dilation.  -Patient is at low risk for cardiovascular event during this low risk procedure. RCRI score is 0 and is associated with 0.4% of major cardiac event. He does have the ability to perform > 4 MET levels of activity and has no active cardiac conditions. He may proceed with proposed procedure with no additional cardiac testing or procedures.     2) Esophagectomy/Gastric pull through  -F/U with Oncology as directed post discharge    3) HTN  -Norvasc ordered however patient is not taking it    -IV prn Hydralazine  -Current BP felt to be elevated due to ? pain / stress  -Advance to DASH diet as tolerated     4) Hypokalemia  -Replete     DVT Prophylaxis -- Venodyne     Dispo: Home in 24 hours.

## 2023-01-06 ENCOUNTER — TRANSCRIPTION ENCOUNTER (OUTPATIENT)
Age: 58
End: 2023-01-06

## 2023-01-06 VITALS
DIASTOLIC BLOOD PRESSURE: 86 MMHG | TEMPERATURE: 98 F | OXYGEN SATURATION: 96 % | HEART RATE: 98 BPM | RESPIRATION RATE: 18 BRPM | SYSTOLIC BLOOD PRESSURE: 131 MMHG

## 2023-01-06 LAB
ANION GAP SERPL CALC-SCNC: 9 MMOL/L — SIGNIFICANT CHANGE UP (ref 5–17)
BUN SERPL-MCNC: 10.5 MG/DL — SIGNIFICANT CHANGE UP (ref 8–20)
CALCIUM SERPL-MCNC: 8.7 MG/DL — SIGNIFICANT CHANGE UP (ref 8.4–10.5)
CHLORIDE SERPL-SCNC: 101 MMOL/L — SIGNIFICANT CHANGE UP (ref 96–108)
CO2 SERPL-SCNC: 27 MMOL/L — SIGNIFICANT CHANGE UP (ref 22–29)
CREAT SERPL-MCNC: 0.67 MG/DL — SIGNIFICANT CHANGE UP (ref 0.5–1.3)
CULTURE RESULTS: NO GROWTH — SIGNIFICANT CHANGE UP
EGFR: 110 ML/MIN/1.73M2 — SIGNIFICANT CHANGE UP
GIANT PLATELETS BLD QL SMEAR: PRESENT — SIGNIFICANT CHANGE UP
GLUCOSE SERPL-MCNC: 127 MG/DL — HIGH (ref 70–99)
HCT VFR BLD CALC: 36.2 % — LOW (ref 39–50)
HGB BLD-MCNC: 11.6 G/DL — LOW (ref 13–17)
MAGNESIUM SERPL-MCNC: 1.7 MG/DL — SIGNIFICANT CHANGE UP (ref 1.6–2.6)
MANUAL SMEAR VERIFICATION: SIGNIFICANT CHANGE UP
MCHC RBC-ENTMCNC: 23.7 PG — LOW (ref 27–34)
MCHC RBC-ENTMCNC: 32 GM/DL — SIGNIFICANT CHANGE UP (ref 32–36)
MCV RBC AUTO: 74 FL — LOW (ref 80–100)
MYELOCYTES NFR BLD: 0.9 % — HIGH (ref 0–0)
NEUTS BAND # BLD: 0.9 % — SIGNIFICANT CHANGE UP (ref 0–8)
OVALOCYTES BLD QL SMEAR: SLIGHT — SIGNIFICANT CHANGE UP
PLAT MORPH BLD: NORMAL — SIGNIFICANT CHANGE UP
PLATELET # BLD AUTO: 273 K/UL — SIGNIFICANT CHANGE UP (ref 150–400)
POIKILOCYTOSIS BLD QL AUTO: SLIGHT — SIGNIFICANT CHANGE UP
POLYCHROMASIA BLD QL SMEAR: SLIGHT — SIGNIFICANT CHANGE UP
POTASSIUM SERPL-MCNC: 3.6 MMOL/L — SIGNIFICANT CHANGE UP (ref 3.5–5.3)
POTASSIUM SERPL-SCNC: 3.6 MMOL/L — SIGNIFICANT CHANGE UP (ref 3.5–5.3)
RAPID RVP RESULT: SIGNIFICANT CHANGE UP
RBC # BLD: 4.89 M/UL — SIGNIFICANT CHANGE UP (ref 4.2–5.8)
RBC # FLD: 15.9 % — HIGH (ref 10.3–14.5)
RBC BLD AUTO: ABNORMAL
SARS-COV-2 RNA SPEC QL NAA+PROBE: SIGNIFICANT CHANGE UP
SODIUM SERPL-SCNC: 137 MMOL/L — SIGNIFICANT CHANGE UP (ref 135–145)
SPECIMEN SOURCE: SIGNIFICANT CHANGE UP
WBC # BLD: 6.57 K/UL — SIGNIFICANT CHANGE UP (ref 3.8–10.5)
WBC # FLD AUTO: 6.57 K/UL — SIGNIFICANT CHANGE UP (ref 3.8–10.5)

## 2023-01-06 PROCEDURE — 80053 COMPREHEN METABOLIC PANEL: CPT

## 2023-01-06 PROCEDURE — 84132 ASSAY OF SERUM POTASSIUM: CPT

## 2023-01-06 PROCEDURE — 86850 RBC ANTIBODY SCREEN: CPT

## 2023-01-06 PROCEDURE — 85025 COMPLETE CBC W/AUTO DIFF WBC: CPT

## 2023-01-06 PROCEDURE — 71045 X-RAY EXAM CHEST 1 VIEW: CPT

## 2023-01-06 PROCEDURE — 96374 THER/PROPH/DIAG INJ IV PUSH: CPT

## 2023-01-06 PROCEDURE — 71046 X-RAY EXAM CHEST 2 VIEWS: CPT | Mod: 26

## 2023-01-06 PROCEDURE — 82947 ASSAY GLUCOSE BLOOD QUANT: CPT

## 2023-01-06 PROCEDURE — 84295 ASSAY OF SERUM SODIUM: CPT

## 2023-01-06 PROCEDURE — 87086 URINE CULTURE/COLONY COUNT: CPT

## 2023-01-06 PROCEDURE — 43245 EGD DILATE STRICTURE: CPT

## 2023-01-06 PROCEDURE — 82803 BLOOD GASES ANY COMBINATION: CPT

## 2023-01-06 PROCEDURE — 84145 PROCALCITONIN (PCT): CPT

## 2023-01-06 PROCEDURE — 80048 BASIC METABOLIC PNL TOTAL CA: CPT

## 2023-01-06 PROCEDURE — 83735 ASSAY OF MAGNESIUM: CPT

## 2023-01-06 PROCEDURE — 71046 X-RAY EXAM CHEST 2 VIEWS: CPT

## 2023-01-06 PROCEDURE — 83605 ASSAY OF LACTIC ACID: CPT

## 2023-01-06 PROCEDURE — 99239 HOSP IP/OBS DSCHRG MGMT >30: CPT

## 2023-01-06 PROCEDURE — 85610 PROTHROMBIN TIME: CPT

## 2023-01-06 PROCEDURE — 82435 ASSAY OF BLOOD CHLORIDE: CPT

## 2023-01-06 PROCEDURE — 85018 HEMOGLOBIN: CPT

## 2023-01-06 PROCEDURE — 86900 BLOOD TYPING SEROLOGIC ABO: CPT

## 2023-01-06 PROCEDURE — 0225U NFCT DS DNA&RNA 21 SARSCOV2: CPT

## 2023-01-06 PROCEDURE — 96375 TX/PRO/DX INJ NEW DRUG ADDON: CPT

## 2023-01-06 PROCEDURE — 84100 ASSAY OF PHOSPHORUS: CPT

## 2023-01-06 PROCEDURE — 85014 HEMATOCRIT: CPT

## 2023-01-06 PROCEDURE — 85027 COMPLETE CBC AUTOMATED: CPT

## 2023-01-06 PROCEDURE — 85730 THROMBOPLASTIN TIME PARTIAL: CPT

## 2023-01-06 PROCEDURE — 87040 BLOOD CULTURE FOR BACTERIA: CPT

## 2023-01-06 PROCEDURE — C1726: CPT

## 2023-01-06 PROCEDURE — 36415 COLL VENOUS BLD VENIPUNCTURE: CPT

## 2023-01-06 PROCEDURE — 43236 UPPR GI SCOPE W/SUBMUC INJ: CPT | Mod: 59

## 2023-01-06 PROCEDURE — 81001 URINALYSIS AUTO W/SCOPE: CPT

## 2023-01-06 PROCEDURE — 96376 TX/PRO/DX INJ SAME DRUG ADON: CPT

## 2023-01-06 PROCEDURE — 82330 ASSAY OF CALCIUM: CPT

## 2023-01-06 PROCEDURE — 83690 ASSAY OF LIPASE: CPT

## 2023-01-06 PROCEDURE — 99285 EMERGENCY DEPT VISIT HI MDM: CPT

## 2023-01-06 PROCEDURE — 87637 SARSCOV2&INF A&B&RSV AMP PRB: CPT

## 2023-01-06 PROCEDURE — 86901 BLOOD TYPING SEROLOGIC RH(D): CPT

## 2023-01-06 DEVICE — CATH ESOPH/PYLO/COL DIL15-18MM: Type: IMPLANTABLE DEVICE | Status: FUNCTIONAL

## 2023-01-06 RX ORDER — POLYETHYLENE GLYCOL 3350 17 G/17G
17 POWDER, FOR SOLUTION ORAL
Qty: 119 | Refills: 0
Start: 2023-01-06 | End: 2023-01-12

## 2023-01-06 RX ORDER — METOCLOPRAMIDE HCL 10 MG
1 TABLET ORAL
Qty: 0 | Refills: 0 | DISCHARGE

## 2023-01-06 RX ORDER — ACETAMINOPHEN 500 MG
2 TABLET ORAL
Qty: 0 | Refills: 0 | DISCHARGE
Start: 2023-01-06

## 2023-01-06 RX ORDER — ACETAMINOPHEN 500 MG
650 TABLET ORAL EVERY 6 HOURS
Refills: 0 | Status: DISCONTINUED | OUTPATIENT
Start: 2023-01-06 | End: 2023-01-06

## 2023-01-06 RX ORDER — OMEPRAZOLE 10 MG/1
1 CAPSULE, DELAYED RELEASE ORAL
Qty: 0 | Refills: 0 | DISCHARGE

## 2023-01-06 RX ORDER — ONDANSETRON 8 MG/1
4 TABLET, FILM COATED ORAL EVERY 6 HOURS
Refills: 0 | Status: DISCONTINUED | OUTPATIENT
Start: 2023-01-06 | End: 2023-01-06

## 2023-01-06 RX ADMIN — PANTOPRAZOLE SODIUM 40 MILLIGRAM(S): 20 TABLET, DELAYED RELEASE ORAL at 10:52

## 2023-01-06 RX ADMIN — Medication 250 MILLIGRAM(S): at 17:20

## 2023-01-06 RX ADMIN — Medication 975 MILLIGRAM(S): at 00:38

## 2023-01-06 RX ADMIN — PANTOPRAZOLE SODIUM 40 MILLIGRAM(S): 20 TABLET, DELAYED RELEASE ORAL at 00:09

## 2023-01-06 RX ADMIN — Medication 975 MILLIGRAM(S): at 08:33

## 2023-01-06 RX ADMIN — BUPRENORPHINE AND NALOXONE 1 FILM(S): 2; .5 TABLET SUBLINGUAL at 06:16

## 2023-01-06 RX ADMIN — Medication 975 MILLIGRAM(S): at 00:10

## 2023-01-06 RX ADMIN — Medication 10 MILLIGRAM(S): at 17:20

## 2023-01-06 RX ADMIN — ONDANSETRON 4 MILLIGRAM(S): 8 TABLET, FILM COATED ORAL at 06:17

## 2023-01-06 RX ADMIN — Medication 975 MILLIGRAM(S): at 09:00

## 2023-01-06 RX ADMIN — ONDANSETRON 4 MILLIGRAM(S): 8 TABLET, FILM COATED ORAL at 00:09

## 2023-01-06 RX ADMIN — Medication 250 MILLIGRAM(S): at 06:17

## 2023-01-06 RX ADMIN — AMLODIPINE BESYLATE 10 MILLIGRAM(S): 2.5 TABLET ORAL at 06:17

## 2023-01-06 RX ADMIN — Medication 10 MILLIGRAM(S): at 06:18

## 2023-01-06 NOTE — DISCHARGE NOTE PROVIDER - CARE PROVIDERS DIRECT ADDRESSES
[email or web address]

## 2023-01-06 NOTE — DIETITIAN INITIAL EVALUATION ADULT - OTHER INFO
57 year old male with a history of HTn, esophageal cancer s/p gastric pull up here for management of gastroparesis.    Consult received- pt off unit at for EGD with dilation and Botox today. Pt currently NPO since midnight, previously on a clear liquid diet. Pt at high risk for malnutrition based on medical history and inadequate diet (clears since admission). RD to follow up with subjective interview/NFPE as feasible.  57 year old male with a history of HTn, esophageal cancer s/p gastric pull up here for management of gastroparesis.    Consult received- pt off unit for EGD with dilation and Botox today. Pt currently NPO since midnight, previously on a clear liquid diet. Pt at high risk for malnutrition based on medical history and inadequate diet (clears since admission). RD to follow up with subjective interview/NFPE as feasible.

## 2023-01-06 NOTE — DIETITIAN INITIAL EVALUATION ADULT - NS FNS DIET ORDER
Diet, NPO after Midnight:      NPO Start Date: 05-Jan-2023,   NPO Start Time: 23:59 (01-05-23 @ 16:20)

## 2023-01-06 NOTE — DIETITIAN INITIAL EVALUATION ADULT - ETIOLOGY
related to inability to meet sufficient protein-energy in setting of esophageal cancer s/p gastric pull up, N/V, gastroparesis

## 2023-01-06 NOTE — DISCHARGE NOTE PROVIDER - HOSPITAL COURSE
58 y/o with hx of esophageal cancer s/p esophagectomy, gastroparesis, HTN presented with nausea/vomiting and abdominal pain. Admitted with Intractable N/V and Abdominal Pain secondary to Gastroparesis. He was started on Reglan, Zofran, PPI and IVF. For abdominal pain, he was initially started on Dilaudid but later it was switched to Suboxone (takes at home -- did not mention on admission). Pain Management was consulted and Robaxin was added PRN.  Seen by GI and had EGD with Pre Pyloric Botox Injection and Balloon Dilation. His symptoms have improved and he is tolerating PO. He is stable for discharge.

## 2023-01-06 NOTE — DISCHARGE NOTE PROVIDER - NSDCCPCAREPLAN_GEN_ALL_CORE_FT
PRINCIPAL DISCHARGE DIAGNOSIS  Diagnosis: Gastroparesis  Assessment and Plan of Treatment: s/p EGD with Pre Pyloric Botox Injection and Balloon Dilation.   Continue medications as prescribed.  Follow up with GI in 2 weeks.

## 2023-01-06 NOTE — DISCHARGE NOTE PROVIDER - PROVIDER TOKENS
PROVIDER:[TOKEN:[[id number]],FOLLOWUP:[2 weeks]],PROVIDER:[TOKEN:[[id number]],FOLLOWUP:[1 week],ESTABLISHEDPATIENT:[T]]

## 2023-01-06 NOTE — DISCHARGE NOTE PROVIDER - CARE PROVIDER_API CALL
Judd Moe)  Gastroenterology; Internal Medicine  [address] [place] [address]  Phone: [phone]  Fax: [phone]  Follow Up Time: 2 weeks    Robb Newell ()  Family Medicine  [address] [place] [address]  Phone: [phone]  Fax: [phone]  Established Patient  Follow Up Time: 1 week

## 2023-01-06 NOTE — CHART NOTE - NSCHARTNOTEFT_GEN_A_CORE
57 year old male with a history of esophageal cancer s/p gastric pull up here for management of gastroparesis. His scheduled endoscopy with dilation and Botox injection was cancelled yesterday due to fever, 101.3. Patient now remain afebrile, no leucocytosis.    Will plan for EGD with dilation and BOTOX today  Keep NPO  Plan discussed with the patient.

## 2023-01-06 NOTE — DISCHARGE NOTE PROVIDER - NSDCMRMEDTOKEN_GEN_ALL_CORE_FT
acetaminophen 325 mg oral tablet: 2 tab(s) orally every 6 hours, As needed, Temp greater or equal to 38C (100.4F), Mild Pain (1 - 3), Moderate Pain (4 - 6)  amLODIPine 10 mg oral tablet: 1 tab(s) orally once a day   buprenorphine-naloxone 8 mg-2 mg sublingual film: 1 film(s) sublingual every 3 days  erythromycin 250 mg oral delayed release tablet: 1 tab(s) orally 2 times a day  omeprazole 40 mg oral delayed release capsule: 1 cap(s) orally 2 times a day x 1 week then once a day  ondansetron 4 mg oral tablet: 1 tab(s) orally every 8 hours as needed for nausea/vomiting  polyethylene glycol 3350 oral powder for reconstitution: 17 gram(s) orally once a day, As needed, Constipation  Reglan 10 mg oral tablet: 1 tab(s) orally 3 times a day (before meals), As Needed

## 2023-01-06 NOTE — DIETITIAN INITIAL EVALUATION ADULT - PERTINENT LABORATORY DATA
01-06    137  |  101  |  10.5  ----------------------------<  127<H>  3.6   |  27.0  |  0.67    Ca    8.7      06 Jan 2023 07:10  Mg     1.7     01-06    TPro  7.7  /  Alb  4.5  /  TBili  0.5  /  DBili  x   /  AST  18  /  ALT  26  /  AlkPhos  97  01-05  A1C with Estimated Average Glucose Result: 5.5 % (11-19-22 @ 05:20)

## 2023-01-06 NOTE — DISCHARGE NOTE PROVIDER - ATTENDING DISCHARGE PHYSICAL EXAMINATION:
Vital Signs   T(C): 36.9 (06 Jan 2023 15:37), Max: 37.1 (05 Jan 2023 20:46)  T(F): 98.5 (06 Jan 2023 15:37), Max: 98.7 (05 Jan 2023 20:46)  HR: 98 (06 Jan 2023 15:37) (77 - 98)  BP: 131/86 (06 Jan 2023 15:37) (131/86 - 164/98)  RR: 18 (06 Jan 2023 15:37) (18 - 18)  SpO2: 96% (06 Jan 2023 15:37) (96% - 97%)  Parameters below as of 06 Jan 2023 15:37  Patient On (Oxygen Delivery Method): room air  General: Middle aged male sitting in bed comfortably. No acute distress  HEENT: EOMI. Clear conjunctivae. Moist mucus membrane  Neck: Supple.   Chest: CTA bilaterally. No wheezing, rales or rhonchi.   Heart: Normal S1 & S2. RRR.   Abdomen: Non distended. Soft. Non-tender. + BS  Ext: No pedal edema. No calf tenderness   Neuro: AAO x 3. No focal deficit. No speech disorder  Skin: Warm and Dry  Psychiatry: Normal mood and affect

## 2023-01-06 NOTE — DISCHARGE NOTE PROVIDER - NSDCCONDITION_GEN_ALL_CORE
Cardiology Progress Note 1/29/2019 7:39 AM 
 
Admit Date: 1/26/2019 Subjective: No angina. Ambulating in room. Still on IV NTG Visit Vitals BP (!) 150/94 (BP 1 Location: Left arm, BP Patient Position: At rest) Pulse 86 Temp 98.6 °F (37 °C) Resp 18 SpO2 95% 01/27 1901 - 01/29 0700 In: 300 [P.O.:300] Out: 400 [Urine:400] Objective:  
  
Physical Exam: 
VS as above Chest CTA Card RRR no gallop Data Review:  
Labs:  No results found for this or any previous visit (from the past 24 hour(s)). Telemetry: SR, no VT Assessment: 1.  Acute inferior wall myocardial infarction. Severe 3 vessel CAD with LV dysfunction, PCI of ramus with BROOK  
2.  Hypertension and hypertensive urgency. Still not controllled 3.  Medication noncompliance. 4.  History of tobacco abuse. 5.  Family history of coronary artery disease. 6. dyslipidemia LDL 64, HDL 36  
  
 
Plan: Increase lisinopril and wean IV NTG.  Lexiscan nuclear when off IV NTG   
 
 
 
 
 
 Stable

## 2023-01-06 NOTE — DIETITIAN INITIAL EVALUATION ADULT - PERTINENT MEDS FT
MEDICATIONS  (STANDING):  acetaminophen  Suppository .. 975 milliGRAM(s) Rectal every 8 hours  amLODIPine   Tablet 10 milliGRAM(s) Oral daily  dextrose 5% + sodium chloride 0.9%. 1000 milliLiter(s) (100 mL/Hr) IV Continuous <Continuous>  erythromycin     enteric coated 250 milliGRAM(s) Oral two times a day  lidocaine   4% Patch 1 Patch Transdermal daily  metoclopramide Injectable 10 milliGRAM(s) IV Push three times a day  onabotulinumtoxinA Injectable 100 Unit(s) IntraMuscular once  pantoprazole  Injectable 40 milliGRAM(s) IV Push every 12 hours  senna 2 Tablet(s) Oral at bedtime    MEDICATIONS  (PRN):  aluminum hydroxide/magnesium hydroxide/simethicone Suspension 30 milliLiter(s) Oral every 4 hours PRN Dyspepsia  buprenorphine 8 mG/naloxone 2 mG SL Film 1 Film(s) SubLingual every 24 hours PRN severe pain  hydrALAZINE Injectable 10 milliGRAM(s) IV Push every 4 hours PRN If SBP > 160  melatonin 3 milliGRAM(s) Oral at bedtime PRN Insomnia  methocarbamol IVPB 750 milliGRAM(s) IV Intermittent every 8 hours PRN Muscle Spasm  ondansetron Injectable 4 milliGRAM(s) IV Push every 6 hours PRN Nausea and/or Vomiting  polyethylene glycol 3350 17 Gram(s) Oral daily PRN Constipation

## 2023-01-11 LAB
CULTURE RESULTS: SIGNIFICANT CHANGE UP
CULTURE RESULTS: SIGNIFICANT CHANGE UP
SPECIMEN SOURCE: SIGNIFICANT CHANGE UP
SPECIMEN SOURCE: SIGNIFICANT CHANGE UP

## 2023-01-17 ENCOUNTER — APPOINTMENT (OUTPATIENT)
Dept: GASTROENTEROLOGY | Facility: CLINIC | Age: 58
End: 2023-01-17
Payer: MEDICARE

## 2023-01-17 VITALS
DIASTOLIC BLOOD PRESSURE: 86 MMHG | HEIGHT: 73 IN | TEMPERATURE: 97.2 F | BODY MASS INDEX: 22.8 KG/M2 | OXYGEN SATURATION: 98 % | RESPIRATION RATE: 14 BRPM | WEIGHT: 172 LBS | SYSTOLIC BLOOD PRESSURE: 142 MMHG | HEART RATE: 85 BPM

## 2023-01-17 DIAGNOSIS — Z78.9 OTHER SPECIFIED HEALTH STATUS: ICD-10-CM

## 2023-01-17 DIAGNOSIS — Z09 ENCOUNTER FOR FOLLOW-UP EXAMINATION AFTER COMPLETED TREATMENT FOR CONDITIONS OTHER THAN MALIGNANT NEOPLASM: ICD-10-CM

## 2023-01-17 DIAGNOSIS — Z85.01 PERSONAL HISTORY OF MALIGNANT NEOPLASM OF ESOPHAGUS: ICD-10-CM

## 2023-01-17 DIAGNOSIS — Z87.891 PERSONAL HISTORY OF NICOTINE DEPENDENCE: ICD-10-CM

## 2023-01-17 PROCEDURE — 99495 TRANSJ CARE MGMT MOD F2F 14D: CPT

## 2023-01-17 RX ORDER — OMEPRAZOLE 40 MG/1
40 CAPSULE, DELAYED RELEASE ORAL
Refills: 0 | Status: ACTIVE | COMMUNITY

## 2023-01-17 RX ORDER — AMLODIPINE BESYLATE 5 MG/1
5 TABLET ORAL
Refills: 0 | Status: ACTIVE | COMMUNITY

## 2023-01-17 RX ORDER — METOCLOPRAMIDE HYDROCHLORIDE 5 MG/1
TABLET ORAL
Refills: 0 | Status: ACTIVE | COMMUNITY

## 2023-01-17 NOTE — HISTORY OF PRESENT ILLNESS
[FreeTextEntry1] : The patient was evaluated in [facility] and underwent EGD with botulinum toxin in prepyloric area and pyloric channel dilation. he is a 57-year-old man with a history of esophageal cancer in 2016 requiring esophagectomy and gastric pull-up and now complicated by gastroparesis and has been on Reglan and erythromycin.  He had also been evaluated on multiple occasions in the hospital and underwent EGD.  As per the patient about 3 years ago he had pyloric channel dilation at [facility] where he had the original surgery and he did okay.  He has been noncompliant with his diet and eat binge meals occasionally.  He was admitted and evaluated with EGD and then subsequently had EGD with Pre Pyloric Botox Injection and Balloon Dilation. His symptoms have improved and he is tolerating PO.  However he admits that he has not been compliant with the diet.  He was discharged on January 6, 2023 and is being seen within 14 days of the hospital discharge.

## 2023-01-17 NOTE — PHYSICAL EXAM

## 2023-01-17 NOTE — ASSESSMENT
[FreeTextEntry1] : We had an extensive discussion regarding his symptomatology in the setting of esophagectomy and gastric pull-up.  He is doing okay at this time.  Discussed the erythromycin and Reglan at length.  Erythromycin has tachyphylaxis and he needs medication holiday.  I recommended small portion of meals and avoid large amount of meat.  Discussed to have gastroparesis related diet.  Also discussed at length and he may need empiric pyloric channel dilation in the near future as well.  Extensive counseling was performed.  Patient will follow-up with me in 3 to 4 months.\par \par Judd Moe MD\par Gastroenterology \par \par

## 2023-01-17 NOTE — REVIEW OF SYSTEMS
Additional Notes: Patient consent was obtained to proceed with the visit and recommended plan of care after discussion of all risks and benefits, including the risks of COVID-19 exposure. Detail Level: Simple [Negative] : Heme/Lymph

## 2023-06-06 ENCOUNTER — APPOINTMENT (OUTPATIENT)
Dept: GASTROENTEROLOGY | Facility: CLINIC | Age: 58
End: 2023-06-06
Payer: MEDICARE

## 2023-06-06 PROCEDURE — 99442: CPT | Mod: 95

## 2023-06-07 NOTE — ASSESSMENT
[FreeTextEntry1] : At this time. we will perform labs and NM Gastric emptying test and then reassess the need for further EGD with dilation and botox injection.\par \par I spent 15 minutes on the encounter\par \par Judd Moe MD\par Gastroenterology \par \par

## 2023-06-07 NOTE — HISTORY OF PRESENT ILLNESS
[FreeTextEntry1] : Telephonic visit was performed. Patient was at home and I was at [address] [place] [address]. Verbal consent was obtained. The patient was evaluated in [facility] and underwent EGD with botulinum toxin in prepyloric area and pyloric channel dilation. he is a 57-year-old man with a history of esophageal cancer in 2016 requiring esophagectomy and gastric pull-up and now complicated by gastroparesis and has been on Reglan and erythromycin.  He had also been evaluated on multiple occasions in the hospital and underwent EGD.  As per the patient about 3 years ago he had pyloric channel dilation at [facility] where he had the original surgery and he did okay.  He has been noncompliant with his diet and eat binge meals occasionally.  He was admitted and evaluated with EGD and then subsequently had EGD with Pre Pyloric Botox Injection and Balloon Dilation. His symptoms have improved and he is tolerating PO.  However he admits that he has not been compliant with the diet.  \par \par He did very well for some period of time and now occasionally has symptoms. He has no gastroparesis study-NM GES.

## 2023-06-15 LAB
ALBUMIN SERPL ELPH-MCNC: 4.8 G/DL
ALP BLD-CCNC: 110 U/L
ALT SERPL-CCNC: 21 U/L
ANION GAP SERPL CALC-SCNC: 15 MMOL/L
AST SERPL-CCNC: 23 U/L
BILIRUB SERPL-MCNC: 0.2 MG/DL
BUN SERPL-MCNC: 14 MG/DL
CALCIUM SERPL-MCNC: 9.5 MG/DL
CHLORIDE SERPL-SCNC: 101 MMOL/L
CO2 SERPL-SCNC: 22 MMOL/L
CREAT SERPL-MCNC: 0.81 MG/DL
EGFR: 104 ML/MIN/1.73M2
GLUCOSE SERPL-MCNC: 116 MG/DL
INR PPP: 0.99 RATIO
POTASSIUM SERPL-SCNC: 4.8 MMOL/L
PROT SERPL-MCNC: 7.4 G/DL
PT BLD: 11.6 SEC
SODIUM SERPL-SCNC: 138 MMOL/L

## 2023-07-11 ENCOUNTER — OUTPATIENT (OUTPATIENT)
Dept: OUTPATIENT SERVICES | Facility: HOSPITAL | Age: 58
LOS: 1 days | End: 2023-07-11
Payer: MEDICARE

## 2023-07-11 DIAGNOSIS — Z98.890 OTHER SPECIFIED POSTPROCEDURAL STATES: Chronic | ICD-10-CM

## 2023-07-11 DIAGNOSIS — Z90.49 ACQUIRED ABSENCE OF OTHER SPECIFIED PARTS OF DIGESTIVE TRACT: Chronic | ICD-10-CM

## 2023-07-11 DIAGNOSIS — K31.84 GASTROPARESIS: ICD-10-CM

## 2023-07-11 PROCEDURE — 78264 GASTRIC EMPTYING IMG STUDY: CPT | Mod: 26,MH

## 2023-07-11 PROCEDURE — A9541: CPT

## 2023-07-11 PROCEDURE — 78264 GASTRIC EMPTYING IMG STUDY: CPT

## 2023-07-11 PROCEDURE — A9548: CPT

## 2023-07-12 DIAGNOSIS — K31.84 GASTROPARESIS: ICD-10-CM

## 2023-07-12 NOTE — ED ADULT TRIAGE NOTE - NSWEIGHTCALCTOOLDRUG_GEN_A_CORE
[Injection] : Injection [de-identified] : b/l suprapatellar injections \par The risks and benefits of injection was discussed with the patient. Verbal consent was obtained from the patient. The area was prepped with Betadine. A lateral suprapatellar approach was used. The knee was injected with 2 cc of Depo-Medrol(40mg/cc) 3 cc of lidocaine.  The patient tolerated the procedure well. Band-Aid was applied.   used

## 2023-07-17 ENCOUNTER — NON-APPOINTMENT (OUTPATIENT)
Age: 58
End: 2023-07-17

## 2023-07-27 ENCOUNTER — APPOINTMENT (OUTPATIENT)
Dept: GASTROENTEROLOGY | Facility: CLINIC | Age: 58
End: 2023-07-27
Payer: MEDICARE

## 2023-07-27 PROCEDURE — 99442: CPT | Mod: 95

## 2023-07-27 NOTE — ASSESSMENT
[FreeTextEntry1] : Patient will need EGD with dilation and botox injection for gastroparesis.  Will perform lab testing. \par \par I spent 15 minutes on the encounter\par \par Judd Moe MD\par Gastroenterology \par \par

## 2023-07-27 NOTE — HISTORY OF PRESENT ILLNESS
[FreeTextEntry1] : Telephonic visit was performed. Patient was at home and I was at [facility].  Verbal consent was obtained. The patient was evaluated in [facility] and underwent EGD with botulinum toxin in prepyloric area and pyloric channel dilation. he is a 57-year-old man with a history of esophageal cancer in 2016 requiring esophagectomy and gastric pull-up and now complicated by gastroparesis and has been on Reglan and erythromycin.  He had also been evaluated on multiple occasions in the hospital and underwent EGD.  As per the patient about 3 years ago he had pyloric channel dilation at [facility] where he had the original surgery and he did okay.  He has been noncompliant with his diet and eat binge meals occasionally.  He was admitted and evaluated with EGD and then subsequently had EGD with Pre Pyloric Botox Injection and Balloon Dilation. His symptoms have improved and he is tolerating PO.  However he admits that he has not been compliant with the diet.  \par \par He did very well for some period of time and now occasionally has symptoms. He has NM gastric emptying test.

## 2023-07-28 NOTE — REASON FOR VISIT
4/20/17 Patient  Temazepam 15mg  Qty #30 with 5 refills  Called into [facility]
[Post Hospitalization] : a post hospitalization visit
- - -

## 2023-08-01 LAB
ALBUMIN SERPL ELPH-MCNC: 4.3 G/DL
ALP BLD-CCNC: 101 U/L
ALT SERPL-CCNC: 26 U/L
ANION GAP SERPL CALC-SCNC: 11 MMOL/L
AST SERPL-CCNC: 26 U/L
BILIRUB SERPL-MCNC: 0.2 MG/DL
BUN SERPL-MCNC: 14 MG/DL
CALCIUM SERPL-MCNC: 9.2 MG/DL
CHLORIDE SERPL-SCNC: 103 MMOL/L
CO2 SERPL-SCNC: 26 MMOL/L
CREAT SERPL-MCNC: 0.8 MG/DL
EGFR: 105 ML/MIN/1.73M2
GLUCOSE SERPL-MCNC: 113 MG/DL
INR PPP: 0.93 RATIO
POTASSIUM SERPL-SCNC: 4.5 MMOL/L
PROT SERPL-MCNC: 6.7 G/DL
PT BLD: 10.6 SEC
SODIUM SERPL-SCNC: 140 MMOL/L

## 2023-08-10 ENCOUNTER — OUTPATIENT (OUTPATIENT)
Dept: OUTPATIENT SERVICES | Facility: HOSPITAL | Age: 58
LOS: 1 days | Discharge: HOME | End: 2023-08-10
Payer: MEDICARE

## 2023-08-10 ENCOUNTER — TRANSCRIPTION ENCOUNTER (OUTPATIENT)
Age: 58
End: 2023-08-10

## 2023-08-10 ENCOUNTER — APPOINTMENT (OUTPATIENT)
Dept: GASTROENTEROLOGY | Facility: HOSPITAL | Age: 58
End: 2023-08-10

## 2023-08-10 DIAGNOSIS — R11.2 NAUSEA WITH VOMITING, UNSPECIFIED: ICD-10-CM

## 2023-08-10 DIAGNOSIS — Z98.890 OTHER SPECIFIED POSTPROCEDURAL STATES: Chronic | ICD-10-CM

## 2023-08-10 DIAGNOSIS — Z85.01 PERSONAL HISTORY OF MALIGNANT NEOPLASM OF ESOPHAGUS: ICD-10-CM

## 2023-08-10 DIAGNOSIS — I10 ESSENTIAL (PRIMARY) HYPERTENSION: ICD-10-CM

## 2023-08-10 DIAGNOSIS — Z90.49 ACQUIRED ABSENCE OF OTHER SPECIFIED PARTS OF DIGESTIVE TRACT: Chronic | ICD-10-CM

## 2023-08-10 DIAGNOSIS — K31.84 GASTROPARESIS: ICD-10-CM

## 2023-08-10 DIAGNOSIS — Z90.49 ACQUIRED ABSENCE OF OTHER SPECIFIED PARTS OF DIGESTIVE TRACT: ICD-10-CM

## 2023-08-10 PROCEDURE — 43236 UPPR GI SCOPE W/SUBMUC INJ: CPT | Mod: 59

## 2023-08-10 PROCEDURE — 43245 EGD DILATE STRICTURE: CPT

## 2023-08-10 PROCEDURE — 43236 UPPR GI SCOPE W/SUBMUC INJ: CPT | Mod: XS

## 2023-08-10 PROCEDURE — C1726: CPT

## 2023-08-10 DEVICE — CATH BLLN CRE .035INX7.7FR 15-16.5-18MM: Type: IMPLANTABLE DEVICE | Status: FUNCTIONAL

## 2023-09-21 NOTE — ED CDU PROVIDER DISPOSITION NOTE - NS_CDULENGTHOFSTAY_ED_A_ED
30 Hour(s) 55 Minute(s) Valtrex Counseling: I discussed with the patient the risks of valacyclovir including but not limited to kidney damage, nausea, vomiting and severe allergy.  The patient understands that if the infection seems to be worsening or is not improving, they are to call.

## 2023-10-01 NOTE — ED PROVIDER NOTE - OBJECTIVE STATEMENT
53 y/o male with PMHx of Esophageal cancer on Omeprazole, Reglan, Amlodipine, Azithromycin presents to ED c/o abdominal pain. Patient reports abdominal pain, unable to tolerate P.O. Patient was seen in [facility] ED last night, was given pain medications and fluids, then d/c home. Now back with worsening symptoms. Patient has had 4 NBNB episodes of vomiting since leaving ED, states emesis looks like "Tobacco leaves". Has had similar episodes in the past and related it to GERD.  Also reports SOB. Last bowel movement today, yellow-light brown in color.   Denies CP  GI: Dr. Combs ([place])  Surgeon: Dr. Tapia; [facility], last endoscopy was 8-9 months ago Female 55 y/o male with PMHx of Esophageal cancer([place], now on Omeprazole, Reglan, Erythromycin), HTN, HLD, CAD(s/p stent x7); presents to ED c/o intractable abd pain pain and vomiting. Patient reports abdominal pain, unable to tolerate PO.  Patient was seen in [facility] ED last night, was given pain medications and fluids, then d/c home. Now back with worsening symptoms. patient reports after going home, abd pain recurred--epigastric, cramping, pressure, non-radiating, associated with coffee ground emesis x4.  denies melena or dark stools. Last bowel movement today, yellow-light brown in color.  denies chest pain.  PMH: CAD, HTN, HLD, GERD, hx of Esoph CA  GI: Dr. Combs ([place])  Surgeon: Dr. Tapia([facility]), last endoscopy was 8-9 months ago

## 2024-01-12 NOTE — ED ADULT TRIAGE NOTE - PATIENT ON (OXYGEN DELIVERY METHOD)
Patient requests all Lab, Cardiology, and Radiology Results on their Discharge Instructions room air

## 2024-02-09 ENCOUNTER — APPOINTMENT (OUTPATIENT)
Dept: GASTROENTEROLOGY | Facility: CLINIC | Age: 59
End: 2024-02-09
Payer: MEDICARE

## 2024-02-09 PROCEDURE — 99442: CPT | Mod: 93

## 2024-02-09 NOTE — ASSESSMENT
[FreeTextEntry1] : We will perform blood work and schedule him for EGD with pyloric channel dilation and possible Botox injection.  He is agreeable for the procedure.  This will be performed in [facility]. I spent 14 minutes on the encounter Judd Moe MD Gastroenterology

## 2024-02-09 NOTE — HISTORY OF PRESENT ILLNESS
[FreeTextEntry1] : Telephonic visit was performed. Patient was at home and I was at [address] [place] [address].  Verbal consent was obtained. The patient was evaluated in [facility] and underwent EGD with botulinum toxin in prepyloric area and pyloric channel dilation. he is a 58-year-old man with a history of esophageal cancer in 2016 requiring esophagectomy and gastric pull-up and now complicated by gastroparesis and has been on Reglan and erythromycin.  He had also been evaluated on multiple occasions in the hospital and underwent EGD.  As per the patient about 3 years ago he had pyloric channel dilation at [facility] where he had the original surgery and he did okay.  He has been noncompliant with his diet and eat binge meals occasionally.  He was admitted and evaluated with EGD and then subsequently had EGD with Pre Pyloric Botox Injection and Balloon Dilation.  He had nuclear medicine gastric emptying study in July 2023.  He had delayed gastric emptying on liquid and solid.  He underwent EGD with dilation and Botox injection in August 2023 and was doing well for 6 months.  He is occasionally taking Reglan.  His symptoms are recurring.

## 2024-03-02 LAB
ALBUMIN SERPL ELPH-MCNC: 4.5 G/DL
ALP BLD-CCNC: 92 U/L
ALT SERPL-CCNC: 22 U/L
ANION GAP SERPL CALC-SCNC: 11 MMOL/L
AST SERPL-CCNC: 20 U/L
BASOPHILS # BLD AUTO: 0.06 K/UL
BASOPHILS NFR BLD AUTO: 1.1 %
BILIRUB SERPL-MCNC: 0.2 MG/DL
BUN SERPL-MCNC: 14 MG/DL
CALCIUM SERPL-MCNC: 9 MG/DL
CHLORIDE SERPL-SCNC: 104 MMOL/L
CO2 SERPL-SCNC: 24 MMOL/L
CREAT SERPL-MCNC: 0.83 MG/DL
EGFR: 103 ML/MIN/1.73M2
EOSINOPHIL # BLD AUTO: 0.08 K/UL
EOSINOPHIL NFR BLD AUTO: 1.4 %
GLUCOSE SERPL-MCNC: 105 MG/DL
HCT VFR BLD CALC: 36.6 %
HGB BLD-MCNC: 10.7 G/DL
IMM GRANULOCYTES NFR BLD AUTO: 0.2 %
INR PPP: 0.94 RATIO
LYMPHOCYTES # BLD AUTO: 1.43 K/UL
LYMPHOCYTES NFR BLD AUTO: 25.4 %
MAN DIFF?: NORMAL
MCHC RBC-ENTMCNC: 22.3 PG
MCHC RBC-ENTMCNC: 29.2 GM/DL
MCV RBC AUTO: 76.3 FL
MONOCYTES # BLD AUTO: 0.44 K/UL
MONOCYTES NFR BLD AUTO: 7.8 %
NEUTROPHILS # BLD AUTO: 3.6 K/UL
NEUTROPHILS NFR BLD AUTO: 64.1 %
PLATELET # BLD AUTO: 299 K/UL
POTASSIUM SERPL-SCNC: 4.5 MMOL/L
PROT SERPL-MCNC: 7.1 G/DL
PT BLD: 10.7 SEC
RBC # BLD: 4.8 M/UL
RBC # FLD: 17.7 %
SODIUM SERPL-SCNC: 139 MMOL/L
WBC # FLD AUTO: 5.62 K/UL

## 2024-04-04 ENCOUNTER — INPATIENT (INPATIENT)
Facility: HOSPITAL | Age: 59
LOS: 5 days | Discharge: HOME | DRG: 179 | End: 2024-04-10
Attending: STUDENT IN AN ORGANIZED HEALTH CARE EDUCATION/TRAINING PROGRAM | Admitting: HOSPITALIST
Payer: MEDICARE

## 2024-04-04 VITALS
OXYGEN SATURATION: 94 % | HEART RATE: 78 BPM | DIASTOLIC BLOOD PRESSURE: 102 MMHG | RESPIRATION RATE: 16 BRPM | WEIGHT: 192.02 LBS | TEMPERATURE: 98 F | SYSTOLIC BLOOD PRESSURE: 178 MMHG

## 2024-04-04 DIAGNOSIS — Z90.49 ACQUIRED ABSENCE OF OTHER SPECIFIED PARTS OF DIGESTIVE TRACT: Chronic | ICD-10-CM

## 2024-04-04 DIAGNOSIS — Z98.890 OTHER SPECIFIED POSTPROCEDURAL STATES: Chronic | ICD-10-CM

## 2024-04-04 DIAGNOSIS — J69.0 PNEUMONITIS DUE TO INHALATION OF FOOD AND VOMIT: ICD-10-CM

## 2024-04-04 LAB
ALBUMIN SERPL ELPH-MCNC: 4.8 G/DL — SIGNIFICANT CHANGE UP (ref 3.3–5.2)
ALP SERPL-CCNC: 160 U/L — HIGH (ref 40–120)
ALT FLD-CCNC: 51 U/L — HIGH
ANION GAP SERPL CALC-SCNC: 17 MMOL/L — SIGNIFICANT CHANGE UP (ref 5–17)
ANISOCYTOSIS BLD QL: SLIGHT — SIGNIFICANT CHANGE UP
APPEARANCE UR: CLEAR — SIGNIFICANT CHANGE UP
AST SERPL-CCNC: 30 U/L — SIGNIFICANT CHANGE UP
BACTERIA # UR AUTO: NEGATIVE /HPF — SIGNIFICANT CHANGE UP
BASOPHILS # BLD AUTO: 0 K/UL — SIGNIFICANT CHANGE UP (ref 0–0.2)
BASOPHILS NFR BLD AUTO: 0 % — SIGNIFICANT CHANGE UP (ref 0–2)
BILIRUB SERPL-MCNC: 0.6 MG/DL — SIGNIFICANT CHANGE UP (ref 0.4–2)
BILIRUB UR-MCNC: NEGATIVE — SIGNIFICANT CHANGE UP
BUN SERPL-MCNC: 14.6 MG/DL — SIGNIFICANT CHANGE UP (ref 8–20)
BURR CELLS BLD QL SMEAR: PRESENT — SIGNIFICANT CHANGE UP
CALCIUM SERPL-MCNC: 9.8 MG/DL — SIGNIFICANT CHANGE UP (ref 8.4–10.5)
CAST: 3 /LPF — SIGNIFICANT CHANGE UP (ref 0–4)
CHLORIDE SERPL-SCNC: 98 MMOL/L — SIGNIFICANT CHANGE UP (ref 96–108)
CO2 SERPL-SCNC: 22 MMOL/L — SIGNIFICANT CHANGE UP (ref 22–29)
COLOR SPEC: SIGNIFICANT CHANGE UP
CREAT SERPL-MCNC: 0.69 MG/DL — SIGNIFICANT CHANGE UP (ref 0.5–1.3)
DIFF PNL FLD: NEGATIVE — SIGNIFICANT CHANGE UP
EGFR: 109 ML/MIN/1.73M2 — SIGNIFICANT CHANGE UP
ELLIPTOCYTES BLD QL SMEAR: SLIGHT — SIGNIFICANT CHANGE UP
EOSINOPHIL # BLD AUTO: 0 K/UL — SIGNIFICANT CHANGE UP (ref 0–0.5)
EOSINOPHIL NFR BLD AUTO: 0 % — SIGNIFICANT CHANGE UP (ref 0–6)
FLUAV AG NPH QL: SIGNIFICANT CHANGE UP
FLUBV AG NPH QL: SIGNIFICANT CHANGE UP
GIANT PLATELETS BLD QL SMEAR: PRESENT — SIGNIFICANT CHANGE UP
GLUCOSE SERPL-MCNC: 169 MG/DL — HIGH (ref 70–99)
GLUCOSE UR QL: 100 MG/DL
HCT VFR BLD CALC: 38.8 % — LOW (ref 39–50)
HGB BLD-MCNC: 12.2 G/DL — LOW (ref 13–17)
KETONES UR-MCNC: 80 MG/DL
LEUKOCYTE ESTERASE UR-ACNC: NEGATIVE — SIGNIFICANT CHANGE UP
LIDOCAIN IGE QN: 11 U/L — LOW (ref 22–51)
LYMPHOCYTES # BLD AUTO: 0.35 K/UL — LOW (ref 1–3.3)
LYMPHOCYTES # BLD AUTO: 1.9 % — LOW (ref 13–44)
MANUAL SMEAR VERIFICATION: SIGNIFICANT CHANGE UP
MCHC RBC-ENTMCNC: 22.3 PG — LOW (ref 27–34)
MCHC RBC-ENTMCNC: 31.4 GM/DL — LOW (ref 32–36)
MCV RBC AUTO: 71.1 FL — LOW (ref 80–100)
MICROCYTES BLD QL: SLIGHT — SIGNIFICANT CHANGE UP
MONOCYTES # BLD AUTO: 0.35 K/UL — SIGNIFICANT CHANGE UP (ref 0–0.9)
MONOCYTES NFR BLD AUTO: 1.9 % — LOW (ref 2–14)
NEUTROPHILS # BLD AUTO: 17.64 K/UL — HIGH (ref 1.8–7.4)
NEUTROPHILS NFR BLD AUTO: 96.2 % — HIGH (ref 43–77)
NITRITE UR-MCNC: NEGATIVE — SIGNIFICANT CHANGE UP
PH UR: 6 — SIGNIFICANT CHANGE UP (ref 5–8)
PLAT MORPH BLD: NORMAL — SIGNIFICANT CHANGE UP
PLATELET # BLD AUTO: 298 K/UL — SIGNIFICANT CHANGE UP (ref 150–400)
POLYCHROMASIA BLD QL SMEAR: SLIGHT — SIGNIFICANT CHANGE UP
POTASSIUM SERPL-MCNC: 4.1 MMOL/L — SIGNIFICANT CHANGE UP (ref 3.5–5.3)
POTASSIUM SERPL-SCNC: 4.1 MMOL/L — SIGNIFICANT CHANGE UP (ref 3.5–5.3)
PROT SERPL-MCNC: 8.9 G/DL — HIGH (ref 6.6–8.7)
PROT UR-MCNC: 100 MG/DL
RBC # BLD: 5.46 M/UL — SIGNIFICANT CHANGE UP (ref 4.2–5.8)
RBC # FLD: 17.2 % — HIGH (ref 10.3–14.5)
RBC BLD AUTO: ABNORMAL
RBC CASTS # UR COMP ASSIST: 6 /HPF — HIGH (ref 0–4)
RSV RNA NPH QL NAA+NON-PROBE: SIGNIFICANT CHANGE UP
SARS-COV-2 RNA SPEC QL NAA+PROBE: SIGNIFICANT CHANGE UP
SMUDGE CELLS # BLD: PRESENT — SIGNIFICANT CHANGE UP
SODIUM SERPL-SCNC: 137 MMOL/L — SIGNIFICANT CHANGE UP (ref 135–145)
SP GR SPEC: 1.03 — SIGNIFICANT CHANGE UP (ref 1–1.03)
SQUAMOUS # UR AUTO: 1 /HPF — SIGNIFICANT CHANGE UP (ref 0–5)
UROBILINOGEN FLD QL: 1 MG/DL — SIGNIFICANT CHANGE UP (ref 0.2–1)
WBC # BLD: 18.34 K/UL — HIGH (ref 3.8–10.5)
WBC # FLD AUTO: 18.34 K/UL — HIGH (ref 3.8–10.5)
WBC UR QL: 1 /HPF — SIGNIFICANT CHANGE UP (ref 0–5)

## 2024-04-04 PROCEDURE — 74177 CT ABD & PELVIS W/CONTRAST: CPT | Mod: 26,MC

## 2024-04-04 PROCEDURE — 99223 1ST HOSP IP/OBS HIGH 75: CPT

## 2024-04-04 PROCEDURE — 71260 CT THORAX DX C+: CPT | Mod: 26,MC

## 2024-04-04 PROCEDURE — 71046 X-RAY EXAM CHEST 2 VIEWS: CPT | Mod: 26

## 2024-04-04 PROCEDURE — 93010 ELECTROCARDIOGRAM REPORT: CPT

## 2024-04-04 PROCEDURE — 99285 EMERGENCY DEPT VISIT HI MDM: CPT | Mod: FS

## 2024-04-04 RX ORDER — ACETAMINOPHEN 500 MG
1000 TABLET ORAL ONCE
Refills: 0 | Status: COMPLETED | OUTPATIENT
Start: 2024-04-04 | End: 2024-04-04

## 2024-04-04 RX ORDER — ONDANSETRON 8 MG/1
4 TABLET, FILM COATED ORAL ONCE
Refills: 0 | Status: COMPLETED | OUTPATIENT
Start: 2024-04-04 | End: 2024-04-04

## 2024-04-04 RX ORDER — PIPERACILLIN AND TAZOBACTAM 4; .5 G/20ML; G/20ML
3.38 INJECTION, POWDER, LYOPHILIZED, FOR SOLUTION INTRAVENOUS ONCE
Refills: 0 | Status: COMPLETED | OUTPATIENT
Start: 2024-04-04 | End: 2024-04-04

## 2024-04-04 RX ORDER — MORPHINE SULFATE 50 MG/1
4 CAPSULE, EXTENDED RELEASE ORAL ONCE
Refills: 0 | Status: DISCONTINUED | OUTPATIENT
Start: 2024-04-04 | End: 2024-04-04

## 2024-04-04 RX ORDER — PANTOPRAZOLE SODIUM 20 MG/1
40 TABLET, DELAYED RELEASE ORAL ONCE
Refills: 0 | Status: COMPLETED | OUTPATIENT
Start: 2024-04-04 | End: 2024-04-04

## 2024-04-04 RX ORDER — KETOROLAC TROMETHAMINE 30 MG/ML
15 SYRINGE (ML) INJECTION ONCE
Refills: 0 | Status: DISCONTINUED | OUTPATIENT
Start: 2024-04-04 | End: 2024-04-04

## 2024-04-04 RX ORDER — FAMOTIDINE 10 MG/ML
20 INJECTION INTRAVENOUS ONCE
Refills: 0 | Status: COMPLETED | OUTPATIENT
Start: 2024-04-04 | End: 2024-04-04

## 2024-04-04 RX ORDER — SODIUM CHLORIDE 9 MG/ML
1000 INJECTION, SOLUTION INTRAVENOUS ONCE
Refills: 0 | Status: COMPLETED | OUTPATIENT
Start: 2024-04-04 | End: 2024-04-04

## 2024-04-04 RX ORDER — HYDROMORPHONE HYDROCHLORIDE 2 MG/ML
0.5 INJECTION INTRAMUSCULAR; INTRAVENOUS; SUBCUTANEOUS ONCE
Refills: 0 | Status: DISCONTINUED | OUTPATIENT
Start: 2024-04-04 | End: 2024-04-04

## 2024-04-04 RX ORDER — HYDRALAZINE HCL 50 MG
10 TABLET ORAL ONCE
Refills: 0 | Status: DISCONTINUED | OUTPATIENT
Start: 2024-04-04 | End: 2024-04-04

## 2024-04-04 RX ADMIN — MORPHINE SULFATE 4 MILLIGRAM(S): 50 CAPSULE, EXTENDED RELEASE ORAL at 17:05

## 2024-04-04 RX ADMIN — Medication 1000 MILLIGRAM(S): at 17:31

## 2024-04-04 RX ADMIN — SODIUM CHLORIDE 1000 MILLILITER(S): 9 INJECTION, SOLUTION INTRAVENOUS at 15:09

## 2024-04-04 RX ADMIN — Medication 15 MILLIGRAM(S): at 15:08

## 2024-04-04 RX ADMIN — HYDROMORPHONE HYDROCHLORIDE 0.5 MILLIGRAM(S): 2 INJECTION INTRAMUSCULAR; INTRAVENOUS; SUBCUTANEOUS at 19:47

## 2024-04-04 RX ADMIN — PANTOPRAZOLE SODIUM 40 MILLIGRAM(S): 20 TABLET, DELAYED RELEASE ORAL at 19:47

## 2024-04-04 RX ADMIN — FAMOTIDINE 20 MILLIGRAM(S): 10 INJECTION INTRAVENOUS at 15:09

## 2024-04-04 RX ADMIN — HYDROMORPHONE HYDROCHLORIDE 0.5 MILLIGRAM(S): 2 INJECTION INTRAMUSCULAR; INTRAVENOUS; SUBCUTANEOUS at 20:35

## 2024-04-04 RX ADMIN — Medication 400 MILLIGRAM(S): at 17:05

## 2024-04-04 RX ADMIN — MORPHINE SULFATE 4 MILLIGRAM(S): 50 CAPSULE, EXTENDED RELEASE ORAL at 17:31

## 2024-04-04 RX ADMIN — Medication 15 MILLIGRAM(S): at 16:59

## 2024-04-04 RX ADMIN — PIPERACILLIN AND TAZOBACTAM 200 GRAM(S): 4; .5 INJECTION, POWDER, LYOPHILIZED, FOR SOLUTION INTRAVENOUS at 19:47

## 2024-04-04 RX ADMIN — ONDANSETRON 4 MILLIGRAM(S): 8 TABLET, FILM COATED ORAL at 15:08

## 2024-04-04 NOTE — ED ADULT NURSE NOTE - NSFALLUNIVINTERV_ED_ALL_ED
Bed/Stretcher in lowest position, wheels locked, appropriate side rails in place/Call bell, personal items and telephone in reach/Instruct patient to call for assistance before getting out of bed/chair/stretcher/Non-slip footwear applied when patient is off stretcher/[place] to call system/Physically safe environment - no spills, clutter or unnecessary equipment/Purposeful proactive rounding/Room/bathroom lighting operational, light cord in reach

## 2024-04-04 NOTE — ED PROVIDER NOTE - PROGRESS NOTE DETAILS
Patient seen by  Initially agreed with the history physical exam and plan   seen the patient at the bedside not acutely distressed warm to touch. and the chest x-ray and urine and flu COVID swab. consult  GI history of esophageal cancer pull-through and dilation. with the nausea vomiting

## 2024-04-04 NOTE — H&P ADULT - NSHPPHYSICALEXAM_GEN_ALL_CORE
Vital Signs Last 24 Hrs  T(C): 36.4 (04 Apr 2024 23:00), Max: 37.4 (04 Apr 2024 22:05)  T(F): 97.6 (04 Apr 2024 23:00), Max: 99.4 (04 Apr 2024 22:05)  HR: 88 (04 Apr 2024 23:00) (78 - 88)  BP: 136/88 (04 Apr 2024 23:00) (136/88 - 191/99)  BP(mean): --  RR: 20 (04 Apr 2024 23:00) (16 - 20)  SpO2: 95% (04 Apr 2024 23:00) (94% - 97%)    Parameters below as of 04 Apr 2024 23:00  Patient On (Oxygen Delivery Method): room air

## 2024-04-04 NOTE — ED PROVIDER NOTE - OBJECTIVE STATEMENT
57yo male with pmh of esophageal cancer s/p esophagectomy, s/p Pre Pyloric Botox Injection and Balloon Dilation, gastroparesis, HTN presented with nausea/vomiting and abdominal pain. Patient states he has been having cough, fever, congestion was started on a Z-Craig by his PMD although no chest x-ray was done.  Patient states he took 1 azithromycin pill yesterday and since then has been having abdominal pain nausea nonbloody nonbilious emesis.   Patient also reports subjective fevers. Pt denies chills, ha, loc, focal neuro deficits, cp/sob/palp, diarrhea, urinary symptoms, recent travel

## 2024-04-04 NOTE — ED ADULT NURSE REASSESSMENT NOTE - NS ED NURSE REASSESS COMMENT FT1
Assumed care of pt from RN CF. Pt AOx3. Airway patent respirations even unlabored. Pt reports 10/10 abdominal pain. PA ME notified awaiting orders. /84 at this time per PA ME hold IV hydralazine.
Pt a&ox4, still feeling nausea, swab and UA sent, VSS, respirations even and unlabored on room air, no distress noted. Pt pending xray.

## 2024-04-04 NOTE — ED PROVIDER NOTE - PHYSICAL EXAMINATION
Const: Awake, alert and oriented. In no acute distress. Well appearing.  HEENT: NC/AT. Moist mucous membranes.  Eyes: No scleral icterus. EOMI.  Neck:. Soft and supple. Full ROM without pain.  Cardiac: Regular rate and regular rhythm. +S1/S2. Peripheral pulses 2+ and symmetric. No LE edema.  Resp: Speaking in full sentences. No evidence of respiratory distress. No wheezes, rales or rhonchi.  Abd: Soft, epigastrium ttp, non-distended. Normal bowel sounds in all 4 quadrants. No guarding or rebound.  Back: Spine midline and non-tender. No CVAT.  Skin: No rashes, abrasions or lacerations.  Lymph: No cervical lymphadenopathy.  Neuro: Awake, alert & oriented x 3. Moves all extremities symmetrically.

## 2024-04-04 NOTE — H&P ADULT - HISTORY OF PRESENT ILLNESS
Patient seen and examined before midnight.       59 y/o male with PMH of esophageal cancer s/p esophagectomy, s/p Pre Pyloric Botox Injection and Balloon Dilation, gastroparesis, HTN came to the ED complaining of nausea, vomiting and epigastric abdominal pain x 3 days. As per significant other  at bed side, a week ago he was complaining of fatigue,  3 days ago started coughing productive of yellow sputum, fever (102) and congestion; seen by his PCP who prescribed z-linn however, he has not been able to tolerate PO due to vomiting. He has no chest pain, shortness of breath, palpitation, change in bowel/urinary habit, sick contact, recent travel, hematemesis, melena.

## 2024-04-04 NOTE — ED PROVIDER NOTE - CLINICAL SUMMARY MEDICAL DECISION MAKING FREE TEXT BOX
57yo male with pmh of esophageal cancer s/p esophagectomy, s/p Pre Pyloric Botox Injection and Balloon Dilation, gastroparesis, HTN presented with nausea/vomiting and abdominal pain as well as cough and sub fevers -   Eval for obstruction/intra-abdominal pathology versus pancreatitis versus gastritis vs gastroparesis also evaluate for pneumonia -  will do labs, CT, antiemetic, analgesics, IV fluids

## 2024-04-04 NOTE — ED ADULT NURSE NOTE - OBJECTIVE STATEMENT
Pt a&ox4 complains of abdominal pain, nausea, vomiting, cough, and fever since Tuesday. Pt notes history of cancer and states he had esophagus removed 2016. Pt hypertensive at triage, ekg performed in triage, respirations even and unlabored on room air, no distress noted.

## 2024-04-04 NOTE — H&P ADULT - ASSESSMENT
57 y/o male with PMH of esophageal cancer s/p esophagectomy, s/p Pre Pyloric Botox Injection and Balloon Dilation, gastroparesis, HTN came to the ED complaining of nausea, vomiting and epigastric abdominal pain x 3 days. As per significant other  at bed side, a week ago he was complaining of fatigue,  3 days ago started coughing productive of yellow sputum, fever (102) and congestion; seen by his PCP who prescribed z-linn however, he has not been able to tolerate PO due to vomiting. In the ED, WBC: 18.34 with left shift; CT C/A/P:  Tree-in-bud nodularity in the right middle and lower lobes concerning for aspiration PNA.     Right middle/lower lobe PNA likely due to aspiration   Admit to medical floor with    CT chest as noted above   Zosyn once given in the ED, will continue   Aspiration precaution   Oxygen therapy as needed     Leukocytosis   WBC: 18.34 with left shift   Likely due to PNA   Continue antibiotic   Trend WBC     Epigastric abdominal pain with nausea/vomiting likely due to underlying gastroparesis   Zofran PRN   Monitor electrolytes   Continue Erythromycin 250mg bid   Omeprazole 40mg     Hx of esophageal ca   S/p esophagectomy   GI consulted; patient due for Botox injection and balloon dilation (patient gets it every 6 months, has an appointment next month)     HTN   Amlodipine 5mg     Supportive   DVT prophylaxis: Lovenox daily   Diet: clear liquid, advance as tolerated     Plan of care discussed with patient, significant other and ER nurse.

## 2024-04-04 NOTE — ED ADULT TRIAGE NOTE - AS PAIN REST
"Red'Inta "Red'Inta" Davide was seen and treated in our emergency department on 12/20/2023.  She may return to school on 12/26/2023.      If you have any questions or concerns, please don't hesitate to call.      Rene Cason MD" 6 (moderate pain)

## 2024-04-04 NOTE — H&P ADULT - TIME BILLING
chart, labs and imaging reviewed. Physical examination, medication reconciliation and documentation. Discussion with patient, family at bedside and ER nurse.

## 2024-04-05 ENCOUNTER — APPOINTMENT (OUTPATIENT)
Dept: ORTHOPEDIC SURGERY | Facility: CLINIC | Age: 59
End: 2024-04-05

## 2024-04-05 ENCOUNTER — TRANSCRIPTION ENCOUNTER (OUTPATIENT)
Age: 59
End: 2024-04-05

## 2024-04-05 LAB
ANION GAP SERPL CALC-SCNC: 15 MMOL/L — SIGNIFICANT CHANGE UP (ref 5–17)
BLD GP AB SCN SERPL QL: SIGNIFICANT CHANGE UP
BUN SERPL-MCNC: 16.9 MG/DL — SIGNIFICANT CHANGE UP (ref 8–20)
CALCIUM SERPL-MCNC: 9.5 MG/DL — SIGNIFICANT CHANGE UP (ref 8.4–10.5)
CHLORIDE SERPL-SCNC: 100 MMOL/L — SIGNIFICANT CHANGE UP (ref 96–108)
CO2 SERPL-SCNC: 23 MMOL/L — SIGNIFICANT CHANGE UP (ref 22–29)
CREAT SERPL-MCNC: 0.68 MG/DL — SIGNIFICANT CHANGE UP (ref 0.5–1.3)
CULTURE RESULTS: NO GROWTH — SIGNIFICANT CHANGE UP
EGFR: 109 ML/MIN/1.73M2 — SIGNIFICANT CHANGE UP
GLUCOSE SERPL-MCNC: 138 MG/DL — HIGH (ref 70–99)
HCT VFR BLD CALC: 34.8 % — LOW (ref 39–50)
HGB BLD-MCNC: 11 G/DL — LOW (ref 13–17)
INR BLD: 1.18 RATIO — SIGNIFICANT CHANGE UP (ref 0.85–1.18)
MCHC RBC-ENTMCNC: 22.4 PG — LOW (ref 27–34)
MCHC RBC-ENTMCNC: 31.6 GM/DL — LOW (ref 32–36)
MCV RBC AUTO: 71 FL — LOW (ref 80–100)
PLATELET # BLD AUTO: 290 K/UL — SIGNIFICANT CHANGE UP (ref 150–400)
POTASSIUM SERPL-MCNC: 4.1 MMOL/L — SIGNIFICANT CHANGE UP (ref 3.5–5.3)
POTASSIUM SERPL-SCNC: 4.1 MMOL/L — SIGNIFICANT CHANGE UP (ref 3.5–5.3)
PROTHROM AB SERPL-ACNC: 13 SEC — SIGNIFICANT CHANGE UP (ref 9.5–13)
RBC # BLD: 4.9 M/UL — SIGNIFICANT CHANGE UP (ref 4.2–5.8)
RBC # FLD: 17.6 % — HIGH (ref 10.3–14.5)
SODIUM SERPL-SCNC: 138 MMOL/L — SIGNIFICANT CHANGE UP (ref 135–145)
SPECIMEN SOURCE: SIGNIFICANT CHANGE UP
WBC # BLD: 15.43 K/UL — HIGH (ref 3.8–10.5)
WBC # FLD AUTO: 15.43 K/UL — HIGH (ref 3.8–10.5)

## 2024-04-05 PROCEDURE — 99232 SBSQ HOSP IP/OBS MODERATE 35: CPT

## 2024-04-05 PROCEDURE — 99223 1ST HOSP IP/OBS HIGH 75: CPT | Mod: FS,25

## 2024-04-05 PROCEDURE — 43249 ESOPH EGD DILATION <30 MM: CPT | Mod: 59

## 2024-04-05 PROCEDURE — 43236 UPPR GI SCOPE W/SUBMUC INJ: CPT | Mod: 59

## 2024-04-05 DEVICE — NAIL OSTEO 1.5X16MM STRL: Type: IMPLANTABLE DEVICE | Status: FUNCTIONAL

## 2024-04-05 DEVICE — CATH BLLN CRE .035INX7.7FR 15-16.5-18MM: Type: IMPLANTABLE DEVICE | Status: FUNCTIONAL

## 2024-04-05 RX ORDER — AMLODIPINE BESYLATE 2.5 MG/1
1 TABLET ORAL
Refills: 0 | DISCHARGE

## 2024-04-05 RX ORDER — ERYTHROMYCIN ETHYLSUCCINATE 400 MG
250 TABLET ORAL
Refills: 0 | Status: DISCONTINUED | OUTPATIENT
Start: 2024-04-05 | End: 2024-04-08

## 2024-04-05 RX ORDER — FENTANYL CITRATE 50 UG/ML
50 INJECTION INTRAVENOUS
Refills: 0 | Status: DISCONTINUED | OUTPATIENT
Start: 2024-04-05 | End: 2024-04-05

## 2024-04-05 RX ORDER — HYDROMORPHONE HYDROCHLORIDE 2 MG/ML
1 INJECTION INTRAMUSCULAR; INTRAVENOUS; SUBCUTANEOUS EVERY 4 HOURS
Refills: 0 | Status: DISCONTINUED | OUTPATIENT
Start: 2024-04-05 | End: 2024-04-10

## 2024-04-05 RX ORDER — SODIUM CHLORIDE 9 MG/ML
1000 INJECTION, SOLUTION INTRAVENOUS
Refills: 0 | Status: DISCONTINUED | OUTPATIENT
Start: 2024-04-05 | End: 2024-04-05

## 2024-04-05 RX ORDER — ONABOTULINUMTOXINA 100 UNIT
10 VIAL (EA) INJECTION ONCE
Refills: 0 | Status: DISCONTINUED | OUTPATIENT
Start: 2024-04-05 | End: 2024-04-10

## 2024-04-05 RX ORDER — PANTOPRAZOLE SODIUM 20 MG/1
40 TABLET, DELAYED RELEASE ORAL
Refills: 0 | Status: DISCONTINUED | OUTPATIENT
Start: 2024-04-05 | End: 2024-04-05

## 2024-04-05 RX ORDER — BUPRENORPHINE AND NALOXONE 2; .5 MG/1; MG/1
1 TABLET SUBLINGUAL
Qty: 0 | Refills: 0 | DISCHARGE

## 2024-04-05 RX ORDER — MORPHINE SULFATE 50 MG/1
2 CAPSULE, EXTENDED RELEASE ORAL EVERY 4 HOURS
Refills: 0 | Status: DISCONTINUED | OUTPATIENT
Start: 2024-04-05 | End: 2024-04-05

## 2024-04-05 RX ORDER — PANTOPRAZOLE SODIUM 20 MG/1
40 TABLET, DELAYED RELEASE ORAL DAILY
Refills: 0 | Status: DISCONTINUED | OUTPATIENT
Start: 2024-04-05 | End: 2024-04-10

## 2024-04-05 RX ORDER — AMLODIPINE BESYLATE 2.5 MG/1
5 TABLET ORAL DAILY
Refills: 0 | Status: DISCONTINUED | OUTPATIENT
Start: 2024-04-05 | End: 2024-04-10

## 2024-04-05 RX ORDER — ONDANSETRON 8 MG/1
4 TABLET, FILM COATED ORAL EVERY 8 HOURS
Refills: 0 | Status: DISCONTINUED | OUTPATIENT
Start: 2024-04-05 | End: 2024-04-09

## 2024-04-05 RX ORDER — LANOLIN ALCOHOL/MO/W.PET/CERES
3 CREAM (GRAM) TOPICAL AT BEDTIME
Refills: 0 | Status: DISCONTINUED | OUTPATIENT
Start: 2024-04-05 | End: 2024-04-10

## 2024-04-05 RX ORDER — SODIUM CHLORIDE 9 MG/ML
1000 INJECTION, SOLUTION INTRAVENOUS
Refills: 0 | Status: DISCONTINUED | OUTPATIENT
Start: 2024-04-05 | End: 2024-04-10

## 2024-04-05 RX ORDER — ONDANSETRON 8 MG/1
4 TABLET, FILM COATED ORAL ONCE
Refills: 0 | Status: DISCONTINUED | OUTPATIENT
Start: 2024-04-05 | End: 2024-04-05

## 2024-04-05 RX ORDER — ENOXAPARIN SODIUM 100 MG/ML
40 INJECTION SUBCUTANEOUS EVERY 24 HOURS
Refills: 0 | Status: DISCONTINUED | OUTPATIENT
Start: 2024-04-05 | End: 2024-04-05

## 2024-04-05 RX ORDER — PIPERACILLIN AND TAZOBACTAM 4; .5 G/20ML; G/20ML
3.38 INJECTION, POWDER, LYOPHILIZED, FOR SOLUTION INTRAVENOUS EVERY 8 HOURS
Refills: 0 | Status: DISCONTINUED | OUTPATIENT
Start: 2024-04-05 | End: 2024-04-10

## 2024-04-05 RX ORDER — ACETAMINOPHEN 500 MG
650 TABLET ORAL EVERY 6 HOURS
Refills: 0 | Status: DISCONTINUED | OUTPATIENT
Start: 2024-04-05 | End: 2024-04-10

## 2024-04-05 RX ADMIN — ONDANSETRON 4 MILLIGRAM(S): 8 TABLET, FILM COATED ORAL at 02:27

## 2024-04-05 RX ADMIN — AMLODIPINE BESYLATE 5 MILLIGRAM(S): 2.5 TABLET ORAL at 05:42

## 2024-04-05 RX ADMIN — HYDROMORPHONE HYDROCHLORIDE 1 MILLIGRAM(S): 2 INJECTION INTRAMUSCULAR; INTRAVENOUS; SUBCUTANEOUS at 15:23

## 2024-04-05 RX ADMIN — PANTOPRAZOLE SODIUM 40 MILLIGRAM(S): 20 TABLET, DELAYED RELEASE ORAL at 11:15

## 2024-04-05 RX ADMIN — HYDROMORPHONE HYDROCHLORIDE 1 MILLIGRAM(S): 2 INJECTION INTRAMUSCULAR; INTRAVENOUS; SUBCUTANEOUS at 23:36

## 2024-04-05 RX ADMIN — PIPERACILLIN AND TAZOBACTAM 25 GRAM(S): 4; .5 INJECTION, POWDER, LYOPHILIZED, FOR SOLUTION INTRAVENOUS at 05:40

## 2024-04-05 RX ADMIN — MORPHINE SULFATE 2 MILLIGRAM(S): 50 CAPSULE, EXTENDED RELEASE ORAL at 06:17

## 2024-04-05 RX ADMIN — HYDROMORPHONE HYDROCHLORIDE 1 MILLIGRAM(S): 2 INJECTION INTRAMUSCULAR; INTRAVENOUS; SUBCUTANEOUS at 15:55

## 2024-04-05 RX ADMIN — HYDROMORPHONE HYDROCHLORIDE 1 MILLIGRAM(S): 2 INJECTION INTRAMUSCULAR; INTRAVENOUS; SUBCUTANEOUS at 22:40

## 2024-04-05 RX ADMIN — HYDROMORPHONE HYDROCHLORIDE 1 MILLIGRAM(S): 2 INJECTION INTRAMUSCULAR; INTRAVENOUS; SUBCUTANEOUS at 11:45

## 2024-04-05 RX ADMIN — SODIUM CHLORIDE 100 MILLILITER(S): 9 INJECTION, SOLUTION INTRAVENOUS at 11:09

## 2024-04-05 RX ADMIN — MORPHINE SULFATE 2 MILLIGRAM(S): 50 CAPSULE, EXTENDED RELEASE ORAL at 02:27

## 2024-04-05 RX ADMIN — PIPERACILLIN AND TAZOBACTAM 25 GRAM(S): 4; .5 INJECTION, POWDER, LYOPHILIZED, FOR SOLUTION INTRAVENOUS at 13:23

## 2024-04-05 RX ADMIN — PIPERACILLIN AND TAZOBACTAM 25 GRAM(S): 4; .5 INJECTION, POWDER, LYOPHILIZED, FOR SOLUTION INTRAVENOUS at 22:53

## 2024-04-05 RX ADMIN — HYDROMORPHONE HYDROCHLORIDE 1 MILLIGRAM(S): 2 INJECTION INTRAMUSCULAR; INTRAVENOUS; SUBCUTANEOUS at 11:15

## 2024-04-05 RX ADMIN — ONDANSETRON 4 MILLIGRAM(S): 8 TABLET, FILM COATED ORAL at 13:23

## 2024-04-05 NOTE — CONSULT NOTE ADULT - SUBJECTIVE AND OBJECTIVE BOX
HISTORY OF PRESENT ILLNESS: 57 year old male with a history of esophageal carcinoma s/p esophagectomy with gastric pull through (2016), gastroparesis, s/p Pre Pyloric Botox Injection and Balloon Dilation (last session on August 2023),  hx of cholecystectomy [id number] to ED with worsening nausea, vomiting. Reported not able to tolerated solids and liquids and was only able to take a few ice chips. He was recently seen by PCP for chest congestion and fever (102) and was started on Z pack which he has not been able to tolerate PO due to vomiting. Patient follows with Dr. Moe, last follow up was on August 2023. He requires repeated Balloon dilation and Botox injection and his las session was on August 2023. Patient endorses use of Reglan and Zofran as needed. In ED, CT abd shows tree-in-bud nodularity at the base of the right middle lobe and primarily in the basilar segments of the right lower lobe. No evidence of bowel obstruction.       Review of Systems:  . Constitutional: No fever, chills  . HEENT: Negative  · Respiratory and Thorax: No shortness of breath, no cough  · Cardiovascular: No chest pain, palpitation, no dizziness  · Gastrointestinal: see above  · Genitourinary: No hematuria  · Musculoskeletal: Negative  · Neurological: negative  · Psychiatric: no agitation, no anxiety      PAST MEDICAL/SURGICAL HISTORY:  Hypertension    Esophageal cancer    Gastroparesis    S/P cholecystectomy    H/O esophagectomy      SOCIAL HISTORY:  - TOBACCO: Denies  - ALCOHOL: Denies  - ILLICIT DRUG USE: Denies    FAMILY HISTORY:  No known history of gastrointestinal or liver disease;  FH: HTN (hypertension) (Father)        HOME MEDICATIONS:  acetaminophen 325 mg oral tablet: 2 tab(s) orally every 6 hours, As needed, Temp greater or equal to 38C (100.4F), Mild Pain (1 - 3), Moderate Pain (4 - 6) (06 Jan 2023 17:53)  amLODIPine 5 mg oral tablet: 1 tab(s) orally once a day (05 Apr 2024 02:29)  erythromycin 250 mg oral delayed release tablet: 1 tab(s) orally 2 times a day (02 Jan 2023 23:43)  omeprazole 40 mg oral delayed release capsule: 1 cap(s) orally 2 times a day x 1 week then once a day (06 Jan 2023 17:53)  Reglan 10 mg oral tablet: 1 tab(s) orally 3 times a day (before meals), As Needed (06 Jan 2023 17:53)    INPATIENT MEDICATIONS:  MEDICATIONS  (STANDING):  amLODIPine   Tablet 5 milliGRAM(s) Oral daily  enoxaparin Injectable 40 milliGRAM(s) SubCutaneous every 24 hours  erythromycin     enteric coated 250 milliGRAM(s) Oral two times a day  pantoprazole    Tablet 40 milliGRAM(s) Oral before breakfast  piperacillin/tazobactam IVPB.. 3.375 Gram(s) IV Intermittent every 8 hours    MEDICATIONS  (PRN):  acetaminophen     Tablet .. 650 milliGRAM(s) Oral every 6 hours PRN Temp greater or equal to 38C (100.4F), Mild Pain (1 - 3)  aluminum hydroxide/magnesium hydroxide/simethicone Suspension 30 milliLiter(s) Oral every 4 hours PRN Dyspepsia  melatonin 3 milliGRAM(s) Oral at bedtime PRN Insomnia  morphine  - Injectable 2 milliGRAM(s) IV Push every 4 hours PRN Severe Pain (7 - 10)  ondansetron Injectable 4 milliGRAM(s) IV Push every 8 hours PRN Nausea and/or Vomiting    ALLERGIES:  No Known Allergies    T(C): 36.6 (04-05-24 @ 07:55), Max: 37.4 (04-04-24 @ 22:05)  HR: 94 (04-05-24 @ 07:55) (75 - 94)  BP: 152/91 (04-05-24 @ 07:55) (126/71 - 191/99)  RR: 20 (04-05-24 @ 07:55) (16 - 20)  SpO2: 94% (04-05-24 @ 07:55) (94% - 97%)      PHYSICAL EXAM:    Constitutional: No acute distress  Neuro: Awake alert, oriented  HEENT: anicteric sclerae  CV: regular rate, regular rhythm  Pulm/chest: lung sounds diminished bilaterally, no accessory muscle use noted  Abd: soft, nontender, nondistended +bowel sounds. No rigidity, rebound tenderness, or guarding  Ext: no edema  Skin: warm, no jaundice   Psych: calm, cooperative      LABS:             11.0   15.43 )-----------( 290      ( 04-05 @ 07:16 )             34.8                12.2   18.34 )-----------( 298      ( 04-04 @ 15:00 )             38.8         04-05    138  |  100  |  16.9  ----------------------------<  138<H>  4.1   |  23.0  |  0.68    Ca    9.5      05 Apr 2024 07:16    TPro  8.9<H>  /  Alb  4.8  /  TBili  0.6  /  DBili  x   /  AST  30  /  ALT  51<H>  /  AlkPhos  160<H>  04-04    LIVER FUNCTIONS - ( 04 Apr 2024 15:00 )  Alb: 4.8 g/dL / Pro: 8.9 g/dL / ALK PHOS: 160 U/L / ALT: 51 U/L / AST: 30 U/L / GGT: x             Lipase: 11 U/L (04-04-24 @ 15:00)      Urinalysis Basic - ( 05 Apr 2024 07:16 )    Color: x / Appearance: x / SG: x / pH: x  Gluc: 138 mg/dL / Ketone: x  / Bili: x / Urobili: x   Blood: x / Protein: x / Nitrite: x   Leuk Esterase: x / RBC: x / WBC x   Sq Epi: x / Non Sq Epi: x / Bacteria: x      < from: CT Chest w/ IV Cont (04.04.24 @ 18:06) >  FINDINGS:  CHEST:  LUNGS AND LARGE AIRWAYS: Patent central airways. Tree-in-bud nodularity   at the base of the right middle lobe and primarily in the basilar   segments of the right lower lobe. Findings may be due to infection or   inflammatory small airways disease with consideration for aspiration.   Right lower lobe bronchial wall thickening and partial distal airway   opacification.  PLEURA: No pleural effusion.  VESSELS: Mild atherosclerotic changes.  HEART: Heart size is normal. No pericardial effusion. Minimal coronary   artery calcification.  MEDIASTINUM AND SEVEN: No lymphadenopathy. Partial esophagectomy with   gastric pull-through.  CHEST WALL AND LOWER NECK: Within normal limits.    ABDOMEN AND PELVIS:  LIVER: Within normal limits.  BILE DUCTS: Normal caliber.  GALLBLADDER: Cholecystectomy.  SPLEEN: Within normal limits.  PANCREAS: Partial fatty replacement.  ADRENALS: Small/nodular right adrenal gland, stable. Stable mild nodular   thickening of the left adrenal gland.  KIDNEYS/URETERS: Stable too small to characterize low attenuating focus   in the left kidney.    BLADDER: Within normal limits.  REPRODUCTIVE ORGANS: Within normal limits    BOWEL: No bowel obstruction. Appendix is normal. Few scattered colonic   diverticula.  PERITONEUM: No ascites.  VESSELS: Infrarenal IVC filter. Struts of the filter extend beyond the   margins of the IVC. Atherosclerotic changes.  RETROPERITONEUM/LYMPH NODES: No lymphadenopathy.  ABDOMINAL WALL: Small fat-containing supraumbilical and umbilical   hernias. Minimal nonspecific infiltration of the fat subjacent to the   umbilical hernia neck.  BONES: Lower cervical anterior fusion hardware. Old fractures of the left   superior and inferior pubic rami.    IMPRESSION:  1.  Tree-in-bud nodularity in the right middle and lower lobes may be due   to infection or inflammatory small airways disease with consideration for   aspiration in light of the patient's history.  2.  No acute findings within the abdomen or pelvis.    < end of copied text >

## 2024-04-05 NOTE — CONSULT NOTE ADULT - NS ATTEND AMEND GEN_ALL_CORE FT
Mr. Schwarz is a 57-year-old gentleman with significant past medical history of esophageal carcinoma status post esophagectomy (gastric pull-up) in 2016, subsequent gastroparesis likely in the setting of vagotomy, history of recurrent pyloric dilation and Botox injection endoscopically, last dilation 8/20/2023, patient was receiving similar every 6 months however has been somewhat delayed, presents with worsening/progressive nausea and vomiting likely in the setting of above.  Interpreted images, prior endoscopy reports, and updated lab results in detail.  Will plan for upper endoscopy with dilation and prepyloric Botox injection today given concerns for retained fluid on CT scan and aspiration pneumonia as evident by chest imaging.  Additional recommendations to follow post procedure.  Discussed plan of care with patient and family.  Pending response and course may consider alternative endoscopic management.  We will continue to follow along closely.

## 2024-04-05 NOTE — PROGRESS NOTE ADULT - SUBJECTIVE AND OBJECTIVE BOX
still nausea  having epigastric pain  ros negative  awaiting EGD     MEDICATIONS  (STANDING):  amLODIPine   Tablet 5 milliGRAM(s) Oral daily  erythromycin     enteric coated 250 milliGRAM(s) Oral two times a day  lactated ringers. 1000 milliLiter(s) (100 mL/Hr) IV Continuous <Continuous>  pantoprazole    Tablet 40 milliGRAM(s) Oral before breakfast  piperacillin/tazobactam IVPB.. 3.375 Gram(s) IV Intermittent every 8 hours    MEDICATIONS  (PRN):  acetaminophen     Tablet .. 650 milliGRAM(s) Oral every 6 hours PRN Temp greater or equal to 38C (100.4F), Mild Pain (1 - 3)  aluminum hydroxide/magnesium hydroxide/simethicone Suspension 30 milliLiter(s) Oral every 4 hours PRN Dyspepsia  HYDROmorphone  Injectable 1 milliGRAM(s) IV Push every 4 hours PRN Severe Pain (7 - 10)  melatonin 3 milliGRAM(s) Oral at bedtime PRN Insomnia  ondansetron Injectable 4 milliGRAM(s) IV Push every 8 hours PRN Nausea and/or Vomiting      Allergies    No Known Allergies          Vital Signs Last 24 Hrs  T(C): 36.6 (05 Apr 2024 07:55), Max: 37.4 (04 Apr 2024 22:05)  T(F): 97.9 (05 Apr 2024 07:55), Max: 99.4 (04 Apr 2024 22:05)  HR: 94 (05 Apr 2024 07:55) (75 - 94)  BP: 152/91 (05 Apr 2024 07:55) (126/71 - 191/99)  BP(mean): --  RR: 20 (05 Apr 2024 07:55) (16 - 20)  SpO2: 94% (05 Apr 2024 07:55) (94% - 97%)    Parameters below as of 05 Apr 2024 07:55  Patient On (Oxygen Delivery Method): room air        PHYSICAL EXAM:    GENERAL: NAD  CHEST/LUNG: Clear to ausculation bilaterally  HEART: Regular rate and rhythm; S1 S2  ABDOMEN: Soft, Nontender, ; Bowel sounds present  EXTREMITIES: no edema   NERVOUS SYSTEM:  Alert & Oriented X3,  Motor Strength 5/5 B/L upper and lower extremities  PSYCH: normal mood, appropriate response.    LABS:                        11.0   15.43 )-----------( 290      ( 05 Apr 2024 07:16 )             34.8     04-05    138  |  100  |  16.9  ----------------------------<  138<H>  4.1   |  23.0  |  0.68    Ca    9.5      05 Apr 2024 07:16    TPro  8.9<H>  /  Alb  4.8  /  TBili  0.6  /  DBili  x   /  AST  30  /  ALT  51<H>  /  AlkPhos  160<H>  04-04    PT/INR - ( 05 Apr 2024 09:42 )   PT: 13.0 sec;   INR: 1.18 ratio           Urinalysis Basic - ( 05 Apr 2024 07:16 )    Color: x / Appearance: x / SG: x / pH: x  Gluc: 138 mg/dL / Ketone: x  / Bili: x / Urobili: x   Blood: x / Protein: x / Nitrite: x   Leuk Esterase: x / RBC: x / WBC x   Sq Epi: x / Non Sq Epi: x / Bacteria: x        CAPILLARY BLOOD GLUCOSE            RADIOLOGY & ADDITIONAL TESTS:

## 2024-04-05 NOTE — PROGRESS NOTE ADULT - ASSESSMENT
59 y/o male with PMH of esophageal cancer s/p esophagectomy, s/p Pre Pyloric Botox Injection and Balloon Dilation, gastroparesis, HTN came to the ED complaining of nausea, vomiting and epigastric abdominal pain x 3 days. As per significant other  at bed side, a week ago he was complaining of fatigue,  3 days ago started coughing productive of yellow sputum, fever (102) and congestion; seen by his PCP who prescribed z-linn however, he has not been able to tolerate PO due to vomiting. In the ED, WBC: 18.34 with left shift; CT C/A/P:  Tree-in-bud nodularity in the right middle and lower lobes concerning for aspiration PNA.     Right middle/lower lobe PNA likely due to aspiration   c/w zosyn     Epigastric abdominal pain with nausea/vomiting likely due to underlying gastroparesis   Zofran PRN   Continue Erythromycin 250mg bid   Omeprazole 40mg   IVF    Hx of esophageal ca   S/p esophagectomy   GI consulted; patient due for Botox injection and balloon dilation (patient gets it every 6 months, has an appointment next month)   EGD today    HTN   Amlodipine 5mg       Plan of care discussed with patient, significant other and ER nurse.

## 2024-04-05 NOTE — CONSULT NOTE ADULT - ASSESSMENT
57 year old male with a history of esophageal carcinoma s/p esophagectomy with gastric pull through (2016), gastroparesis, s/p Pre Pyloric Botox Injection and Balloon Dilation (last session on August 2023), hx of cholecystectomy [id number] to ED with worsening nausea, vomiting.     Nausea, Vomiting:  Hx Esophageal cancer, s/p esophagectomy with gastric pull through   Gastroparesis requiring prepyloric Botox injection and Balloon Dilation    Patient with history of esophagectomy gastric pull-through, requiring repeated prepyloric Botox injection and Balloon Dilation (last session was on August 2023).  Now with worsening nausea vomiting, unable to tolerate oral intake.  ?Aspiration pneumonia on imaging  Plan:  Will plan for EGD with dilation and possible Botox injection today  Please keep NPO  Please Hold Lovenox for procedure  IV fluids for hydration  Aspiration precaution   Type and screen, INR ordered  Plan discussed with the patient

## 2024-04-06 DIAGNOSIS — R11.2 NAUSEA WITH VOMITING, UNSPECIFIED: ICD-10-CM

## 2024-04-06 LAB
ANION GAP SERPL CALC-SCNC: 16 MMOL/L — SIGNIFICANT CHANGE UP (ref 5–17)
BUN SERPL-MCNC: 16.8 MG/DL — SIGNIFICANT CHANGE UP (ref 8–20)
CALCIUM SERPL-MCNC: 9 MG/DL — SIGNIFICANT CHANGE UP (ref 8.4–10.5)
CHLORIDE SERPL-SCNC: 101 MMOL/L — SIGNIFICANT CHANGE UP (ref 96–108)
CO2 SERPL-SCNC: 22 MMOL/L — SIGNIFICANT CHANGE UP (ref 22–29)
CREAT SERPL-MCNC: 0.64 MG/DL — SIGNIFICANT CHANGE UP (ref 0.5–1.3)
EGFR: 111 ML/MIN/1.73M2 — SIGNIFICANT CHANGE UP
GLUCOSE SERPL-MCNC: 147 MG/DL — HIGH (ref 70–99)
HCT VFR BLD CALC: 35.1 % — LOW (ref 39–50)
HGB BLD-MCNC: 11 G/DL — LOW (ref 13–17)
INR BLD: 1.18 RATIO — SIGNIFICANT CHANGE UP (ref 0.85–1.18)
MAGNESIUM SERPL-MCNC: 1.9 MG/DL — SIGNIFICANT CHANGE UP (ref 1.8–2.6)
MCHC RBC-ENTMCNC: 22.4 PG — LOW (ref 27–34)
MCHC RBC-ENTMCNC: 31.3 GM/DL — LOW (ref 32–36)
MCV RBC AUTO: 71.3 FL — LOW (ref 80–100)
PLATELET # BLD AUTO: 287 K/UL — SIGNIFICANT CHANGE UP (ref 150–400)
POTASSIUM SERPL-MCNC: 3.9 MMOL/L — SIGNIFICANT CHANGE UP (ref 3.5–5.3)
POTASSIUM SERPL-SCNC: 3.9 MMOL/L — SIGNIFICANT CHANGE UP (ref 3.5–5.3)
PROTHROM AB SERPL-ACNC: 13 SEC — SIGNIFICANT CHANGE UP (ref 9.5–13)
RBC # BLD: 4.92 M/UL — SIGNIFICANT CHANGE UP (ref 4.2–5.8)
RBC # FLD: 17.7 % — HIGH (ref 10.3–14.5)
SODIUM SERPL-SCNC: 138 MMOL/L — SIGNIFICANT CHANGE UP (ref 135–145)
WBC # BLD: 13.85 K/UL — HIGH (ref 3.8–10.5)
WBC # FLD AUTO: 13.85 K/UL — HIGH (ref 3.8–10.5)

## 2024-04-06 PROCEDURE — 99232 SBSQ HOSP IP/OBS MODERATE 35: CPT

## 2024-04-06 RX ORDER — ACETAMINOPHEN 500 MG
1000 TABLET ORAL ONCE
Refills: 0 | Status: COMPLETED | OUTPATIENT
Start: 2024-04-06 | End: 2024-04-06

## 2024-04-06 RX ORDER — METOCLOPRAMIDE HCL 10 MG
10 TABLET ORAL ONCE
Refills: 0 | Status: COMPLETED | OUTPATIENT
Start: 2024-04-06 | End: 2024-04-06

## 2024-04-06 RX ORDER — HYDRALAZINE HCL 50 MG
10 TABLET ORAL ONCE
Refills: 0 | Status: COMPLETED | OUTPATIENT
Start: 2024-04-06 | End: 2024-04-06

## 2024-04-06 RX ORDER — METOCLOPRAMIDE HCL 10 MG
10 TABLET ORAL THREE TIMES A DAY
Refills: 0 | Status: DISCONTINUED | OUTPATIENT
Start: 2024-04-06 | End: 2024-04-08

## 2024-04-06 RX ADMIN — SODIUM CHLORIDE 100 MILLILITER(S): 9 INJECTION, SOLUTION INTRAVENOUS at 03:00

## 2024-04-06 RX ADMIN — PIPERACILLIN AND TAZOBACTAM 25 GRAM(S): 4; .5 INJECTION, POWDER, LYOPHILIZED, FOR SOLUTION INTRAVENOUS at 05:13

## 2024-04-06 RX ADMIN — ONDANSETRON 4 MILLIGRAM(S): 8 TABLET, FILM COATED ORAL at 19:43

## 2024-04-06 RX ADMIN — ONDANSETRON 4 MILLIGRAM(S): 8 TABLET, FILM COATED ORAL at 02:14

## 2024-04-06 RX ADMIN — HYDROMORPHONE HYDROCHLORIDE 1 MILLIGRAM(S): 2 INJECTION INTRAMUSCULAR; INTRAVENOUS; SUBCUTANEOUS at 12:22

## 2024-04-06 RX ADMIN — PIPERACILLIN AND TAZOBACTAM 25 GRAM(S): 4; .5 INJECTION, POWDER, LYOPHILIZED, FOR SOLUTION INTRAVENOUS at 21:33

## 2024-04-06 RX ADMIN — HYDROMORPHONE HYDROCHLORIDE 1 MILLIGRAM(S): 2 INJECTION INTRAMUSCULAR; INTRAVENOUS; SUBCUTANEOUS at 22:30

## 2024-04-06 RX ADMIN — PIPERACILLIN AND TAZOBACTAM 25 GRAM(S): 4; .5 INJECTION, POWDER, LYOPHILIZED, FOR SOLUTION INTRAVENOUS at 13:07

## 2024-04-06 RX ADMIN — Medication 10 MILLIGRAM(S): at 02:56

## 2024-04-06 RX ADMIN — Medication 1000 MILLIGRAM(S): at 11:22

## 2024-04-06 RX ADMIN — HYDROMORPHONE HYDROCHLORIDE 1 MILLIGRAM(S): 2 INJECTION INTRAMUSCULAR; INTRAVENOUS; SUBCUTANEOUS at 03:26

## 2024-04-06 RX ADMIN — Medication 10 MILLIGRAM(S): at 21:33

## 2024-04-06 RX ADMIN — HYDROMORPHONE HYDROCHLORIDE 1 MILLIGRAM(S): 2 INJECTION INTRAMUSCULAR; INTRAVENOUS; SUBCUTANEOUS at 21:33

## 2024-04-06 RX ADMIN — Medication 10 MILLIGRAM(S): at 09:36

## 2024-04-06 RX ADMIN — HYDROMORPHONE HYDROCHLORIDE 1 MILLIGRAM(S): 2 INJECTION INTRAMUSCULAR; INTRAVENOUS; SUBCUTANEOUS at 02:56

## 2024-04-06 RX ADMIN — Medication 400 MILLIGRAM(S): at 09:36

## 2024-04-06 RX ADMIN — HYDROMORPHONE HYDROCHLORIDE 1 MILLIGRAM(S): 2 INJECTION INTRAMUSCULAR; INTRAVENOUS; SUBCUTANEOUS at 07:04

## 2024-04-06 RX ADMIN — PANTOPRAZOLE SODIUM 40 MILLIGRAM(S): 20 TABLET, DELAYED RELEASE ORAL at 12:21

## 2024-04-06 RX ADMIN — HYDROMORPHONE HYDROCHLORIDE 1 MILLIGRAM(S): 2 INJECTION INTRAMUSCULAR; INTRAVENOUS; SUBCUTANEOUS at 16:40

## 2024-04-06 NOTE — PROGRESS NOTE ADULT - SUBJECTIVE AND OBJECTIVE BOX
[facility] Division of Hospital Medicine    Chief Complaint:      SUBJECTIVE / OVERNIGHT EVENTS:    Patient seen and examined at bedside, no acute events overnight, patient denies any new complaints. Continues to endorse dysphagia, inability to tolerate PO, seen again by GI with recommendation to continue trial of CLD, will add Reglan IV / Patient continued on Erythromycin for motility as is however difficulty tolerating PO thus likely not receiving effect. Will continue to monitor, if able to tolerate PO may likely be DC'd with further outpatient follow up with GI.     MEDICATIONS  (STANDING):  amLODIPine   Tablet 5 milliGRAM(s) Oral daily  erythromycin     enteric coated 250 milliGRAM(s) Oral two times a day  lactated ringers. 1000 milliLiter(s) (100 mL/Hr) IV Continuous <Continuous>  onabotulinumtoxinA Injectable 10 Unit(s) IntraMuscular once  pantoprazole  Injectable 40 milliGRAM(s) IV Push daily  piperacillin/tazobactam IVPB.. 3.375 Gram(s) IV Intermittent every 8 hours    MEDICATIONS  (PRN):  acetaminophen     Tablet .. 650 milliGRAM(s) Oral every 6 hours PRN Temp greater or equal to 38C (100.4F), Mild Pain (1 - 3)  aluminum hydroxide/magnesium hydroxide/simethicone Suspension 30 milliLiter(s) Oral every 4 hours PRN Dyspepsia  HYDROmorphone  Injectable 1 milliGRAM(s) IV Push every 4 hours PRN Severe Pain (7 - 10)  melatonin 3 milliGRAM(s) Oral at bedtime PRN Insomnia  ondansetron Injectable 4 milliGRAM(s) IV Push every 8 hours PRN Nausea and/or Vomiting        I&O's Summary      PHYSICAL EXAM:  Vital Signs Last 24 Hrs  T(C): 37 (06 Apr 2024 11:43), Max: 37.9 (05 Apr 2024 18:52)  T(F): 98.6 (06 Apr 2024 11:43), Max: 100.2 (05 Apr 2024 18:52)  HR: 93 (06 Apr 2024 11:43) (85 - 104)  BP: 152/80 (06 Apr 2024 11:43) (105/88 - 181/93)  BP(mean): 82 (05 Apr 2024 21:45) (82 - 94)  RR: 18 (06 Apr 2024 11:43) (13 - 21)  SpO2: 96% (06 Apr 2024 11:43) (94% - 100%)    Parameters below as of 06 Apr 2024 04:41  Patient On (Oxygen Delivery Method): room air      GENERAL: NAD  CHEST/LUNG: Clear to ausculation bilaterally  HEART: Regular rate and rhythm; S1 S2  ABDOMEN: Soft, Nontender, ; Bowel sounds present  EXTREMITIES: no edema   NERVOUS SYSTEM:  Alert & Oriented X3,  Motor Strength 5/5 B/L upper and lower extremities  PSYCH: normal mood, appropriate response.    LABS:                        11.0   13.85 )-----------( 287      ( 06 Apr 2024 04:54 )             35.1     04-06    138  |  101  |  16.8  ----------------------------<  147<H>  3.9   |  22.0  |  0.64    Ca    9.0      06 Apr 2024 04:54  Mg     1.9     04-06    TPro  8.9<H>  /  Alb  4.8  /  TBili  0.6  /  DBili  x   /  AST  30  /  ALT  51<H>  /  AlkPhos  160<H>  04-04    PT/INR - ( 06 Apr 2024 04:54 )   PT: 13.0 sec;   INR: 1.18 ratio               Urinalysis Basic - ( 06 Apr 2024 04:54 )    Color: x / Appearance: x / SG: x / pH: x  Gluc: 147 mg/dL / Ketone: x  / Bili: x / Urobili: x   Blood: x / Protein: x / Nitrite: x   Leuk Esterase: x / RBC: x / WBC x   Sq Epi: x / Non Sq Epi: x / Bacteria: x        Culture - Blood (collected 04 Apr 2024 19:30)  Source: .Blood Blood  Preliminary Report (06 Apr 2024 02:03):    No growth at 24 hours    Culture - Blood (collected 04 Apr 2024 19:25)  Source: .Blood Blood  Preliminary Report (06 Apr 2024 02:03):    No growth at 24 hours    Culture - Urine (collected 04 Apr 2024 16:50)  Source: Clean Catch Clean Catch (Midstream)  Final Report (05 Apr 2024 22:24):    No growth      CAPILLARY BLOOD GLUCOSE            RADIOLOGY & ADDITIONAL TESTS:  Results Reviewed:   Imaging Personally Reviewed:  Electrocardiogram Personally Reviewed:

## 2024-04-06 NOTE — PROGRESS NOTE ADULT - SUBJECTIVE AND OBJECTIVE BOX
Patient is a 58y old  Male who presents with a chief complaint of     HPI:  Patient seen and examined before midnight.       59 y/o male with PMH of esophageal cancer s/p esophagectomy, s/p Pre Pyloric Botox Injection and Balloon Dilation, gastroparesis, HTN came to the ED complaining of nausea, vomiting and epigastric abdominal pain x 3 days Prior to admission. Patient continues to complain of nausea and vomiting.  He is status post EGD with Botox injection and dilation of the pylorus.  No abdominal pain no fevers    REVIEW OF SYSTEMS:  Constitutional: No fever, weight loss or fatigue  ENMT:  No difficulty hearing, tinnitus, vertigo; No sinus or throat pain  Respiratory: No cough, wheezing, chills or hemoptysis  Cardiovascular: No chest pain, palpitations, dizziness or leg swelling  Gastrointestinal: No abdominal or epigastric pain. + nausea, vomiting ; No diarrhea or constipation. No melena or hematochezia.  Skin: No itching, burning, rashes or lesions   Musculoskeletal: No joint pain or swelling; No muscle, back or extremity pain    PAST MEDICAL & SURGICAL HISTORY:  Hypertension      Esophageal cancer      Gastroparesis      S/P cholecystectomy      H/O esophagectomy          FAMILY HISTORY:  FH: HTN (hypertension) (Father)        SOCIAL HISTORY:  Smoking Status: [ ] Current, [ ] Former, [ ] Never  Pack Years:  [  ] EtOH  [  ] IVDA    MEDICATIONS:  MEDICATIONS  (STANDING):  amLODIPine   Tablet 5 milliGRAM(s) Oral daily  erythromycin     enteric coated 250 milliGRAM(s) Oral two times a day  lactated ringers. 1000 milliLiter(s) (100 mL/Hr) IV Continuous <Continuous>  onabotulinumtoxinA Injectable 10 Unit(s) IntraMuscular once  pantoprazole  Injectable 40 milliGRAM(s) IV Push daily  piperacillin/tazobactam IVPB.. 3.375 Gram(s) IV Intermittent every 8 hours    MEDICATIONS  (PRN):  acetaminophen     Tablet .. 650 milliGRAM(s) Oral every 6 hours PRN Temp greater or equal to 38C (100.4F), Mild Pain (1 - 3)  aluminum hydroxide/magnesium hydroxide/simethicone Suspension 30 milliLiter(s) Oral every 4 hours PRN Dyspepsia  HYDROmorphone  Injectable 1 milliGRAM(s) IV Push every 4 hours PRN Severe Pain (7 - 10)  melatonin 3 milliGRAM(s) Oral at bedtime PRN Insomnia  ondansetron Injectable 4 milliGRAM(s) IV Push every 8 hours PRN Nausea and/or Vomiting      Allergies    No Known Allergies    Intolerances        Vital Signs Last 24 Hrs  T(C): 37 (06 Apr 2024 11:43), Max: 37.9 (05 Apr 2024 18:52)  T(F): 98.6 (06 Apr 2024 11:43), Max: 100.2 (05 Apr 2024 18:52)  HR: 93 (06 Apr 2024 11:43) (85 - 104)  BP: 152/80 (06 Apr 2024 11:43) (105/88 - 181/93)  BP(mean): 82 (05 Apr 2024 21:45) (82 - 94)  RR: 18 (06 Apr 2024 11:43) (13 - 21)  SpO2: 96% (06 Apr 2024 11:43) (94% - 100%)    Parameters below as of 06 Apr 2024 04:41  Patient On (Oxygen Delivery Method): room air            PHYSICAL EXAM:    General: ; in no acute distress  HEENT: MMM, conjunctiva and sclera clear  H-RRR  L-CTA  Gastrointestinal: Soft, non-tender non-distended; Normal bowel sounds; No rebound or guarding  Extremities: Normal range of motion, No clubbing, cyanosis or edema  Neurological: Alert and oriented x3  Skin: Warm and dry. No obvious rash      LABS:                        11.0   13.85 )-----------( 287      ( 06 Apr 2024 04:54 )             35.1     06 Apr 2024 04:54    138    |  101    |  16.8   ----------------------------<  147    3.9     |  22.0   |  0.64     Ca    9.0        06 Apr 2024 04:54  Mg     1.9       06 Apr 2024 04:54    TPro  8.9    /  Alb  4.8    /  TBili  0.6    /  DBili  x      /  AST  30     /  ALT  51     /  AlkPhos  160    / Amylase x      /Lipase 11     04 Apr 2024 15:00              RADIOLOGY & ADDITIONAL STUDIES:     < from: Xray Chest 2 Views PA/Lat (04.04.24 @ 18:19) >    Normal heart mediastinum. No consolidation or effusion. Mild pleural   thickening right costophrenic angle similar to prior. Old fracture right   seventh rib.    IMPRESSION: No acute finding    --- End of Report ---      < end of copied text >

## 2024-04-06 NOTE — PROGRESS NOTE ADULT - ASSESSMENT
59 y/o male with PMH of esophageal cancer s/p esophagectomy, s/p Pre Pyloric Botox Injection and Balloon Dilation, gastroparesis, HTN came to the ED complaining of nausea, vomiting and epigastric abdominal pain x 3 days. As per significant other  at bed side, a week ago he was complaining of fatigue,  3 days ago started coughing productive of yellow sputum, fever (102) and congestion; seen by his PCP who prescribed z-linn however, he has not been able to tolerate PO due to vomiting. In the ED, WBC: 18.34 with left shift; CT C/A/P:  Tree-in-bud nodularity in the right middle and lower lobes concerning for aspiration PNA. Now s/p EGD with dilation / botox 4/5 with GI, remains unable to tolerate PO due to dysphagia.      #Right middle/lower lobe PNA likely due to aspiration   c/w zosyn     #Epigastric abdominal pain with nausea/vomiting likely due to underlying gastroparesis   Zofran PRN   Continue Erythromycin 250mg bid  Add Reglan 10 mg IV TID given inability to tolerate PO    Omeprazole 40mg   IVF    #Hx of esophageal ca   S/p esophagectomy   GI consulted, s/p EGD / dilatation / Botox injection 4/5, advanced to CLD, however still unable to tolerate PO, add Reglan as above     #HTN   Amlodipine 5mg       Plan of care discussed with patient, PA and nursing staff

## 2024-04-06 NOTE — CHART NOTE - NSCHARTNOTEFT_GEN_A_CORE
RN called to report pt with elevated bp 176/91 unable at this time to swallow medications due to procedure last evening  Pt seen at bedside no acute distress, states he feels like as soon as he swallows it comes back up.    ICU Vital Signs Last 24 Hrs  T(C): 36.7 (06 Apr 2024 04:41), Max: 37.9 (05 Apr 2024 18:52)  T(F): 98 (06 Apr 2024 04:41), Max: 100.2 (05 Apr 2024 18:52)  HR: 91 (06 Apr 2024 07:06) (83 - 104)  BP: 176/84 (06 Apr 2024 07:06) (105/88 - 181/93)  BP(mean): 82 (05 Apr 2024 21:45) (82 - 94)  ABP: --  ABP(mean): --  RR: 18 (06 Apr 2024 04:41) (13 - 21)  SpO2: 96% (06 Apr 2024 04:41) (94% - 100%)    O2 Parameters below as of 06 Apr 2024 04:41  Patient On (Oxygen Delivery Method): room air      GENERAL: NAD, lying in bed comfortably  speaking in full sentences with quiet tone         Plan:  IV Tylenol for pain  IV hydralazine for elevated BP  IV raglan for nausea   Discussed with RN, Patient and attending GI following

## 2024-04-07 LAB
ALBUMIN SERPL ELPH-MCNC: 4.2 G/DL — SIGNIFICANT CHANGE UP (ref 3.3–5.2)
ALP SERPL-CCNC: 122 U/L — HIGH (ref 40–120)
ALT FLD-CCNC: 27 U/L — SIGNIFICANT CHANGE UP
ANION GAP SERPL CALC-SCNC: 18 MMOL/L — HIGH (ref 5–17)
AST SERPL-CCNC: 20 U/L — SIGNIFICANT CHANGE UP
BILIRUB DIRECT SERPL-MCNC: 0.1 MG/DL — SIGNIFICANT CHANGE UP (ref 0–0.3)
BILIRUB INDIRECT FLD-MCNC: 0.3 MG/DL — SIGNIFICANT CHANGE UP (ref 0.2–1)
BILIRUB SERPL-MCNC: 0.4 MG/DL — SIGNIFICANT CHANGE UP (ref 0.4–2)
BUN SERPL-MCNC: 13.7 MG/DL — SIGNIFICANT CHANGE UP (ref 8–20)
CALCIUM SERPL-MCNC: 8.9 MG/DL — SIGNIFICANT CHANGE UP (ref 8.4–10.5)
CHLORIDE SERPL-SCNC: 97 MMOL/L — SIGNIFICANT CHANGE UP (ref 96–108)
CO2 SERPL-SCNC: 21 MMOL/L — LOW (ref 22–29)
CREAT SERPL-MCNC: 0.57 MG/DL — SIGNIFICANT CHANGE UP (ref 0.5–1.3)
EGFR: 115 ML/MIN/1.73M2 — SIGNIFICANT CHANGE UP
GLUCOSE SERPL-MCNC: 118 MG/DL — HIGH (ref 70–99)
HCT VFR BLD CALC: 35.1 % — LOW (ref 39–50)
HGB BLD-MCNC: 11.3 G/DL — LOW (ref 13–17)
MCHC RBC-ENTMCNC: 22.4 PG — LOW (ref 27–34)
MCHC RBC-ENTMCNC: 32.2 GM/DL — SIGNIFICANT CHANGE UP (ref 32–36)
MCV RBC AUTO: 69.5 FL — LOW (ref 80–100)
PLATELET # BLD AUTO: 294 K/UL — SIGNIFICANT CHANGE UP (ref 150–400)
POTASSIUM SERPL-MCNC: 3.5 MMOL/L — SIGNIFICANT CHANGE UP (ref 3.5–5.3)
POTASSIUM SERPL-SCNC: 3.5 MMOL/L — SIGNIFICANT CHANGE UP (ref 3.5–5.3)
PROT SERPL-MCNC: 7.8 G/DL — SIGNIFICANT CHANGE UP (ref 6.6–8.7)
RBC # BLD: 5.05 M/UL — SIGNIFICANT CHANGE UP (ref 4.2–5.8)
RBC # FLD: 17.4 % — HIGH (ref 10.3–14.5)
SODIUM SERPL-SCNC: 136 MMOL/L — SIGNIFICANT CHANGE UP (ref 135–145)
WBC # BLD: 13.94 K/UL — HIGH (ref 3.8–10.5)
WBC # FLD AUTO: 13.94 K/UL — HIGH (ref 3.8–10.5)

## 2024-04-07 PROCEDURE — 99232 SBSQ HOSP IP/OBS MODERATE 35: CPT

## 2024-04-07 RX ORDER — HYDROMORPHONE HYDROCHLORIDE 2 MG/ML
0.5 INJECTION INTRAMUSCULAR; INTRAVENOUS; SUBCUTANEOUS ONCE
Refills: 0 | Status: DISCONTINUED | OUTPATIENT
Start: 2024-04-07 | End: 2024-04-07

## 2024-04-07 RX ADMIN — PIPERACILLIN AND TAZOBACTAM 25 GRAM(S): 4; .5 INJECTION, POWDER, LYOPHILIZED, FOR SOLUTION INTRAVENOUS at 14:15

## 2024-04-07 RX ADMIN — HYDROMORPHONE HYDROCHLORIDE 1 MILLIGRAM(S): 2 INJECTION INTRAMUSCULAR; INTRAVENOUS; SUBCUTANEOUS at 12:23

## 2024-04-07 RX ADMIN — HYDROMORPHONE HYDROCHLORIDE 1 MILLIGRAM(S): 2 INJECTION INTRAMUSCULAR; INTRAVENOUS; SUBCUTANEOUS at 07:35

## 2024-04-07 RX ADMIN — HYDROMORPHONE HYDROCHLORIDE 1 MILLIGRAM(S): 2 INJECTION INTRAMUSCULAR; INTRAVENOUS; SUBCUTANEOUS at 03:08

## 2024-04-07 RX ADMIN — Medication 10 MILLIGRAM(S): at 22:11

## 2024-04-07 RX ADMIN — PANTOPRAZOLE SODIUM 40 MILLIGRAM(S): 20 TABLET, DELAYED RELEASE ORAL at 12:01

## 2024-04-07 RX ADMIN — Medication 10 MILLIGRAM(S): at 14:15

## 2024-04-07 RX ADMIN — SODIUM CHLORIDE 100 MILLILITER(S): 9 INJECTION, SOLUTION INTRAVENOUS at 05:06

## 2024-04-07 RX ADMIN — HYDROMORPHONE HYDROCHLORIDE 1 MILLIGRAM(S): 2 INJECTION INTRAMUSCULAR; INTRAVENOUS; SUBCUTANEOUS at 07:50

## 2024-04-07 RX ADMIN — HYDROMORPHONE HYDROCHLORIDE 0.5 MILLIGRAM(S): 2 INJECTION INTRAMUSCULAR; INTRAVENOUS; SUBCUTANEOUS at 06:00

## 2024-04-07 RX ADMIN — HYDROMORPHONE HYDROCHLORIDE 1 MILLIGRAM(S): 2 INJECTION INTRAMUSCULAR; INTRAVENOUS; SUBCUTANEOUS at 21:50

## 2024-04-07 RX ADMIN — Medication 10 MILLIGRAM(S): at 05:04

## 2024-04-07 RX ADMIN — PIPERACILLIN AND TAZOBACTAM 25 GRAM(S): 4; .5 INJECTION, POWDER, LYOPHILIZED, FOR SOLUTION INTRAVENOUS at 22:11

## 2024-04-07 RX ADMIN — SODIUM CHLORIDE 100 MILLILITER(S): 9 INJECTION, SOLUTION INTRAVENOUS at 16:43

## 2024-04-07 RX ADMIN — HYDROMORPHONE HYDROCHLORIDE 1 MILLIGRAM(S): 2 INJECTION INTRAMUSCULAR; INTRAVENOUS; SUBCUTANEOUS at 20:48

## 2024-04-07 RX ADMIN — HYDROMORPHONE HYDROCHLORIDE 1 MILLIGRAM(S): 2 INJECTION INTRAMUSCULAR; INTRAVENOUS; SUBCUTANEOUS at 17:00

## 2024-04-07 RX ADMIN — HYDROMORPHONE HYDROCHLORIDE 0.5 MILLIGRAM(S): 2 INJECTION INTRAMUSCULAR; INTRAVENOUS; SUBCUTANEOUS at 05:04

## 2024-04-07 RX ADMIN — PIPERACILLIN AND TAZOBACTAM 25 GRAM(S): 4; .5 INJECTION, POWDER, LYOPHILIZED, FOR SOLUTION INTRAVENOUS at 05:05

## 2024-04-07 RX ADMIN — HYDROMORPHONE HYDROCHLORIDE 1 MILLIGRAM(S): 2 INJECTION INTRAMUSCULAR; INTRAVENOUS; SUBCUTANEOUS at 02:08

## 2024-04-07 RX ADMIN — HYDROMORPHONE HYDROCHLORIDE 1 MILLIGRAM(S): 2 INJECTION INTRAMUSCULAR; INTRAVENOUS; SUBCUTANEOUS at 16:43

## 2024-04-07 RX ADMIN — ONDANSETRON 4 MILLIGRAM(S): 8 TABLET, FILM COATED ORAL at 12:01

## 2024-04-07 RX ADMIN — HYDROMORPHONE HYDROCHLORIDE 1 MILLIGRAM(S): 2 INJECTION INTRAMUSCULAR; INTRAVENOUS; SUBCUTANEOUS at 12:01

## 2024-04-07 NOTE — PROGRESS NOTE ADULT - SUBJECTIVE AND OBJECTIVE BOX
[facility] Division of Hospital Medicine    Chief Complaint:      SUBJECTIVE / OVERNIGHT EVENTS:    Patient seen and examined at bedside, no acute events overnight, patient denies any new complaints. continues to endorse inability to tolerate PO intake, will continue to attempt CLD, continues on IV reglan for improved motility due to inability to tolerate PO / erythromycin     MEDICATIONS  (STANDING):  amLODIPine   Tablet 5 milliGRAM(s) Oral daily  erythromycin     enteric coated 250 milliGRAM(s) Oral two times a day  lactated ringers. 1000 milliLiter(s) (100 mL/Hr) IV Continuous <Continuous>  metoclopramide Injectable 10 milliGRAM(s) IV Push three times a day  onabotulinumtoxinA Injectable 10 Unit(s) IntraMuscular once  pantoprazole  Injectable 40 milliGRAM(s) IV Push daily  piperacillin/tazobactam IVPB.. 3.375 Gram(s) IV Intermittent every 8 hours    MEDICATIONS  (PRN):  acetaminophen     Tablet .. 650 milliGRAM(s) Oral every 6 hours PRN Temp greater or equal to 38C (100.4F), Mild Pain (1 - 3)  aluminum hydroxide/magnesium hydroxide/simethicone Suspension 30 milliLiter(s) Oral every 4 hours PRN Dyspepsia  HYDROmorphone  Injectable 1 milliGRAM(s) IV Push every 4 hours PRN Severe Pain (7 - 10)  melatonin 3 milliGRAM(s) Oral at bedtime PRN Insomnia  ondansetron Injectable 4 milliGRAM(s) IV Push every 8 hours PRN Nausea and/or Vomiting        I&O's Summary    06 Apr 2024 07:01  -  07 Apr 2024 07:00  --------------------------------------------------------  IN: 800 mL / OUT: 0 mL / NET: 800 mL        PHYSICAL EXAM:  Vital Signs Last 24 Hrs  T(C): 36.6 (07 Apr 2024 04:53), Max: 37.6 (06 Apr 2024 17:05)  T(F): 97.8 (07 Apr 2024 04:53), Max: 99.6 (06 Apr 2024 17:05)  HR: 80 (07 Apr 2024 06:17) (78 - 93)  BP: 150/82 (07 Apr 2024 06:17) (142/86 - 181/95)  BP(mean): --  RR: 20 (07 Apr 2024 04:53) (16 - 20)  SpO2: 93% (07 Apr 2024 04:53) (93% - 98%)    Parameters below as of 07 Apr 2024 04:53  Patient On (Oxygen Delivery Method): room air      GENERAL: NAD  CHEST/LUNG: Clear to ausculation bilaterally  HEART: Regular rate and rhythm; S1 S2  ABDOMEN: Soft, Nontender, ; Bowel sounds present  EXTREMITIES: no edema   NERVOUS SYSTEM:  Alert & Oriented X3,  Motor Strength 5/5 B/L upper and lower extremities  PSYCH: normal mood, appropriate response.    LABS:                        11.3   13.94 )-----------( 294      ( 07 Apr 2024 04:40 )             35.1     04-07    136  |  97  |  13.7  ----------------------------<  118<H>  3.5   |  21.0<L>  |  0.57    Ca    8.9      07 Apr 2024 04:40  Mg     1.9     04-06    TPro  7.8  /  Alb  4.2  /  TBili  0.4  /  DBili  0.1  /  AST  20  /  ALT  27  /  AlkPhos  122<H>  04-07    PT/INR - ( 06 Apr 2024 04:54 )   PT: 13.0 sec;   INR: 1.18 ratio               Urinalysis Basic - ( 07 Apr 2024 04:40 )    Color: x / Appearance: x / SG: x / pH: x  Gluc: 118 mg/dL / Ketone: x  / Bili: x / Urobili: x   Blood: x / Protein: x / Nitrite: x   Leuk Esterase: x / RBC: x / WBC x   Sq Epi: x / Non Sq Epi: x / Bacteria: x        Culture - Blood (collected 04 Apr 2024 19:30)  Source: .Blood Blood  Preliminary Report (07 Apr 2024 02:02):    No growth at 48 Hours    Culture - Blood (collected 04 Apr 2024 19:25)  Source: .Blood Blood  Preliminary Report (07 Apr 2024 02:02):    No growth at 48 Hours    Culture - Urine (collected 04 Apr 2024 16:50)  Source: Clean Catch Clean Catch (Midstream)  Final Report (05 Apr 2024 22:24):    No growth      CAPILLARY BLOOD GLUCOSE            RADIOLOGY & ADDITIONAL TESTS:  Results Reviewed:   Imaging Personally Reviewed:  Electrocardiogram Personally Reviewed:

## 2024-04-07 NOTE — PROGRESS NOTE ADULT - NS ATTEST RISK PROBLEM GEN_ALL_CORE FT
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  Now status post Botox and pylorus dilation.  Continue clear liquids  Continue Zofran pantoprazole and Reglan.
Nausea vomiting gastroparesis, pneumonia  History of esophageal cancer with a gastric pull-through, gastroparesis  Patient with continued nausea and vomiting.  Status post pyloric dilation and injection with Botox  Continue clear liquids for now  My interpretation–chest x-ray look clear however CAT scan has some opacity in the right middle lobe

## 2024-04-07 NOTE — PROGRESS NOTE ADULT - SUBJECTIVE AND OBJECTIVE BOX
Patient is a 58y old  Male who presents with a chief complaint of Nausea vomiting (06 Apr 2024 13:15)      HPI:  Patient seen and examined before midnight.       59 y/o male with PMH of esophageal cancer s/p esophagectomy, s/p Pre Pyloric Botox Injection and Balloon Dilation, gastroparesis, HTN came to the ED complaining of nausea, vomiting and epigastric abdominal pain .Patient is again status post Botox and pyloric dilation done on 4/5.  Still complaining of nausea and vomiting.  Having difficulty tolerating clear liquids despite Reglan and PPI        REVIEW OF SYSTEMS:  Constitutional: No fever, weight loss or fatigue  ENMT:  No difficulty hearing, tinnitus, vertigo; No sinus or throat pain  Respiratory: No cough, wheezing, chills or hemoptysis  Cardiovascular: No chest pain, palpitations, dizziness or leg swelling  Gastrointestinal: No abdominal or epigastric pain. +nausea, vomiting or hematemesis; No diarrhea or constipation. No melena or hematochezia.  Skin: No itching, burning, rashes or lesions   Musculoskeletal: No joint pain or swelling; No muscle, back or extremity pain    PAST MEDICAL & SURGICAL HISTORY:  Hypertension      Esophageal cancer      Gastroparesis      S/P cholecystectomy      H/O esophagectomy          FAMILY HISTORY:  FH: HTN (hypertension) (Father)        SOCIAL HISTORY:  Smoking Status: [ ] Current, [ ] Former, [ ] Never  Pack Years:  [  ] EtOH  [  ] IVDA    MEDICATIONS:  MEDICATIONS  (STANDING):  amLODIPine   Tablet 5 milliGRAM(s) Oral daily  erythromycin     enteric coated 250 milliGRAM(s) Oral two times a day  lactated ringers. 1000 milliLiter(s) (100 mL/Hr) IV Continuous <Continuous>  metoclopramide Injectable 10 milliGRAM(s) IV Push three times a day  onabotulinumtoxinA Injectable 10 Unit(s) IntraMuscular once  pantoprazole  Injectable 40 milliGRAM(s) IV Push daily  piperacillin/tazobactam IVPB.. 3.375 Gram(s) IV Intermittent every 8 hours    MEDICATIONS  (PRN):  acetaminophen     Tablet .. 650 milliGRAM(s) Oral every 6 hours PRN Temp greater or equal to 38C (100.4F), Mild Pain (1 - 3)  aluminum hydroxide/magnesium hydroxide/simethicone Suspension 30 milliLiter(s) Oral every 4 hours PRN Dyspepsia  HYDROmorphone  Injectable 1 milliGRAM(s) IV Push every 4 hours PRN Severe Pain (7 - 10)  melatonin 3 milliGRAM(s) Oral at bedtime PRN Insomnia  ondansetron Injectable 4 milliGRAM(s) IV Push every 8 hours PRN Nausea and/or Vomiting      Allergies    No Known Allergies    Intolerances        Vital Signs Last 24 Hrs  T(C): 36.6 (07 Apr 2024 04:53), Max: 37.6 (06 Apr 2024 17:05)  T(F): 97.8 (07 Apr 2024 04:53), Max: 99.6 (06 Apr 2024 17:05)  HR: 80 (07 Apr 2024 06:17) (78 - 92)  BP: 150/82 (07 Apr 2024 06:17) (142/86 - 181/95)  BP(mean): --  RR: 20 (07 Apr 2024 04:53) (16 - 20)  SpO2: 93% (07 Apr 2024 04:53) (93% - 98%)    Parameters below as of 07 Apr 2024 04:53  Patient On (Oxygen Delivery Method): room air        04-06 @ 07:01  -  04-07 @ 07:00  --------------------------------------------------------  IN: 800 mL / OUT: 0 mL / NET: 800 mL          PHYSICAL EXAM:    General: ; in no acute distress  HEENT: MMM, conjunctiva and sclera clear  H-RRR  L-CTA  Gastrointestinal: Soft, non-tender non-distended; Normal bowel sounds; No rebound or guarding  Extremities: Normal range of motion, No clubbing, cyanosis or edema  Neurological: Alert and oriented x3  Skin: Warm and dry. No obvious rash      LABS:                        11.3   13.94 )-----------( 294      ( 07 Apr 2024 04:40 )             35.1     07 Apr 2024 04:40    136    |  97     |  13.7   ----------------------------<  118    3.5     |  21.0   |  0.57     Ca    8.9        07 Apr 2024 04:40  Mg     1.9       06 Apr 2024 04:54    TPro  7.8    /  Alb  4.2    /  TBili  0.4    /  DBili  0.1    /  AST  20     /  ALT  27     /  AlkPhos  122    / Amylase x      /Lipase x      07 Apr 2024 04:40              RADIOLOGY & ADDITIONAL STUDIES:     < from: CT Chest w/ IV Cont (04.04.24 @ 18:06) >  MPRESSION:  1.  Tree-in-bud nodularity in the right middle and lower lobes may be due   to infection or inflammatory small airways disease with consideration for   aspiration in light of the patient's history.  2.  No acute findings within the abdomen or pelvis.        < end of copied text >

## 2024-04-07 NOTE — PROGRESS NOTE ADULT - PROBLEM SELECTOR PLAN 1
History of esophageal cancer with a gastric pull-through, gastroparesis  Patient with continued nausea and vomiting.  Status post pyloric dilation and injection with Botox  Continue clear liquids for now  Continue PPI and Zofran.  May need to add Reglan  On antibiotics for possible aspiration–CAT scan of the chest that showed some tree-in-bud opacity in the right middle lobe
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  Now status post Botox and pylorus dilation.  Continue clear liquids  Continue Zofran pantoprazole and Reglan.

## 2024-04-08 LAB
ANION GAP SERPL CALC-SCNC: 14 MMOL/L — SIGNIFICANT CHANGE UP (ref 5–17)
BUN SERPL-MCNC: 14.7 MG/DL — SIGNIFICANT CHANGE UP (ref 8–20)
CALCIUM SERPL-MCNC: 8.4 MG/DL — SIGNIFICANT CHANGE UP (ref 8.4–10.5)
CHLORIDE SERPL-SCNC: 97 MMOL/L — SIGNIFICANT CHANGE UP (ref 96–108)
CO2 SERPL-SCNC: 24 MMOL/L — SIGNIFICANT CHANGE UP (ref 22–29)
CREAT SERPL-MCNC: 0.56 MG/DL — SIGNIFICANT CHANGE UP (ref 0.5–1.3)
EGFR: 116 ML/MIN/1.73M2 — SIGNIFICANT CHANGE UP
GLUCOSE SERPL-MCNC: 109 MG/DL — HIGH (ref 70–99)
HCT VFR BLD CALC: 33.2 % — LOW (ref 39–50)
HGB BLD-MCNC: 10.7 G/DL — LOW (ref 13–17)
MCHC RBC-ENTMCNC: 22.4 PG — LOW (ref 27–34)
MCHC RBC-ENTMCNC: 32.2 GM/DL — SIGNIFICANT CHANGE UP (ref 32–36)
MCV RBC AUTO: 69.5 FL — LOW (ref 80–100)
PLATELET # BLD AUTO: 264 K/UL — SIGNIFICANT CHANGE UP (ref 150–400)
POTASSIUM SERPL-MCNC: 3.4 MMOL/L — LOW (ref 3.5–5.3)
POTASSIUM SERPL-SCNC: 3.4 MMOL/L — LOW (ref 3.5–5.3)
RBC # BLD: 4.78 M/UL — SIGNIFICANT CHANGE UP (ref 4.2–5.8)
RBC # FLD: 17.2 % — HIGH (ref 10.3–14.5)
SODIUM SERPL-SCNC: 135 MMOL/L — SIGNIFICANT CHANGE UP (ref 135–145)
WBC # BLD: 9.5 K/UL — SIGNIFICANT CHANGE UP (ref 3.8–10.5)
WBC # FLD AUTO: 9.5 K/UL — SIGNIFICANT CHANGE UP (ref 3.8–10.5)

## 2024-04-08 PROCEDURE — 99232 SBSQ HOSP IP/OBS MODERATE 35: CPT

## 2024-04-08 RX ORDER — ERYTHROMYCIN ETHYLSUCCINATE 400 MG
250 TABLET ORAL EVERY 8 HOURS
Refills: 0 | Status: DISCONTINUED | OUTPATIENT
Start: 2024-04-08 | End: 2024-04-09

## 2024-04-08 RX ADMIN — PIPERACILLIN AND TAZOBACTAM 25 GRAM(S): 4; .5 INJECTION, POWDER, LYOPHILIZED, FOR SOLUTION INTRAVENOUS at 05:47

## 2024-04-08 RX ADMIN — HYDROMORPHONE HYDROCHLORIDE 1 MILLIGRAM(S): 2 INJECTION INTRAMUSCULAR; INTRAVENOUS; SUBCUTANEOUS at 02:10

## 2024-04-08 RX ADMIN — PIPERACILLIN AND TAZOBACTAM 25 GRAM(S): 4; .5 INJECTION, POWDER, LYOPHILIZED, FOR SOLUTION INTRAVENOUS at 14:18

## 2024-04-08 RX ADMIN — Medication 250 MILLIGRAM(S): at 21:06

## 2024-04-08 RX ADMIN — HYDROMORPHONE HYDROCHLORIDE 1 MILLIGRAM(S): 2 INJECTION INTRAMUSCULAR; INTRAVENOUS; SUBCUTANEOUS at 21:13

## 2024-04-08 RX ADMIN — PIPERACILLIN AND TAZOBACTAM 25 GRAM(S): 4; .5 INJECTION, POWDER, LYOPHILIZED, FOR SOLUTION INTRAVENOUS at 22:19

## 2024-04-08 RX ADMIN — HYDROMORPHONE HYDROCHLORIDE 1 MILLIGRAM(S): 2 INJECTION INTRAMUSCULAR; INTRAVENOUS; SUBCUTANEOUS at 16:33

## 2024-04-08 RX ADMIN — HYDROMORPHONE HYDROCHLORIDE 1 MILLIGRAM(S): 2 INJECTION INTRAMUSCULAR; INTRAVENOUS; SUBCUTANEOUS at 23:00

## 2024-04-08 RX ADMIN — HYDROMORPHONE HYDROCHLORIDE 1 MILLIGRAM(S): 2 INJECTION INTRAMUSCULAR; INTRAVENOUS; SUBCUTANEOUS at 01:19

## 2024-04-08 RX ADMIN — SODIUM CHLORIDE 100 MILLILITER(S): 9 INJECTION, SOLUTION INTRAVENOUS at 05:47

## 2024-04-08 RX ADMIN — ONDANSETRON 4 MILLIGRAM(S): 8 TABLET, FILM COATED ORAL at 18:38

## 2024-04-08 RX ADMIN — HYDROMORPHONE HYDROCHLORIDE 1 MILLIGRAM(S): 2 INJECTION INTRAMUSCULAR; INTRAVENOUS; SUBCUTANEOUS at 05:46

## 2024-04-08 RX ADMIN — ONDANSETRON 4 MILLIGRAM(S): 8 TABLET, FILM COATED ORAL at 08:23

## 2024-04-08 RX ADMIN — PANTOPRAZOLE SODIUM 40 MILLIGRAM(S): 20 TABLET, DELAYED RELEASE ORAL at 14:17

## 2024-04-08 RX ADMIN — HYDROMORPHONE HYDROCHLORIDE 1 MILLIGRAM(S): 2 INJECTION INTRAMUSCULAR; INTRAVENOUS; SUBCUTANEOUS at 10:17

## 2024-04-08 RX ADMIN — Medication 250 MILLIGRAM(S): at 14:16

## 2024-04-08 RX ADMIN — Medication 10 MILLIGRAM(S): at 05:46

## 2024-04-08 NOTE — PROGRESS NOTE ADULT - SUBJECTIVE AND OBJECTIVE BOX
[facility] Division of Hospital Medicine    Chief Complaint:      SUBJECTIVE / OVERNIGHT EVENTS:    Patient seen and examined at bedside, no acute events overnight, patient denies any new complaints. Per GI recs, started on IV erythromycin 250 q8 in place of Reglan, Reglan held at this time. Patient states he still has pain with swallowing and vomited in AM, but will continue to attempt CLD.     MEDICATIONS  (STANDING):  amLODIPine   Tablet 5 milliGRAM(s) Oral daily  erythromycin   IVPB 250 milliGRAM(s) IV Intermittent every 8 hours  lactated ringers. 1000 milliLiter(s) (100 mL/Hr) IV Continuous <Continuous>  onabotulinumtoxinA Injectable 10 Unit(s) IntraMuscular once  pantoprazole  Injectable 40 milliGRAM(s) IV Push daily  piperacillin/tazobactam IVPB.. 3.375 Gram(s) IV Intermittent every 8 hours    MEDICATIONS  (PRN):  acetaminophen     Tablet .. 650 milliGRAM(s) Oral every 6 hours PRN Temp greater or equal to 38C (100.4F), Mild Pain (1 - 3)  aluminum hydroxide/magnesium hydroxide/simethicone Suspension 30 milliLiter(s) Oral every 4 hours PRN Dyspepsia  HYDROmorphone  Injectable 1 milliGRAM(s) IV Push every 4 hours PRN Severe Pain (7 - 10)  melatonin 3 milliGRAM(s) Oral at bedtime PRN Insomnia  ondansetron Injectable 4 milliGRAM(s) IV Push every 8 hours PRN Nausea and/or Vomiting        I&O's Summary    07 Apr 2024 07:01  -  08 Apr 2024 07:00  --------------------------------------------------------  IN: 575 mL / OUT: 0 mL / NET: 575 mL        PHYSICAL EXAM:  Vital Signs Last 24 Hrs  T(C): 36.8 (08 Apr 2024 11:27), Max: 36.8 (07 Apr 2024 17:24)  T(F): 98.2 (08 Apr 2024 11:27), Max: 98.2 (07 Apr 2024 17:24)  HR: 91 (08 Apr 2024 11:27) (78 - 91)  BP: 154/95 (08 Apr 2024 11:27) (146/94 - 188/98)  BP(mean): --  RR: 18 (08 Apr 2024 11:27) (16 - 18)  SpO2: 94% (08 Apr 2024 11:27) (91% - 96%)    Parameters below as of 08 Apr 2024 11:27  Patient On (Oxygen Delivery Method): room air            CONSTITUTIONAL: NAD, well-developed, well-groomed  ENMT: Moist oral mucosa, no pharyngeal injection or exudates; normal dentition  RESPIRATORY: Normal respiratory effort; lungs are clear to auscultation bilaterally  CARDIOVASCULAR: Regular rate and rhythm, normal S1 and S2, no murmur/rub/gallop; No lower extremity edema; Peripheral pulses are 2+ bilaterally  ABDOMEN: Nontender to palpation, normoactive bowel sounds, no rebound/guarding; No hepatosplenomegaly  MUSCLOSKELETAL:  Normal gait; no clubbing or cyanosis of digits; no joint swelling or tenderness to palpation  PSYCH: A+O to person, place, and time; affect appropriate  NEUROLOGY: CN 2-12 are intact and symmetric; no gross sensory deficits;   SKIN: No rashes; no palpable lesions    LABS:                        10.7   9.50  )-----------( 264      ( 08 Apr 2024 04:39 )             33.2     04-08    135  |  97  |  14.7  ----------------------------<  109<H>  3.4<L>   |  24.0  |  0.56    Ca    8.4      08 Apr 2024 04:39    TPro  7.8  /  Alb  4.2  /  TBili  0.4  /  DBili  0.1  /  AST  20  /  ALT  27  /  AlkPhos  122<H>  04-07          Urinalysis Basic - ( 08 Apr 2024 04:39 )    Color: x / Appearance: x / SG: x / pH: x  Gluc: 109 mg/dL / Ketone: x  / Bili: x / Urobili: x   Blood: x / Protein: x / Nitrite: x   Leuk Esterase: x / RBC: x / WBC x   Sq Epi: x / Non Sq Epi: x / Bacteria: x        CAPILLARY BLOOD GLUCOSE            RADIOLOGY & ADDITIONAL TESTS:  Results Reviewed:   Imaging Personally Reviewed:  Electrocardiogram Personally Reviewed:                                           [facility] Division of Hospital Medicine    Chief Complaint:      SUBJECTIVE / OVERNIGHT EVENTS:    Patient seen and examined at bedside, no acute events overnight, patient denies any new complaints. Per GI recs, started on IV erythromycin 250 q8 in place of Reglan, Reglan held at this time. Patient states he still has pain with swallowing and vomited in AM, but will continue to attempt CLD.     MEDICATIONS  (STANDING):  amLODIPine   Tablet 5 milliGRAM(s) Oral daily  erythromycin   IVPB 250 milliGRAM(s) IV Intermittent every 8 hours  lactated ringers. 1000 milliLiter(s) (100 mL/Hr) IV Continuous <Continuous>  onabotulinumtoxinA Injectable 10 Unit(s) IntraMuscular once  pantoprazole  Injectable 40 milliGRAM(s) IV Push daily  piperacillin/tazobactam IVPB.. 3.375 Gram(s) IV Intermittent every 8 hours    MEDICATIONS  (PRN):  acetaminophen     Tablet .. 650 milliGRAM(s) Oral every 6 hours PRN Temp greater or equal to 38C (100.4F), Mild Pain (1 - 3)  aluminum hydroxide/magnesium hydroxide/simethicone Suspension 30 milliLiter(s) Oral every 4 hours PRN Dyspepsia  HYDROmorphone  Injectable 1 milliGRAM(s) IV Push every 4 hours PRN Severe Pain (7 - 10)  melatonin 3 milliGRAM(s) Oral at bedtime PRN Insomnia  ondansetron Injectable 4 milliGRAM(s) IV Push every 8 hours PRN Nausea and/or Vomiting        I&O's Summary    07 Apr 2024 07:01  -  08 Apr 2024 07:00  --------------------------------------------------------  IN: 575 mL / OUT: 0 mL / NET: 575 mL        PHYSICAL EXAM:  Vital Signs Last 24 Hrs  T(C): 36.8 (08 Apr 2024 11:27), Max: 36.8 (07 Apr 2024 17:24)  T(F): 98.2 (08 Apr 2024 11:27), Max: 98.2 (07 Apr 2024 17:24)  HR: 91 (08 Apr 2024 11:27) (78 - 91)  BP: 154/95 (08 Apr 2024 11:27) (146/94 - 188/98)  BP(mean): --  RR: 18 (08 Apr 2024 11:27) (16 - 18)  SpO2: 94% (08 Apr 2024 11:27) (91% - 96%)    Parameters below as of 08 Apr 2024 11:27  Patient On (Oxygen Delivery Method): room air      GENERAL: NAD  CHEST/LUNG: Clear to ausculation bilaterally  HEART: Regular rate and rhythm; S1 S2  ABDOMEN: Soft, Nontender, ; Bowel sounds present  EXTREMITIES: no edema   NERVOUS SYSTEM:  Alert & Oriented X3,  Motor Strength 5/5 B/L upper and lower extremities  PSYCH: normal mood, appropriate response.    LABS:                        10.7   9.50  )-----------( 264      ( 08 Apr 2024 04:39 )             33.2     04-08    135  |  97  |  14.7  ----------------------------<  109<H>  3.4<L>   |  24.0  |  0.56    Ca    8.4      08 Apr 2024 04:39    TPro  7.8  /  Alb  4.2  /  TBili  0.4  /  DBili  0.1  /  AST  20  /  ALT  27  /  AlkPhos  122<H>  04-07          Urinalysis Basic - ( 08 Apr 2024 04:39 )    Color: x / Appearance: x / SG: x / pH: x  Gluc: 109 mg/dL / Ketone: x  / Bili: x / Urobili: x   Blood: x / Protein: x / Nitrite: x   Leuk Esterase: x / RBC: x / WBC x   Sq Epi: x / Non Sq Epi: x / Bacteria: x        CAPILLARY BLOOD GLUCOSE            RADIOLOGY & ADDITIONAL TESTS:  Results Reviewed:   Imaging Personally Reviewed:  Electrocardiogram Personally Reviewed:

## 2024-04-08 NOTE — PROGRESS NOTE ADULT - SUBJECTIVE AND OBJECTIVE BOX
Chief Complaint:  Patient is a 58y old  Male who presents with a chief complaint of nausea and vomiting (07 Apr 2024 12:12)      HPI/ 24 hr events: Patient seen and examined at bedside. He is feeling somewhat better this morning. Able to tolerate a small amount of clear liquids. Denies abdominal pain. Vitals are overall stable.       REVIEW OF SYSTEMS:   General: Negative  HEENT: Negative  CV: Negative  Respiratory: Negative  GI: See HPI  : Negative  MSK: Negative  Hematologic: Negative  Skin: Negative    MEDICATIONS:   MEDICATIONS  (STANDING):  amLODIPine   Tablet 5 milliGRAM(s) Oral daily  erythromycin   IVPB 250 milliGRAM(s) IV Intermittent every 8 hours  lactated ringers. 1000 milliLiter(s) (100 mL/Hr) IV Continuous <Continuous>  onabotulinumtoxinA Injectable 10 Unit(s) IntraMuscular once  pantoprazole  Injectable 40 milliGRAM(s) IV Push daily  piperacillin/tazobactam IVPB.. 3.375 Gram(s) IV Intermittent every 8 hours    MEDICATIONS  (PRN):  acetaminophen     Tablet .. 650 milliGRAM(s) Oral every 6 hours PRN Temp greater or equal to 38C (100.4F), Mild Pain (1 - 3)  aluminum hydroxide/magnesium hydroxide/simethicone Suspension 30 milliLiter(s) Oral every 4 hours PRN Dyspepsia  HYDROmorphone  Injectable 1 milliGRAM(s) IV Push every 4 hours PRN Severe Pain (7 - 10)  melatonin 3 milliGRAM(s) Oral at bedtime PRN Insomnia  ondansetron Injectable 4 milliGRAM(s) IV Push every 8 hours PRN Nausea and/or Vomiting            DIET:  Diet, NPO:   NPO for Procedure/Test     NPO Start Date: 05-Apr-2024,   NPO Start Time: 09:30  Except Medications (04-05-24 @ 09:30) [Active]  Diet, Clear Liquid (04-05-24 @ 01:56) [Active]          ALLERGIES:   Allergies    No Known Allergies    Intolerances        VITAL SIGNS:   Vital Signs Last 24 Hrs  T(C): 36.5 (08 Apr 2024 04:54), Max: 36.8 (07 Apr 2024 17:24)  T(F): 97.7 (08 Apr 2024 04:54), Max: 98.2 (07 Apr 2024 17:24)  HR: 78 (08 Apr 2024 04:54) (78 - 89)  BP: 152/90 (08 Apr 2024 04:54) (146/94 - 188/98)  BP(mean): --  RR: 16 (08 Apr 2024 04:54) (16 - 18)  SpO2: 91% (08 Apr 2024 04:54) (91% - 96%)    Parameters below as of 08 Apr 2024 04:54  Patient On (Oxygen Delivery Method): room air      I&O's Summary    07 Apr 2024 07:01  -  08 Apr 2024 07:00  --------------------------------------------------------  IN: 575 mL / OUT: 0 mL / NET: 575 mL        PHYSICAL EXAM:   GENERAL:  No acute distress  HEENT:  NC/AT, conjunctiva clear, sclera anicteric  CHEST:  No increased effort  HEART:  Regular rate  ABDOMEN:  Soft, non-tender, non-distended, normoactive bowel sounds, no rebound or guarding  EXTREMITIES: No edema  SKIN:  Warm, dry  NEURO:  Calm, cooperative    LABS:                        10.7   9.50  )-----------( 264      ( 08 Apr 2024 04:39 )             33.2     Hemoglobin: 10.7 g/dL (04-08-24 @ 04:39)  Hemoglobin: 11.3 g/dL (04-07-24 @ 04:40)  Hemoglobin: 11.0 g/dL (04-06-24 @ 04:54)    04-08    135  |  97  |  14.7  ----------------------------<  109<H>  3.4<L>   |  24.0  |  0.56    Ca    8.4      08 Apr 2024 04:39    TPro  7.8  /  Alb  4.2  /  TBili  0.4  /  DBili  0.1  /  AST  20  /  ALT  27  /  AlkPhos  122<H>  04-07    LIVER FUNCTIONS - ( 07 Apr 2024 04:40 )  Alb: 4.2 g/dL / Pro: 7.8 g/dL / ALK PHOS: 122 U/L / ALT: 27 U/L / AST: 20 U/L / GGT: x                                                     RADIOLOGY & ADDITIONAL STUDIES:          EGD Report    Date: 4/5/2024 7:51 PM      Findings:    EsophagusAdditional esophagus findings - The proximal portion of retained    esophageal mucosa appeared endoscopically normal. There was evidence of prior    surgery (Esophagectomy) at approximately 15 cm from the incisors. There was a    visible staple in the mid esophagus/gastric pull up..    Stomach Additional stomach findings - There was significant fluid / gastric    contents retained within the stomach / farrah-esophagus. The pylorus was patent and    the endoscope was able to traverse without difficulty. The decision was made to    proceed with CRE balloon dilation utilizing a wire guided technique, the pylorus    was dilated to a maximal diameter of 18 mm. Subsequently, 100 units of Botulinum    diluted in 10 cc of sterile water was injected in a four quadrant fashion into    the pylorus..    Duodenum Normal duodenum.        Impressions:    The proximal portion of retained esophageal mucosa appeared endoscopically    normal. There was evidence of prior surgery (Esophagectomy) at approximately 15    cm from the incisors. There was a visible staple in the mid esophagus/gastric    pull up.    There was significant fluid / gastric contents retained within the stomach /    farrah-esophagus. The pylorus was patent and the endoscopewas able to traverse    without difficulty. The decision was made to proceed with CRE balloon dilation    utilizing a wire guided technique, the pylorus was dilated to a maximal diameter    of 18 mm. 100 units of Botulinum diluted in 10 cc of sterilewater was injected    in a four quadrant fashion into the pylorus.        Plan:    NPO overnight, can advance diet as tolerated tomorrow morning.    Continue aspiration precautions and PPI    Pending response to recent intervention will likely need repeat therapy. Can    follow up outpatient with Dr. Moe regarding alternative versus repeat    therapies.    Return to floor for further management        Josemanuel Cm MD    Version 1, Electronically signed on 4/6/2024 12:42:41 AM by Josemanuel Cm MD   Chief Complaint:  Patient is a 58y old  Male who presents with a chief complaint of nausea and vomiting (07 Apr 2024 12:12)      HPI/ 24 hr events: Patient seen and examined at bedside. He is feeling somewhat better this morning. Able to tolerate a small amount of clear liquids. Denies abdominal pain. Vitals are overall stable.       REVIEW OF SYSTEMS:   General: Negative  HEENT: Negative  CV: Negative  Respiratory: Negative  GI: See HPI  : Negative  MSK: Negative  Hematologic: Negative  Skin: Negative    MEDICATIONS:   MEDICATIONS  (STANDING):  amLODIPine   Tablet 5 milliGRAM(s) Oral daily  erythromycin   IVPB 250 milliGRAM(s) IV Intermittent every 8 hours  lactated ringers. 1000 milliLiter(s) (100 mL/Hr) IV Continuous <Continuous>  onabotulinumtoxinA Injectable 10 Unit(s) IntraMuscular once  pantoprazole  Injectable 40 milliGRAM(s) IV Push daily  piperacillin/tazobactam IVPB.. 3.375 Gram(s) IV Intermittent every 8 hours    MEDICATIONS  (PRN):  acetaminophen     Tablet .. 650 milliGRAM(s) Oral every 6 hours PRN Temp greater or equal to 38C (100.4F), Mild Pain (1 - 3)  aluminum hydroxide/magnesium hydroxide/simethicone Suspension 30 milliLiter(s) Oral every 4 hours PRN Dyspepsia  HYDROmorphone  Injectable 1 milliGRAM(s) IV Push every 4 hours PRN Severe Pain (7 - 10)  melatonin 3 milliGRAM(s) Oral at bedtime PRN Insomnia  ondansetron Injectable 4 milliGRAM(s) IV Push every 8 hours PRN Nausea and/or Vomiting            DIET:  Diet, NPO:   NPO for Procedure/Test     NPO Start Date: 05-Apr-2024,   NPO Start Time: 09:30  Except Medications (04-05-24 @ 09:30) [Active]  Diet, Clear Liquid (04-05-24 @ 01:56) [Active]          ALLERGIES:   Allergies    No Known Allergies    Intolerances        VITAL SIGNS:   Vital Signs Last 24 Hrs  T(C): 36.5 (08 Apr 2024 04:54), Max: 36.8 (07 Apr 2024 17:24)  T(F): 97.7 (08 Apr 2024 04:54), Max: 98.2 (07 Apr 2024 17:24)  HR: 78 (08 Apr 2024 04:54) (78 - 89)  BP: 152/90 (08 Apr 2024 04:54) (146/94 - 188/98)  BP(mean): --  RR: 16 (08 Apr 2024 04:54) (16 - 18)  SpO2: 91% (08 Apr 2024 04:54) (91% - 96%)    Parameters below as of 08 Apr 2024 04:54  Patient On (Oxygen Delivery Method): room air      I&O's Summary    07 Apr 2024 07:01  -  08 Apr 2024 07:00  --------------------------------------------------------  IN: 575 mL / OUT: 0 mL / NET: 575 mL        PHYSICAL EXAM:   GENERAL:  No acute distress  HEENT:  NC/AT, conjunctiva clear, sclera anicteric  CHEST:  No increased effort  HEART:  Regular rate  ABDOMEN:  Soft, non-tender, non-distended, normoactive bowel sounds, no rebound or guarding  EXTREMITIES: No edema  SKIN:  Warm, dry  NEURO:  Calm, cooperative    LABS:                        10.7   9.50  )-----------( 264      ( 08 Apr 2024 04:39 )             33.2     Hemoglobin: 10.7 g/dL (04-08-24 @ 04:39)  Hemoglobin: 11.3 g/dL (04-07-24 @ 04:40)  Hemoglobin: 11.0 g/dL (04-06-24 @ 04:54)    04-08    135  |  97  |  14.7  ----------------------------<  109<H>  3.4<L>   |  24.0  |  0.56    Ca    8.4      08 Apr 2024 04:39    TPro  7.8  /  Alb  4.2  /  TBili  0.4  /  DBili  0.1  /  AST  20  /  ALT  27  /  AlkPhos  122<H>  04-07    LIVER FUNCTIONS - ( 07 Apr 2024 04:40 )  Alb: 4.2 g/dL / Pro: 7.8 g/dL / ALK PHOS: 122 U/L / ALT: 27 U/L / AST: 20 U/L / GGT: x                                                     RADIOLOGY & ADDITIONAL STUDIES:          EGD Report    Date: 4/5/2024 7:51 PM      Findings:    EsophagusAdditional esophagus findings - The proximal portion of retained    esophageal mucosa appeared endoscopically normal. There was evidence of prior    surgery (Esophagectomy) at approximately 15 cm from the incisors. There was a    visible staple in the mid esophagus/gastric pull up..    Stomach Additional stomach findings - There was significant fluid / gastric    contents retained within the stomach / farrah-esophagus. The pylorus was patent and    the endoscope was able to traverse without difficulty. The decision was made to    proceed with CRE balloon dilation utilizing a wire guided technique, the pylorus    was dilated to a maximal diameter of 18 mm. Subsequently, 100 units of Botulinum    diluted in 10 cc of sterile water was injected in a four quadrant fashion into    the pylorus..    Duodenum Normal duodenum.        Impressions:    The proximal portion of retained esophageal mucosa appeared endoscopically    normal. There was evidence of prior surgery (Esophagectomy) at approximately 15    cm from the incisors. There was a visible staple in the mid esophagus/gastric    pull up.    There was significant fluid / gastric contents retained within the stomach /    farrah-esophagus. The pylorus was patent and the endoscopewas able to traverse    without difficulty. The decision was made to proceed with CRE balloon dilation    utilizing a wire guided technique, the pylorus was dilated to a maximal diameter    of 18 mm. 100 units of Botulinum diluted in 10 cc of sterilewater was injected    in a four quadrant fashion into the pylorus.        Plan:    NPO overnight, can advance diet as tolerated tomorrow morning.    Continue aspiration precautions and PPI    Pending response to recent intervention will likely need repeat therapy. Can    follow up outpatient with Dr. Moe regarding alternative versus repeat    therapies.    Return to floor for further management        Josemanuel Cm MD    Version 1, Electronically signed on 4/6/2024 12:42:41 AM by Josemanuel Cm MD

## 2024-04-08 NOTE — PROGRESS NOTE ADULT - ASSESSMENT
56 y/o M with PMHX esophageal carcinoma s/p esophagectomy with gastric pull through (2016), gastroparesis, s/p Pre Pyloric Botox Injection and Balloon Dilation (last session on August 2023), hx of cholecystectomy [id number] to ED with worsening nausea, vomiting.     #Nausea, Vomiting  #Hx Esophageal cancer, s/p esophagectomy with gastric pull through   #Gastroparesis requiring prepyloric Botox injection and Balloon Dilation  EGD (04.05.24)- Proximal portion of retained esophageal mucosa appeared normal. Esophagectomy. Significant fluid / gastric contents retained within the stomach / farrah-esophagus. Pylorus was patent, dilated to a maximal diameter of 18 mm with CRE balloon dilation. 100u botox injected in a four quadrant fashion into the pylorus.    - Erythromycin IV 250mg q 8 hours x today (hold Reglan for now)   - Continue clear liquid diet as tolerated  - Continue Zofran, PPI   - Maintain head of bed elevation and aspiration precautions   - Will need outpatient follow up with Dr. Judd Moe for repeat therapy on a routine basis   _________________________________________________________________  Assessment and recommendations are final when note is signed by the attending physician.        58 y/o M with PMHX esophageal carcinoma s/p esophagectomy with gastric pull through (2016), gastroparesis, s/p Pre Pyloric Botox Injection and Balloon Dilation (last session on August 2023), hx of cholecystectomy presented to ED with worsening nausea, vomiting.     #Nausea, Vomiting  #Hx Esophageal cancer, s/p esophagectomy with gastric pull through   #Gastroparesis requiring prepyloric Botox injection and Balloon Dilation  EGD (04.05.24)- Proximal portion of retained esophageal mucosa appeared normal. Esophagectomy. Significant fluid / gastric contents retained within the stomach / farrah-esophagus. Pylorus was patent, dilated to a maximal diameter of 18 mm with CRE balloon dilation. 100u botox injected in a four quadrant fashion into the pylorus.    - Erythromycin IV 250mg q 8 hours x today (hold Reglan for now)   - Continue clear liquid diet as tolerated  - Continue Zofran, PPI   - Maintain head of bed elevation and aspiration precautions   - Will need outpatient follow up with Dr. Judd Moe for repeat therapy on a routine basis   _________________________________________________________________

## 2024-04-09 LAB
ANION GAP SERPL CALC-SCNC: 14 MMOL/L — SIGNIFICANT CHANGE UP (ref 5–17)
BUN SERPL-MCNC: 9.3 MG/DL — SIGNIFICANT CHANGE UP (ref 8–20)
CALCIUM SERPL-MCNC: 8.5 MG/DL — SIGNIFICANT CHANGE UP (ref 8.4–10.5)
CHLORIDE SERPL-SCNC: 93 MMOL/L — LOW (ref 96–108)
CO2 SERPL-SCNC: 25 MMOL/L — SIGNIFICANT CHANGE UP (ref 22–29)
CREAT SERPL-MCNC: 0.45 MG/DL — LOW (ref 0.5–1.3)
EGFR: 124 ML/MIN/1.73M2 — SIGNIFICANT CHANGE UP
GLUCOSE SERPL-MCNC: 107 MG/DL — HIGH (ref 70–99)
HCT VFR BLD CALC: 33.4 % — LOW (ref 39–50)
HGB BLD-MCNC: 10.8 G/DL — LOW (ref 13–17)
MCHC RBC-ENTMCNC: 22.5 PG — LOW (ref 27–34)
MCHC RBC-ENTMCNC: 32.3 GM/DL — SIGNIFICANT CHANGE UP (ref 32–36)
MCV RBC AUTO: 69.4 FL — LOW (ref 80–100)
PLATELET # BLD AUTO: 268 K/UL — SIGNIFICANT CHANGE UP (ref 150–400)
POTASSIUM SERPL-MCNC: 3.2 MMOL/L — LOW (ref 3.5–5.3)
POTASSIUM SERPL-SCNC: 3.2 MMOL/L — LOW (ref 3.5–5.3)
RBC # BLD: 4.81 M/UL — SIGNIFICANT CHANGE UP (ref 4.2–5.8)
RBC # FLD: 16.6 % — HIGH (ref 10.3–14.5)
SODIUM SERPL-SCNC: 132 MMOL/L — LOW (ref 135–145)
WBC # BLD: 9.32 K/UL — SIGNIFICANT CHANGE UP (ref 3.8–10.5)
WBC # FLD AUTO: 9.32 K/UL — SIGNIFICANT CHANGE UP (ref 3.8–10.5)

## 2024-04-09 PROCEDURE — 99232 SBSQ HOSP IP/OBS MODERATE 35: CPT | Mod: FS

## 2024-04-09 PROCEDURE — 99232 SBSQ HOSP IP/OBS MODERATE 35: CPT

## 2024-04-09 RX ORDER — HYDRALAZINE HCL 50 MG
5 TABLET ORAL ONCE
Refills: 0 | Status: COMPLETED | OUTPATIENT
Start: 2024-04-09 | End: 2024-04-09

## 2024-04-09 RX ORDER — METOCLOPRAMIDE HCL 10 MG
10 TABLET ORAL EVERY 8 HOURS
Refills: 0 | Status: DISCONTINUED | OUTPATIENT
Start: 2024-04-09 | End: 2024-04-10

## 2024-04-09 RX ORDER — ONDANSETRON 8 MG/1
4 TABLET, FILM COATED ORAL EVERY 6 HOURS
Refills: 0 | Status: DISCONTINUED | OUTPATIENT
Start: 2024-04-09 | End: 2024-04-10

## 2024-04-09 RX ADMIN — PIPERACILLIN AND TAZOBACTAM 25 GRAM(S): 4; .5 INJECTION, POWDER, LYOPHILIZED, FOR SOLUTION INTRAVENOUS at 06:41

## 2024-04-09 RX ADMIN — HYDROMORPHONE HYDROCHLORIDE 1 MILLIGRAM(S): 2 INJECTION INTRAMUSCULAR; INTRAVENOUS; SUBCUTANEOUS at 11:10

## 2024-04-09 RX ADMIN — Medication 10 MILLIGRAM(S): at 13:38

## 2024-04-09 RX ADMIN — HYDROMORPHONE HYDROCHLORIDE 1 MILLIGRAM(S): 2 INJECTION INTRAMUSCULAR; INTRAVENOUS; SUBCUTANEOUS at 10:40

## 2024-04-09 RX ADMIN — SODIUM CHLORIDE 100 MILLILITER(S): 9 INJECTION, SOLUTION INTRAVENOUS at 21:19

## 2024-04-09 RX ADMIN — HYDROMORPHONE HYDROCHLORIDE 1 MILLIGRAM(S): 2 INJECTION INTRAMUSCULAR; INTRAVENOUS; SUBCUTANEOUS at 21:56

## 2024-04-09 RX ADMIN — ONDANSETRON 4 MILLIGRAM(S): 8 TABLET, FILM COATED ORAL at 18:02

## 2024-04-09 RX ADMIN — PANTOPRAZOLE SODIUM 40 MILLIGRAM(S): 20 TABLET, DELAYED RELEASE ORAL at 11:43

## 2024-04-09 RX ADMIN — Medication 10 MILLIGRAM(S): at 21:19

## 2024-04-09 RX ADMIN — HYDROMORPHONE HYDROCHLORIDE 1 MILLIGRAM(S): 2 INJECTION INTRAMUSCULAR; INTRAVENOUS; SUBCUTANEOUS at 18:01

## 2024-04-09 RX ADMIN — PIPERACILLIN AND TAZOBACTAM 25 GRAM(S): 4; .5 INJECTION, POWDER, LYOPHILIZED, FOR SOLUTION INTRAVENOUS at 21:19

## 2024-04-09 RX ADMIN — Medication 250 MILLIGRAM(S): at 05:37

## 2024-04-09 RX ADMIN — SODIUM CHLORIDE 100 MILLILITER(S): 9 INJECTION, SOLUTION INTRAVENOUS at 01:50

## 2024-04-09 RX ADMIN — Medication 5 MILLIGRAM(S): at 05:44

## 2024-04-09 RX ADMIN — ONDANSETRON 4 MILLIGRAM(S): 8 TABLET, FILM COATED ORAL at 04:42

## 2024-04-09 RX ADMIN — HYDROMORPHONE HYDROCHLORIDE 1 MILLIGRAM(S): 2 INJECTION INTRAMUSCULAR; INTRAVENOUS; SUBCUTANEOUS at 06:11

## 2024-04-09 RX ADMIN — PIPERACILLIN AND TAZOBACTAM 25 GRAM(S): 4; .5 INJECTION, POWDER, LYOPHILIZED, FOR SOLUTION INTRAVENOUS at 13:36

## 2024-04-09 RX ADMIN — HYDROMORPHONE HYDROCHLORIDE 1 MILLIGRAM(S): 2 INJECTION INTRAMUSCULAR; INTRAVENOUS; SUBCUTANEOUS at 18:31

## 2024-04-09 RX ADMIN — SODIUM CHLORIDE 100 MILLILITER(S): 9 INJECTION, SOLUTION INTRAVENOUS at 11:39

## 2024-04-09 RX ADMIN — ONDANSETRON 4 MILLIGRAM(S): 8 TABLET, FILM COATED ORAL at 11:40

## 2024-04-09 RX ADMIN — HYDROMORPHONE HYDROCHLORIDE 1 MILLIGRAM(S): 2 INJECTION INTRAMUSCULAR; INTRAVENOUS; SUBCUTANEOUS at 22:00

## 2024-04-09 RX ADMIN — HYDROMORPHONE HYDROCHLORIDE 1 MILLIGRAM(S): 2 INJECTION INTRAMUSCULAR; INTRAVENOUS; SUBCUTANEOUS at 05:43

## 2024-04-09 NOTE — DIETITIAN INITIAL EVALUATION ADULT - PERSON TAUGHT/METHOD
encouraged ensure clear/gelatein consumption for protein supplementation on clear liquid diet/verbal instruction/patient instructed

## 2024-04-09 NOTE — DIETITIAN INITIAL EVALUATION ADULT - ORAL INTAKE PTA/DIET HISTORY
Chart reviewed, h/o esophageal cancer and gastroparesis noted. Met with pt and visitor at bedside this morning. Pt reports he has had poor appetite/po intake x 5-7 days with associated weight loss.  lbs, this admission 192 lbs. Has been consuming < 50% of meals, family bringing broth from home. Pt meets criteria for acute malnutrition, encouraged ensure clear consumption on clear liquid diet and offered high protein gelatein for additional protein, pt was receptive to trial, order placed on CBORD. He has NKFA, vomiting has subsided, however pt remains nauseous. He reports prior to admission he was not having issues with chewing/swallowing, found to have aspiration PNA this admission. Currently on clear liquid diet, pt reports he has sometimes been coughing with liquids, SLP not following at this time per chart review. Reached out to MD via teams, requested SLP consult for caution; made MD aware of additional nutrition recommendations. F/U as feasible/appropriate, RD remains available.

## 2024-04-09 NOTE — DIETITIAN NUTRITION RISK NOTIFICATION - ADDITIONAL COMMENTS/DIETITIAN RECOMMENDATIONS
Monitor electrolytes, replete prn  Suggest SLP consult to assess swallow function  Rx: MVI daily to optimize nutrition  Provide Gelatein BID on clear liquid diet

## 2024-04-09 NOTE — DIETITIAN INITIAL EVALUATION ADULT - NUTRITIONGOAL OUTCOME1
Pt will meet > 75% est nutrient needs via oral diet and oral nutrition supplementation as medically feasible

## 2024-04-09 NOTE — DIETITIAN INITIAL EVALUATION ADULT - PERTINENT LABORATORY DATA
04-09    132<L>  |  93<L>  |  9.3  ----------------------------<  107<H>  3.2<L>   |  25.0  |  0.45<L>    Ca    8.5      09 Apr 2024 05:20

## 2024-04-09 NOTE — PROGRESS NOTE ADULT - ASSESSMENT
58 y/o M with PMHX esophageal carcinoma s/p esophagectomy with gastric pull through (2016), gastroparesis, s/p Pre Pyloric Botox Injection and Balloon Dilation (last session on August 2023), hx of cholecystectomy presented to ED with worsening nausea, vomiting.     #Nausea, Vomiting  #Hx Esophageal cancer, s/p esophagectomy with gastric pull through   #Gastroparesis requiring prepyloric Botox injection and Balloon Dilation  EGD (04.05.24)- Proximal portion of retained esophageal mucosa appeared normal. Esophagectomy. Significant fluid / gastric contents retained within the stomach / farrah-esophagus. Pylorus was patent, dilated to a maximal diameter of 18 mm with CRE balloon dilation. 100u botox injected in a four quadrant fashion into the pylorus.    - Would stop Erythromycin, add reglan   - Continue clear liquid diet as tolerated  - Continue Zofran, PPI   - Maintain head of bed elevation and aspiration precautions   - Will need outpatient follow up with Dr. Judd Moe for repeat therapy on a routine basis   _________________________________________________________________

## 2024-04-09 NOTE — DIETITIAN INITIAL EVALUATION ADULT - ADD RECOMMEND
Monitor electrolytes, replete prn  Suggest SLP consult to assess swallow function  Rx: MVI daily to optimize nutrition  Provide Gelatein BID on clear liquid diet Monitor electrolytes, replete prn  Suggest SLP consult to assess swallow function  Rx: MVI daily to optimize nutrition  Provide Gelatein BID on clear liquid diet (150 kcals, 20 g pro)

## 2024-04-09 NOTE — PROGRESS NOTE ADULT - SUBJECTIVE AND OBJECTIVE BOX
[facility] Division of Hospital Medicine    Chief Complaint:      SUBJECTIVE / OVERNIGHT EVENTS:    Patient seen and examined at bedside, no acute events overnight, patient denies any new complaints. continues to have difficulty tolerating PO diet, no vomiting as yet today, however still nauseous with limited liquid intake. Switched back to Reglan from Erythromycin today as per GI, plan to discuss possible further intervention this admission.     MEDICATIONS  (STANDING):  amLODIPine   Tablet 5 milliGRAM(s) Oral daily  lactated ringers. 1000 milliLiter(s) (100 mL/Hr) IV Continuous <Continuous>  metoclopramide Injectable 10 milliGRAM(s) IV Push every 8 hours  onabotulinumtoxinA Injectable 10 Unit(s) IntraMuscular once  pantoprazole  Injectable 40 milliGRAM(s) IV Push daily  piperacillin/tazobactam IVPB.. 3.375 Gram(s) IV Intermittent every 8 hours    MEDICATIONS  (PRN):  acetaminophen     Tablet .. 650 milliGRAM(s) Oral every 6 hours PRN Temp greater or equal to 38C (100.4F), Mild Pain (1 - 3)  aluminum hydroxide/magnesium hydroxide/simethicone Suspension 30 milliLiter(s) Oral every 4 hours PRN Dyspepsia  HYDROmorphone  Injectable 1 milliGRAM(s) IV Push every 4 hours PRN Severe Pain (7 - 10)  melatonin 3 milliGRAM(s) Oral at bedtime PRN Insomnia  ondansetron Injectable 4 milliGRAM(s) IV Push every 6 hours PRN Nausea and/or Vomiting        I&O's Summary      PHYSICAL EXAM:  Vital Signs Last 24 Hrs  T(C): 37.2 (09 Apr 2024 11:15), Max: 37.2 (08 Apr 2024 16:57)  T(F): 98.9 (09 Apr 2024 11:15), Max: 98.9 (08 Apr 2024 16:57)  HR: 78 (09 Apr 2024 11:15) (73 - 83)  BP: 160/84 (09 Apr 2024 11:15) (155/83 - 172/94)  BP(mean): --  RR: 18 (09 Apr 2024 11:15) (18 - 18)  SpO2: 93% (09 Apr 2024 11:15) (93% - 96%)    Parameters below as of 09 Apr 2024 11:15  Patient On (Oxygen Delivery Method): room air      GENERAL: NAD  CHEST/LUNG: Clear to ausculation bilaterally  HEART: Regular rate and rhythm; S1 S2  ABDOMEN: Soft, Nontender, ; Bowel sounds present  EXTREMITIES: no edema   NERVOUS SYSTEM:  Alert & Oriented X3,  Motor Strength 5/5 B/L upper and lower extremities  PSYCH: normal mood, appropriate response.    LABS:                        10.8   9.32  )-----------( 268      ( 09 Apr 2024 05:20 )             33.4     04-09    132<L>  |  93<L>  |  9.3  ----------------------------<  107<H>  3.2<L>   |  25.0  |  0.45<L>    Ca    8.5      09 Apr 2024 05:20            Urinalysis Basic - ( 09 Apr 2024 05:20 )    Color: x / Appearance: x / SG: x / pH: x  Gluc: 107 mg/dL / Ketone: x  / Bili: x / Urobili: x   Blood: x / Protein: x / Nitrite: x   Leuk Esterase: x / RBC: x / WBC x   Sq Epi: x / Non Sq Epi: x / Bacteria: x        CAPILLARY BLOOD GLUCOSE            RADIOLOGY & ADDITIONAL TESTS:  Results Reviewed:   Imaging Personally Reviewed:  Electrocardiogram Personally Reviewed:

## 2024-04-09 NOTE — PROGRESS NOTE ADULT - SUBJECTIVE AND OBJECTIVE BOX
Chief Complaint:  Patient is a 58y old  Male who presents with a chief complaint of nausea and vomiting (07 Apr 2024 12:12)      HPI/ 24 hr events: Patient seen and examined at bedside. He is able to tolerate a small amount of the clear liquids. He had some broth that he brought from home. Does not feel any significant improvement with erythromycin.  Denies vomiting, abdominal pain. Vitals are overall stable.       REVIEW OF SYSTEMS:   General: Negative  HEENT: Negative  CV: Negative  Respiratory: Negative  GI: See HPI  : Negative  MSK: Negative  Hematologic: Negative  Skin: Negative    MEDICATIONS:   MEDICATIONS  (STANDING):  amLODIPine   Tablet 5 milliGRAM(s) Oral daily  erythromycin   IVPB 250 milliGRAM(s) IV Intermittent every 8 hours  lactated ringers. 1000 milliLiter(s) (100 mL/Hr) IV Continuous <Continuous>  onabotulinumtoxinA Injectable 10 Unit(s) IntraMuscular once  pantoprazole  Injectable 40 milliGRAM(s) IV Push daily  piperacillin/tazobactam IVPB.. 3.375 Gram(s) IV Intermittent every 8 hours    MEDICATIONS  (PRN):  acetaminophen     Tablet .. 650 milliGRAM(s) Oral every 6 hours PRN Temp greater or equal to 38C (100.4F), Mild Pain (1 - 3)  aluminum hydroxide/magnesium hydroxide/simethicone Suspension 30 milliLiter(s) Oral every 4 hours PRN Dyspepsia  HYDROmorphone  Injectable 1 milliGRAM(s) IV Push every 4 hours PRN Severe Pain (7 - 10)  melatonin 3 milliGRAM(s) Oral at bedtime PRN Insomnia  ondansetron Injectable 4 milliGRAM(s) IV Push every 8 hours PRN Nausea and/or Vomiting            DIET:  Diet, Clear Liquid (04-05-24 @ 01:56) [Active]          ALLERGIES:   Allergies    No Known Allergies    Intolerances        VITAL SIGNS:   Vital Signs Last 24 Hrs  T(C): 37.2 (09 Apr 2024 05:00), Max: 37.2 (08 Apr 2024 16:57)  T(F): 98.9 (09 Apr 2024 05:00), Max: 98.9 (08 Apr 2024 16:57)  HR: 73 (09 Apr 2024 05:00) (73 - 91)  BP: 172/94 (09 Apr 2024 05:00) (154/95 - 172/94)  BP(mean): --  RR: 18 (09 Apr 2024 05:00) (18 - 18)  SpO2: 96% (09 Apr 2024 05:00) (94% - 96%)    Parameters below as of 08 Apr 2024 16:57  Patient On (Oxygen Delivery Method): room air      I&O's Summary      PHYSICAL EXAM:   GENERAL:  No acute distress  HEENT:  NC/AT, conjunctiva clear, sclera anicteric  CHEST:  No increased effort  HEART:  Regular rate  ABDOMEN:  Soft, non-tender, non-distended, normoactive bowel sounds, no rebound or guarding  EXTREMITIES: No edema  SKIN:  Warm, dry  NEURO:  Calm, cooperative    LABS:                        10.8   9.32  )-----------( 268      ( 09 Apr 2024 05:20 )             33.4     Hemoglobin: 10.8 g/dL (04-09-24 @ 05:20)  Hemoglobin: 10.7 g/dL (04-08-24 @ 04:39)  Hemoglobin: 11.3 g/dL (04-07-24 @ 04:40)    04-09    132<L>  |  93<L>  |  9.3  ----------------------------<  107<H>  3.2<L>   |  25.0  |  0.45<L>    Ca    8.5      09 Apr 2024 05:20                                                  RADIOLOGY & ADDITIONAL STUDIES:

## 2024-04-09 NOTE — PROGRESS NOTE ADULT - ASSESSMENT
59 y/o male with PMH of esophageal cancer s/p esophagectomy, s/p Pre Pyloric Botox Injection and Balloon Dilation, gastroparesis, HTN came to the ED complaining of nausea, vomiting and epigastric abdominal pain x 3 days. As per significant other  at bed side, a week ago he was complaining of fatigue,  3 days ago started coughing productive of yellow sputum, fever (102) and congestion; seen by his PCP who prescribed z-linn however, he has not been able to tolerate PO due to vomiting. In the ED, WBC: 18.34 with left shift; CT C/A/P:  Tree-in-bud nodularity in the right middle and lower lobes concerning for aspiration PNA. Now s/p EGD with dilation / botox 4/5 with GI, remains unable to tolerate PO due to dysphagia. Started on Reglan again in place of Erythromycin, with some improvement in PO intake, GI discussing potential further interventions vs. continued plan for outpatient management.     #Right middle/lower lobe PNA likely due to aspiration   c/w zosyn     #Epigastric abdominal pain with nausea/vomiting likely due to underlying gastroparesis   Zofran PRN   Continue Reglan 10 mg q8 again in place of erythromycin      Omeprazole 40mg   IVF    #Hx of esophageal ca   S/p esophagectomy   GI consulted, s/p EGD / dilatation / Botox injection 4/5, advanced to CLD, however still unable to tolerate adequate PO intake /meds , added Reglan back in place of erythromycin     #HTN   Amlodipine 5mg       Plan of care discussed with patient, PA and nursing staff as well as GI team

## 2024-04-09 NOTE — PROGRESS NOTE ADULT - TIME BILLING
Patient with esophageal cancer with need for multiple EGD's with dilatation and botox injections, now s/p 4/5 as well but unable to tolerate PO, continues on IVF with motility agents, GI following.

## 2024-04-09 NOTE — DIETITIAN INITIAL EVALUATION ADULT - ETIOLOGY-BASIS
Acute illness or injury Spironolactone Pregnancy And Lactation Text: This medication can cause feminization of the male fetus and should be avoided during pregnancy. The active metabolite is also found in breast milk.

## 2024-04-09 NOTE — DIETITIAN INITIAL EVALUATION ADULT - ETIOLOGY
related to inability to consume sufficient energy/protein intake in setting of nausea/vomiting, decreased appetite/po intake, h/o esophageal cancer and gastroparesis VIEW ALL

## 2024-04-09 NOTE — DIETITIAN INITIAL EVALUATION ADULT - OTHER INFO
58 y/o M with PMHX esophageal carcinoma s/p esophagectomy with gastric pull through (2016), gastroparesis, s/p Pre Pyloric Botox Injection and Balloon Dilation (last session on August 2023), hx of cholecystectomy presented to ED with worsening nausea, vomiting. Found to have aspiration PNA. GI following, pt receiving gastric motility agent.

## 2024-04-10 ENCOUNTER — TRANSCRIPTION ENCOUNTER (OUTPATIENT)
Age: 59
End: 2024-04-10

## 2024-04-10 VITALS
DIASTOLIC BLOOD PRESSURE: 86 MMHG | OXYGEN SATURATION: 98 % | RESPIRATION RATE: 18 BRPM | TEMPERATURE: 97 F | HEART RATE: 73 BPM | SYSTOLIC BLOOD PRESSURE: 130 MMHG

## 2024-04-10 LAB
ANION GAP SERPL CALC-SCNC: 11 MMOL/L — SIGNIFICANT CHANGE UP (ref 5–17)
BUN SERPL-MCNC: 7.9 MG/DL — LOW (ref 8–20)
CALCIUM SERPL-MCNC: 8.3 MG/DL — LOW (ref 8.4–10.5)
CHLORIDE SERPL-SCNC: 98 MMOL/L — SIGNIFICANT CHANGE UP (ref 96–108)
CO2 SERPL-SCNC: 26 MMOL/L — SIGNIFICANT CHANGE UP (ref 22–29)
CREAT SERPL-MCNC: 0.58 MG/DL — SIGNIFICANT CHANGE UP (ref 0.5–1.3)
CULTURE RESULTS: SIGNIFICANT CHANGE UP
CULTURE RESULTS: SIGNIFICANT CHANGE UP
EGFR: 114 ML/MIN/1.73M2 — SIGNIFICANT CHANGE UP
GLUCOSE SERPL-MCNC: 106 MG/DL — HIGH (ref 70–99)
POTASSIUM SERPL-MCNC: 3.2 MMOL/L — LOW (ref 3.5–5.3)
POTASSIUM SERPL-SCNC: 3.2 MMOL/L — LOW (ref 3.5–5.3)
SODIUM SERPL-SCNC: 134 MMOL/L — LOW (ref 135–145)
SPECIMEN SOURCE: SIGNIFICANT CHANGE UP
SPECIMEN SOURCE: SIGNIFICANT CHANGE UP

## 2024-04-10 PROCEDURE — 85025 COMPLETE CBC W/AUTO DIFF WBC: CPT

## 2024-04-10 PROCEDURE — 80048 BASIC METABOLIC PNL TOTAL CA: CPT

## 2024-04-10 PROCEDURE — C1726: CPT

## 2024-04-10 PROCEDURE — 87637 SARSCOV2&INF A&B&RSV AMP PRB: CPT

## 2024-04-10 PROCEDURE — 99232 SBSQ HOSP IP/OBS MODERATE 35: CPT

## 2024-04-10 PROCEDURE — 87040 BLOOD CULTURE FOR BACTERIA: CPT

## 2024-04-10 PROCEDURE — 71046 X-RAY EXAM CHEST 2 VIEWS: CPT

## 2024-04-10 PROCEDURE — 86850 RBC ANTIBODY SCREEN: CPT

## 2024-04-10 PROCEDURE — 36415 COLL VENOUS BLD VENIPUNCTURE: CPT

## 2024-04-10 PROCEDURE — 71260 CT THORAX DX C+: CPT | Mod: MC

## 2024-04-10 PROCEDURE — 93005 ELECTROCARDIOGRAM TRACING: CPT

## 2024-04-10 PROCEDURE — 86901 BLOOD TYPING SEROLOGIC RH(D): CPT

## 2024-04-10 PROCEDURE — 87086 URINE CULTURE/COLONY COUNT: CPT

## 2024-04-10 PROCEDURE — 85610 PROTHROMBIN TIME: CPT

## 2024-04-10 PROCEDURE — 81001 URINALYSIS AUTO W/SCOPE: CPT

## 2024-04-10 PROCEDURE — 80053 COMPREHEN METABOLIC PANEL: CPT

## 2024-04-10 PROCEDURE — 99239 HOSP IP/OBS DSCHRG MGMT >30: CPT

## 2024-04-10 PROCEDURE — 83735 ASSAY OF MAGNESIUM: CPT

## 2024-04-10 PROCEDURE — 74177 CT ABD & PELVIS W/CONTRAST: CPT | Mod: MC

## 2024-04-10 PROCEDURE — 96375 TX/PRO/DX INJ NEW DRUG ADDON: CPT

## 2024-04-10 PROCEDURE — 82248 BILIRUBIN DIRECT: CPT

## 2024-04-10 PROCEDURE — 96374 THER/PROPH/DIAG INJ IV PUSH: CPT

## 2024-04-10 PROCEDURE — 99285 EMERGENCY DEPT VISIT HI MDM: CPT

## 2024-04-10 PROCEDURE — 86900 BLOOD TYPING SEROLOGIC ABO: CPT

## 2024-04-10 PROCEDURE — 83690 ASSAY OF LIPASE: CPT

## 2024-04-10 PROCEDURE — 85027 COMPLETE CBC AUTOMATED: CPT

## 2024-04-10 PROCEDURE — C9399: CPT

## 2024-04-10 RX ADMIN — Medication 650 MILLIGRAM(S): at 09:20

## 2024-04-10 RX ADMIN — PANTOPRAZOLE SODIUM 40 MILLIGRAM(S): 20 TABLET, DELAYED RELEASE ORAL at 11:33

## 2024-04-10 RX ADMIN — Medication 650 MILLIGRAM(S): at 08:21

## 2024-04-10 RX ADMIN — Medication 10 MILLIGRAM(S): at 05:26

## 2024-04-10 RX ADMIN — AMLODIPINE BESYLATE 5 MILLIGRAM(S): 2.5 TABLET ORAL at 05:26

## 2024-04-10 RX ADMIN — PIPERACILLIN AND TAZOBACTAM 25 GRAM(S): 4; .5 INJECTION, POWDER, LYOPHILIZED, FOR SOLUTION INTRAVENOUS at 05:25

## 2024-04-10 RX ADMIN — SODIUM CHLORIDE 100 MILLILITER(S): 9 INJECTION, SOLUTION INTRAVENOUS at 08:09

## 2024-04-10 NOTE — PROGRESS NOTE ADULT - NS ATTEND AMEND GEN_ALL_CORE FT
Patient seen and examined. Patient with esophagectomy with gastroparesis and now s/p botox injection and pyloric dilation. Feels better. To be discharged with low residue low fat diet. To be considered for G POEM with Dr Cm.
Mr. Schwarz is a 57 year old gentleman with history of esophageal carcinoma s/p esophagectomy with gastric pull up ([place] [facility] [place]) s/p EGD with pyloric dilation and Botox injection (received 3x in past), previously with significant improvement, slightly improved since admission and intervention, no significant clinical response to erythromycin/metoclopramide, suspect likely due to vagotomy and unlikely to respond to promotility agents, discussed potential for endosurgical intervention (GPOEM) given likely gastroparesis (retained contents in stomach on endoscopy despite NPO). Discussed with patient, reports desperate for some relief of symptoms, willing to try anything per patient. Will speak with CT surgery given concerns for potential perforation given significant scarring in the setting of repeat Botox injection and dilation, however could potentially offer some improvement, there is some data showing positive symptoms improvement in post-surgical gastroparesis which did include esophagectomy patients however small numbers. Earlier intervention has shown better results however despite being later in his course at this time, it may be reasonable to consider given lack of alternatives and refractory status to medications / Botox / dilation / etc. If considering, would prefer inpatient intervention, possibly Friday. Would keep on liquids only in the interim. We will continue to follow along with you.
Patient seen and examined.  Patient with a history of esophagectomy and gastric pull-up with repeated prepyloric Botox injection and balloon dilation.  However the patient continues to have recurrence of the symptoms again and again.  Underwent EGD with dilation on last Friday but still persistent symptoms.  Will recommend to hold off Reglan and start erythromycin IV for 1 day and start clear liquid.  After that we should advance to low-fat diet.  Discussed at length with the patient.

## 2024-04-10 NOTE — DISCHARGE NOTE PROVIDER - NSDCFUSCHEDAPPT_GEN_ALL_CORE_FT
Judd Moe  [facility]  [address]STR[address]  Scheduled Appointment: 04/15/2024    Judd Moe  [facility]  [address]STR[address]  Scheduled Appointment: 05/02/2024

## 2024-04-10 NOTE — PROGRESS NOTE ADULT - SUBJECTIVE AND OBJECTIVE BOX
Chief Complaint:  Patient is a 58y old  Male who presents with a chief complaint of Pneumonitis due to inhalation of food or vomitus     (09 Apr 2024 11:17)      HPI/ 24 hr events: Patient seen and examined at bedside. He is feeling well this morning, states he is ready to go home. He is hungry. He is tolerating clear liquid diet. Denies nausea, vomiting, diarrhea, abdominal pain. Vitals are overall stable.       REVIEW OF SYSTEMS:   General: Negative  HEENT: Negative  CV: Negative  Respiratory: Negative  GI: See HPI  : Negative  MSK: Negative  Hematologic: Negative  Skin: Negative    MEDICATIONS:   MEDICATIONS  (STANDING):  amLODIPine   Tablet 5 milliGRAM(s) Oral daily  lactated ringers. 1000 milliLiter(s) (100 mL/Hr) IV Continuous <Continuous>  metoclopramide Injectable 10 milliGRAM(s) IV Push every 8 hours  onabotulinumtoxinA Injectable 10 Unit(s) IntraMuscular once  pantoprazole  Injectable 40 milliGRAM(s) IV Push daily  piperacillin/tazobactam IVPB.. 3.375 Gram(s) IV Intermittent every 8 hours    MEDICATIONS  (PRN):  acetaminophen     Tablet .. 650 milliGRAM(s) Oral every 6 hours PRN Temp greater or equal to 38C (100.4F), Mild Pain (1 - 3)  aluminum hydroxide/magnesium hydroxide/simethicone Suspension 30 milliLiter(s) Oral every 4 hours PRN Dyspepsia  HYDROmorphone  Injectable 1 milliGRAM(s) IV Push every 4 hours PRN Severe Pain (7 - 10)  melatonin 3 milliGRAM(s) Oral at bedtime PRN Insomnia  ondansetron Injectable 4 milliGRAM(s) IV Push every 6 hours PRN Nausea and/or Vomiting            DIET:  Diet, Clear Liquid (04-05-24 @ 01:56) [Active]          ALLERGIES:   Allergies    No Known Allergies    Intolerances        VITAL SIGNS:   Vital Signs Last 24 Hrs  T(C): 36.8 (10 Apr 2024 08:13), Max: 37.7 (09 Apr 2024 16:46)  T(F): 98.2 (10 Apr 2024 08:13), Max: 99.8 (09 Apr 2024 16:46)  HR: 80 (10 Apr 2024 08:13) (76 - 86)  BP: 153/91 (10 Apr 2024 08:13) (148/88 - 160/84)  BP(mean): --  RR: 18 (10 Apr 2024 08:13) (18 - 18)  SpO2: 98% (10 Apr 2024 08:13) (93% - 98%)    Parameters below as of 10 Apr 2024 08:13  Patient On (Oxygen Delivery Method): room air      I&O's Summary    09 Apr 2024 07:01  -  10 Apr 2024 07:00  --------------------------------------------------------  IN: 1080 mL / OUT: 0 mL / NET: 1080 mL        PHYSICAL EXAM:   GENERAL:  No acute distress  HEENT:  NC/AT, conjunctiva clear, sclera anicteric  CHEST:  No increased effort  HEART:  Regular rate  ABDOMEN:  Soft, non-tender, non-distended, normoactive bowel sounds, no rebound or guarding  EXTREMITIES: No edema  SKIN:  Warm, dry  NEURO:  Calm, cooperative    LABS:                        10.8   9.32  )-----------( 268      ( 09 Apr 2024 05:20 )             33.4     Hemoglobin: 10.8 g/dL (04-09-24 @ 05:20)  Hemoglobin: 10.7 g/dL (04-08-24 @ 04:39)    04-10    134<L>  |  98  |  7.9<L>  ----------------------------<  106<H>  3.2<L>   |  26.0  |  0.58    Ca    8.3<L>      10 Apr 2024 04:47                                                  RADIOLOGY & ADDITIONAL STUDIES:

## 2024-04-10 NOTE — DISCHARGE NOTE PROVIDER - PROVIDER TOKENS
PROVIDER:[TOKEN:[[id number]],FOLLOWUP:[2 weeks]],FREE:[LAST:[PCP],PHONE:[(   )    -],FAX:[(   )    -]]

## 2024-04-10 NOTE — DISCHARGE NOTE PROVIDER - ATTENDING DISCHARGE PHYSICAL EXAMINATION:
GENERAL: NAD  CHEST/LUNG: Clear to ausculation bilaterally  HEART: Regular rate and rhythm; S1 S2  ABDOMEN: Soft, Nontender, ; Bowel sounds present  EXTREMITIES: no edema   NERVOUS SYSTEM:  Alert & Oriented X3,  Motor Strength 5/5 B/L upper and lower extremities  PSYCH: normal mood, appropriate response.

## 2024-04-10 NOTE — DISCHARGE NOTE NURSING/CASE MANAGEMENT/SOCIAL WORK - PATIENT PORTAL LINK FT
You can access the FollowMyHealth Patient Portal offered by [facility] by registering at the following website: [email or web address]. By joining [facility]’s FollowMyHealth portal, you will also be able to view your health information using other applications (apps) compatible with our system.

## 2024-04-10 NOTE — DISCHARGE NOTE PROVIDER - HOSPITAL COURSE
Mr. Schwarz is a 59 yo M with a PMH significant for esophageal cancer s/p esophagectomy, s/p Pre Pyloric Botox Injection and Balloon Dilation, gastroparesis, HTN came to the ED 4/4 complaining of nausea, vomiting and epigastric abdominal pain x 3 days. Patient had a cough / productive of sputum with fever prior to admission but was unable to tolerate PO abx given by his PCP due to this dysphagia and vomiting. CT imaging showed RML PNA concerning for aspiration, completed course of Zosyn while inpatient. Patient also taken with GI 4/5 for EGD with dilation and botox injection for significant dysphagia in setting of esophageal cancer. Patient continued to have difficulty swallowing with significant nausea several days after procedure despite motility agents such as erythromycin / reglan, now improved and patient tolerating Liquid diet with no issues, able to swallow pills with no issues and feels well enough to leave. Asked if patient would like to trial solids before discharge however he declines stating if he can't tolerate it he will continue with liquids. Patient cleared by GI standpoint for DC, plan to follow outpatient with Dr. Cm in 2 weeks for consideration of outpatient GPOEM for further symptom management.

## 2024-04-10 NOTE — DISCHARGE NOTE PROVIDER - NSDCCPCAREPLAN_GEN_ALL_CORE_FT
PRINCIPAL DISCHARGE DIAGNOSIS  Diagnosis: Pneumonia, aspiration  Assessment and Plan of Treatment: Likely in setting of esophageal narrowing      SECONDARY DISCHARGE DIAGNOSES  Diagnosis: Intractable vomiting  Assessment and Plan of Treatment:     Diagnosis: Esophageal cancer  Assessment and Plan of Treatment:     Diagnosis: Status post dilation of esophageal narrowing  Assessment and Plan of Treatment:     Diagnosis: HTN (hypertension)  Assessment and Plan of Treatment:      no strength deficits were identified PRINCIPAL DISCHARGE DIAGNOSIS  Diagnosis: Pneumonia, aspiration  Assessment and Plan of Treatment: Likely in setting of esophageal narrowing, improved with Antibiotics, completed course while inpatient      SECONDARY DISCHARGE DIAGNOSES  Diagnosis: Intractable vomiting  Assessment and Plan of Treatment: resolved at this time, s/p esophageal dilation and botox injection   continue erythromycin and Reglan as needed    Diagnosis: Esophageal cancer  Assessment and Plan of Treatment: Continue to follow with GI with Dr. Cm in 2 weeks for possible further interventions / GPOEM   Continue further outpatient follow up with Oncology    Diagnosis: Status post dilation of esophageal narrowing  Assessment and Plan of Treatment: as above    Diagnosis: HTN (hypertension)  Assessment and Plan of Treatment: continue home regiment

## 2024-04-10 NOTE — PROGRESS NOTE ADULT - ASSESSMENT
56 y/o M with PMHX esophageal carcinoma s/p esophagectomy with gastric pull through (2016), gastroparesis, s/p Pre Pyloric Botox Injection and Balloon Dilation (last session on August 2023), hx of cholecystectomy presented to ED with worsening nausea, vomiting.     #Nausea, Vomiting  #Hx Esophageal cancer, s/p esophagectomy with gastric pull through   #Gastroparesis requiring prepyloric Botox injection and Balloon Dilation  EGD (04.05.24)- Proximal portion of retained esophageal mucosa appeared normal. Esophagectomy. Significant fluid / gastric contents retained within the stomach / farrah-esophagus. Pylorus was patent, dilated to a maximal diameter of 18 mm with CRE balloon dilation. 100u botox injected in a four quadrant fashion into the pylorus.    - Continue clear liquid diet, advanced as tolerated  - Maintain head of bed elevation and aspiration precautions   - Will need outpatient follow up with Dr. Cm in 2 weeks for consideration of outpatient GPOEM. Patient to obtain surgical records from [facility] [place] to bring to office appointment  Able to discharge home from gastroenterology standpoint   GI to sign-off, please reconsult as needed, call for any questions or concerns.  _________________________________________________________________

## 2024-04-10 NOTE — DISCHARGE NOTE PROVIDER - DETAILS OF MALNUTRITION DIAGNOSIS/DIAGNOSES
This patient has been assessed with a concern for Malnutrition and was treated during this hospitalization for the following Nutrition diagnosis/diagnoses:     -  04/09/2024: Moderate protein-calorie malnutrition

## 2024-04-10 NOTE — DISCHARGE NOTE PROVIDER - CARE PROVIDERS DIRECT ADDRESSES
[email or web address]

## 2024-04-10 NOTE — DISCHARGE NOTE PROVIDER - CARE PROVIDER_API CALL
Josemanuel Cm  Gastroenterology  [address]  [place] [address]  Phone: [phone]  Fax: [phone]  Follow Up Time: 2 weeks    PCP,   Phone: (   )    -  Fax: (   )    -  Follow Up Time:

## 2024-04-10 NOTE — PROGRESS NOTE ADULT - PROVIDER SPECIALTY LIST ADULT
Hospitalist
Internal Medicine
Gastroenterology
Hospitalist

## 2024-04-10 NOTE — DISCHARGE NOTE PROVIDER - NSDCMRMEDTOKEN_GEN_ALL_CORE_FT
acetaminophen 325 mg oral tablet: 2 tab(s) orally every 6 hours, As needed, Temp greater or equal to 38C (100.4F), Mild Pain (1 - 3), Moderate Pain (4 - 6)  amLODIPine 5 mg oral tablet: 1 tab(s) orally once a day  erythromycin 250 mg oral delayed release tablet: 1 tab(s) orally 2 times a day  omeprazole 40 mg oral delayed release capsule: 1 cap(s) orally 2 times a day x 1 week then once a day  ondansetron 4 mg oral tablet: 1 tab(s) orally every 8 hours as needed for nausea/vomiting  Reglan 10 mg oral tablet: 1 tab(s) orally 3 times a day (before meals), As Needed

## 2024-04-12 ENCOUNTER — APPOINTMENT (OUTPATIENT)
Dept: ORTHOPEDIC SURGERY | Facility: CLINIC | Age: 59
End: 2024-04-12
Payer: COMMERCIAL

## 2024-04-12 VITALS — WEIGHT: 190 LBS | BODY MASS INDEX: 25.73 KG/M2 | HEIGHT: 72 IN

## 2024-04-12 DIAGNOSIS — M50.20 OTHER CERVICAL DISC DISPLACEMENT, UNSPECIFIED CERVICAL REGION: ICD-10-CM

## 2024-04-12 DIAGNOSIS — M47.816 SPONDYLOSIS W/OUT MYELOPATHY OR RADICULOPATHY, LUMBAR REGION: ICD-10-CM

## 2024-04-12 PROCEDURE — 72040 X-RAY EXAM NECK SPINE 2-3 VW: CPT

## 2024-04-12 PROCEDURE — 99204 OFFICE O/P NEW MOD 45 MIN: CPT

## 2024-04-14 PROBLEM — M47.816 FACET ARTHRITIS OF LUMBAR REGION: Status: ACTIVE | Noted: 2024-04-14

## 2024-04-14 NOTE — PHYSICAL EXAM
[Normal Coordination] : normal coordination [Normal DTR UE/LE] : normal DTR UE/LE  [Normal Sensation] : normal sensation [Normal Mood and Affect] : normal mood and affect [Oriented] : oriented [Able to Communicate] : able to communicate [Normal Skin] : normal skin [No Rash] : no rash [No Ulcers] : no ulcers [No Lesions] : no lesions [No obvious lymphadenopathy in areas examined] : no obvious lymphadenopathy in areas examined [Well Developed] : well developed [Peripheral vascular exam is grossly normal] : peripheral vascular exam is grossly normal [No Respiratory Distress] : no respiratory distress [Lungs clear to auscultation bilaterally] : lungs clear to auscultation bilaterally [Normal Bowel Sounds] : normal bowel sounds [Non-Tender] : non-tender [No HSM] : no HSM [No Mass] : no mass [de-identified] : Ambulatory without assistance. Stiffness in cervical spine and reduced range of emotional planes. Tenderness over posterior cervical musculature. Strength intact, bilateral upper extremities. Positive Valle's bilaterally Brisk flexes bilaterally Sensation intact Painful lumbar range of motion. Brisk brief reflexes lower extremities. No clonus  Resting tremor noted in bilateral upper extremities   [Extension] : extension [] : positive Valle reflex

## 2024-04-14 NOTE — DATA REVIEWED
[FreeTextEntry1] : On my interpretation of these images  MRI stand up 1/16/2014 Cervical degeneration and herniation C5/6 and C6/7. No evidence of significant cord compression.  3/19/24 Herniation C4/5 Post surgical changes with arthrodesis C5/6 C6/7 No significant spinal cord compression  Lumbar MRI Stand up 3/19/24 DDDL3/4, L4/5, L5/S1 Bilateral facet hypertrophy L5/S1 Bilateral facet hypertrophy/5 right greater than left foraminal stenosis Facet hypertrophy L3/4 right greater than left foraminal stenosis  Thoracic spine MRI 3/16/24 Stand up Multiple thoracic disc herniation T3/4, T4/5, T5/6, T7/8, T8/9. Herniations about the spinal cord, but no cord compression. No evidence of signal of Signal abnormality within thoracic spinal cord.  X-ray in office 04/12/2024 Cervical spine two views. Post surgical changes implants in place C5/6, 6/7. Solid fusion.  Slight reversal of normal cervical lordosis at C4/5  I stop paperwork reviewed
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
## 2024-04-14 NOTE — DISCUSSION/SUMMARY
[de-identified] : No evidence of spinal cord compression or significant disc herniation on current cervical MRI. Recommend referral to pain management for consultation and treatment with spinal steroid injection. Also consider lumbar epidural injection or possible facet injections. We do not see any evidence of spinal cord compression or cord changes on current MRI imaging. We cannot offer a current explanation for the patient resting tremors or brisk reflexes. Recommend consultation with neurology for further evaluation of tremors and abnormal reflexes. Cumulative encounter time exceeded 45 minutes  Prior to appointment and during encounter with patient extensive medical records were reviewed including but not limited to, hospital records, outpatient records, imaging results, and lab data. During this appointment the patient was examined, diagnoses were discussed and explained in a face to face manner. In addition extensive time was spent reviewing aforementioned diagnostic studies. Counseling including abnormal image results, differential diagnoses, treatment options, risk and benefits, lifestyle changes, current condition, and current medications was performed. Patient's comments, questions, and concerns were addressed and patient verbalized understanding. Based on this patient's presentation at our office, which is an orthopedic spine surgeon's office, this patient inherently / intrinsically has a risk, however minute, of developing issues such as Cauda equina syndrome, bowel and bladder changes, or progression of motor or neurological deficits such as paralysis which may be permanent.

## 2024-04-15 ENCOUNTER — APPOINTMENT (OUTPATIENT)
Dept: GASTROENTEROLOGY | Facility: CLINIC | Age: 59
End: 2024-04-15

## 2024-04-15 NOTE — DISCHARGE NOTE NURSING/CASE MANAGEMENT/SOCIAL WORK - BRAND OF COVID-19 VACCINATION
"From: Heena Toledo  To: Mimi Sanchez  Sent: 4/14/2024 11:06 PM EDT  Subject: Post appointment questions    Katarina Sanchez!    I have questions that I was hoping you could help me with.    1.) I am just now starting to learn about and accept my diagnosis as well as trying to educate myself on it a little. You had mentioned that it is a chronic auto immune disease but my bloodwork with both you and my previous doctor that tested me 1 month before (while having full symptoms) both do not show positive for the auto immune markers. Can you help me understand why that is or what that means? Could it be something else?    2. I have been eating a pretty clean \"colitis\" diet for about a month and now that I have started on the mesalamine, my symptoms have nearly gone completely away(thank goodness). Should I still get the CT without having a flare or inflamation feelings in my stomach? Will the CT show anything while not having a flare up? Another reason I ask because I was told the CT scan may have to get pushed back again (due to insurance approval) and I'm not sure how much longer it will be. I'm assuming with my positive progress, I'll feel completely normal in a week.     Sorry for the long email but I do appreciate your help in navigating this new, abrupt lifestyle change.  "
Pfizer dose 1 and 2

## 2024-04-19 NOTE — CDI QUERY NOTE - NSCDIOTHERTXTBX_GEN_ALL_CORE_HH
Please clarify if there is a relationship between the dysphagia and the esophageal CA.  Please note that " in setting of" describes that 2 conditions coexist and does not provide the cause and effect relationship of the 2 conditions.      - Dysphagia likely secondary to esophageal CA  - No relationship between the dysphagia and the esophageal CA  - Other ( please specify)  - Not clinically significant    Documentation    D/C note- Mr. Schwarz is a 57 yo M with a PMH significant for esophageal cancer s/p esophagectomy, s/p Pre Pyloric Botox Injection and Balloon Dilation, gastroparesis, HTN came to the ED 4/4 complaining of nausea, vomiting and epigastric abdominal pain x 3 days   Patient also taken with GI 4/5 for EGD with dilation and botox injection for significant dysphagia in setting of esophageal cancer.

## 2024-04-25 ENCOUNTER — APPOINTMENT (OUTPATIENT)
Dept: GASTROENTEROLOGY | Facility: CLINIC | Age: 59
End: 2024-04-25
Payer: MEDICARE

## 2024-04-25 VITALS
HEART RATE: 85 BPM | BODY MASS INDEX: 26.55 KG/M2 | HEIGHT: 72 IN | RESPIRATION RATE: 14 BRPM | OXYGEN SATURATION: 98 % | DIASTOLIC BLOOD PRESSURE: 86 MMHG | SYSTOLIC BLOOD PRESSURE: 131 MMHG | WEIGHT: 196 LBS

## 2024-04-25 DIAGNOSIS — K31.84 GASTROPARESIS: ICD-10-CM

## 2024-04-25 PROCEDURE — 99214 OFFICE O/P EST MOD 30 MIN: CPT

## 2024-04-25 RX ORDER — ERYTHROMYCIN BASE 250 MG
250 CAPSULE,DELAYED RELEASE (ENTERIC COATED) ORAL
Refills: 0 | Status: ACTIVE | COMMUNITY

## 2024-04-25 NOTE — PHYSICAL EXAM
[Alert] : alert [Normal Voice/Communication] : normal voice/communication [Healthy Appearing] : healthy appearing [No Acute Distress] : no acute distress [Sclera] : the sclera and conjunctiva were normal [Hearing Threshold Finger Rub Not [place]] : hearing was normal [Oropharynx] : the oropharynx was normal [Normal Appearance] : the appearance of the neck was normal [No Respiratory Distress] : no respiratory distress [No Acc Muscle Use] : no accessory muscle use [Respiration, Rhythm And Depth] : normal respiratory rhythm and effort [Auscultation Breath Sounds / Voice Sounds] : lungs were clear to auscultation bilaterally [Heart Rate And Rhythm] : heart rate was normal and rhythm regular [Normal S1, S2] : normal S1 and S2 [Murmurs] : no murmurs [None] : no edema [Bowel Sounds] : normal bowel sounds [Abdomen Tenderness] : non-tender [No Masses] : no abdominal mass palpated [Abdomen Soft] : soft [] : no hepatosplenomegaly [Abnormal Walk] : normal gait [Oriented To Time, Place, And Person] : oriented to person, place, and time

## 2024-04-29 ENCOUNTER — APPOINTMENT (OUTPATIENT)
Dept: NEUROLOGY | Facility: CLINIC | Age: 59
End: 2024-04-29
Payer: COMMERCIAL

## 2024-04-29 PROCEDURE — 95912 NRV CNDJ TEST 11-12 STUDIES: CPT

## 2024-04-29 PROCEDURE — 95886 MUSC TEST DONE W/N TEST COMP: CPT

## 2024-04-29 NOTE — PATIENT PROFILE ADULT - FALL HARM RISK - UNIVERSAL INTERVENTIONS
supervision
Bed in lowest position, wheels locked, appropriate side rails in place/Call bell, personal items and telephone in reach/Instruct patient to call for assistance before getting out of bed or chair/Non-slip footwear when patient is out of bed/[place] to call system/Physically safe environment - no spills, clutter or unnecessary equipment/Purposeful Proactive Rounding/Room/bathroom lighting operational, light cord in reach

## 2024-05-02 ENCOUNTER — APPOINTMENT (OUTPATIENT)
Dept: GASTROENTEROLOGY | Facility: HOSPITAL | Age: 59
End: 2024-05-02

## 2024-05-07 ENCOUNTER — APPOINTMENT (OUTPATIENT)
Dept: PAIN MANAGEMENT | Facility: CLINIC | Age: 59
End: 2024-05-07

## 2024-05-15 NOTE — H&P ADULT - NSHPPOAPULMEMBOLUS_GEN_A_CORE
Detail Level: Detailed Depth Of Biopsy: dermis Was A Bandage Applied: Yes Size Of Lesion In Cm: 0 Biopsy Type: H and E Biopsy Method: Dermablade Anesthesia Type: 1% lidocaine with epinephrine Anesthesia Volume In Cc: 0.5 no Hemostasis: Drysol Wound Care: Petrolatum Dressing: bandage Destruction After The Procedure: No Type Of Destruction Used: Curettage Curettage Text: The wound bed was treated with curettage after the biopsy was performed. Cryotherapy Text: The wound bed was treated with cryotherapy after the biopsy was performed. Electrodesiccation Text: The wound bed was treated with electrodesiccation after the biopsy was performed. Electrodesiccation And Curettage Text: The wound bed was treated with electrodesiccation and curettage after the biopsy was performed. Silver Nitrate Text: The wound bed was treated with silver nitrate after the biopsy was performed. Lab: [id number] Consent: Written consent was obtained and risks were reviewed including but not limited to scarring, infection, bleeding, scabbing, incomplete removal, nerve damage and allergy to anesthesia. Post-Care Instructions: I reviewed with the patient in detail post-care instructions. Patient is to keep the biopsy site dry overnight, and then apply bacitracin twice daily until healed. Patient may apply hydrogen peroxide soaks to remove any crusting. Notification Instructions: Patient will be notified of biopsy results. However, patient instructed to call the office if not contacted within 2 weeks. Billing Type: Third-Party Bill Information: Selecting Yes will display possible errors in your note based on the variables you have selected. This validation is only offered as a suggestion for you. PLEASE NOTE THAT THE VALIDATION TEXT WILL BE REMOVED WHEN YOU FINALIZE YOUR NOTE. IF YOU WANT TO FAX A PRELIMINARY NOTE YOU WILL NEED TO TOGGLE THIS TO 'NO' IF YOU DO NOT WANT IT IN YOUR FAXED NOTE.

## 2024-07-30 ENCOUNTER — APPOINTMENT (OUTPATIENT)
Dept: PAIN MANAGEMENT | Facility: CLINIC | Age: 59
End: 2024-07-30
Payer: COMMERCIAL

## 2024-07-30 VITALS — WEIGHT: 198 LBS | BODY MASS INDEX: 26.82 KG/M2 | HEIGHT: 72 IN

## 2024-07-30 DIAGNOSIS — M50.20 OTHER CERVICAL DISC DISPLACEMENT, UNSPECIFIED CERVICAL REGION: ICD-10-CM

## 2024-07-30 DIAGNOSIS — Z98.1 ARTHRODESIS STATUS: ICD-10-CM

## 2024-07-30 DIAGNOSIS — M79.18 MYALGIA, OTHER SITE: ICD-10-CM

## 2024-07-30 DIAGNOSIS — M54.12 RADICULOPATHY, CERVICAL REGION: ICD-10-CM

## 2024-07-30 PROCEDURE — 99204 OFFICE O/P NEW MOD 45 MIN: CPT
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
## 2024-07-30 NOTE — ASSESSMENT
[FreeTextEntry1] : A discussion regarding available pain management treatment options occurred with the patient.  These included interventional, rehabilitative, pharmacological, and alternative modalities. We will proceed with the following:    Interventional treatment options:   - Proceed with C7-T1 IWONA with fluoroscopic guidance - Can consider TPI for refractory cervical myofascial pain component - see additional instructions below    Rehabilitative options:   - Continue physical therapy - participation in active HEP was discussed and encouraged as tolerated  Medication based treatment options:   - Continue Tylenol 500-1000 mg up to TID as needed - continue topical OTC analgesics on as-needed basis - Patient is not candidate for oral NSAIDs (prior esophagectomy) - see additional instructions below    Complementary treatment options:   - Weight management and lifestyle modifications discussed   - Failed chiropractic care  Additional treatment recommendations as follows:   -Follow-up with Dr. Mauricio as directed - Follow up 1-2 weeks post injection for assessment of efficacy and further treatment recommendations  The risks, benefits and alternatives of the proposed procedure were explained in detail with the patient. The risks outlined include but are not limited to infection, bleeding, post- dural puncture headache, nerve injury, a temporary increase in pain, failure to resolve symptoms, need for future interventions, allergic reaction, and possible elevation of blood sugar in diabetics if using corticosteroid.  All questions were answered to patient's apparent satisfaction, and he/she verbalized an understanding.  The documentation recorded by the scribe, in my presence, accurately reflects the service I personally performed and the decisions made by me with my edits as appropriate.   I, John Nassar acting as scribe, attest that this documentation has been prepared under the direction and in the presence of Provider Kirill Cali DO.

## 2024-07-30 NOTE — PHYSICAL EXAM
[de-identified] : Constitutional:   - No acute distress   - Well developed; well nourished    Neurological:   - normal mood and affect   - alert and oriented x 3     Cardiovascular:   - grossly normal  Cervical Spine Exam:   Inspection:   erythema (-)   ecchymosis (-)   rashes (-)    Palpation:                                                    Cervical paraspinal tenderness:         R (+); L(+)  Upper trapezius tenderness:              R (+); L (+)  Rhomboids tenderness:                      R (+); L (+)  Occipital Ridge:                                    R (-); L (-)  Supraspinatus tenderness:                 R (-); L (-)   ROM: Reduced ROM all planes  Strength Testing:              Deltoid                           R (5/5); L (4/5)  Biceps:                          R (5/5); L (5/5)  Triceps:                         R (5/5); L (5/5)  Finger Abductors:         R (5/5); L (5/5)  Grasp:                           R (5/5); L (5/5)   Special Testing:  Spurling Test:                  R (+); L (+)  Facet load test:               R (+); L (+)   Neuro:  SILT throughout right upper extremity  SILT throughout left upper extremity   Reflexes:  Bilateral Patellar and Achilles reflex 3+ Biceps   -           R (2+); L (2+)  Triceps  -           R (2+); L (2+)  Brachioradialis- R (2+); L (2+)     No ankle clonus

## 2024-08-02 ENCOUNTER — APPOINTMENT (OUTPATIENT)
Dept: GASTROENTEROLOGY | Facility: CLINIC | Age: 59
End: 2024-08-02
Payer: MEDICARE

## 2024-08-02 VITALS
WEIGHT: 198 LBS | RESPIRATION RATE: 14 BRPM | OXYGEN SATURATION: 97 % | TEMPERATURE: 97.6 F | BODY MASS INDEX: 26.82 KG/M2 | HEIGHT: 72 IN

## 2024-08-02 VITALS — SYSTOLIC BLOOD PRESSURE: 133 MMHG | DIASTOLIC BLOOD PRESSURE: 87 MMHG | HEART RATE: 56 BPM

## 2024-08-02 DIAGNOSIS — K31.84 GASTROPARESIS: ICD-10-CM

## 2024-08-02 PROCEDURE — 99213 OFFICE O/P EST LOW 20 MIN: CPT

## 2024-08-02 NOTE — PHYSICAL EXAM
[Alert] : alert [Normal Voice/Communication] : normal voice/communication [Healthy Appearing] : healthy appearing [No Acute Distress] : no acute distress [Sclera] : the sclera and conjunctiva were normal [Hearing Threshold Finger Rub Not [place]] : hearing was normal [Oropharynx] : the oropharynx was normal [Normal Appearance] : the appearance of the neck was normal [No Respiratory Distress] : no respiratory distress [No Acc Muscle Use] : no accessory muscle use [Respiration, Rhythm And Depth] : normal respiratory rhythm and effort [Auscultation Breath Sounds / Voice Sounds] : lungs were clear to auscultation bilaterally [Heart Rate And Rhythm] : heart rate was normal and rhythm regular [Normal S1, S2] : normal S1 and S2 [Murmurs] : no murmurs [None] : no edema [Bowel Sounds] : normal bowel sounds [Abdomen Tenderness] : non-tender [No Masses] : no abdominal mass palpated [Abdomen Soft] : soft [] : no hepatosplenomegaly [Abnormal Walk] : normal gait [Oriented To Time, Place, And Person] : oriented to person, place, and time

## 2024-08-02 NOTE — PLAN
[TextEntry] : Discussed at multidisciplinary esophageal conference Consider SARAHGARETHDAVID @ [facility]  Patient will continue aspiration precautions / dietary discretion as previously recommended. Will contact patient once case discussed to update on current plan of care.

## 2024-08-02 NOTE — ASSESSMENT
[FreeTextEntry1] : Mr. Schwarz is a 58-year-old gentleman referred for acute on chronic dysphagia, significant past medical history of esophagectomy with partial gastrectomy with [place]ophagogastrostomy with [place] [facility] [place] complicated by severe gastroparesis s/p 4 x dilation and Botulinum injection, referred for evaluation of possible repeat pyloromyotomy revision endoscopically (G-POEM).  Patient reports some improvement with pyloric dilation and Botox injection however continues to have intermittent symptoms. Patient interested in undergoing Endosurgical intervention understanding that it may not be possible (scarring) and/or not be completely effective in terms of symptomatic relief.   Although suspect likely technically challenging, it is not unreasonable to consider per-oral endoscopic pyloromyotomy. His response to dilation and Botulinum injection has been positive which can portend a better prognosis although can often result in significant scarring. Discussed alternatives at length, patient verbalized understanding of underlying concerns and expressed realistic expectations. All questions answered and concerns addressed. Will discuss at multi-disciplinary esophageal meeting to determine if all other options explored. If determined the best course of action will call and schedule for G-POEM at [facility].

## 2024-08-02 NOTE — HISTORY OF PRESENT ILLNESS
[FreeTextEntry1] : Mr. Schwarz is a 58-year-old gentleman referred for acute on chronic dysphagia, significant past medical history of esophagectomy with partial gastrectomy with esophagogastrostomy with [place] pyloroplasty in [place] [facility] [place], complicated by severe gastroparesis s/p 4 x dilation and Botulinum injection, referred for evaluation of possible repeat pyloromyotomy revision endoscopically (G-POEM). Patient reports improvement in symptoms s/p most recent dilation and Botox injection however continues to have intermittent symptoms. Additionally, has had some improvement with restructuring his diet. His weight remains stable. Patient again reported that he is interested in potentially undergoing Endosurgical intervention understanding that it may not be possible (scarring) and/or not be completely effective in terms of symptomatic relief. Patient reported that he has had a diminished quality of life since surgery albeit appreciative that he has survived and in remission. With the exception outlined above, patient reports feeling at his baseline level of health.

## 2024-08-09 ENCOUNTER — APPOINTMENT (OUTPATIENT)
Dept: PAIN MANAGEMENT | Facility: CLINIC | Age: 59
End: 2024-08-09

## 2024-09-09 ENCOUNTER — APPOINTMENT (OUTPATIENT)
Dept: PAIN MANAGEMENT | Facility: CLINIC | Age: 59
End: 2024-09-09

## 2024-10-06 NOTE — ED ADULT NURSE NOTE - SUICIDE SCREENING QUESTION 3
Pt to ED by medic 5 for hypoglycemia. EMS originally called out due to pt being lethargic. EMS also did and EKG which showed concern for a stemi. Pt's BS was in the 40's and EMS gave pt dextrose. On arrival pt is A&O x4 but a little lethargic. Pt states her sugars have been running in the 200's and 300's. Pt unable to answer if she took insulin this morning.    No

## 2024-11-06 ENCOUNTER — APPOINTMENT (OUTPATIENT)
Dept: GASTROENTEROLOGY | Facility: CLINIC | Age: 59
End: 2024-11-06
Payer: MEDICARE

## 2024-11-06 VITALS
BODY MASS INDEX: 26.95 KG/M2 | OXYGEN SATURATION: 98 % | SYSTOLIC BLOOD PRESSURE: 125 MMHG | RESPIRATION RATE: 14 BRPM | DIASTOLIC BLOOD PRESSURE: 89 MMHG | WEIGHT: 199 LBS | HEART RATE: 58 BPM | HEIGHT: 72 IN

## 2024-11-06 DIAGNOSIS — K31.84 GASTROPARESIS: ICD-10-CM

## 2024-11-06 PROCEDURE — 99212 OFFICE O/P EST SF 10 MIN: CPT

## 2024-11-27 ENCOUNTER — TRANSCRIPTION ENCOUNTER (OUTPATIENT)
Age: 59
End: 2024-11-27

## 2024-11-27 ENCOUNTER — INPATIENT (INPATIENT)
Facility: HOSPITAL | Age: 59
LOS: 3 days | Discharge: HOME | DRG: 392 | End: 2024-12-01
Attending: STUDENT IN AN ORGANIZED HEALTH CARE EDUCATION/TRAINING PROGRAM | Admitting: HOSPITALIST
Payer: MEDICARE

## 2024-11-27 VITALS
WEIGHT: 189.82 LBS | OXYGEN SATURATION: 99 % | SYSTOLIC BLOOD PRESSURE: 164 MMHG | TEMPERATURE: 98 F | HEART RATE: 90 BPM | RESPIRATION RATE: 20 BRPM | DIASTOLIC BLOOD PRESSURE: 95 MMHG

## 2024-11-27 DIAGNOSIS — Z98.890 OTHER SPECIFIED POSTPROCEDURAL STATES: ICD-10-CM

## 2024-11-27 DIAGNOSIS — K31.84 GASTROPARESIS: ICD-10-CM

## 2024-11-27 DIAGNOSIS — Z98.890 OTHER SPECIFIED POSTPROCEDURAL STATES: Chronic | ICD-10-CM

## 2024-11-27 DIAGNOSIS — R13.10 DYSPHAGIA, UNSPECIFIED: ICD-10-CM

## 2024-11-27 DIAGNOSIS — Z90.49 ACQUIRED ABSENCE OF OTHER SPECIFIED PARTS OF DIGESTIVE TRACT: Chronic | ICD-10-CM

## 2024-11-27 LAB
AGGLUTINATION: PRESENT — SIGNIFICANT CHANGE UP
ALBUMIN SERPL ELPH-MCNC: 5.1 G/DL — SIGNIFICANT CHANGE UP (ref 3.3–5.2)
ALP SERPL-CCNC: 142 U/L — HIGH (ref 40–120)
ALT FLD-CCNC: 30 U/L — SIGNIFICANT CHANGE UP
ANION GAP SERPL CALC-SCNC: 15 MMOL/L — SIGNIFICANT CHANGE UP (ref 5–17)
AST SERPL-CCNC: 29 U/L — SIGNIFICANT CHANGE UP
BASOPHILS # BLD AUTO: 0.08 K/UL — SIGNIFICANT CHANGE UP (ref 0–0.2)
BASOPHILS NFR BLD AUTO: 0.9 % — SIGNIFICANT CHANGE UP (ref 0–2)
BILIRUB SERPL-MCNC: 0.3 MG/DL — LOW (ref 0.4–2)
BLD GP AB SCN SERPL QL: SIGNIFICANT CHANGE UP
BUN SERPL-MCNC: 9.5 MG/DL — SIGNIFICANT CHANGE UP (ref 8–20)
CALCIUM SERPL-MCNC: 9.6 MG/DL — SIGNIFICANT CHANGE UP (ref 8.4–10.5)
CHLORIDE SERPL-SCNC: 98 MMOL/L — SIGNIFICANT CHANGE UP (ref 96–108)
CO2 SERPL-SCNC: 22 MMOL/L — SIGNIFICANT CHANGE UP (ref 22–29)
CREAT SERPL-MCNC: 0.79 MG/DL — SIGNIFICANT CHANGE UP (ref 0.5–1.3)
DACRYOCYTES BLD QL SMEAR: SLIGHT — SIGNIFICANT CHANGE UP
EGFR: 104 ML/MIN/1.73M2 — SIGNIFICANT CHANGE UP
EOSINOPHIL # BLD AUTO: 0 K/UL — SIGNIFICANT CHANGE UP (ref 0–0.5)
EOSINOPHIL NFR BLD AUTO: 0 % — SIGNIFICANT CHANGE UP (ref 0–6)
GIANT PLATELETS BLD QL SMEAR: PRESENT — SIGNIFICANT CHANGE UP
GLUCOSE SERPL-MCNC: 155 MG/DL — HIGH (ref 70–99)
HCT VFR BLD CALC: 39.7 % — SIGNIFICANT CHANGE UP (ref 39–50)
HGB BLD-MCNC: 12.6 G/DL — LOW (ref 13–17)
INR BLD: 1.09 RATIO — SIGNIFICANT CHANGE UP (ref 0.85–1.16)
LIDOCAIN IGE QN: 19 U/L — LOW (ref 22–51)
LYMPHOCYTES # BLD AUTO: 1 K/UL — SIGNIFICANT CHANGE UP (ref 1–3.3)
LYMPHOCYTES # BLD AUTO: 11.4 % — LOW (ref 13–44)
MANUAL SMEAR VERIFICATION: SIGNIFICANT CHANGE UP
MCHC RBC-ENTMCNC: 22.1 PG — LOW (ref 27–34)
MCHC RBC-ENTMCNC: 31.7 G/DL — LOW (ref 32–36)
MCV RBC AUTO: 69.5 FL — LOW (ref 80–100)
MICROCYTES BLD QL: SLIGHT — SIGNIFICANT CHANGE UP
MONOCYTES # BLD AUTO: 0.23 K/UL — SIGNIFICANT CHANGE UP (ref 0–0.9)
MONOCYTES NFR BLD AUTO: 2.6 % — SIGNIFICANT CHANGE UP (ref 2–14)
NEUTROPHILS # BLD AUTO: 7.01 K/UL — SIGNIFICANT CHANGE UP (ref 1.8–7.4)
NEUTROPHILS NFR BLD AUTO: 79.8 % — HIGH (ref 43–77)
OVALOCYTES BLD QL SMEAR: SLIGHT — SIGNIFICANT CHANGE UP
PLAT MORPH BLD: ABNORMAL
PLATELET # BLD AUTO: 320 K/UL — SIGNIFICANT CHANGE UP (ref 150–400)
POIKILOCYTOSIS BLD QL AUTO: SLIGHT — SIGNIFICANT CHANGE UP
POLYCHROMASIA BLD QL SMEAR: SLIGHT — SIGNIFICANT CHANGE UP
POTASSIUM SERPL-MCNC: 4 MMOL/L — SIGNIFICANT CHANGE UP (ref 3.5–5.3)
POTASSIUM SERPL-SCNC: 4 MMOL/L — SIGNIFICANT CHANGE UP (ref 3.5–5.3)
PROT SERPL-MCNC: 8.9 G/DL — HIGH (ref 6.6–8.7)
PROTHROM AB SERPL-ACNC: 12.6 SEC — SIGNIFICANT CHANGE UP (ref 9.9–13.4)
RBC # BLD: 5.71 M/UL — SIGNIFICANT CHANGE UP (ref 4.2–5.8)
RBC # FLD: 18.4 % — HIGH (ref 10.3–14.5)
RBC BLD AUTO: ABNORMAL
SODIUM SERPL-SCNC: 135 MMOL/L — SIGNIFICANT CHANGE UP (ref 135–145)
VARIANT LYMPHS # BLD: 5.3 % — SIGNIFICANT CHANGE UP (ref 0–6)
WBC # BLD: 8.79 K/UL — SIGNIFICANT CHANGE UP (ref 3.8–10.5)
WBC # FLD AUTO: 8.79 K/UL — SIGNIFICANT CHANGE UP (ref 3.8–10.5)

## 2024-11-27 PROCEDURE — 71260 CT THORAX DX C+: CPT | Mod: 26,MC

## 2024-11-27 PROCEDURE — 99223 1ST HOSP IP/OBS HIGH 75: CPT | Mod: GC

## 2024-11-27 PROCEDURE — 74177 CT ABD & PELVIS W/CONTRAST: CPT | Mod: 26,MC

## 2024-11-27 PROCEDURE — 99223 1ST HOSP IP/OBS HIGH 75: CPT | Mod: FS,25

## 2024-11-27 PROCEDURE — 43235 EGD DIAGNOSTIC BRUSH WASH: CPT

## 2024-11-27 PROCEDURE — 93010 ELECTROCARDIOGRAM REPORT: CPT

## 2024-11-27 PROCEDURE — 99285 EMERGENCY DEPT VISIT HI MDM: CPT | Mod: FS

## 2024-11-27 RX ORDER — HYDROMORPHONE HYDROCHLORIDE 2 MG/1
1 TABLET ORAL ONCE
Refills: 0 | Status: DISCONTINUED | OUTPATIENT
Start: 2024-11-27 | End: 2024-11-27

## 2024-11-27 RX ORDER — ACETAMINOPHEN 500MG 500 MG/1
1000 TABLET, COATED ORAL ONCE
Refills: 0 | Status: COMPLETED | OUTPATIENT
Start: 2024-11-27 | End: 2024-11-27

## 2024-11-27 RX ORDER — ACETAMINOPHEN 500MG 500 MG/1
650 TABLET, COATED ORAL EVERY 6 HOURS
Refills: 0 | Status: DISCONTINUED | OUTPATIENT
Start: 2024-11-27 | End: 2024-11-27

## 2024-11-27 RX ORDER — PANTOPRAZOLE SODIUM 40 MG/1
40 TABLET, DELAYED RELEASE ORAL
Refills: 0 | Status: DISCONTINUED | OUTPATIENT
Start: 2024-11-27 | End: 2024-12-01

## 2024-11-27 RX ORDER — ACETAMINOPHEN 500MG 500 MG/1
650 TABLET, COATED ORAL EVERY 6 HOURS
Refills: 0 | Status: DISCONTINUED | OUTPATIENT
Start: 2024-11-27 | End: 2024-12-01

## 2024-11-27 RX ORDER — DIAZEPAM 10 MG/1
1 TABLET ORAL ONCE
Refills: 0 | Status: DISCONTINUED | OUTPATIENT
Start: 2024-11-27 | End: 2024-11-27

## 2024-11-27 RX ORDER — SODIUM CHLORIDE 9 MG/ML
1000 INJECTION, SOLUTION INTRAMUSCULAR; INTRAVENOUS; SUBCUTANEOUS ONCE
Refills: 0 | Status: COMPLETED | OUTPATIENT
Start: 2024-11-27 | End: 2024-11-27

## 2024-11-27 RX ORDER — AMLODIPINE BESYLATE 10 MG/1
5 TABLET ORAL DAILY
Refills: 0 | Status: DISCONTINUED | OUTPATIENT
Start: 2024-11-27 | End: 2024-12-01

## 2024-11-27 RX ORDER — ONDANSETRON HYDROCHLORIDE 4 MG/1
4 TABLET, FILM COATED ORAL EVERY 8 HOURS
Refills: 0 | Status: DISCONTINUED | OUTPATIENT
Start: 2024-11-27 | End: 2024-11-27

## 2024-11-27 RX ORDER — ACETAMINOPHEN, DIPHENHYDRAMINE HCL, PHENYLEPHRINE HCL 325; 25; 5 MG/1; MG/1; MG/1
3 TABLET ORAL AT BEDTIME
Refills: 0 | Status: DISCONTINUED | OUTPATIENT
Start: 2024-11-27 | End: 2024-11-27

## 2024-11-27 RX ORDER — METOCLOPRAMIDE HYDROCHLORIDE 10 MG/1
10 TABLET ORAL THREE TIMES A DAY
Refills: 0 | Status: DISCONTINUED | OUTPATIENT
Start: 2024-11-27 | End: 2024-12-01

## 2024-11-27 RX ORDER — MAGNESIUM, ALUMINUM HYDROXIDE 200-225/5
30 SUSPENSION, ORAL (FINAL DOSE FORM) ORAL EVERY 4 HOURS
Refills: 0 | Status: DISCONTINUED | OUTPATIENT
Start: 2024-11-27 | End: 2024-12-01

## 2024-11-27 RX ORDER — ONDANSETRON HYDROCHLORIDE 4 MG/1
4 TABLET, FILM COATED ORAL ONCE
Refills: 0 | Status: COMPLETED | OUTPATIENT
Start: 2024-11-27 | End: 2024-11-27

## 2024-11-27 RX ADMIN — METOCLOPRAMIDE HYDROCHLORIDE 10 MILLIGRAM(S): 10 TABLET ORAL at 21:36

## 2024-11-27 RX ADMIN — PANTOPRAZOLE SODIUM 40 MILLIGRAM(S): 40 TABLET, DELAYED RELEASE ORAL at 17:19

## 2024-11-27 RX ADMIN — ONDANSETRON HYDROCHLORIDE 4 MILLIGRAM(S): 4 TABLET, FILM COATED ORAL at 01:39

## 2024-11-27 RX ADMIN — ACETAMINOPHEN 500MG 400 MILLIGRAM(S): 500 TABLET, COATED ORAL at 11:28

## 2024-11-27 RX ADMIN — Medication 1 MILLIGRAM(S): at 09:39

## 2024-11-27 RX ADMIN — HYDROMORPHONE HYDROCHLORIDE 1 MILLIGRAM(S): 2 TABLET ORAL at 02:09

## 2024-11-27 RX ADMIN — METOCLOPRAMIDE HYDROCHLORIDE 10 MILLIGRAM(S): 10 TABLET ORAL at 14:03

## 2024-11-27 RX ADMIN — Medication 2 MILLIGRAM(S): at 17:19

## 2024-11-27 RX ADMIN — Medication 4 MILLIGRAM(S): at 01:40

## 2024-11-27 RX ADMIN — Medication 4 MILLIGRAM(S): at 22:00

## 2024-11-27 RX ADMIN — HYDROMORPHONE HYDROCHLORIDE 1 MILLIGRAM(S): 2 TABLET ORAL at 05:30

## 2024-11-27 RX ADMIN — HYDROMORPHONE HYDROCHLORIDE 1 MILLIGRAM(S): 2 TABLET ORAL at 06:44

## 2024-11-27 RX ADMIN — Medication 4 MILLIGRAM(S): at 02:10

## 2024-11-27 RX ADMIN — HYDROMORPHONE HYDROCHLORIDE 1 MILLIGRAM(S): 2 TABLET ORAL at 07:44

## 2024-11-27 RX ADMIN — ACETAMINOPHEN 500MG 1000 MILLIGRAM(S): 500 TABLET, COATED ORAL at 12:28

## 2024-11-27 RX ADMIN — DIAZEPAM 1 MILLIGRAM(S): 10 TABLET ORAL at 01:40

## 2024-11-27 RX ADMIN — PANTOPRAZOLE SODIUM 40 MILLIGRAM(S): 40 TABLET, DELAYED RELEASE ORAL at 09:39

## 2024-11-27 RX ADMIN — Medication 30 MILLILITER(S): at 01:55

## 2024-11-27 RX ADMIN — HYDROMORPHONE HYDROCHLORIDE 1 MILLIGRAM(S): 2 TABLET ORAL at 00:12

## 2024-11-27 RX ADMIN — Medication 4 MILLIGRAM(S): at 21:45

## 2024-11-27 RX ADMIN — HYDROMORPHONE HYDROCHLORIDE 1 MILLIGRAM(S): 2 TABLET ORAL at 04:30

## 2024-11-27 RX ADMIN — Medication 1 MILLIGRAM(S): at 10:39

## 2024-11-27 RX ADMIN — ONDANSETRON HYDROCHLORIDE 4 MILLIGRAM(S): 4 TABLET, FILM COATED ORAL at 14:03

## 2024-11-27 RX ADMIN — SODIUM CHLORIDE 1000 MILLILITER(S): 9 INJECTION, SOLUTION INTRAMUSCULAR; INTRAVENOUS; SUBCUTANEOUS at 01:40

## 2024-11-27 RX ADMIN — Medication 2 MILLIGRAM(S): at 18:19

## 2024-11-27 NOTE — CONSULT NOTE ADULT - NS ATTEND AMEND GEN_ALL_CORE FT
Patient with hx of esophageal cancer, S/P esophagectomy with gastric pull up.   Now with gastroparesis, Pt has had Botox of the pylorus and balloon dilation of pylorus X 4 times. Last was 4/2024  Now again with abdominal pain , nausea, vomiting  Will do EGD today with possible dilation again. I explained the risks and benefits of EGD including but not limited to bleeding, infection, perforation   CBC- 8.79, lytes normal  cmc, lytes ordered for tomorrow  CT- my interpretation - no gastric outlet obstruction   I spoke to Dr Cm who follows the pt. He will come and do EGD   NPO for now     A- BS, soft, non tender

## 2024-11-27 NOTE — H&P ADULT - NSHPPHYSICALEXAM_GEN_ALL_CORE
VITALS:   T(C): 36.4 (11-27-24 @ 12:28), Max: 36.8 (11-27-24 @ 00:23)  HR: 91 (11-27-24 @ 12:28) (86 - 91)  BP: 115/74 (11-27-24 @ 12:28) (115/74 - 165/88)  RR: 18 (11-27-24 @ 12:28) (18 - 20)  SpO2: 96% (11-27-24 @ 12:28) (96% - 99%)    GENERAL: NAD, lying in bed comfortably  HEAD:  Atraumatic, normocephalic  EYES: EOMI, PERRLA, conjunctiva and sclera clear  ENT: Moist mucous membranes  NECK: Supple, no JVD  HEART: Regular rate and rhythm, no murmurs, rubs, or gallops  LUNGS: Unlabored respirations.  Clear to auscultation bilaterally, no crackles, wheezing, or rhonchi  ABDOMEN: Soft, nontender, nondistended, +BS  EXTREMITIES: 2+ peripheral pulses bilaterally. No clubbing, cyanosis, or edema  NERVOUS SYSTEM:  A&Ox3, no focal deficits   SKIN: No rashes or lesions

## 2024-11-27 NOTE — DISCHARGE NOTE PROVIDER - HOSPITAL COURSE
59 M PMHx esophageal carcinoma s/p esophagectomy with gastric pull through (2016), gastroparesis, s/p Pre Pyloric Botox Injection and Balloon Dilation, cholecystectomy presented to ED from home with N/V/D since yesterday. Admitted for gastroparesis. EGD performed 11/27 by Dr. Cm showing abnormal anatomy, open pylorus, no dilation or botox injection needed. Patient discharged home after the procedure with Reglan and PPI BID.    59 M PMHx esophageal carcinoma s/p esophagectomy with gastric pull through (2016), gastroparesis, s/p Pre Pyloric Botox Injection and Balloon Dilation, cholecystectomy presented to ED from home with N/V/D since yesterday. Admitted for gastroparesis. EGD performed 11/27 by Dr. Cm showing abnormal anatomy, open pylorus, no dilation or botox injection needed. Post procedure nausea and vomiting were slowly improving. CT chest and abdomen with oral contrast did not report any leak. He tolerated full liquid diet. He was discharged home

## 2024-11-27 NOTE — CONSULT NOTE ADULT - ASSESSMENT
This is a 60 y/o M with PMHx esophageal carcinoma s/p esophagectomy with gastric pull through (2016), gastroparesis, s/p Pre Pyloric Botox Injection and Balloon Dilation, cholecystectomy presented to ED with worsening nausea, vomiting and abdominal pain.    EGD (04.5.24) There was significant fluid / gastric contents retained within the stomach / farrah-esophagus. CRE balloon dilation utilizing a wire guided technique, the pylorus was dilated to a maximal diameter of 18 mm. 100 units of Botulinum diluted in 10 cc of sterilewater was injected in a four quadrant fashion into the pylorus.      #nausea, vomiting  #abdominal pain   #Hx of esophageal CA s/p esophagectomy   #S/p Dilation and Botox  CT A/P w/ OC & IC (11.27.24) No acute findings in the abdomen or pelvis.  Unchanged sequela from partial esophagectomy and gastric pull-through.    - Antiemetics as needed   - Keep NPO   - IVP PPI BID for mucosal cytoprotection   - Monitor electrolytes. Replete as needed   - Case discussed with advanced endoscopist. Further recs pending  _________________________________________________________________  Assessment and recommendations are final when note is signed by the attending physician.        This is a 58 y/o M with PMHx esophageal carcinoma s/p esophagectomy with gastric pull through (2016), gastroparesis, s/p Pre Pyloric Botox Injection and Balloon Dilation, cholecystectomy presented to ED with worsening nausea, vomiting and abdominal pain.    EGD (04.5.24) There was significant fluid / gastric contents retained within the stomach / farrah-esophagus. CRE balloon dilation utilizing a wire guided technique, the pylorus was dilated to a maximal diameter of 18 mm. 100 units of Botulinum diluted in 10 cc of sterilewater was injected in a four quadrant fashion into the pylorus.      #nausea, vomiting  #abdominal pain   #Hx of esophageal CA s/p esophagectomy   #S/p Dilation and Botox  CT A/P w/ OC & IC (11.27.24) No acute findings in the abdomen or pelvis.  Unchanged sequela from partial esophagectomy and gastric pull-through.    - Antiemetics as needed   - Keep NPO   - IVP PPI BID for mucosal cytoprotection   - Monitor electrolytes. Replete as needed   - Case discussed with advanced endoscopist.   - Plan for EGD +/- dilation later today with advanced endoscopist  - Active T&S, INR < 1.5, platelet >50  _________________________________________________________________  Assessment and recommendations are final when note is signed by the attending physician.

## 2024-11-27 NOTE — DISCHARGE NOTE PROVIDER - NSDCCPCAREPLAN_GEN_ALL_CORE_FT
PRINCIPAL DISCHARGE DIAGNOSIS  Diagnosis: Nausea & vomiting  Assessment and Plan of Treatment: The EGD showed some abnormal anatomy, an open pylorus so no dilation/botox injection was needed. Follow up with GI in 1 week. Stop taking Erythromycin. Continue with Reglan as needed for nausea. Continue with Omeprazole 40 mg twice per day. If nausea/vomiting worsen, you develop intractable vomiting, severe chest pain, or cannot swallow saliva please return to ED.      SECONDARY DISCHARGE DIAGNOSES  Diagnosis: Esophageal cancer  Assessment and Plan of Treatment: continue home regimen      Diagnosis: HTN (hypertension)  Assessment and Plan of Treatment: continue home regimen

## 2024-11-27 NOTE — H&P ADULT - ASSESSMENT
59 M PMHx esophageal carcinoma s/p esophagectomy with gastric pull through (2016), gastroparesis, s/p Pre Pyloric Botox Injection and Balloon Dilation, cholecystectomy presented to ED from home with N/V/D since yesterday. Admitted for gastroparesis.     Plan:  Nausea/vomiting 2/2 Gastroparesis/abnormal anatomy   - Hx esophageal cancer s/p esophagectomy   - Multiple EGDs, last one in April with CRE balloon dilation and Botox injection  - CT abd showed no acute findings in the abdomen or pelvis, partial esophagectomy and gastric pull-through.  - EGD performed 11/27 by Dr. Cm showing abnormal anatomy, open pylorus   - GI following, recommending CT surgery outpatient but Dr. Cm will discuss the case with Dr. JOSUE Moe   - C/w Zofran  - C/w PPI IV BID     HTN  - C/w amlodipine    DVT: encourage mobilization   Diet: Advance as tolerated  Dispo: tomorrow if patient symptoms improve    59 M PMHx esophageal carcinoma s/p esophagectomy with gastric pull through (2016), gastroparesis, s/p Pre Pyloric Botox Injection and Balloon Dilation, cholecystectomy presented to ED from home with N/V/D since yesterday. Admitted for gastroparesis.     Plan:  Nausea/vomiting 2/2 Gastroparesis/abnormal anatomy   - Hx esophageal cancer s/p esophagectomy   - Multiple EGDs, last one in April with CRE balloon dilation and Botox injection  - CT abd showed no acute findings in the abdomen or pelvis, partial esophagectomy and gastric pull-through.  - EGD performed 11/27 by Dr. Cm showing abnormal anatomy, open pylorus   - GI following, recommending CT surgery outpatient but Dr. Cm will discuss the case with Dr. JOSUE Moe   - Per GI held Erythromycin   - C/w Reglan  - C/w PPI IV BID     HTN  - C/w amlodipine    DVT: encourage mobilization   Diet: Advance as tolerated  Dispo: tomorrow if patient symptoms improve    59 M PMHx esophageal carcinoma s/p esophagectomy with gastric pull through (2016), gastroparesis, s/p Pre Pyloric Botox Injection and Balloon Dilation, cholecystectomy presented to ED from home with N/V/D since yesterday. Admitted for gastroparesis.     Plan:  Nausea/vomiting 2/2 Gastroparesis/abnormal anatomy   - Hx esophageal cancer s/p esophagectomy   - Multiple EGDs, last one in April with CRE balloon dilation and Botox injection  - CT abd showed no acute findings in the abdomen or pelvis, partial esophagectomy and gastric pull-through.  - EGD performed 11/27 by Dr. Cm showing abnormal anatomy, open pylorus, no dilation/botox injection needed   - GI following, recommending CT surgery outpatient but Dr. Cm will discuss the case with Dr. JOSUE Moe   - Per GI held Erythromycin   - C/w Reglan  - C/w PPI IV BID     HTN  - C/w amlodipine    DVT: encourage mobilization   Diet: Advance as tolerated  Dispo: home tomorrow if patient symptoms improve

## 2024-11-27 NOTE — H&P ADULT - HISTORY OF PRESENT ILLNESS
59 M PMHx esophageal carcinoma s/p esophagectomy with gastric pull through (2016), gastroparesis, s/p Pre Pyloric Botox Injection and Balloon Dilation, cholecystectomy presented to ED from home with N/V/D since yesterday. Over the past several weeks has had Nausea, NBNB vomiting, abd pain with eating/drinking. Yesterday had more frequent episodes, unable to keep water/food down, still NBNB, tried Reglan did not work. Denies fever, chills, headache, chest pain, heart palpitations. Patient had EGD this past April with CRE balloon dilation and Botox injection. Follows with GI Dr. Cm.  In ED patient had CT showing no acute findings. GI consulted, planned for EGD with balloon dilation. Patient admitted to medicine.

## 2024-11-27 NOTE — DISCHARGE NOTE PROVIDER - CARE PROVIDER_API CALL
Josemanuel Cm  Gastroenterology  [address] [facility], [facility] [address]  [place] [address]  Phone: [phone]  Fax: [phone]  Established Patient  Follow Up Time: 1 week

## 2024-11-27 NOTE — CONSULT NOTE ADULT - NS ATTEST RISK PROBLEM GEN_ALL_CORE FT
Patient with hx of esophageal cancer, S/P esophagectomy with gastric pull up.   Now with gastroparesis, Pt has had Botox of the pylorus and balloon dilation of pylorus X 4 times. Last was 4/2024  Now again with abdominal pain , nausea, vomiting  Will do EGD today with possible dilation again. I explained the risks and benefits of EGD including but not limited to bleeding, infection, perforation   CBC- 8.79, lytes normal  cmc, lytes ordered for tomorrow  CT- my interpretation - no gastric outlet obstruction   I spoke to Dr Cm who follows the pt. He will come and do EGD   NPO for now

## 2024-11-27 NOTE — ED ADULT NURSE NOTE - OBJECTIVE STATEMENT
Patient presents to ED complaining of abdominal pain with nausea and vomiting. Patient A&O x4, denies CP/SOB, no acute distress noted. Patient lying in bed, safety maintained. Patient updated on plan of care.

## 2024-11-27 NOTE — ED PROVIDER NOTE - CLINICAL SUMMARY MEDICAL DECISION MAKING FREE TEXT BOX
58 y/o male with PMH of esophageal cancer s/p esophagectomy, s/p Pre Pyloric Botox Injection and Balloon Dilation, gastroparesis, HTN present to ED for n/v abd pain. pt reports having these episodes in the past and needed EGD for balloon dilation. Pt reports 2 days of pain when eating or drinking with associated nausea/vomiting. Pt denies fever, chills, HA,  SOB, CP, lightheadedness, dizziness, weakness, diarrhea.   pt uncomfortable, with n/v. CT, labs, 60 y/o male with PMH of esophageal cancer s/p esophagectomy, s/p Pre Pyloric Botox Injection and Balloon Dilation, gastroparesis, HTN present to ED for n/v abd pain. pt reports having these episodes in the past and needed EGD for balloon dilation. Pt reports 2 days of pain when eating or drinking with associated nausea/vomiting. Pt denies fever, chills, HA,  SOB, CP, lightheadedness, dizziness, weakness, diarrhea.   pt uncomfortable, with n/v. CT, labs,  Spoke with on call GI chatalbash, advised if CT negative, pt should be admitted for EGD  labs unremarkable, CT without acute findings.  pt to be admitted for further management. 60 y/o male with PMH of esophageal cancer s/p esophagectomy , s/p Pre Pyloric Botox Injection and Balloon Dilation, gastroparesis, HTN present to ED for n/v abd pain. pt reports having these episodes in the past and needed EGD for balloon dilation. Pt reports 2 days of pain when eating or drinking with associated nausea/vomiting. Pt denies fever, chills, HA,  SOB, CP, lightheadedness, dizziness, weakness, diarrhea.   pt uncomfortable, with n/v. CT, labs,  Spoke with on call GI joseluis, advised if CT negative, pt should be admitted for EGD  labs unremarkable, CT without acute findings.  pt to be admitted for further management.    Babs Wise MD, Attending  60 yo M hx of esophageal CA sp esophagectomy with gastric pull-through (2016),  s/p Pre Pyloric Botox Injection and Balloon Dilation (roughly ~6 months), gastroparesis, HTN present to ED for n/v abd pain x 2 days, worse with solid and liquid PO intake. Pt states he is due "right around now" for his balloon dilation.   Gen: Patient in pain.   Head: NC/AT  Neck: trachea midline  Resp:  No distress  abd: + epigastric ttp, non distended, no rebound guarding  Ext: no deformities  Neuro:  A&O appears non focal  Skin:  Warm and dry as visualized    ddx includes, but is not limited to the following: esophageal strictures, lower suspicion for sbo, perforated viscus, pancreatitis, gastritis  plan: abd labs, CT, analgesia, anticipate GI consult and admission.    update: see progress notes

## 2024-11-27 NOTE — CONSULT NOTE ADULT - SUBJECTIVE AND OBJECTIVE BOX
victorina zimmer [id number]   Daughters number   HPI:  59 M PMHx esophageal carcinoma s/p esophagectomy with gastric pull through (2016), gastroparesis, s/p Pre Pyloric Botox Injection and Balloon Dilation, cholecystectomy presented to ED from home with N/V/D since yesterday. Over the past several weeks has had Nausea, NBNB vomiting, abd pain with eating/drinking. Yesterday had more frequent episodes, unable to keep water/food down, still NBNB, tried Reglan did not work. Denies fever, chills, headache, chest pain, heart palpitations. Patient had EGD this past April with CRE balloon dilation and Botox injection. Follows with GI Dr. Cm.  In ED patient had CT showing no acute findings. GI consulted, planned for EGD with balloon dilation. Patient admitted to medicine.    (27 Nov 2024 14:43)    Patient s/p EGD with GI. Revealed pyloris open, diffuse gastric erythema and retained contents and diagnosed with gastroparesis. However patient still with severe ABD pain. Pain is RLQ and sharp and constant. Worsens with PO intake. No flatus. Last BM 2 days ago. Thoracic surgery consulted for prior surgical history. CT A/P with IV cxt read with no acute findings, but reviewed with radiology. Noted to have mild enteritis and thickening of terminal ileum. No leukocytosis.       PAST MEDICAL & SURGICAL HISTORY:  Hypertension      Esophageal cancer      Gastroparesis      S/P cholecystectomy      H/O esophagectomy          REVIEW OF SYSTEMS      General: No Weight change/ Fatigue/ HA/Dizzy	    Skin/Breast: No Rashes/ Lesions/ Masses  	  Ophthalmologic: No Blurry vision/ Glaucoma/ Blindness  	  ENMT: No Hearing loss/ Drainage/ Lesions	    Respiratory and Thorax: No Cough/ Wheezing/ SOB/ Hemoptysis/ Sputum production  	  Cardiovascular: No Chest pain/ Palpitations/ Diaphoresis	    Gastrointestinal: N/V    Genitourinary: No Hematuria/ Dysuria/ Frequency change	    Musculoskeletal: No Pain/ Weakness/ Claudication	    Neurological: No Seizures/ TIA/CVA/ Paresthesias    Psychiatric: No Dementia/ Depression/ SI/HI	    Hematology/Lymphatics: No hx of bleeding/ Edema	    Endocrine:	No Hyperglycemia/ Hypoglycemia    Allergic/Immunologic:	 No Anaphylaxis/ Intolerance/ Recent illnesses    MEDICATIONS  (STANDING):  amLODIPine   Tablet 5 milliGRAM(s) Oral daily  metoclopramide Injectable 10 milliGRAM(s) IV Push three times a day  pantoprazole  Injectable 40 milliGRAM(s) IV Push two times a day    MEDICATIONS  (PRN):  acetaminophen     Tablet .. 650 milliGRAM(s) Oral every 6 hours PRN Temp greater or equal to 38C (100.4F), Mild Pain (1 - 3)  aluminum hydroxide/magnesium hydroxide/simethicone Suspension 30 milliLiter(s) Oral every 4 hours PRN Dyspepsia  morphine  - Injectable 2 milliGRAM(s) IV Push every 4 hours PRN Moderate Pain (4 - 6)  morphine  - Injectable 4 milliGRAM(s) IV Push every 4 hours PRN Severe Pain (7 - 10)      Allergies    No Known Allergies            FAMILY HISTORY:  FH: HTN (hypertension) (Father)        Vital Signs Last 24 Hrs  T(C): 36.4 (27 Nov 2024 12:28), Max: 36.8 (27 Nov 2024 00:23)  T(F): 97.5 (27 Nov 2024 12:28), Max: 98.2 (27 Nov 2024 00:23)  HR: 91 (27 Nov 2024 12:28) (86 - 91)  BP: 115/74 (27 Nov 2024 12:28) (115/74 - 165/88)  BP(mean): --  RR: 18 (27 Nov 2024 12:28) (18 - 20)  SpO2: 96% (27 Nov 2024 12:28) (96% - 99%)    Parameters below as of 27 Nov 2024 12:28  Patient On (Oxygen Delivery Method): room air        General: NAD  Neurology: Awake, nonfocal, STONER x 4  ENT: Gross hearing intact, grossly patent pharynx, no stridor  Neck: Neck supple, trachea midline, No JVD  Respiratory: CTA B/L  CV: S1S2, no murmurs, rubs or gallops  Abdominal: Soft, RLQ tenderness with rebound pain, nondistended  Extremities: No edema, + peripheral pulses  Skin: No Rashes, Hematoma, Ecchymosis      LABS:                        12.6   8.79  )-----------( 320      ( 27 Nov 2024 01:15 )             39.7     11-27    135  |  98  |  9.5  ----------------------------<  155[H]  4.0   |  22.0  |  0.79    Ca    9.6      27 Nov 2024 01:15    TPro  8.9[H]  /  Alb  5.1  /  TBili  0.3[L]  /  DBili  x   /  AST  29  /  ALT  30  /  AlkPhos  142[H]  11-27    PT/INR - ( 27 Nov 2024 09:19 )   PT: 12.6 sec;   INR: 1.09 ratio           Urinalysis Basic - ( 27 Nov 2024 01:15 )    Color: x / Appearance: x / SG: x / pH: x  Gluc: 155 mg/dL / Ketone: x  / Bili: x / Urobili: x   Blood: x / Protein: x / Nitrite: x   Leuk Esterase: x / RBC: x / WBC x   Sq Epi: x / Non Sq Epi: x / Bacteria: x        RADIOLOGY & ADDITIONAL STUDIES:  < from: CT Abdomen and Pelvis w/ Oral Cont and w/ IV Cont (11.27.24 @ 05:24) >  CONTRAST/COMPLICATIONS:  IV Contrast: Omnipaque 350 (accession [id number]), NONE (accession   [id number])  90 cc administered   10 cc discarded  Oral Contrast: NONE    PROCEDURE:  CT of the Chest, Abdomen and Pelvis was performed.  Sagittal and coronal reformats were performed.    FINDINGS:  CHEST:  LUNGS AND LARGE AIRWAYS: Patent central airways. No pulmonary nodules or   parenchymal consolidation.  PLEURA: No pleural effusion.  VESSELS: Aortic calcifications. Coronary artery calcifications.  HEART: Heart size is normal. No pericardial effusion.  MEDIASTINUM AND SEVEN: No lymphadenopathy. Unchanged sequela from partial   esophagectomy and gastric pull-through.  CHEST WALL AND LOWER NECK: Within normal limits.  BONES: Partially visualized cervical ACDF hardware. Multiple healed rib   fractures on the right.    ABDOMEN AND PELVIS:  LIVER: Within normal limits.  BILE DUCTS: Normal caliber.  GALLBLADDER: Cholecystectomy.  SPLEEN: Within normal limits.  PANCREAS: Unchanged partial fatty replacement.  ADRENALS: Stable appearance.  KIDNEYS/URETERS: Within normal limits.    BLADDER: Within normal limits.  REPRODUCTIVE ORGANS: Prostate withinnormal limits.    BOWEL: No bowel obstruction. Colonic diverticulosis. Appendix is normal.  PERITONEUM/RETROPERITONEUM: Within normal limits.  VESSELS: Atherosclerotic changes. IVC filter.  LYMPH NODES: No lymphadenopathy.  ABDOMINAL WALL: Within normal limits.  BONES: Degenerative changes. Unchanged healed fracture of the left   inferior and superior pubic rami.    < end of copied text >      ASSESSMENT:   59yMalePAST MEDICAL & SURGICAL HISTORY:  Hypertension      Esophageal cancer      Gastroparesis      S/P cholecystectomy      H/O esophagectomy     HPI:  59 M PMHx esophageal carcinoma s/p esophagectomy with gastric pull through (2016), gastroparesis, s/p Pre Pyloric Botox Injection and Balloon Dilation, cholecystectomy presented to ED from home with N/V/D since yesterday. Over the past several weeks has had Nausea, NBNB vomiting, abd pain with eating/drinking. Yesterday had more frequent episodes, unable to keep water/food down, still NBNB, tried Reglan did not work. Denies fever, chills, headache, chest pain, heart palpitations. Patient had EGD this past April with CRE balloon dilation and Botox injection. Follows with GI Dr. Cm.  In ED patient had CT showing no acute findings. GI consulted, planned for EGD with balloon dilation. Patient admitted to medicine.    (27 Nov 2024 14:43)    Patient s/p EGD with GI. Revealed pyloris open, diffuse gastric erythema and retained contents and diagnosed with gastroparesis. However patient still with severe ABD pain. Pain is RLQ and sharp and constant. Worsens with PO intake. No flatus. Last BM 2 days ago. Thoracic surgery consulted for prior surgical history. CT A/P with IV cxt read with no acute findings, but reviewed with radiology. Noted to have mild enterocolitis and thickening of terminal ileum. No leukocytosis.       PAST MEDICAL & SURGICAL HISTORY:  Hypertension      Esophageal cancer      Gastroparesis      S/P cholecystectomy      H/O esophagectomy          REVIEW OF SYSTEMS      General: No Weight change/ Fatigue/ HA/Dizzy	    Skin/Breast: No Rashes/ Lesions/ Masses  	  Ophthalmologic: No Blurry vision/ Glaucoma/ Blindness  	  ENMT: No Hearing loss/ Drainage/ Lesions	    Respiratory and Thorax: No Cough/ Wheezing/ SOB/ Hemoptysis/ Sputum production  	  Cardiovascular: No Chest pain/ Palpitations/ Diaphoresis	    Gastrointestinal: N/V    Genitourinary: No Hematuria/ Dysuria/ Frequency change	    Musculoskeletal: No Pain/ Weakness/ Claudication	    Neurological: No Seizures/ TIA/CVA/ Paresthesias    Psychiatric: No Dementia/ Depression/ SI/HI	    Hematology/Lymphatics: No hx of bleeding/ Edema	    Endocrine:	No Hyperglycemia/ Hypoglycemia    Allergic/Immunologic:	 No Anaphylaxis/ Intolerance/ Recent illnesses    MEDICATIONS  (STANDING):  amLODIPine   Tablet 5 milliGRAM(s) Oral daily  metoclopramide Injectable 10 milliGRAM(s) IV Push three times a day  pantoprazole  Injectable 40 milliGRAM(s) IV Push two times a day    MEDICATIONS  (PRN):  acetaminophen     Tablet .. 650 milliGRAM(s) Oral every 6 hours PRN Temp greater or equal to 38C (100.4F), Mild Pain (1 - 3)  aluminum hydroxide/magnesium hydroxide/simethicone Suspension 30 milliLiter(s) Oral every 4 hours PRN Dyspepsia  morphine  - Injectable 2 milliGRAM(s) IV Push every 4 hours PRN Moderate Pain (4 - 6)  morphine  - Injectable 4 milliGRAM(s) IV Push every 4 hours PRN Severe Pain (7 - 10)      Allergies    No Known Allergies            FAMILY HISTORY:  FH: HTN (hypertension) (Father)        Vital Signs Last 24 Hrs  T(C): 36.4 (27 Nov 2024 12:28), Max: 36.8 (27 Nov 2024 00:23)  T(F): 97.5 (27 Nov 2024 12:28), Max: 98.2 (27 Nov 2024 00:23)  HR: 91 (27 Nov 2024 12:28) (86 - 91)  BP: 115/74 (27 Nov 2024 12:28) (115/74 - 165/88)  BP(mean): --  RR: 18 (27 Nov 2024 12:28) (18 - 20)  SpO2: 96% (27 Nov 2024 12:28) (96% - 99%)    Parameters below as of 27 Nov 2024 12:28  Patient On (Oxygen Delivery Method): room air        General: NAD  Neurology: Awake, nonfocal, STONER x 4  ENT: Gross hearing intact, grossly patent pharynx, no stridor  Neck: Neck supple, trachea midline, No JVD  Respiratory: CTA B/L  CV: S1S2, no murmurs, rubs or gallops  Abdominal: Soft, RLQ tenderness with rebound pain, nondistended  Extremities: No edema, + peripheral pulses  Skin: No Rashes, Hematoma, Ecchymosis      LABS:                        12.6   8.79  )-----------( 320      ( 27 Nov 2024 01:15 )             39.7     11-27    135  |  98  |  9.5  ----------------------------<  155[H]  4.0   |  22.0  |  0.79    Ca    9.6      27 Nov 2024 01:15    TPro  8.9[H]  /  Alb  5.1  /  TBili  0.3[L]  /  DBili  x   /  AST  29  /  ALT  30  /  AlkPhos  142[H]  11-27    PT/INR - ( 27 Nov 2024 09:19 )   PT: 12.6 sec;   INR: 1.09 ratio           Urinalysis Basic - ( 27 Nov 2024 01:15 )    Color: x / Appearance: x / SG: x / pH: x  Gluc: 155 mg/dL / Ketone: x  / Bili: x / Urobili: x   Blood: x / Protein: x / Nitrite: x   Leuk Esterase: x / RBC: x / WBC x   Sq Epi: x / Non Sq Epi: x / Bacteria: x        RADIOLOGY & ADDITIONAL STUDIES:  < from: CT Abdomen and Pelvis w/ Oral Cont and w/ IV Cont (11.27.24 @ 05:24) >  CONTRAST/COMPLICATIONS:  IV Contrast: Omnipaque 350 (accession [id number]), NONE (accession   [id number])  90 cc administered   10 cc discarded  Oral Contrast: NONE    PROCEDURE:  CT of the Chest, Abdomen and Pelvis was performed.  Sagittal and coronal reformats were performed.    FINDINGS:  CHEST:  LUNGS AND LARGE AIRWAYS: Patent central airways. No pulmonary nodules or   parenchymal consolidation.  PLEURA: No pleural effusion.  VESSELS: Aortic calcifications. Coronary artery calcifications.  HEART: Heart size is normal. No pericardial effusion.  MEDIASTINUM AND SEVEN: No lymphadenopathy. Unchanged sequela from partial   esophagectomy and gastric pull-through.  CHEST WALL AND LOWER NECK: Within normal limits.  BONES: Partially visualized cervical ACDF hardware. Multiple healed rib   fractures on the right.    ABDOMEN AND PELVIS:  LIVER: Within normal limits.  BILE DUCTS: Normal caliber.  GALLBLADDER: Cholecystectomy.  SPLEEN: Within normal limits.  PANCREAS: Unchanged partial fatty replacement.  ADRENALS: Stable appearance.  KIDNEYS/URETERS: Within normal limits.    BLADDER: Within normal limits.  REPRODUCTIVE ORGANS: Prostate withinnormal limits.    BOWEL: No bowel obstruction. Colonic diverticulosis. Appendix is normal.  PERITONEUM/RETROPERITONEUM: Within normal limits.  VESSELS: Atherosclerotic changes. IVC filter.  LYMPH NODES: No lymphadenopathy.  ABDOMINAL WALL: Within normal limits.  BONES: Degenerative changes. Unchanged healed fracture of the left   inferior and superior pubic rami.    < end of copied text >      ASSESSMENT:   59yMalePAST MEDICAL & SURGICAL HISTORY:  Hypertension      Esophageal cancer      Gastroparesis      S/P cholecystectomy      H/O esophagectomy

## 2024-11-27 NOTE — CONSULT NOTE ADULT - SUBJECTIVE AND OBJECTIVE BOX
Chief Complaint: Patient is a 59y old  Male who presents with a chief complaint of nausea, vomiting, and abdominal pain     HPI:  This is a 60 y/o M with PMHx esophageal carcinoma s/p esophagectomy with gastric pull through (), gastroparesis, s/p Pre Pyloric Botox Injection and Balloon Dilation, cholecystectomy presented to ED with worsening nausea, vomiting and abdominal pain. GI consulted. Patient reports for the past several weeks he has had intermittent nausea, NBNB vomiting, and abdominal pain with PO intolerance. For the past two days abdominal pain, nausea, vomiting have become progressively worse. He reports at least 6 episodes of vomiting yesterday, no blood visualized. He currently takes omeprazole and erythromycin daily, and Reglan PRN. He has took reglan 3X yesterday. Patient s/p EGD this past April with CRE balloon dilation utilizing a wire guided technique, the pylorus was dilated to a maximal diameter of 18 mm and Botox injection. He recently saw GI Dr. Cm for possible repeat dilation versus G-POEM if becomes un-responsive to injection / dilation. He was told to follow up in 6 months at that time.     PAST MEDICAL & SURGICAL HISTORY:  Hypertension      Esophageal cancer      Gastroparesis      S/P cholecystectomy      H/O esophagectomy          REVIEW OF SYSTEMS:   General: Negative  HEENT: Negative  CV: Negative  Respiratory: Negative  GI: See HPI  : Negative  MSK: Negative  Hematologic: Negative  Skin: Negative    MEDICATIONS:   MEDICATIONS  (STANDING):    MEDICATIONS  (PRN):          DIET:          ALLERGIES:   Allergies    No Known Allergies    Intolerances        Substance Use:   (  ) never used  (  ) other:  Tobacco Usage:  (   ) never smoked   (   ) former smoker   (   ) current smoker  (     ) pack year  (        ) last cigarette date  Alcohol Usage:    Family History   IBD (  ) Yes   (  ) No  GI Malignancy (  )  Yes    (  ) No    Health Management  Last Colonoscopy:  Last Endoscopy:     VITAL SIGNS:   Vital Signs Last 24 Hrs  T(C): 36.7 (2024 07:34), Max: 36.8 (2024 00:23)  T(F): 98.1 (2024 07:34), Max: 98.2 (2024 00:23)  HR: 86 (2024 07:34) (86 - 90)  BP: 165/88 (2024 07:34) (164/95 - 165/88)  BP(mean): --  RR: 18 (2024 07:34) (18 - 20)  SpO2: 97% (2024 07:34) (97% - 99%)    Parameters below as of 2024 07:34  Patient On (Oxygen Delivery Method): room air      I&O's Summary      PHYSICAL EXAM:   GENERAL:  No acute distress  HEENT:  NC/AT, conjunctiva clear, sclera anicteric  CHEST:  No increased effort  HEART:  Regular rate  ABDOMEN:  Soft, non-tender, non-distended, normoactive bowel sounds, no rebound or guarding  EXTREMITIES: No edema  SKIN:  Warm, dry  NEURO:  Calm, cooperative    LABS:                        12.6   8.79  )-----------( 320      ( 2024 01:15 )             39.7     Hemoglobin: 12.6 g/dL (24 @ 01:15)        135  |  98  |  9.5  ----------------------------<  155[H]  4.0   |  22.0  |  0.79    Ca    9.6      2024 01:15    TPro  8.9[H]  /  Alb  5.1  /  TBili  0.3[L]  /  DBili  x   /  AST  29  /  ALT  30  /  AlkPhos  142[H]      LIVER FUNCTIONS - ( 2024 01:15 )  Alb: 5.1 g/dL / Pro: 8.9 g/dL / ALK PHOS: 142 U/L / ALT: 30 U/L / AST: 29 U/L / GGT: x           Lipase: 19 U/L (24 @ 01:15)      RADIOLOGY & ADDITIONAL STUDIES:      ACC: [id number] EXAM:  CT ABDOMEN AND PELVIS OC IC   ORDERED BY: CHRIS WALKER     ACC: [id number] EXAM:  CT CHEST IC   ORDERED BY: CHRIS WALKER     PROCEDURE DATE:  2024          INTERPRETATION:  CLINICAL INFORMATION: Nausea, vomiting, abdominal pain,   history of esophagectomy    COMPARISON: CT chest, abdomen and pelvis performed 2024.    CONTRAST/COMPLICATIONS:  IV Contrast: Omnipaque 350 (accession [id number]), NONE (accession   [id number])  90 cc administered   10 cc discarded  Oral Contrast: NONE    PROCEDURE:  CT of the Chest, Abdomen and Pelvis was performed.  Sagittal and coronal reformats were performed.    FINDINGS:  CHEST:  LUNGS AND LARGE AIRWAYS: Patent central airways. No pulmonary nodules or   parenchymal consolidation.  PLEURA: No pleural effusion.  VESSELS: Aortic calcifications. Coronary artery calcifications.  HEART: Heart size is normal. No pericardial effusion.  MEDIASTINUM AND SEVEN: No lymphadenopathy. Unchanged sequela from partial   esophagectomy and gastric pull-through.  CHEST WALL AND LOWER NECK: Within normal limits.  BONES: Partially visualized cervical ACDF hardware. Multiple healed rib   fractures on the right.    ABDOMEN AND PELVIS:  LIVER: Within normal limits.  BILE DUCTS: Normal caliber.  GALLBLADDER: Cholecystectomy.  SPLEEN: Within normal limits.  PANCREAS: Unchanged partial fatty replacement.  ADRENALS: Stable appearance.  KIDNEYS/URETERS: Within normal limits.    BLADDER: Within normal limits.  REPRODUCTIVE ORGANS: Prostate within normal limits.    BOWEL: No bowel obstruction. Colonic diverticulosis. Appendix is normal.  PERITONEUM/RETROPERITONEUM: Within normal limits.  VESSELS: Atherosclerotic changes. IVC filter.  LYMPH NODES: No lymphadenopathy.  ABDOMINAL WALL: Within normal limits.  BONES: Degenerative changes. Unchanged healed fracture of the left   inferior and superior pubic rami.    IMPRESSION:  No acute findings in the abdomen or pelvis.    Unchanged sequela from partial esophagectomy and gastric pull-through.    --- End of Report ---    ROX POLK MD; Attending Radiologist  This document has been electronically signed. 2024  5:47AM  24 @ 05:24    < from: EGD (24 @ 19:51) >  EGD Report        Date: 2024 7:51 PM        Patient Name: MIAH GIL        MRN: [id number]        Account Number:        [id number]        Gender: Male         (age): 1965 (58)        Instrument(s):        GIF [id number]        Attending/Fellow:        Josemanuel Cm MD                                ASA Class:        P3 - 2024 12:31 AM Josemanuel Cm MD        History of Present Illness:        History of esophagectomy presenting with symptoms concerning for GOO        Administered Medications:        As per Anesthesiology Record        Indications:        Gastroparesis: 536.3 - K31.84        Nausea: 787.02 - R11.0        Vomitin.01 - R11.2        Procedure:        The procedure, indications, preparation and potential complications were    explained to the patient, who indicated understanding and signed the    corresponding consent forms. IV anesthesia was administered by anesthesiologist.    Continuous pulse oximetry and blood pressure monitoring wereused throughout the    procedure. Supplemental oxygen was used. Patient was placed in the left lateral    decubitus position. The endoscope was introduced through the mouth and advanced    under direct visualization until the second part of the duodenum was reached.    Patient tolerance to the procedure was good. The procedure was not difficult.    Blood loss was minimal.        Limitations/Complications:        There were no apparent limitations or complications        Findings:        EsophagusAdditional esophagus findings - The proximal portion of retained    esophageal mucosa appeared endoscopically normal. There was evidence of prior    surgery (Esophagectomy) at approximately 15 cm from the incisors. There was a    visible staple in the mid esophagus/gastric pull up..        Stomach Additional stomach findings - There was significant fluid / gastric    contents retained within the stomach / farrah-esophagus. The pylorus was patent and    the endoscope was able to traverse without difficulty. The decision was made to    proceed with CRE balloon dilation utilizing a wire guided technique, the pylorus    was dilated to a maximal diameter of 18 mm. Subsequently, 100 units of Botulinum    diluted in 10 cc of sterile water was injected in a four quadrant fashion into    the pylorus..        Duodenum Normal duodenum.        Impressions:        The proximal portion of retained esophageal mucosa appeared endoscopically    normal. There was evidence of prior surgery (Esophagectomy) at approximately 15    cm from the incisors. There was a visible staple in the mid esophagus/gastric    pull up.        There was significant fluid / gastric contents retained within the stomach /    farrah-esophagus. The pylorus was patent and the endoscopewas able to traverse    without difficulty. The decision was made to proceed with CRE balloon dilation    utilizing a wire guided technique, the pylorus was dilated to a maximal diameter    of 18 mm. 100 units of Botulinum diluted in 10 cc of sterilewater was injected    in a four quadrant fashion into the pylorus.        Plan:        NPO overnight, can advance diet as tolerated tomorrow morning.        Continue aspiration precautions and PPI        Pending response to recent intervention will likely need repeat therapy. Can    follow up outpatient with Dr. Moe regarding alternative versus repeat    therapies.        Return to floor for further management                Josemanuel Cm MD        Version 1, Electronically signed on 2024 12:42:41 AM by Josemanuel Cm MD    < end of copied text >     Chief Complaint: Patient is a 59y old  Male who presents with a chief complaint of nausea, vomiting, and abdominal pain     HPI:  This is a 60 y/o M with PMHx esophageal carcinoma s/p esophagectomy with gastric pull through (), gastroparesis, s/p Pre Pyloric Botox Injection and Balloon DilationX 4 , cholecystectomy presented to ED with worsening nausea, vomiting and abdominal pain. GI consulted. Patient reports for the past several weeks he has had intermittent nausea, NBNB vomiting, and abdominal pain with PO intolerance. For the past two days abdominal pain, nausea, vomiting have become progressively worse. He reports at least 6 episodes of vomiting yesterday, no blood visualized. He currently takes omeprazole and erythromycin daily, and Reglan PRN. He has took reglan 3X yesterday. Patient s/p EGD this past April with CRE balloon dilation utilizing a wire guided technique, the pylorus was dilated to a maximal diameter of 18 mm and Botox injection. He recently saw GI Dr. Cm for possible repeat dilation versus G-POEM if becomes un-responsive to injection / dilation. He was told to follow up in 6 months at that time.     PAST MEDICAL & SURGICAL HISTORY:  Hypertension      Esophageal cancer      Gastroparesis      S/P cholecystectomy      H/O esophagectomy          REVIEW OF SYSTEMS:   General: Negative  HEENT: Negative  CV: Negative  Respiratory: Negative  GI: See HPI  : Negative  MSK: Negative  Hematologic: Negative  Skin: Negative    MEDICATIONS:   MEDICATIONS  (STANDING):    MEDICATIONS  (PRN):          DIET:          ALLERGIES:   Allergies    No Known Allergies    Intolerances        Substance Use:   (  ) never used  (  ) other:  Tobacco Usage:  (   ) never smoked   (   ) former smoker   (   ) current smoker  (     ) pack year  (        ) last cigarette date  Alcohol Usage:    Family History   IBD (  ) Yes   (  ) No  GI Malignancy (  )  Yes    (  ) No    Health Management  Last Colonoscopy:  Last Endoscopy:     VITAL SIGNS:   Vital Signs Last 24 Hrs  T(C): 36.7 (2024 07:34), Max: 36.8 (2024 00:23)  T(F): 98.1 (2024 07:34), Max: 98.2 (2024 00:23)  HR: 86 (2024 07:34) (86 - 90)  BP: 165/88 (2024 07:34) (164/95 - 165/88)  BP(mean): --  RR: 18 (2024 07:34) (18 - 20)  SpO2: 97% (2024 07:34) (97% - 99%)    Parameters below as of 2024 07:34  Patient On (Oxygen Delivery Method): room air      I&O's Summary      PHYSICAL EXAM:   GENERAL:  No acute distress  HEENT:  NC/AT, conjunctiva clear, sclera anicteric  CHEST:  No increased effort  HEART:  Regular rate  ABDOMEN:  Soft, non-tender, non-distended, normoactive bowel sounds, no rebound or guarding  EXTREMITIES: No edema  SKIN:  Warm, dry  NEURO:  Calm, cooperative    LABS:                        12.6   8.79  )-----------( 320      ( 2024 01:15 )             39.7     Hemoglobin: 12.6 g/dL (24 @ 01:15)        135  |  98  |  9.5  ----------------------------<  155[H]  4.0   |  22.0  |  0.79    Ca    9.6      2024 01:15    TPro  8.9[H]  /  Alb  5.1  /  TBili  0.3[L]  /  DBili  x   /  AST  29  /  ALT  30  /  AlkPhos  142[H]      LIVER FUNCTIONS - ( 2024 01:15 )  Alb: 5.1 g/dL / Pro: 8.9 g/dL / ALK PHOS: 142 U/L / ALT: 30 U/L / AST: 29 U/L / GGT: x           Lipase: 19 U/L (24 @ 01:15)      RADIOLOGY & ADDITIONAL STUDIES:      ACC: [id number] EXAM:  CT ABDOMEN AND PELVIS OC IC   ORDERED BY: CHRIS WALKER     ACC: [id number] EXAM:  CT CHEST IC   ORDERED BY: CHRIS WALKER     PROCEDURE DATE:  2024          INTERPRETATION:  CLINICAL INFORMATION: Nausea, vomiting, abdominal pain,   history of esophagectomy    COMPARISON: CT chest, abdomen and pelvis performed 2024.    CONTRAST/COMPLICATIONS:  IV Contrast: Omnipaque 350 (accession [id number]), NONE (accession   [id number])  90 cc administered   10 cc discarded  Oral Contrast: NONE    PROCEDURE:  CT of the Chest, Abdomen and Pelvis was performed.  Sagittal and coronal reformats were performed.    FINDINGS:  CHEST:  LUNGS AND LARGE AIRWAYS: Patent central airways. No pulmonary nodules or   parenchymal consolidation.  PLEURA: No pleural effusion.  VESSELS: Aortic calcifications. Coronary artery calcifications.  HEART: Heart size is normal. No pericardial effusion.  MEDIASTINUM AND SEVEN: No lymphadenopathy. Unchanged sequela from partial   esophagectomy and gastric pull-through.  CHEST WALL AND LOWER NECK: Within normal limits.  BONES: Partially visualized cervical ACDF hardware. Multiple healed rib   fractures on the right.    ABDOMEN AND PELVIS:  LIVER: Within normal limits.  BILE DUCTS: Normal caliber.  GALLBLADDER: Cholecystectomy.  SPLEEN: Within normal limits.  PANCREAS: Unchanged partial fatty replacement.  ADRENALS: Stable appearance.  KIDNEYS/URETERS: Within normal limits.    BLADDER: Within normal limits.  REPRODUCTIVE ORGANS: Prostate within normal limits.    BOWEL: No bowel obstruction. Colonic diverticulosis. Appendix is normal.  PERITONEUM/RETROPERITONEUM: Within normal limits.  VESSELS: Atherosclerotic changes. IVC filter.  LYMPH NODES: No lymphadenopathy.  ABDOMINAL WALL: Within normal limits.  BONES: Degenerative changes. Unchanged healed fracture of the left   inferior and superior pubic rami.    IMPRESSION:  No acute findings in the abdomen or pelvis.    Unchanged sequela from partial esophagectomy and gastric pull-through.    --- End of Report ---    ROX POLK MD; Attending Radiologist  This document has been electronically signed. 2024  5:47AM  24 @ 05:24    < from: EGD (24 @ 19:51) >  EGD Report        Date: 2024 7:51 PM        Patient Name: MIAH GIL        MRN: [id number]        Account Number:        [id number]        Gender: Male         (age): 1965 (58)        Instrument(s):        GIF [id number]        Attending/Fellow:        Josemanuel Cm MD                                ASA Class:        P3 - 2024 12:31 AM Josemanuel Cm MD        History of Present Illness:        History of esophagectomy presenting with symptoms concerning for GOO        Administered Medications:        As per Anesthesiology Record        Indications:        Gastroparesis: 536.3 - K31.84        Nausea: 787.02 - R11.0        Vomitin.01 - R11.2        Procedure:        The procedure, indications, preparation and potential complications were    explained to the patient, who indicated understanding and signed the    corresponding consent forms. IV anesthesia was administered by anesthesiologist.    Continuous pulse oximetry and blood pressure monitoring wereused throughout the    procedure. Supplemental oxygen was used. Patient was placed in the left lateral    decubitus position. The endoscope was introduced through the mouth and advanced    under direct visualization until the second part of the duodenum was reached.    Patient tolerance to the procedure was good. The procedure was not difficult.    Blood loss was minimal.        Limitations/Complications:        There were no apparent limitations or complications        Findings:        EsophagusAdditional esophagus findings - The proximal portion of retained    esophageal mucosa appeared endoscopically normal. There was evidence of prior    surgery (Esophagectomy) at approximately 15 cm from the incisors. There was a    visible staple in the mid esophagus/gastric pull up..        Stomach Additional stomach findings - There was significant fluid / gastric    contents retained within the stomach / farrah-esophagus. The pylorus was patent and    the endoscope was able to traverse without difficulty. The decision was made to    proceed with CRE balloon dilation utilizing a wire guided technique, the pylorus    was dilated to a maximal diameter of 18 mm. Subsequently, 100 units of Botulinum    diluted in 10 cc of sterile water was injected in a four quadrant fashion into    the pylorus..        Duodenum Normal duodenum.        Impressions:        The proximal portion of retained esophageal mucosa appeared endoscopically    normal. There was evidence of prior surgery (Esophagectomy) at approximately 15    cm from the incisors. There was a visible staple in the mid esophagus/gastric    pull up.        There was significant fluid / gastric contents retained within the stomach /    farrah-esophagus. The pylorus was patent and the endoscopewas able to traverse    without difficulty. The decision was made to proceed with CRE balloon dilation    utilizing a wire guided technique, the pylorus was dilated to a maximal diameter    of 18 mm. 100 units of Botulinum diluted in 10 cc of sterilewater was injected    in a four quadrant fashion into the pylorus.        Plan:        NPO overnight, can advance diet as tolerated tomorrow morning.        Continue aspiration precautions and PPI        Pending response to recent intervention will likely need repeat therapy. Can    follow up outpatient with Dr. Moe regarding alternative versus repeat    therapies.        Return to floor for further management                Josemanuel Cm MD        Version 1, Electronically signed on 2024 12:42:41 AM by Josemanuel Cm MD    < end of copied text >

## 2024-11-27 NOTE — H&P ADULT - NSHPOUTPATIENTPROVIDERS_GEN_ALL_CORE
Dr. Ceja, PCP  Dr. Mela Thomas, [facility]   Dr. Cm, GI Dr. Ceja, PCP  Dr. John Thomas, [facility]   Dr. Cm, GI

## 2024-11-27 NOTE — CONSULT NOTE ADULT - PROBLEM SELECTOR RECOMMENDATION 9
Patient with severe RLQ ABD pain  Xray Esophagram will not be performed until Friday  Would recommend CT Chest/A/P with PO contrast for CT esophogram and delayed imaging to assess A/P  Trend WBC, check lactate   C/W PPI as per GI  IVF/NPO  Serial ABD exams prior to pain medications  Would discontinue prn morphine, and order one time doses following ABD exams as needed  Will follow imaging  D/W Dr. Moe
Patient with hx of esophageal cancer, S/P esophagectomy with gastric pull up.   Now with gastroparesis, Pt has had Botox of the pylorus and balloon dilation of pylorus X 4 times. Last was 4/2024  Now again with abdominal pain , nausea, vomiting  Will do EGD today with possible dilation again. I explained the risks and benefits of EGD including but not limited to bleeding, infection, perforation   CBC- 8.79, lytes normal  cmc, lytes ordered for tomorrow  CT- my interpretation - no gastric outlet obstruction   I spoke to Dr Cm who follows the pt. He will come and do EGD   NPO for now

## 2024-11-27 NOTE — ED PROVIDER NOTE - OBJECTIVE STATEMENT
60 y/o male with PMH of esophageal cancer s/p esophagectomy, s/p Pre Pyloric Botox Injection and Balloon Dilation, gastroparesis, HTN present to ED for n/v abd pain. pt reports having these episodes in the past and needed EGD for balloon dilation. Pt reports 2 days of pain when eating or drinking with associated nausea/vomiting. Pt denies fever, chills, HA,  SOB, CP, lightheadedness, dizziness, weakness, diarrhea.

## 2024-11-27 NOTE — ED PROVIDER NOTE - ATTENDING APP SHARED VISIT CONTRIBUTION OF CARE
Dr. Wise: I personally saw the patient with the ACP and performed a substantive portion of the visit. I performed a face to face bedside interview with patient regarding history of present illness, review of symptoms and past medical history. I completed an independent physical exam and all aspects of medical decision making. I have discussed patient's plan of care with ACP. I agree with note as stated above, having amended the EMR as needed to reflect my findings. This includes HISTORY OF PRESENT ILLNESS, HIV, PAST MEDICAL/SURGICAL/FAMILY/SOCIAL HISTORY, ALLERGIES AND HOME MEDICATIONS, ROS, PHYSICAL EXAM, MEDICAL DECISION MAKING and any PROGRESS NOTES during the time I functioned as the attending physician for this patient.  SEE MDM

## 2024-11-27 NOTE — DISCHARGE NOTE PROVIDER - NSDCMRMEDTOKEN_GEN_ALL_CORE_FT
amLODIPine 5 mg oral tablet: 1 tab(s) orally once a day  omeprazole 40 mg oral delayed release capsule: 1 cap(s) orally 2 times a day  Reglan 10 mg oral tablet: 1 tab(s) orally 3 times a day (before meals), As Needed

## 2024-11-27 NOTE — ED PROVIDER NOTE - PHYSICAL EXAMINATION
Gen: No acute distress, non toxic  HEENT: Mucous membranes moist, pink conjunctivae, EOMI  CV: RRR, nl s1/s2.  Resp: CTAB, normal rate and effort  GI: Abdomen soft, epigastric mildly tender, ND. No rebound, no guarding  : No CVAT  Neuro: A&O x 3, moving all 4 extremities  MSK: No spine or joint tenderness to palpation  Skin: No rashes. intact and perfused.

## 2024-11-27 NOTE — H&P ADULT - ATTENDING COMMENTS
I attest that I have completed more than 50% of the documentation, physical exam and orders      GENERAL: pt examined bedside, laying comfortably in bed in NAD  HEENT: NC/AT, dry oral mucosa, clear conjunctiva, sclera nonicteric  RESPIRATORY: Normal respiratory effort, no wheezing, rhonchi, rales  CARDIOVASCULAR: RRR, normal S1 and S2, no murmur/rub/gallop  ABDOMEN: soft, NT/ND, normoactive bowel sounds, no rebound/guarding  EXTREMITIES: No cynaosis, no clubbing, no lower extremity edema        58 y/o M w/ PMH of esophageal carcinoma s/p esophagectomy with gastric pull through (2016), gastroparesis, s/p Pre Pyloric Botox Injection and Balloon Dilation, cholecystectomy presented to ED w/ 1 day hx of dysphagia to solids and liquids although tolerating saliva.    He reports NBNB emesis of anything he tries to eat/drink within seconds of ingesting it.  Pt also reporting odynophagia and epigastric abd pain.  He denies weight loss but in the past he has had similar symptoms on and off w/ associated weight loss bc of difficulty eating and has required botox injections and dilation.  His last EGD was 04/2024 during which time he underwent balloon dilation and botox injection and follows w/ Dr. Bustillos and recently saw him for possible repeat dilation versus G-POEM if becomes un-responsive to injection / dilation.  Pt went for EGD today and pylorus was dilated.  Pts symptoms likely from gastroparesis as per GI and cleared for d/c w/ reglan and PPI regimen.

## 2024-11-27 NOTE — CONSULT NOTE ADULT - ASSESSMENT
59 M PMHx esophageal carcinoma s/p esophagectomy with gastric pull through (2016), gastroparesis, s/p Pre Pyloric Botox Injection and Balloon Dilation, cholecystectomy presented to ED from home 11/27 with N/V since yesterday. S/p EGD with GI: Revealed pyloris open, diffuse gastric erythema and retained contents and diagnosed with gastroparesis. However patient still with severe ABD pain. Pain is RLQ and sharp and constant. Worsens with PO intake. No flatus. Last BM 2 days ago. Thoracic surgery consulted for prior surgical history. CT A/P with IV cxt read with no acute findings, but reviewed with radiology. Noted to have mild enteritis and thickening of terminal ileum. No leukocytosis.  59 M PMHx esophageal carcinoma s/p esophagectomy with gastric pull through (2016), gastroparesis, s/p Pre Pyloric Botox Injection and Balloon Dilation, cholecystectomy presented to ED from home 11/27 with N/V since yesterday. S/p EGD with GI: Revealed pyloris open, diffuse gastric erythema and retained contents and diagnosed with gastroparesis. However patient still with severe ABD pain. Pain is RLQ and sharp and constant. Worsens with PO intake. No flatus. Last BM 2 days ago. Thoracic surgery consulted for prior surgical history. CT A/P with IV cxt read with no acute findings, but reviewed with radiology. Noted to have mild enterocolitis and thickening of terminal ileum. No leukocytosis.

## 2024-11-27 NOTE — DISCHARGE NOTE PROVIDER - ATTENDING DISCHARGE PHYSICAL EXAMINATION:
VITALS:   T(C): 36.4 (11-27-24 @ 12:28), Max: 36.8 (11-27-24 @ 00:23)  HR: 91 (11-27-24 @ 12:28) (86 - 91)  BP: 115/74 (11-27-24 @ 12:28) (115/74 - 165/88)  RR: 18 (11-27-24 @ 12:28) (18 - 20)  SpO2: 96% (11-27-24 @ 12:28) (96% - 99%)    GENERAL: NAD, lying in bed comfortably  HEAD:  Atraumatic, normocephalic  EYES: EOMI, PERRLA, conjunctiva and sclera clear  ENT: Moist mucous membranes  NECK: Supple, no JVD  HEART: Regular rate and rhythm, no murmurs, rubs, or gallops  LUNGS: Unlabored respirations.  Clear to auscultation bilaterally, no crackles, wheezing, or rhonchi  ABDOMEN: Soft, nontender, nondistended, +BS  EXTREMITIES: 2+ peripheral pulses bilaterally. No clubbing, cyanosis, or edema  NERVOUS SYSTEM:  A&Ox3, no focal deficits   SKIN: No rashes or lesions Vital Signs Last 24 Hrs  T(C): 36.4 (01 Dec 2024 07:34), Max: 36.7 (30 Nov 2024 16:57)  T(F): 97.6 (01 Dec 2024 07:34), Max: 98.1 (30 Nov 2024 21:27)  HR: 87 (01 Dec 2024 07:34) (70 - 87)  BP: 133/87 (01 Dec 2024 07:34) (133/87 - 160/92)  BP(mean): 115 (30 Nov 2024 21:27) (112 - 115)  RR: 18 (01 Dec 2024 07:34) (18 - 18)  SpO2: 96% (01 Dec 2024 07:34) (96% - 99%)    Parameters below as of 01 Dec 2024 07:34  Patient On (Oxygen Delivery Method): room air        GENERAL: NAD, lying in bed comfortably  HEAD:  Atraumatic, normocephalic  EYES: EOMI, PERRLA, conjunctiva and sclera clear  ENT: Moist mucous membranes  NECK: Supple, no JVD  HEART: Regular rate and rhythm, no murmurs, rubs, or gallops  LUNGS: Unlabored respirations.  Clear to auscultation bilaterally, no crackles, wheezing, or rhonchi  ABDOMEN: Soft, nontender, nondistended, +BS  EXTREMITIES: 2+ peripheral pulses bilaterally. No clubbing, cyanosis, or edema  NERVOUS SYSTEM:  A&Ox3, no focal deficits   SKIN: No rashes or lesions

## 2024-11-28 LAB
ALBUMIN SERPL ELPH-MCNC: 4.5 G/DL — SIGNIFICANT CHANGE UP (ref 3.3–5.2)
ALP SERPL-CCNC: 110 U/L — SIGNIFICANT CHANGE UP (ref 40–120)
ALT FLD-CCNC: 23 U/L — SIGNIFICANT CHANGE UP
ANION GAP SERPL CALC-SCNC: 15 MMOL/L — SIGNIFICANT CHANGE UP (ref 5–17)
AST SERPL-CCNC: 23 U/L — SIGNIFICANT CHANGE UP
BASOPHILS # BLD AUTO: 0.02 K/UL — SIGNIFICANT CHANGE UP (ref 0–0.2)
BASOPHILS NFR BLD AUTO: 0.2 % — SIGNIFICANT CHANGE UP (ref 0–2)
BILIRUB SERPL-MCNC: 0.3 MG/DL — LOW (ref 0.4–2)
BUN SERPL-MCNC: 16.7 MG/DL — SIGNIFICANT CHANGE UP (ref 8–20)
CALCIUM SERPL-MCNC: 8.9 MG/DL — SIGNIFICANT CHANGE UP (ref 8.4–10.5)
CHLORIDE SERPL-SCNC: 100 MMOL/L — SIGNIFICANT CHANGE UP (ref 96–108)
CO2 SERPL-SCNC: 21 MMOL/L — LOW (ref 22–29)
CREAT SERPL-MCNC: 0.74 MG/DL — SIGNIFICANT CHANGE UP (ref 0.5–1.3)
EGFR: 106 ML/MIN/1.73M2 — SIGNIFICANT CHANGE UP
EOSINOPHIL # BLD AUTO: 0 K/UL — SIGNIFICANT CHANGE UP (ref 0–0.5)
EOSINOPHIL NFR BLD AUTO: 0 % — SIGNIFICANT CHANGE UP (ref 0–6)
FERRITIN SERPL-MCNC: 14 NG/ML — LOW (ref 30–400)
FOLATE SERPL-MCNC: 8.6 NG/ML — SIGNIFICANT CHANGE UP
GLUCOSE SERPL-MCNC: 152 MG/DL — HIGH (ref 70–99)
HCT VFR BLD CALC: 35.1 % — LOW (ref 39–50)
HGB BLD-MCNC: 11.4 G/DL — LOW (ref 13–17)
IMM GRANULOCYTES NFR BLD AUTO: 0.5 % — SIGNIFICANT CHANGE UP (ref 0–0.9)
IRON SATN MFR SERPL: 10 % — LOW (ref 16–55)
IRON SATN MFR SERPL: 54 UG/DL — LOW (ref 59–158)
LYMPHOCYTES # BLD AUTO: 1.37 K/UL — SIGNIFICANT CHANGE UP (ref 1–3.3)
LYMPHOCYTES # BLD AUTO: 10.3 % — LOW (ref 13–44)
MAGNESIUM SERPL-MCNC: 2.4 MG/DL — SIGNIFICANT CHANGE UP (ref 1.6–2.6)
MCHC RBC-ENTMCNC: 22.5 PG — LOW (ref 27–34)
MCHC RBC-ENTMCNC: 32.5 G/DL — SIGNIFICANT CHANGE UP (ref 32–36)
MCV RBC AUTO: 69.4 FL — LOW (ref 80–100)
MONOCYTES # BLD AUTO: 1.12 K/UL — HIGH (ref 0–0.9)
MONOCYTES NFR BLD AUTO: 8.4 % — SIGNIFICANT CHANGE UP (ref 2–14)
NEUTROPHILS # BLD AUTO: 10.75 K/UL — HIGH (ref 1.8–7.4)
NEUTROPHILS NFR BLD AUTO: 80.6 % — HIGH (ref 43–77)
PHOSPHATE SERPL-MCNC: 3.1 MG/DL — SIGNIFICANT CHANGE UP (ref 2.4–4.7)
PLATELET # BLD AUTO: 332 K/UL — SIGNIFICANT CHANGE UP (ref 150–400)
POTASSIUM SERPL-MCNC: 4.3 MMOL/L — SIGNIFICANT CHANGE UP (ref 3.5–5.3)
POTASSIUM SERPL-SCNC: 4.3 MMOL/L — SIGNIFICANT CHANGE UP (ref 3.5–5.3)
PROT SERPL-MCNC: 7.6 G/DL — SIGNIFICANT CHANGE UP (ref 6.6–8.7)
RBC # BLD: 5.06 M/UL — SIGNIFICANT CHANGE UP (ref 4.2–5.8)
RBC # FLD: 17.9 % — HIGH (ref 10.3–14.5)
SODIUM SERPL-SCNC: 136 MMOL/L — SIGNIFICANT CHANGE UP (ref 135–145)
TIBC SERPL-MCNC: 565 UG/DL — HIGH (ref 220–430)
TRANSFERRIN SERPL-MCNC: 395 MG/DL — HIGH (ref 180–329)
VIT B12 SERPL-MCNC: 306 PG/ML — SIGNIFICANT CHANGE UP (ref 232–1245)
WBC # BLD: 13.32 K/UL — HIGH (ref 3.8–10.5)
WBC # FLD AUTO: 13.32 K/UL — HIGH (ref 3.8–10.5)

## 2024-11-28 PROCEDURE — 99232 SBSQ HOSP IP/OBS MODERATE 35: CPT

## 2024-11-28 RX ORDER — IRON SUCROSE 20 MG/ML
200 INJECTION, SOLUTION INTRAVENOUS EVERY 24 HOURS
Refills: 0 | Status: COMPLETED | OUTPATIENT
Start: 2024-11-28 | End: 2024-11-30

## 2024-11-28 RX ORDER — ONDANSETRON HYDROCHLORIDE 4 MG/1
4 TABLET, FILM COATED ORAL ONCE
Refills: 0 | Status: COMPLETED | OUTPATIENT
Start: 2024-11-28 | End: 2024-11-28

## 2024-11-28 RX ORDER — FENTANYL 12 UG/H
1 PATCH, EXTENDED RELEASE TRANSDERMAL
Refills: 0 | Status: DISCONTINUED | OUTPATIENT
Start: 2024-11-28 | End: 2024-12-01

## 2024-11-28 RX ORDER — 0.9 % SODIUM CHLORIDE 0.9 %
1000 INTRAVENOUS SOLUTION INTRAVENOUS
Refills: 0 | Status: DISCONTINUED | OUTPATIENT
Start: 2024-11-28 | End: 2024-11-28

## 2024-11-28 RX ORDER — ONDANSETRON HYDROCHLORIDE 4 MG/1
4 TABLET, FILM COATED ORAL EVERY 6 HOURS
Refills: 0 | Status: DISCONTINUED | OUTPATIENT
Start: 2024-11-28 | End: 2024-12-01

## 2024-11-28 RX ADMIN — METOCLOPRAMIDE HYDROCHLORIDE 10 MILLIGRAM(S): 10 TABLET ORAL at 05:42

## 2024-11-28 RX ADMIN — Medication 4 MILLIGRAM(S): at 02:09

## 2024-11-28 RX ADMIN — PANTOPRAZOLE SODIUM 40 MILLIGRAM(S): 40 TABLET, DELAYED RELEASE ORAL at 05:42

## 2024-11-28 RX ADMIN — Medication 125 MILLILITER(S): at 02:07

## 2024-11-28 RX ADMIN — Medication 2 MILLIGRAM(S): at 20:40

## 2024-11-28 RX ADMIN — Medication 2 MILLIGRAM(S): at 15:49

## 2024-11-28 RX ADMIN — Medication 1000 MICROGRAM(S): at 09:53

## 2024-11-28 RX ADMIN — FENTANYL 1 PATCH: 12 PATCH, EXTENDED RELEASE TRANSDERMAL at 19:30

## 2024-11-28 RX ADMIN — FENTANYL 1 PATCH: 12 PATCH, EXTENDED RELEASE TRANSDERMAL at 12:21

## 2024-11-28 RX ADMIN — Medication 2 MILLIGRAM(S): at 14:49

## 2024-11-28 RX ADMIN — IRON SUCROSE 110 MILLIGRAM(S): 20 INJECTION, SOLUTION INTRAVENOUS at 09:53

## 2024-11-28 RX ADMIN — Medication 4 MILLIGRAM(S): at 09:35

## 2024-11-28 RX ADMIN — ONDANSETRON HYDROCHLORIDE 4 MILLIGRAM(S): 4 TABLET, FILM COATED ORAL at 14:49

## 2024-11-28 RX ADMIN — ONDANSETRON HYDROCHLORIDE 4 MILLIGRAM(S): 4 TABLET, FILM COATED ORAL at 02:09

## 2024-11-28 RX ADMIN — Medication 4 MILLIGRAM(S): at 02:25

## 2024-11-28 RX ADMIN — Medication 2 MILLIGRAM(S): at 21:40

## 2024-11-28 RX ADMIN — PANTOPRAZOLE SODIUM 40 MILLIGRAM(S): 40 TABLET, DELAYED RELEASE ORAL at 16:57

## 2024-11-28 RX ADMIN — ONDANSETRON HYDROCHLORIDE 4 MILLIGRAM(S): 4 TABLET, FILM COATED ORAL at 20:44

## 2024-11-28 RX ADMIN — METOCLOPRAMIDE HYDROCHLORIDE 10 MILLIGRAM(S): 10 TABLET ORAL at 12:22

## 2024-11-28 RX ADMIN — AMLODIPINE BESYLATE 5 MILLIGRAM(S): 10 TABLET ORAL at 05:44

## 2024-11-28 RX ADMIN — Medication 4 MILLIGRAM(S): at 08:35

## 2024-11-28 NOTE — PROGRESS NOTE ADULT - PROBLEM SELECTOR PLAN 1
Patient with severe RLQ ABD pain  Xray Esophagram will not be performed until Friday  Would recommend CT Chest/A/P with PO contrast for CT esophogram and delayed imaging to assess A/P  Trend WBC, check lactate   C/W PPI as per GI  Currently on CLD without any worsening abd pain or difficulty swowlling. Patient is passing gas   Serial ABD exams prior to pain medications  Would discontinue prn morphine, and order one time doses following ABD exams as needed  Will follow imaging  D/W Dr. Moe.

## 2024-11-28 NOTE — PROVIDER CONTACT NOTE (CHANGE IN STATUS NOTIFICATION) - ASSESSMENT
pt denies lightheadedness, dizziness from poor PO intake. states that when he comes to hospital and is not eating that "they normally put me on IV fluids". RN encouraged pt to drink some juice to keep BS stable, pt took sips of apple juice

## 2024-11-28 NOTE — PROGRESS NOTE ADULT - SUBJECTIVE AND OBJECTIVE BOX
Chief Complaint:  Patient is a 59y old  Male who presents with a chief complaint of Nausea/vomiting (2024 12:21)    HPI/ 24 hr events:  He is s/p EGD yesterday with wide open pylorus and no GI interventions. Patient seen and examined at bedside. Pt feeling well this morning. Tolerating diet. BM. Denies nausea, vomiting, diarrhea, hematemesis, hematochezia, melena. Had abdominal pain, which has improved with pain medication. Reports was taking reglan, erythromycin and omeprazole at home.      REVIEW OF SYSTEMS:   12 point review of systems completed and negative other than as stated in HPI    MEDICATIONS:   MEDICATIONS  (STANDING):  amLODIPine   Tablet 5 milliGRAM(s) Oral daily  cyanocobalamin 1000 MICROGram(s) Oral daily  fentaNYL   Patch  12 MICROgram(s)/Hr 1 Patch Transdermal every 72 hours  iron sucrose IVPB 200 milliGRAM(s) IV Intermittent every 24 hours  metoclopramide Injectable 10 milliGRAM(s) IV Push three times a day  pantoprazole  Injectable 40 milliGRAM(s) IV Push two times a day    MEDICATIONS  (PRN):  acetaminophen     Tablet .. 650 milliGRAM(s) Oral every 6 hours PRN Temp greater or equal to 38C (100.4F), Mild Pain (1 - 3)  aluminum hydroxide/magnesium hydroxide/simethicone Suspension 30 milliLiter(s) Oral every 4 hours PRN Dyspepsia  morphine  - Injectable 4 milliGRAM(s) IV Push every 4 hours PRN Severe Pain (7 - 10)  morphine  - Injectable 2 milliGRAM(s) IV Push every 4 hours PRN Moderate Pain (4 - 6)            DIET:  Diet, Clear Liquid (24 @ 16:43) [Active]          ALLERGIES:   Allergies    No Known Allergies    Intolerances        PHYSICAL EXAM:   VITAL SIGNS:   Vital Signs Last 24 Hrs  T(C): 36.5 (2024 11:14), Max: 36.9 (2024 23:57)  T(F): 97.7 (2024 11:14), Max: 98.4 (2024 23:57)  HR: 86 (2024 11:14) (86 - 95)  BP: 156/94 (2024 11:14) (128/86 - 156/94)  BP(mean): 115 (2024 11:14) (100 - 115)  RR: 18 (2024 11:14) (18 - 18)  SpO2: 97% (2024 11:14) (94% - 100%)    Parameters below as of 2024 11:14  Patient On (Oxygen Delivery Method): room air      I&O's Summary    GENERAL:  No acute distress  HEENT:  NC/AT, conjunctiva clear, sclera anicteric  CHEST:  No increased effort  HEART:  Regular rate  ABDOMEN:  Soft, non-tender, non-distended, normoactive bowel sounds, no rebound or guarding  EXTREMITIES: No edema  SKIN:  Warm, dry  NEURO:  Calm, cooperative    LABS:                        11.4   13.32 )-----------( 332      ( 2024 04:00 )             35.1     Hemoglobin: 11.4 g/dL (24 @ 04:00)  Hemoglobin: 12.6 g/dL (24 @ 01:15)        136  |  100  |  16.7  ----------------------------<  152[H]  4.3   |  21.0[L]  |  0.74    Ca    8.9      2024 04:00  Phos  3.1       Mg     2.4         TPro  7.6  /  Alb  4.5  /  TBili  0.3[L]  /  DBili  x   /  AST  23  /  ALT  23  /  AlkPhos  110      LIVER FUNCTIONS - ( 2024 04:00 )  Alb: 4.5 g/dL / Pro: 7.6 g/dL / ALK PHOS: 110 U/L / ALT: 23 U/L / AST: 23 U/L / GGT: x             PT/INR - ( 2024 09:19 )   PT: 12.6 sec;   INR: 1.09 ratio           RADIOLOGY & ADDITIONAL STUDIES:  I personally reviewed the studies and agree with the radiologists review    ACC: [id number] EXAM:  CT ABDOMEN AND PELVIS OC IC   ORDERED BY: CHRIS WALKER     ACC: [id number] EXAM:  CT CHEST IC   ORDERED BY: CHRIS WALKER     PROCEDURE DATE:  2024          INTERPRETATION:  CLINICAL INFORMATION: Nausea, vomiting, abdominal pain,   history of esophagectomy    COMPARISON: CT chest, abdomen and pelvis performed 2024.    CONTRAST/COMPLICATIONS:  IV Contrast: Omnipaque 350 (accession [id number]), NONE (accession   [id number])  90 cc administered   10 cc discarded  Oral Contrast: NONE    PROCEDURE:  CT of the Chest, Abdomen and Pelvis was performed.  Sagittal and coronal reformats were performed.    FINDINGS:  CHEST:  LUNGS AND LARGE AIRWAYS: Patent central airways. No pulmonary nodules or   parenchymal consolidation.  PLEURA: No pleural effusion.  VESSELS: Aortic calcifications. Coronary artery calcifications.  HEART: Heart size is normal. No pericardial effusion.  MEDIASTINUM AND SEVEN: No lymphadenopathy. Unchanged sequela from partial   esophagectomy and gastric pull-through.  CHEST WALL AND LOWER NECK: Within normal limits.  BONES: Partially visualized cervical ACDF hardware. Multiple healed rib   fractures on the right.    ABDOMEN AND PELVIS:  LIVER: Within normal limits.  BILE DUCTS: Normal caliber.  GALLBLADDER: Cholecystectomy.  SPLEEN: Within normal limits.  PANCREAS: Unchanged partial fatty replacement.  ADRENALS: Stable appearance.  KIDNEYS/URETERS: Within normal limits.    BLADDER: Within normal limits.  REPRODUCTIVE ORGANS: Prostate withinnormal limits.    BOWEL: No bowel obstruction. Colonic diverticulosis. Appendix is normal.  PERITONEUM/RETROPERITONEUM: Within normal limits.  VESSELS: Atherosclerotic changes. IVC filter.  LYMPH NODES: No lymphadenopathy.  ABDOMINAL WALL: Within normal limits.  BONES: Degenerative changes. Unchanged healed fracture of the left   inferior and superior pubic rami.    IMPRESSION:  No acute findings in the abdomen or pelvis.    Unchanged sequela from partial esophagectomy and gastric pull-through.    --- End of Report ---            ROX POLK MD; Attending Radiologist  This document has been electronically signed. 2024  5:47AM  24 @ 05:24        EGD Report        Date: 2024 2:24 PM        Patient Name: MIAH GIL        MRN: [id number]        Account Number:        [id number]        Gender: Male         (age): 1965 (59)        Instrument(s):        GIF [id number]        Attending/Fellow:        Josemanuel Cm MD                Procedure Room #:        PROCEDURE ROOM 1                        ASA Class:        P3 - 2024 3:23 PM Josemanuel Cm MD        History of Present Illness:        History of esophagectomy with associated post surgical gastroparesis presenting    with intractable nausea and vomiting here for upper endoscopy possible dilation,    possible Botox injection.        Administered Medications:        As per Anesthesiology Record        Indications:        Gastroparesis: 536.3 - K31.84        Procedure:        The procedure, indications, preparation and potential complications were    explained to the patient, who indicated understanding and signed the    corresponding consent forms. MAC was administered by anesthesiologist.    Continuous pulse oximetry and blood pressure monitoring were used throughout the    procedure. Supplemental oxygen was used. Patient was placed in the left lateral    decubitus position. The endoscope was introduced through the mouth and advanced    under direct visualization until the second part of the duodenum was reached.    Patient tolerance to the procedure was good. The procedure was not difficult.    Blood loss was minimal.        Limitations/Complications:        There were no apparent limitations or complications        Findings:        Esophagus Additional esophagus findings Evidence of prior esophagectomy, mucosa    appeared healthy and intact..        Stomach Additional stomach findings There was diffuse gastric erythema and    retained contents (food/fluid) within the gastric body suggestive of    gastroparesis, there were significant changes in gastric anatomy as suspected    given surgical history, the mid stomach was notable for a significant upward    deflection possibly due to post surgical scaring / changes..        The pylorus was notably widely patent, essentially unchanged s/p previous    dilation and Botox injection..        Duodenum Normal duodenum.        Impressions:        Normal duodenum.        Evidence of prior esophagectomy, mucosa appeared healthy and intact. .        There was diffuse gastric erythema and retained contents (food/fluid) within    the gastric body suggestive of gastroparesis, there were significant changes in    gastric anatomy as suspected given surgical history, the mid stomach was notable    for a significant upward deflection possibly due to post surgical scaring /    changes. .        The pylorus was notably widely patent, essentially unchanged s/p previous    dilation and Botox injection. .        Plan:        Return to floor for further management        Continue acid suppression, current motility regimen.        Anti-emetics PRN        OK for discharge home from a GI stand point                Josemanuel Cm MD        Version 1, Electronically signed on 2024 3:45:38 PM by Josemanuel Cm MD

## 2024-11-28 NOTE — PROGRESS NOTE ADULT - SUBJECTIVE AND OBJECTIVE BOX
Subjective: 59yM currently sitting up in bed. States that his right lower quadrant abdominal pain is still there and is constant, but the pain medication does help. No current difficulty swallowing or drinking (on CLD). Denies chest pain, palpitations, SOB, WEBER, orthopnea, cough, difficulty breathing, N/V/D/C.    Vital Signs:  Vital Signs Last 24 Hrs  T(C): 36.5 (11-28-24 @ 11:14), Max: 36.9 (11-27-24 @ 23:57)  T(F): 97.7 (11-28-24 @ 11:14), Max: 98.4 (11-27-24 @ 23:57)  HR: 86 (11-28-24 @ 11:14) (86 - 95)  BP: 156/94 (11-28-24 @ 11:14) (115/74 - 156/94)  RR: 18 (11-28-24 @ 11:14) (18 - 18)  SpO2: 97% (11-28-24 @ 11:14) (94% - 100%) on (O2)    Telemetry/Alarms: reviewed     Relevant labs, radiology and Medications reviewed    Pertinent Physical Exam      PHYSICAL EXAM:  GENERAL: No acute distress  CHEST/LUNG: CTAB; No wheezes, rales, or rhonchi  HEART: RRR; No murmurs, rubs, or gallops  ABDOMEN: Soft, + RLQ tenderness to palpation, non-distended; normal bowel sounds  NEUROLOGY: AAO x 4

## 2024-11-28 NOTE — PROGRESS NOTE ADULT - SUBJECTIVE AND OBJECTIVE BOX
MIAH GIL    [id number]    59y      Male    INTERVAL HPI/OVERNIGHT EVENTS:  patient being seen for nausea and vomiting. patient seen at bedside and states he is able to tolerate some food and still has pain.      REVIEW OF SYSTEMS:    CONSTITUTIONAL:  pain  RESPIRATORY: No cough, wheezing, hemoptysis; No shortness of breath  CARDIOVASCULAR: No chest pain, palpitations  GASTROINTESTINAL: No abdominal or epigastric pain. No nausea, vomiting  NEUROLOGICAL: No headaches, memory loss, loss of strength.  MISCELLANEOUS:      Vital Signs Last 24 Hrs  T(C): 36.5 (28 Nov 2024 11:14), Max: 36.9 (27 Nov 2024 23:57)  T(F): 97.7 (28 Nov 2024 11:14), Max: 98.4 (27 Nov 2024 23:57)  HR: 86 (28 Nov 2024 11:14) (86 - 95)  BP: 156/94 (28 Nov 2024 11:14) (115/74 - 156/94)  BP(mean): 115 (28 Nov 2024 11:14) (100 - 115)  RR: 18 (28 Nov 2024 11:14) (18 - 18)  SpO2: 97% (28 Nov 2024 11:14) (94% - 100%)    Parameters below as of 28 Nov 2024 11:14  Patient On (Oxygen Delivery Method): room air        PHYSICAL EXAM:  GENERAL: NAD, lying in bed comfortably  HEAD:  Atraumatic, normocephalic  EYES: EOMI, PERRLA, conjunctiva and sclera clear  ENT: Moist mucous membranes  NECK: Supple, no JVD  HEART: Regular rate and rhythm, no murmurs, rubs, or gallops  LUNGS: Unlabored respirations.  Clear to auscultation bilaterally, no crackles, wheezing, or rhonchi  ABDOMEN: Soft, nontender, nondistended, +BS  EXTREMITIES: 2+ peripheral pulses bilaterally. No clubbing, cyanosis, or edema  NERVOUS SYSTEM:  A&Ox3, no focal deficits   SKIN: No rashes or lesions      LABS:                        11.4   13.32 )-----------( 332      ( 28 Nov 2024 04:00 )             35.1     11-28    136  |  100  |  16.7  ----------------------------<  152[H]  4.3   |  21.0[L]  |  0.74    Ca    8.9      28 Nov 2024 04:00  Phos  3.1     11-28  Mg     2.4     11-28    TPro  7.6  /  Alb  4.5  /  TBili  0.3[L]  /  DBili  x   /  AST  23  /  ALT  23  /  AlkPhos  110  11-28    PT/INR - ( 27 Nov 2024 09:19 )   PT: 12.6 sec;   INR: 1.09 ratio           Urinalysis Basic - ( 28 Nov 2024 04:00 )    Color: x / Appearance: x / SG: x / pH: x  Gluc: 152 mg/dL / Ketone: x  / Bili: x / Urobili: x   Blood: x / Protein: x / Nitrite: x   Leuk Esterase: x / RBC: x / WBC x   Sq Epi: x / Non Sq Epi: x / Bacteria: x          MEDICATIONS  (STANDING):  amLODIPine   Tablet 5 milliGRAM(s) Oral daily  cyanocobalamin 1000 MICROGram(s) Oral daily  fentaNYL   Patch  12 MICROgram(s)/Hr 1 Patch Transdermal every 72 hours  iron sucrose IVPB 200 milliGRAM(s) IV Intermittent every 24 hours  metoclopramide Injectable 10 milliGRAM(s) IV Push three times a day  pantoprazole  Injectable 40 milliGRAM(s) IV Push two times a day    MEDICATIONS  (PRN):  acetaminophen     Tablet .. 650 milliGRAM(s) Oral every 6 hours PRN Temp greater or equal to 38C (100.4F), Mild Pain (1 - 3)  aluminum hydroxide/magnesium hydroxide/simethicone Suspension 30 milliLiter(s) Oral every 4 hours PRN Dyspepsia  morphine  - Injectable 4 milliGRAM(s) IV Push every 4 hours PRN Severe Pain (7 - 10)  morphine  - Injectable 2 milliGRAM(s) IV Push every 4 hours PRN Moderate Pain (4 - 6)      RADIOLOGY & ADDITIONAL TESTS:

## 2024-11-29 LAB
ALBUMIN SERPL ELPH-MCNC: 4.6 G/DL — SIGNIFICANT CHANGE UP (ref 3.3–5.2)
ALP SERPL-CCNC: 115 U/L — SIGNIFICANT CHANGE UP (ref 40–120)
ALT FLD-CCNC: 26 U/L — SIGNIFICANT CHANGE UP
ANION GAP SERPL CALC-SCNC: 16 MMOL/L — SIGNIFICANT CHANGE UP (ref 5–17)
AST SERPL-CCNC: 25 U/L — SIGNIFICANT CHANGE UP
BASOPHILS # BLD AUTO: 0.04 K/UL — SIGNIFICANT CHANGE UP (ref 0–0.2)
BASOPHILS NFR BLD AUTO: 0.4 % — SIGNIFICANT CHANGE UP (ref 0–2)
BILIRUB SERPL-MCNC: 0.5 MG/DL — SIGNIFICANT CHANGE UP (ref 0.4–2)
BUN SERPL-MCNC: 16.6 MG/DL — SIGNIFICANT CHANGE UP (ref 8–20)
CALCIUM SERPL-MCNC: 9 MG/DL — SIGNIFICANT CHANGE UP (ref 8.4–10.5)
CHLORIDE SERPL-SCNC: 99 MMOL/L — SIGNIFICANT CHANGE UP (ref 96–108)
CO2 SERPL-SCNC: 21 MMOL/L — LOW (ref 22–29)
CREAT SERPL-MCNC: 0.72 MG/DL — SIGNIFICANT CHANGE UP (ref 0.5–1.3)
EGFR: 107 ML/MIN/1.73M2 — SIGNIFICANT CHANGE UP
EOSINOPHIL # BLD AUTO: 0 K/UL — SIGNIFICANT CHANGE UP (ref 0–0.5)
EOSINOPHIL NFR BLD AUTO: 0 % — SIGNIFICANT CHANGE UP (ref 0–6)
GLUCOSE SERPL-MCNC: 124 MG/DL — HIGH (ref 70–99)
HCT VFR BLD CALC: 39.2 % — SIGNIFICANT CHANGE UP (ref 39–50)
HGB BLD-MCNC: 12.2 G/DL — LOW (ref 13–17)
IMM GRANULOCYTES NFR BLD AUTO: 0.3 % — SIGNIFICANT CHANGE UP (ref 0–0.9)
LYMPHOCYTES # BLD AUTO: 1.76 K/UL — SIGNIFICANT CHANGE UP (ref 1–3.3)
LYMPHOCYTES # BLD AUTO: 16.6 % — SIGNIFICANT CHANGE UP (ref 13–44)
MAGNESIUM SERPL-MCNC: 2.2 MG/DL — SIGNIFICANT CHANGE UP (ref 1.6–2.6)
MCHC RBC-ENTMCNC: 21.9 PG — LOW (ref 27–34)
MCHC RBC-ENTMCNC: 31.1 G/DL — LOW (ref 32–36)
MCV RBC AUTO: 70.3 FL — LOW (ref 80–100)
MONOCYTES # BLD AUTO: 0.74 K/UL — SIGNIFICANT CHANGE UP (ref 0–0.9)
MONOCYTES NFR BLD AUTO: 7 % — SIGNIFICANT CHANGE UP (ref 2–14)
NEUTROPHILS # BLD AUTO: 8.05 K/UL — HIGH (ref 1.8–7.4)
NEUTROPHILS NFR BLD AUTO: 75.7 % — SIGNIFICANT CHANGE UP (ref 43–77)
PLATELET # BLD AUTO: 337 K/UL — SIGNIFICANT CHANGE UP (ref 150–400)
POTASSIUM SERPL-MCNC: 4.1 MMOL/L — SIGNIFICANT CHANGE UP (ref 3.5–5.3)
POTASSIUM SERPL-SCNC: 4.1 MMOL/L — SIGNIFICANT CHANGE UP (ref 3.5–5.3)
PROT SERPL-MCNC: 7.9 G/DL — SIGNIFICANT CHANGE UP (ref 6.6–8.7)
RBC # BLD: 5.58 M/UL — SIGNIFICANT CHANGE UP (ref 4.2–5.8)
RBC # FLD: 18.2 % — HIGH (ref 10.3–14.5)
SODIUM SERPL-SCNC: 136 MMOL/L — SIGNIFICANT CHANGE UP (ref 135–145)
WBC # BLD: 10.62 K/UL — HIGH (ref 3.8–10.5)
WBC # FLD AUTO: 10.62 K/UL — HIGH (ref 3.8–10.5)

## 2024-11-29 PROCEDURE — 99232 SBSQ HOSP IP/OBS MODERATE 35: CPT

## 2024-11-29 PROCEDURE — 99231 SBSQ HOSP IP/OBS SF/LOW 25: CPT

## 2024-11-29 RX ADMIN — FENTANYL 1 PATCH: 12 PATCH, EXTENDED RELEASE TRANSDERMAL at 08:06

## 2024-11-29 RX ADMIN — ONDANSETRON HYDROCHLORIDE 4 MILLIGRAM(S): 4 TABLET, FILM COATED ORAL at 23:41

## 2024-11-29 RX ADMIN — METOCLOPRAMIDE HYDROCHLORIDE 10 MILLIGRAM(S): 10 TABLET ORAL at 13:47

## 2024-11-29 RX ADMIN — ONDANSETRON HYDROCHLORIDE 4 MILLIGRAM(S): 4 TABLET, FILM COATED ORAL at 18:59

## 2024-11-29 RX ADMIN — METOCLOPRAMIDE HYDROCHLORIDE 10 MILLIGRAM(S): 10 TABLET ORAL at 21:02

## 2024-11-29 RX ADMIN — Medication 4 MILLIGRAM(S): at 04:34

## 2024-11-29 RX ADMIN — Medication 4 MILLIGRAM(S): at 00:14

## 2024-11-29 RX ADMIN — Medication 4 MILLIGRAM(S): at 18:59

## 2024-11-29 RX ADMIN — METOCLOPRAMIDE HYDROCHLORIDE 10 MILLIGRAM(S): 10 TABLET ORAL at 05:04

## 2024-11-29 RX ADMIN — FENTANYL 1 PATCH: 12 PATCH, EXTENDED RELEASE TRANSDERMAL at 21:57

## 2024-11-29 RX ADMIN — PANTOPRAZOLE SODIUM 40 MILLIGRAM(S): 40 TABLET, DELAYED RELEASE ORAL at 05:04

## 2024-11-29 RX ADMIN — ONDANSETRON HYDROCHLORIDE 4 MILLIGRAM(S): 4 TABLET, FILM COATED ORAL at 08:45

## 2024-11-29 RX ADMIN — Medication 4 MILLIGRAM(S): at 13:09

## 2024-11-29 RX ADMIN — Medication 4 MILLIGRAM(S): at 14:00

## 2024-11-29 RX ADMIN — PANTOPRAZOLE SODIUM 40 MILLIGRAM(S): 40 TABLET, DELAYED RELEASE ORAL at 17:16

## 2024-11-29 RX ADMIN — Medication 4 MILLIGRAM(S): at 23:39

## 2024-11-29 RX ADMIN — Medication 4 MILLIGRAM(S): at 00:30

## 2024-11-29 RX ADMIN — AMLODIPINE BESYLATE 5 MILLIGRAM(S): 10 TABLET ORAL at 05:04

## 2024-11-29 RX ADMIN — Medication 4 MILLIGRAM(S): at 09:42

## 2024-11-29 RX ADMIN — Medication 4 MILLIGRAM(S): at 08:42

## 2024-11-29 RX ADMIN — Medication 4 MILLIGRAM(S): at 04:49

## 2024-11-29 RX ADMIN — IRON SUCROSE 110 MILLIGRAM(S): 20 INJECTION, SOLUTION INTRAVENOUS at 09:27

## 2024-11-29 NOTE — PROGRESS NOTE ADULT - PROBLEM SELECTOR PLAN 1
Patient with severe RLQ ABD pain  Would recommend CT Chest esophagram and /A/P with PO contrast and delayed imaging to assess A/P  CT esophagram today - unable to tolerate PO contrast. Will attempt again later  Trend WBC, check lactate   C/W PPI as per GI  Currently on CLD able to tolerate chicken broth per patient, but anything else triggers n/v.   Patient is passing gas   Serial ABD exams prior to pain medications  Will follow imaging  D/W Dr. Moe.

## 2024-11-29 NOTE — PROGRESS NOTE ADULT - SUBJECTIVE AND OBJECTIVE BOX
MIAH GIL Patient is a 59y old  Male who presents with a chief complaint of Nausea/vomiting (29 Nov 2024 10:40)     HPI:  59 M PMHx esophageal carcinoma s/p esophagectomy with gastric pull through (2016), gastroparesis, s/p Pre Pyloric Botox Injection and Balloon Dilation, cholecystectomy presented to ED from home with N/V/D since yesterday. Over the past several weeks has had Nausea, NBNB vomiting, abd pain with eating/drinking. Yesterday had more frequent episodes, unable to keep water/food down, still NBNB, tried Reglan did not work. Denies fever, chills, headache, chest pain, heart palpitations. Patient had EGD this past April with CRE balloon dilation and Botox injection. Follows with GI Dr. Cm.  In ED patient had CT showing no acute findings. GI consulted, planned for EGD with balloon dilation. Patient admitted to medicine.    (27 Nov 2024 14:43)    The patient was seen and evaluated sitting in chair with wife at bedside- states couldn't tolerate the CT contrast but will try again   The patient is in no acute distress.  Denied any fever chest pain, palpitations, shortness of breath, abdominal pain, fever, dysuria, cough, edema         Weight (kg): 86.1 (11-29 @ 10:40)I&O's Summary    Allergies    No Known Allergies    Intolerances      HEALTH ISSUES - PROBLEM Dx:  Gastroparesis    H/O esophagectomy          PAST MEDICAL & SURGICAL HISTORY:  Hypertension      Esophageal cancer      Gastroparesis      S/P cholecystectomy      H/O esophagectomy              Vital Signs Last 24 Hrs  T(C): 36.8 (29 Nov 2024 07:55), Max: 36.9 (28 Nov 2024 16:27)  T(F): 98.3 (29 Nov 2024 07:55), Max: 98.4 (28 Nov 2024 16:27)  HR: 103 (29 Nov 2024 07:55) (81 - 103)  BP: 146/95 (29 Nov 2024 07:55) (143/84 - 167/80)  BP(mean): 112 (29 Nov 2024 07:55) (112 - 112)  RR: 18 (29 Nov 2024 07:55) (18 - 19)  SpO2: 95% (29 Nov 2024 07:55) (95% - 98%)    Parameters below as of 29 Nov 2024 07:55  Patient On (Oxygen Delivery Method): room air    T(C): 36.8 (11-29-24 @ 07:55), Max: 36.9 (11-28-24 @ 16:27)  HR: 103 (11-29-24 @ 07:55) (81 - 103)  BP: 146/95 (11-29-24 @ 07:55) (143/84 - 167/80)  RR: 18 (11-29-24 @ 07:55) (18 - 19)  SpO2: 95% (11-29-24 @ 07:55) (95% - 98%)  Wt(kg): --    PHYSICAL EXAM:    GENERAL: NAD, conversing pleasant - trying soup now  HEAD:  Atraumatic, Normocephalic  EYES: EOMI,  conjunctiva and sclera clear  ENMT:  Moist mucous membranes,   NECK: Supple, Normal thyroid  NERVOUS SYSTEM:  Alert & Oriented X3,  Moves upper and lower extremities; CNS-II-XII  CHEST/LUNG: Clear to auscultation bilaterally; No rales, rhonchi, wheezing,   HEART: Regular rate and rhythm; No murmurs,   ABDOMEN: Soft, Nontender, Nondistended; Bowel sounds present  EXTREMITIES:  Peripheral Pulses, No  cyanosis, or edema  psychiatry- mood and affect appropriate, Insight and judgement intact     acetaminophen     Tablet .. 650 milliGRAM(s) Oral every 6 hours PRN  aluminum hydroxide/magnesium hydroxide/simethicone Suspension 30 milliLiter(s) Oral every 4 hours PRN  amLODIPine   Tablet 5 milliGRAM(s) Oral daily  cyanocobalamin 1000 MICROGram(s) Oral daily  fentaNYL   Patch  12 MICROgram(s)/Hr 1 Patch Transdermal every 72 hours  iron sucrose IVPB 200 milliGRAM(s) IV Intermittent every 24 hours  metoclopramide Injectable 10 milliGRAM(s) IV Push three times a day  morphine  - Injectable 4 milliGRAM(s) IV Push every 4 hours PRN  morphine  - Injectable 2 milliGRAM(s) IV Push every 4 hours PRN  ondansetron Injectable 4 milliGRAM(s) IV Push every 6 hours PRN  pantoprazole  Injectable 40 milliGRAM(s) IV Push two times a day      LABS:                          12.2   10.62 )-----------( 337      ( 29 Nov 2024 05:13 )             39.2     11-29    136  |  99  |  16.6  ----------------------------<  124[H]  4.1   |  21.0[L]  |  0.72    Ca    9.0      29 Nov 2024 05:13  Phos  3.1     11-28  Mg     2.2     11-29    TPro  7.9  /  Alb  4.6  /  TBili  0.5  /  DBili  x   /  AST  25  /  ALT  26  /  AlkPhos  115  11-29    LIVER FUNCTIONS - ( 29 Nov 2024 05:13 )  Alb: 4.6 g/dL / Pro: 7.9 g/dL / ALK PHOS: 115 U/L / ALT: 26 U/L / AST: 25 U/L / GGT: x                 Urinalysis Basic - ( 29 Nov 2024 05:13 )    Color: x / Appearance: x / SG: x / pH: x  Gluc: 124 mg/dL / Ketone: x  / Bili: x / Urobili: x   Blood: x / Protein: x / Nitrite: x   Leuk Esterase: x / RBC: x / WBC x   Sq Epi: x / Non Sq Epi: x / Bacteria: x      CAPILLARY BLOOD GLUCOSE          RADIOLOGY & ADDITIONAL TESTS:      Consultant notes reviewed  I independently reviwed all images/ labarotory results /cultures/ telemetry/EKG and my findings are indicated above  and discussed care plan with consultants/nursing/ family /patient

## 2024-11-29 NOTE — PROGRESS NOTE ADULT - SUBJECTIVE AND OBJECTIVE BOX
Subjective - patient seen and evaluated bedside. Sitting comfortably in bed. Denies CP, SOB, HA, dizziness, Admits to nausea with vomiting    Review of Systems: negative x 10 systems except as noted above    Brief summary:  59yMale s/p esophagectomy (2016) presenting with abd pain, n/v    Significant/[id number] events:  no acute events    PAST MEDICAL & SURGICAL HISTORY:  Hypertension      Esophageal cancer      Gastroparesis      S/P cholecystectomy      H/O esophagectomy            acetaminophen     Tablet .. 650 milliGRAM(s) Oral every 6 hours PRN  aluminum hydroxide/magnesium hydroxide/simethicone Suspension 30 milliLiter(s) Oral every 4 hours PRN  amLODIPine   Tablet 5 milliGRAM(s) Oral daily  cyanocobalamin 1000 MICROGram(s) Oral daily  fentaNYL   Patch  12 MICROgram(s)/Hr 1 Patch Transdermal every 72 hours  iron sucrose IVPB 200 milliGRAM(s) IV Intermittent every 24 hours  metoclopramide Injectable 10 milliGRAM(s) IV Push three times a day  morphine  - Injectable 4 milliGRAM(s) IV Push every 4 hours PRN  morphine  - Injectable 2 milliGRAM(s) IV Push every 4 hours PRN  ondansetron Injectable 4 milliGRAM(s) IV Push every 6 hours PRN  pantoprazole  Injectable 40 milliGRAM(s) IV Push two times a day  MEDICATIONS  (PRN):  acetaminophen     Tablet .. 650 milliGRAM(s) Oral every 6 hours PRN Temp greater or equal to 38C (100.4F), Mild Pain (1 - 3)  aluminum hydroxide/magnesium hydroxide/simethicone Suspension 30 milliLiter(s) Oral every 4 hours PRN Dyspepsia  morphine  - Injectable 4 milliGRAM(s) IV Push every 4 hours PRN Severe Pain (7 - 10)  morphine  - Injectable 2 milliGRAM(s) IV Push every 4 hours PRN Moderate Pain (4 - 6)  ondansetron Injectable 4 milliGRAM(s) IV Push every 6 hours PRN Nausea and/or Vomiting      Daily     Daily                               12.2   10.62 )-----------( 337      ( 29 Nov 2024 05:13 )             39.2   11-29    136  |  99  |  16.6  ----------------------------<  124[H]  4.1   |  21.0[L]  |  0.72    Ca    9.0      29 Nov 2024 05:13  Phos  3.1     11-28  Mg     2.2     11-29    TPro  7.9  /  Alb  4.6  /  TBili  0.5  /  DBili  x   /  AST  25  /  ALT  26  /  AlkPhos  115  11-29            Objective:  T(C): 36.8 (11-29-24 @ 07:55), Max: 36.9 (11-28-24 @ 16:27)  HR: 103 (11-29-24 @ 07:55) (81 - 103)  BP: 146/95 (11-29-24 @ 07:55) (143/84 - 167/80)  RR: 18 (11-29-24 @ 07:55) (18 - 19)  SpO2: 95% (11-29-24 @ 07:55) (95% - 98%)  Wt(kg): --CAPILLARY BLOOD GLUCOSE      I&O's Summary      Physical Exam  General: NAD  Neuro: A+O x 3, non-focal, speech clear and intact  Psych: Appropriate affect  HEENT:  NCAT, No conjuctival edema or icterus, no thrush.  Pulm: CTA, equal bilaterally  CV: RRR, +S1S2  Abd: soft, NT, ND, +BS  Ext: +DP Pulses b/l, no edema  Skin: Warm, dry, intact          Imaging:  < from: CT Abdomen and Pelvis w/ Oral Cont and w/ IV Cont (11.27.24 @ 05:24) >  MPRESSION:  No acute findings in the abdomen or pelvis.    Unchanged sequela from partial esophagectomy and gastric pull-through.    --- End of Report ---    < end of copied text >

## 2024-11-30 LAB
ALBUMIN SERPL ELPH-MCNC: 4.2 G/DL — SIGNIFICANT CHANGE UP (ref 3.3–5.2)
ALP SERPL-CCNC: 90 U/L — SIGNIFICANT CHANGE UP (ref 40–120)
ALT FLD-CCNC: 20 U/L — SIGNIFICANT CHANGE UP
ANION GAP SERPL CALC-SCNC: 15 MMOL/L — SIGNIFICANT CHANGE UP (ref 5–17)
AST SERPL-CCNC: 18 U/L — SIGNIFICANT CHANGE UP
BILIRUB SERPL-MCNC: 0.4 MG/DL — SIGNIFICANT CHANGE UP (ref 0.4–2)
BUN SERPL-MCNC: 16.3 MG/DL — SIGNIFICANT CHANGE UP (ref 8–20)
CALCIUM SERPL-MCNC: 8.6 MG/DL — SIGNIFICANT CHANGE UP (ref 8.4–10.5)
CHLORIDE SERPL-SCNC: 98 MMOL/L — SIGNIFICANT CHANGE UP (ref 96–108)
CO2 SERPL-SCNC: 23 MMOL/L — SIGNIFICANT CHANGE UP (ref 22–29)
CREAT SERPL-MCNC: 0.66 MG/DL — SIGNIFICANT CHANGE UP (ref 0.5–1.3)
EGFR: 109 ML/MIN/1.73M2 — SIGNIFICANT CHANGE UP
GLUCOSE SERPL-MCNC: 111 MG/DL — HIGH (ref 70–99)
HCT VFR BLD CALC: 34.9 % — LOW (ref 39–50)
HGB BLD-MCNC: 10.7 G/DL — LOW (ref 13–17)
MCHC RBC-ENTMCNC: 21.8 PG — LOW (ref 27–34)
MCHC RBC-ENTMCNC: 30.7 G/DL — LOW (ref 32–36)
MCV RBC AUTO: 71.1 FL — LOW (ref 80–100)
OB PNL STL: NEGATIVE — SIGNIFICANT CHANGE UP
PLATELET # BLD AUTO: 310 K/UL — SIGNIFICANT CHANGE UP (ref 150–400)
POTASSIUM SERPL-MCNC: 3.7 MMOL/L — SIGNIFICANT CHANGE UP (ref 3.5–5.3)
POTASSIUM SERPL-SCNC: 3.7 MMOL/L — SIGNIFICANT CHANGE UP (ref 3.5–5.3)
PROT SERPL-MCNC: 7.1 G/DL — SIGNIFICANT CHANGE UP (ref 6.6–8.7)
RBC # BLD: 4.91 M/UL — SIGNIFICANT CHANGE UP (ref 4.2–5.8)
RBC # FLD: 17.5 % — HIGH (ref 10.3–14.5)
SODIUM SERPL-SCNC: 136 MMOL/L — SIGNIFICANT CHANGE UP (ref 135–145)
WBC # BLD: 9.31 K/UL — SIGNIFICANT CHANGE UP (ref 3.8–10.5)
WBC # FLD AUTO: 9.31 K/UL — SIGNIFICANT CHANGE UP (ref 3.8–10.5)

## 2024-11-30 PROCEDURE — 99231 SBSQ HOSP IP/OBS SF/LOW 25: CPT

## 2024-11-30 PROCEDURE — 74176 CT ABD & PELVIS W/O CONTRAST: CPT | Mod: 26

## 2024-11-30 PROCEDURE — 99232 SBSQ HOSP IP/OBS MODERATE 35: CPT

## 2024-11-30 PROCEDURE — 71250 CT THORAX DX C-: CPT | Mod: 26

## 2024-11-30 RX ORDER — HYDROMORPHONE HYDROCHLORIDE 2 MG/1
0.5 TABLET ORAL ONCE
Refills: 0 | Status: DISCONTINUED | OUTPATIENT
Start: 2024-11-30 | End: 2024-11-30

## 2024-11-30 RX ADMIN — HYDROMORPHONE HYDROCHLORIDE 0.5 MILLIGRAM(S): 2 TABLET ORAL at 17:49

## 2024-11-30 RX ADMIN — Medication 4 MILLIGRAM(S): at 03:17

## 2024-11-30 RX ADMIN — HYDROMORPHONE HYDROCHLORIDE 0.5 MILLIGRAM(S): 2 TABLET ORAL at 23:22

## 2024-11-30 RX ADMIN — HYDROMORPHONE HYDROCHLORIDE 0.5 MILLIGRAM(S): 2 TABLET ORAL at 04:07

## 2024-11-30 RX ADMIN — Medication 4 MILLIGRAM(S): at 04:30

## 2024-11-30 RX ADMIN — Medication 1000 MICROGRAM(S): at 14:24

## 2024-11-30 RX ADMIN — PANTOPRAZOLE SODIUM 40 MILLIGRAM(S): 40 TABLET, DELAYED RELEASE ORAL at 16:49

## 2024-11-30 RX ADMIN — IRON SUCROSE 110 MILLIGRAM(S): 20 INJECTION, SOLUTION INTRAVENOUS at 14:25

## 2024-11-30 RX ADMIN — Medication 4 MILLIGRAM(S): at 00:30

## 2024-11-30 RX ADMIN — METOCLOPRAMIDE HYDROCHLORIDE 10 MILLIGRAM(S): 10 TABLET ORAL at 22:01

## 2024-11-30 RX ADMIN — METOCLOPRAMIDE HYDROCHLORIDE 10 MILLIGRAM(S): 10 TABLET ORAL at 05:44

## 2024-11-30 RX ADMIN — HYDROMORPHONE HYDROCHLORIDE 0.5 MILLIGRAM(S): 2 TABLET ORAL at 23:52

## 2024-11-30 RX ADMIN — ONDANSETRON HYDROCHLORIDE 4 MILLIGRAM(S): 4 TABLET, FILM COATED ORAL at 08:16

## 2024-11-30 RX ADMIN — PANTOPRAZOLE SODIUM 40 MILLIGRAM(S): 40 TABLET, DELAYED RELEASE ORAL at 05:44

## 2024-11-30 RX ADMIN — FENTANYL 1 PATCH: 12 PATCH, EXTENDED RELEASE TRANSDERMAL at 19:58

## 2024-11-30 RX ADMIN — HYDROMORPHONE HYDROCHLORIDE 0.5 MILLIGRAM(S): 2 TABLET ORAL at 10:33

## 2024-11-30 RX ADMIN — HYDROMORPHONE HYDROCHLORIDE 0.5 MILLIGRAM(S): 2 TABLET ORAL at 16:49

## 2024-11-30 RX ADMIN — FENTANYL 1 PATCH: 12 PATCH, EXTENDED RELEASE TRANSDERMAL at 07:45

## 2024-11-30 RX ADMIN — HYDROMORPHONE HYDROCHLORIDE 0.5 MILLIGRAM(S): 2 TABLET ORAL at 04:57

## 2024-11-30 RX ADMIN — METOCLOPRAMIDE HYDROCHLORIDE 10 MILLIGRAM(S): 10 TABLET ORAL at 14:25

## 2024-11-30 RX ADMIN — HYDROMORPHONE HYDROCHLORIDE 0.5 MILLIGRAM(S): 2 TABLET ORAL at 11:33

## 2024-11-30 NOTE — PROGRESS NOTE ADULT - SUBJECTIVE AND OBJECTIVE BOX
MIAH GIL Patient is a 59y old  Male who presents with a chief complaint of Nausea/vomiting (30 Nov 2024 12:23)     HPI:  59 M PMHx esophageal carcinoma s/p esophagectomy with gastric pull through (2016), gastroparesis, s/p Pre Pyloric Botox Injection and Balloon Dilation, cholecystectomy presented to ED from home with N/V/D since yesterday. Over the past several weeks has had Nausea, NBNB vomiting, abd pain with eating/drinking. Yesterday had more frequent episodes, unable to keep water/food down, still NBNB, tried Reglan did not work. Denies fever, chills, headache, chest pain, heart palpitations. Patient had EGD this past April with CRE balloon dilation and Botox injection. Follows with GI Dr. Cm.  In ED patient had CT showing no acute findings. GI consulted, planned for EGD with balloon dilation. Patient admitted to medicine.    (27 Nov 2024 14:43)    The patient was seen and evaluated - vomiting - today better - tolerated oral contrast and is open to advancing to full liquid diet  The patient is in no acute distress.  Denied any fever chest pain, palpitations, shortness of breath, abdominal pain, fever, dysuria, cough, edema       Height (cm): 185.4 (11-30 @ 12:23)  Weight (kg): 86.1 (11-30 @ 12:23)  BMI (kg/m2): 25 (11-30 @ 12:23)  BSA (m2): 2.1 (11-30 @ 12:23)I&O's Summary    29 Nov 2024 07:01  -  30 Nov 2024 07:00  --------------------------------------------------------  IN: 200 mL / OUT: 0 mL / NET: 200 mL      Allergies    No Known Allergies    Intolerances      HEALTH ISSUES - PROBLEM Dx:  Gastroparesis    H/O esophagectomy          PAST MEDICAL & SURGICAL HISTORY:  Hypertension      Esophageal cancer      Gastroparesis      S/P cholecystectomy      H/O esophagectomy              Vital Signs Last 24 Hrs  T(C): 36.3 (30 Nov 2024 08:43), Max: 36.9 (29 Nov 2024 16:27)  T(F): 97.4 (30 Nov 2024 08:43), Max: 98.4 (29 Nov 2024 16:27)  HR: 84 (30 Nov 2024 08:43) (80 - 89)  BP: 151/90 (30 Nov 2024 08:43) (144/90 - 191/97)  BP(mean): 108 (30 Nov 2024 05:40) (108 - 128)  RR: 18 (30 Nov 2024 08:43) (18 - 19)  SpO2: 97% (30 Nov 2024 08:43) (94% - 97%)    Parameters below as of 30 Nov 2024 08:43  Patient On (Oxygen Delivery Method): room air    T(C): 36.3 (11-30-24 @ 08:43), Max: 36.9 (11-29-24 @ 16:27)  HR: 84 (11-30-24 @ 08:43) (80 - 89)  BP: 151/90 (11-30-24 @ 08:43) (144/90 - 191/97)  RR: 18 (11-30-24 @ 08:43) (18 - 19)  SpO2: 97% (11-30-24 @ 08:43) (94% - 97%)  Wt(kg): --    PHYSICAL EXAM:    GENERAL: NAD sitting in chair conversing - wife at bedsdie   HEAD:  Atraumatic, Normocephalic  EYES: EOMI, PERRL, conjunctiva and sclera clear  ENMT:  Moist mucous membranes,  No lesions  NECK: Supple, No JVD, Normal thyroid  NERVOUS SYSTEM:  Alert & Oriented X3,  Moves upper and lower extremities; CNS-II-XII  CHEST/LUNG: Clear to auscultation bilaterally; No rales, rhonchi, wheezing,   HEART: Regular rate and rhythm; No murmurs,   ABDOMEN: Soft, Nontender, Nondistended; Bowel sounds present  EXTREMITIES:  Peripheral Pulses, No  cyanosis, or edema  SKIN: No rashes or lesions  psychiatry- mood and affect appropriate, Insight and judgement intact     acetaminophen     Tablet .. 650 milliGRAM(s) Oral every 6 hours PRN  aluminum hydroxide/magnesium hydroxide/simethicone Suspension 30 milliLiter(s) Oral every 4 hours PRN  amLODIPine   Tablet 5 milliGRAM(s) Oral daily  cyanocobalamin 1000 MICROGram(s) Oral daily  fentaNYL   Patch  12 MICROgram(s)/Hr 1 Patch Transdermal every 72 hours  iron sucrose IVPB 200 milliGRAM(s) IV Intermittent every 24 hours  metoclopramide Injectable 10 milliGRAM(s) IV Push three times a day  morphine  - Injectable 4 milliGRAM(s) IV Push every 4 hours PRN  morphine  - Injectable 2 milliGRAM(s) IV Push every 4 hours PRN  ondansetron Injectable 4 milliGRAM(s) IV Push every 6 hours PRN  pantoprazole  Injectable 40 milliGRAM(s) IV Push two times a day      LABS:                          10.7   9.31  )-----------( 310      ( 30 Nov 2024 05:19 )             34.9     11-30    136  |  98  |  16.3  ----------------------------<  111[H]  3.7   |  23.0  |  0.66    Ca    8.6      30 Nov 2024 05:19  Mg     2.2     11-29    TPro  7.1  /  Alb  4.2  /  TBili  0.4  /  DBili  x   /  AST  18  /  ALT  20  /  AlkPhos  90  11-30    LIVER FUNCTIONS - ( 30 Nov 2024 05:19 )  Alb: 4.2 g/dL / Pro: 7.1 g/dL / ALK PHOS: 90 U/L / ALT: 20 U/L / AST: 18 U/L / GGT: x                 Urinalysis Basic - ( 30 Nov 2024 05:19 )    Color: x / Appearance: x / SG: x / pH: x  Gluc: 111 mg/dL / Ketone: x  / Bili: x / Urobili: x   Blood: x / Protein: x / Nitrite: x   Leuk Esterase: x / RBC: x / WBC x   Sq Epi: x / Non Sq Epi: x / Bacteria: x      CAPILLARY BLOOD GLUCOSE          RADIOLOGY & ADDITIONAL TESTS:      Consultant notes reviewed  I independently reviwed all images/ labarotory results /cultures/ telemetry/EKG and my findings are indicated above  and discussed care plan with consultants/nursing/ family /patient

## 2024-11-30 NOTE — PROGRESS NOTE ADULT - SUBJECTIVE AND OBJECTIVE BOX
Subjective - patient seen and evaluated bedside. Sitting comfortably in bed. Admits to nausea. Improving today. Denies CP, SOB, HA, dizziness,     Review of Systems: negative x 10 systems except as noted above    Brief summary:  59yMale with abd pain with hx prior esophagectomy    Significant/[id number] events: vomited during initial ct scan attempt.       PAST MEDICAL & SURGICAL HISTORY:  Hypertension      Esophageal cancer      Gastroparesis      S/P cholecystectomy      H/O esophagectomy            acetaminophen     Tablet .. 650 milliGRAM(s) Oral every 6 hours PRN  aluminum hydroxide/magnesium hydroxide/simethicone Suspension 30 milliLiter(s) Oral every 4 hours PRN  amLODIPine   Tablet 5 milliGRAM(s) Oral daily  cyanocobalamin 1000 MICROGram(s) Oral daily  fentaNYL   Patch  12 MICROgram(s)/Hr 1 Patch Transdermal every 72 hours  iron sucrose IVPB 200 milliGRAM(s) IV Intermittent every 24 hours  metoclopramide Injectable 10 milliGRAM(s) IV Push three times a day  morphine  - Injectable 4 milliGRAM(s) IV Push every 4 hours PRN  morphine  - Injectable 2 milliGRAM(s) IV Push every 4 hours PRN  ondansetron Injectable 4 milliGRAM(s) IV Push every 6 hours PRN  pantoprazole  Injectable 40 milliGRAM(s) IV Push two times a day  MEDICATIONS  (PRN):  acetaminophen     Tablet .. 650 milliGRAM(s) Oral every 6 hours PRN Temp greater or equal to 38C (100.4F), Mild Pain (1 - 3)  aluminum hydroxide/magnesium hydroxide/simethicone Suspension 30 milliLiter(s) Oral every 4 hours PRN Dyspepsia  morphine  - Injectable 4 milliGRAM(s) IV Push every 4 hours PRN Severe Pain (7 - 10)  morphine  - Injectable 2 milliGRAM(s) IV Push every 4 hours PRN Moderate Pain (4 - 6)  ondansetron Injectable 4 milliGRAM(s) IV Push every 6 hours PRN Nausea and/or Vomiting      Daily     Daily                               10.7   9.31  )-----------( 310      ( 30 Nov 2024 05:19 )             34.9   11-30    136  |  98  |  16.3  ----------------------------<  111[H]  3.7   |  23.0  |  0.66    Ca    8.6      30 Nov 2024 05:19  Mg     2.2     11-29    TPro  7.1  /  Alb  4.2  /  TBili  0.4  /  DBili  x   /  AST  18  /  ALT  20  /  AlkPhos  90  11-30            Objective:  T(C): 36.3 (11-30-24 @ 08:43), Max: 36.9 (11-29-24 @ 16:27)  HR: 84 (11-30-24 @ 08:43) (80 - 89)  BP: 151/90 (11-30-24 @ 08:43) (144/90 - 191/97)  RR: 18 (11-30-24 @ 08:43) (18 - 19)  SpO2: 97% (11-30-24 @ 08:43) (94% - 97%)  Wt(kg): --CAPILLARY BLOOD GLUCOSE      I&O's Summary    29 Nov 2024 07:01  -  30 Nov 2024 07:00  --------------------------------------------------------  IN: 200 mL / OUT: 0 mL / NET: 200 mL        Physical Exam  General: NAD  Neuro: A+O x 3, non-focal, speech clear and intact  Psych: Appropriate affect  HEENT:  NCAT, No conjuctival edema or icterus, no thrush.  Pulm: CTA, equal bilaterally  CV: RRR,, +S1S2  Abd: soft, NT, ND, +BS  Ext: +DP Pulses b/l, no edema  Skin: Warm, dry, intact          Imaging:    < from: CT Abdomen and Pelvis w/ Oral Cont and w/ IV Cont (11.27.24 @ 05:24) >  IMPRESSION:  No acute findings in the abdomen or pelvis.    Unchanged sequela from partial esophagectomy and gastric pull-through.    --- End of Report ---    < end of copied text >

## 2024-11-30 NOTE — PROGRESS NOTE ADULT - PROBLEM SELECTOR PLAN 1
Patient with severe RLQ ABD pain  Recommend CT Chest esophagram and /A/P with PO contrast and delayed imaging to assess A/P  CT esophagram today - attempting again after unable to tolerate contrast yesterday  Trend WBC  C/W PPI as per GI  Currently on CLD able to tolerate chicken broth per patient, but anything else triggers n/v.   Patient is passing gas   Serial ABD exams prior to pain medications  Will follow imaging  D/W Dr. Moe.

## 2024-11-30 NOTE — CHART NOTE - NSCHARTNOTEFT_GEN_A_CORE
RN called to report pt with c/o abdominal pain. Pt seen and examined at bedside. Reports intermittent lower abdominal pain RLQ > LLQ since admission, currently rates as 6/10. Pt received morphine earlier and states was unable to tolerate 2/2 N/V. Underwent CT A/P earlier today without acute abnormalities. Denies fever, chills, CP, SOB, N/V/D, dizziness, melena. VSS, NAD. Abd soft, non distended, non-tender, no rebound or guarding, BS x4 normoactive. Ordered Dilaudid 0.5mg IVP x1. Will continue to monitor, call PA for any change in pt condition.

## 2024-11-30 NOTE — PROGRESS NOTE ADULT - TIME BILLING
Time spent reviewing the chart documentation, reviewing labs and imaging studies, evaluating the patient, discussing the plan of care with the consultants & medical team, and documenting.
Review of chart, review of imaging, discussion with patient

## 2024-12-01 VITALS
OXYGEN SATURATION: 96 % | SYSTOLIC BLOOD PRESSURE: 128 MMHG | HEART RATE: 90 BPM | TEMPERATURE: 98 F | DIASTOLIC BLOOD PRESSURE: 76 MMHG | RESPIRATION RATE: 18 BRPM

## 2024-12-01 LAB
ALBUMIN SERPL ELPH-MCNC: 3.9 G/DL — SIGNIFICANT CHANGE UP (ref 3.3–5.2)
ALP SERPL-CCNC: 99 U/L — SIGNIFICANT CHANGE UP (ref 40–120)
ALT FLD-CCNC: 18 U/L — SIGNIFICANT CHANGE UP
ANION GAP SERPL CALC-SCNC: 12 MMOL/L — SIGNIFICANT CHANGE UP (ref 5–17)
AST SERPL-CCNC: 16 U/L — SIGNIFICANT CHANGE UP
BILIRUB SERPL-MCNC: 0.5 MG/DL — SIGNIFICANT CHANGE UP (ref 0.4–2)
BUN SERPL-MCNC: 16 MG/DL — SIGNIFICANT CHANGE UP (ref 8–20)
CALCIUM SERPL-MCNC: 8.4 MG/DL — SIGNIFICANT CHANGE UP (ref 8.4–10.5)
CHLORIDE SERPL-SCNC: 98 MMOL/L — SIGNIFICANT CHANGE UP (ref 96–108)
CO2 SERPL-SCNC: 24 MMOL/L — SIGNIFICANT CHANGE UP (ref 22–29)
CREAT SERPL-MCNC: 0.75 MG/DL — SIGNIFICANT CHANGE UP (ref 0.5–1.3)
EGFR: 105 ML/MIN/1.73M2 — SIGNIFICANT CHANGE UP
GLUCOSE SERPL-MCNC: 91 MG/DL — SIGNIFICANT CHANGE UP (ref 70–99)
HCT VFR BLD CALC: 34.8 % — LOW (ref 39–50)
HGB BLD-MCNC: 10.8 G/DL — LOW (ref 13–17)
MCHC RBC-ENTMCNC: 22.3 PG — LOW (ref 27–34)
MCHC RBC-ENTMCNC: 31 G/DL — LOW (ref 32–36)
MCV RBC AUTO: 71.9 FL — LOW (ref 80–100)
PLATELET # BLD AUTO: 272 K/UL — SIGNIFICANT CHANGE UP (ref 150–400)
POTASSIUM SERPL-MCNC: 3.9 MMOL/L — SIGNIFICANT CHANGE UP (ref 3.5–5.3)
POTASSIUM SERPL-SCNC: 3.9 MMOL/L — SIGNIFICANT CHANGE UP (ref 3.5–5.3)
PROT SERPL-MCNC: 6.6 G/DL — SIGNIFICANT CHANGE UP (ref 6.6–8.7)
RBC # BLD: 4.84 M/UL — SIGNIFICANT CHANGE UP (ref 4.2–5.8)
RBC # FLD: 17.2 % — HIGH (ref 10.3–14.5)
SODIUM SERPL-SCNC: 134 MMOL/L — LOW (ref 135–145)
WBC # BLD: 7.95 K/UL — SIGNIFICANT CHANGE UP (ref 3.8–10.5)
WBC # FLD AUTO: 7.95 K/UL — SIGNIFICANT CHANGE UP (ref 3.8–10.5)

## 2024-12-01 PROCEDURE — 99239 HOSP IP/OBS DSCHRG MGMT >30: CPT

## 2024-12-01 PROCEDURE — 99231 SBSQ HOSP IP/OBS SF/LOW 25: CPT

## 2024-12-01 RX ORDER — IRON SUCROSE 20 MG/ML
100 INJECTION, SOLUTION INTRAVENOUS EVERY 24 HOURS
Refills: 0 | Status: DISCONTINUED | OUTPATIENT
Start: 2024-12-01 | End: 2024-12-01

## 2024-12-01 RX ORDER — METOCLOPRAMIDE HYDROCHLORIDE 10 MG/1
10 TABLET ORAL THREE TIMES A DAY
Refills: 0 | Status: DISCONTINUED | OUTPATIENT
Start: 2024-12-01 | End: 2024-12-01

## 2024-12-01 RX ORDER — HYDROMORPHONE HYDROCHLORIDE 2 MG/1
0.5 TABLET ORAL EVERY 6 HOURS
Refills: 0 | Status: DISCONTINUED | OUTPATIENT
Start: 2024-12-01 | End: 2024-12-01

## 2024-12-01 RX ADMIN — FENTANYL 1 PATCH: 12 PATCH, EXTENDED RELEASE TRANSDERMAL at 11:00

## 2024-12-01 RX ADMIN — IRON SUCROSE 100 MILLIGRAM(S): 20 INJECTION, SOLUTION INTRAVENOUS at 12:52

## 2024-12-01 RX ADMIN — PANTOPRAZOLE SODIUM 40 MILLIGRAM(S): 40 TABLET, DELAYED RELEASE ORAL at 05:59

## 2024-12-01 RX ADMIN — METOCLOPRAMIDE HYDROCHLORIDE 10 MILLIGRAM(S): 10 TABLET ORAL at 05:59

## 2024-12-01 RX ADMIN — METOCLOPRAMIDE HYDROCHLORIDE 10 MILLIGRAM(S): 10 TABLET ORAL at 12:52

## 2024-12-01 RX ADMIN — Medication 1000 MICROGRAM(S): at 12:52

## 2024-12-01 RX ADMIN — FENTANYL 1 PATCH: 12 PATCH, EXTENDED RELEASE TRANSDERMAL at 07:55

## 2024-12-01 RX ADMIN — AMLODIPINE BESYLATE 5 MILLIGRAM(S): 10 TABLET ORAL at 06:00

## 2024-12-01 NOTE — DISCHARGE NOTE NURSING/CASE MANAGEMENT/SOCIAL WORK - PATIENT PORTAL LINK FT
You can access the FollowMyHealth Patient Portal offered by [facility] by registering at the following website: [email or web address]. By joining [employer]’s FollowMyHealth portal, you will also be able to view your health information using other applications (apps) compatible with our system.

## 2024-12-01 NOTE — PROGRESS NOTE ADULT - PROVIDER SPECIALTY LIST ADULT
Thoracic Surgery
Gastroenterology
Hospitalist
Internal Medicine
Thoracic Surgery
Hospitalist

## 2024-12-01 NOTE — PROGRESS NOTE ADULT - PROBLEM SELECTOR PLAN 1
Patient with severe RLQ ABD pain  Recommend CT Chest esophagram and /A/P with PO contrast and delayed imaging to assess A/P  CT esophagram 11/30 without esophageal leak or acute findings. Imaging reviewed by Dr. Moe.   Trend WBC  C/W PPI as per GI  Currently on full liquid diet.   Patient is passing gas   Serial ABD exams prior to pain medications  Thoracic surgery will follow.   D/W Dr. Moe. Patient with severe RLQ ABD pain  Recommend CT Chest esophagram and /A/P with PO contrast and delayed imaging to assess A/P  CT esophagram 11/30 without esophageal leak or acute findings. Imaging reviewed by Dr. Moe.   C/W PPI as per GI  Tolerated full liquid diet, advanced to soft diet without issues today.    Pt aware that he should continue to have small meals & to follow up with GI as an outpatient.  Plan discussed with Dr. Moe.

## 2024-12-01 NOTE — PROGRESS NOTE ADULT - ASSESSMENT
59 M PMHx esophageal carcinoma s/p esophagectomy with gastric pull through (2016), gastroparesis, s/p Pre Pyloric Botox Injection and Balloon Dilation, cholecystectomy presented to ED from home 11/27 with N/V since yesterday. S/p EGD with GI: Revealed pyloris open, diffuse gastric erythema and retained contents and diagnosed with gastroparesis. However patient still with severe ABD pain. Pain is RLQ and sharp and constant. Worsens with PO intake. No flatus. Last BM 2 days ago. Thoracic surgery consulted for prior surgical history. CT A/P with IV cxt read with no acute findings, but reviewed with radiology. Noted to have mild enterocolitis and thickening of terminal ileum. No leukocytosis. 
This is a 58 y/o M with PMHx esophageal carcinoma s/p esophagectomy with gastric pull through (2016), gastroparesis, s/p Pre Pyloric Botox Injection and Balloon Dilation, cholecystectomy presented to ED with worsening nausea, vomiting and abdominal pain. Considering EGD yesterday with wide open pylorus, this likely due to his underlying gastric dysmotility. Would continue his antimotility medications. He reports no nausea or vomiting today. Abdominal pain controlled with PO pain meds. CT surgery planning for CT esophagram.    EGD yesterday as above.    #Nausea, vomiting  #Esophageal Cancer s/p esophagectomy  - continue reglan and erythromycin  - continue PPI BID  - agree with plan to change from standing morphine to as needed  - No further inpatient GI interventions  - GI to sign off, please call or page with questions or concerns    
59 M PMHx esophageal carcinoma s/p esophagectomy with gastric pull through (2016), gastroparesis, s/p Pre Pyloric Botox Injection and Balloon Dilation, cholecystectomy presented to ED from home with N/V/D since yesterday. Admitted for gastroparesis.     Plan:  Nausea/vomiting 2/2 Gastroparesis/abnormal anatomy   - Hx esophageal cancer s/p esophagectomy   - Multiple EGDs, last one in April with CRE balloon dilation and Botox injection  - CT abd showed no acute findings in the abdomen or pelvis, partial esophagectomy and gastric pull-through.  - EGD performed 11/27 by Dr. Cm showing abnormal anatomy, open pylorus, no dilation/botox injection needed   - GI following, recommending CT surgery   - Per GI held Erythromycin   - C/w Reglan  - C/w PPI IV BID     HTN  - C/w amlodipine    pain - start fentanyl patch  
59 M PMHx esophageal carcinoma s/p esophagectomy with gastric pull through (2016), gastroparesis, s/p Pre Pyloric Botox Injection and Balloon Dilation, cholecystectomy presented to ED from home with N/V/D since yesterday. Admitted for gastroparesis.     Nausea/vomiting 2/2 Gastroparesis/abnormal anatomy   - Hx esophageal cancer s/p esophagectomy   - Multiple EGDs, last one in April with CRE balloon dilation and Botox injection  - CT abd showed no acute findings in the abdomen or pelvis, partial esophagectomy and gastric pull-through.  - EGD performed 11/27 by Dr. Cm showing abnormal anatomy, open pylorus, no dilation/botox injection needed   - GI CT surgery- Recommend CT Chest/A/P with PO contrast for CT esophogram and delayed imaging   - ordered is pending - patient couldn't tolerate contrast  Trend WBC- improving   C/W PPI as per GI  Currently without any worsening abd pain+ passing gas   Serial ABD exams prior to morphine  - C/w Reglan  - C/w PPI IV BID     Iron deficiency anemia- will start Iron once the GI issues resolve - this is probably will poor oral intake   hgb is stable   will check for occult blood     HTN  - C/w amlodipine    pain - start fentanyl patch            
59 M PMHx esophageal carcinoma s/p esophagectomy with gastric pull through (2016), gastroparesis, s/p Pre Pyloric Botox Injection and Balloon Dilation, cholecystectomy presented to ED from home 11/27 with N/V since yesterday. S/p EGD with GI: Revealed pyloris open, diffuse gastric erythema and retained contents and diagnosed with gastroparesis. However patient still with severe ABD pain. Pain is RLQ and sharp and constant. Worsens with PO intake. No flatus. Last BM 2 days ago. Thoracic surgery consulted for prior surgical history. CT A/P with IV cxt read with no acute findings, but reviewed with radiology. Noted to have mild enterocolitis and thickening of terminal ileum. No leukocytosis. 
59 M PMHx esophageal carcinoma s/p esophagectomy with gastric pull through (2016), gastroparesis, s/p Pre Pyloric Botox Injection and Balloon Dilation, cholecystectomy presented to ED from home with N/V/D since yesterday. Admitted for gastroparesis.     Nausea/vomiting 2/2 Gastroparesis/abnormal anatomy - improving - tolerated contrast - Ct results opending   - Hx esophageal cancer s/p esophagectomy   - Multiple EGDs, last one in April with CRE balloon dilation and Botox injection  - CT abd showed no acute findings in the abdomen or pelvis, partial esophagectomy and gastric pull-through.  - EGD performed 11/27 by Dr. Cm showing abnormal anatomy, open pylorus, no dilation/botox injection needed   - GI CT surgery- Recommend CT Chest/A/P with PO contrast for CT esophogram and delayed imaging   - ordered is pending - patient couldn't tolerate contrast  Trend WBC- improving   C/W PPI as per GI  + passing gas   Serial ABD exams prior to morphine- improving     Iron deficiency anemia- will start Iron once the GI issues resolve - this is probably will poor oral intake   hgb is stable   will check for occult blood     HTN  - C/w amlodipine    pain - start fentanyl patch      Dispo- active needs to tolerate diet - follow CT results 
59 M PMHx esophageal carcinoma s/p esophagectomy with gastric pull through (2016), gastroparesis, s/p Pre Pyloric Botox Injection and Balloon Dilation, cholecystectomy presented to ED from home 11/27 with N/V since yesterday. S/p EGD with GI: Revealed pyloris open, diffuse gastric erythema and retained contents and diagnosed with gastroparesis. However patient still with severe ABD pain. Pain is RLQ and sharp and constant. Worsens with PO intake. No flatus. Last BM 2 days ago. Thoracic surgery consulted for prior surgical history. CT A/P with IV cxt read with no acute findings, but reviewed with radiology. Noted to have mild enterocolitis and thickening of terminal ileum. No leukocytosis. CT esophagram 11/30 without acute findings. 
59 M PMHx esophageal carcinoma s/p esophagectomy with gastric pull through (2016), gastroparesis, s/p Pre Pyloric Botox Injection and Balloon Dilation, cholecystectomy presented to ED from home 11/27 with N/V since yesterday. S/p EGD with GI: Revealed pyloris open, diffuse gastric erythema and retained contents and diagnosed with gastroparesis. However patient still with severe ABD pain. Pain is RLQ and sharp and constant. Worsens with PO intake. No flatus. Last BM 2 days ago. Thoracic surgery consulted for prior surgical history. CT A/P with IV cxt read with no acute findings, but reviewed with radiology. Noted to have mild enterocolitis and thickening of terminal ileum. No leukocytosis.

## 2024-12-01 NOTE — DISCHARGE NOTE NURSING/CASE MANAGEMENT/SOCIAL WORK - NSDCPEFALRISK_GEN_ALL_CORE
For information on Fall & Injury Prevention, visit: [email or web address] OR  [email or web address] OR  https://www.cdc.gov/steadi/patient.html

## 2024-12-01 NOTE — PROGRESS NOTE ADULT - SUBJECTIVE AND OBJECTIVE BOX
Subjective:  Patient seen and examined. Patient lying in bed in [facility]. Patient denies acute pain with radiating or aggravating factors. He denies chest pain, shortness of breath, palpitations, headache, dizziness, nausea, or vomiting.     PAST MEDICAL & SURGICAL HISTORY:  Hypertension    Esophageal cancer    Gastroparesis    S/P cholecystectomy    H/O esophagectomy      VITAL SIGNS  Vital Signs Last 24 Hrs  T(C): 36.4 (12-01-24 @ 07:34), Max: 36.7 (11-30-24 @ 16:57)  T(F): 97.6 (12-01-24 @ 07:34), Max: 98.1 (11-30-24 @ 21:27)  HR: 87 (12-01-24 @ 07:34) (70 - 87)  BP: 133/87 (12-01-24 @ 07:34) (133/87 - 160/92)  RR: 18 (12-01-24 @ 07:34) (18 - 18)  SpO2: 96% (12-01-24 @ 07:34) (96% - 99%)  on (O2)              MEDICATIONS  acetaminophen     Tablet .. 650 milliGRAM(s) Oral every 6 hours PRN  aluminum hydroxide/magnesium hydroxide/simethicone Suspension 30 milliLiter(s) Oral every 4 hours PRN  amLODIPine   Tablet 5 milliGRAM(s) Oral daily  cyanocobalamin 1000 MICROGram(s) Oral daily  fentaNYL   Patch  12 MICROgram(s)/Hr 1 Patch Transdermal every 72 hours  HYDROmorphone  Injectable 0.5 milliGRAM(s) IV Push every 6 hours PRN  iron sucrose Injectable 100 milliGRAM(s) IV Push every 24 hours  metoclopramide Injectable 10 milliGRAM(s) IV Push three times a day  ondansetron Injectable 4 milliGRAM(s) IV Push every 6 hours PRN  pantoprazole  Injectable 40 milliGRAM(s) IV Push two times a day      11-30 @ 07:01  -  12-01 @ 07:00  --------------------------------------------------------  IN: 360 mL / OUT: 300 mL / NET: 60 mL      Weights:  Daily Height in cm: 185.42 (30 Nov 2024 12:23)    Daily   Admit Wt: Drug Dosing Weight  Height (cm): 185.4 (30 Nov 2024 12:23)  Weight (kg): 86.1 (30 Nov 2024 12:23)  BMI (kg/m2): 25 (30 Nov 2024 12:23)  BSA (m2): 2.1 (30 Nov 2024 12:23)    LABS  12-01    134[L]  |  98  |  16.0  ----------------------------<  91  3.9   |  24.0  |  0.75    Ca    8.4      01 Dec 2024 04:45    TPro  6.6  /  Alb  3.9  /  TBili  0.5  /  DBili  x   /  AST  16  /  ALT  18  /  AlkPhos  99  12-01                                 10.8   7.95  )-----------( 272      ( 01 Dec 2024 04:45 )             34.8            Bilirubin Total: 0.5 mg/dL (12-01 @ 04:45)      DIAGNOSTICS:  All laboratory results, radiology and medications reviewed.    < from: CT Abdomen and Pelvis w/ Oral Cont (11.30.24 @ 10:53) >  IMPRESSION:  Esophagectomy with gastric pull-through. No extravasation of oral   contrast.  No acute abnormality in the chest, abdomen and pelvis.      --- End of Report ---    < end of copied text >      PHYSICAL EXAM  General: NAD  Neurology: Awake, nonfocal, STONER x 4  Eyes: Scleras clear, PERRLA/ EOMI, Gross vision intact  Neck: Neck supple, trachea midline, No JVD  Respiratory: CTA B/L, No wheezing, rales, rhonchi  CV: RRR, S1S2, no murmurs, rubs or gallops  Abdominal: Soft, NT, ND +BS  Extremities: No edema, + peripheral pulses       Subjective:  Patient seen and examined. Patient lying in bed in [facility]. Patient reports "I feel good, eating well & no pain. I'm waiting to be discharged". Patient denies acute pain with radiating or aggravating factors. He denies chest pain, shortness of breath, palpitations, headache, dizziness, nausea, or vomiting.     PAST MEDICAL & SURGICAL HISTORY:  Hypertension    Esophageal cancer    Gastroparesis    S/P cholecystectomy    H/O esophagectomy      VITAL SIGNS  Vital Signs Last 24 Hrs  T(C): 36.4 (12-01-24 @ 07:34), Max: 36.7 (11-30-24 @ 16:57)  T(F): 97.6 (12-01-24 @ 07:34), Max: 98.1 (11-30-24 @ 21:27)  HR: 87 (12-01-24 @ 07:34) (70 - 87)  BP: 133/87 (12-01-24 @ 07:34) (133/87 - 160/92)  RR: 18 (12-01-24 @ 07:34) (18 - 18)  SpO2: 96% (12-01-24 @ 07:34) (96% - 99%)  on (O2)              MEDICATIONS  acetaminophen     Tablet .. 650 milliGRAM(s) Oral every 6 hours PRN  aluminum hydroxide/magnesium hydroxide/simethicone Suspension 30 milliLiter(s) Oral every 4 hours PRN  amLODIPine   Tablet 5 milliGRAM(s) Oral daily  cyanocobalamin 1000 MICROGram(s) Oral daily  fentaNYL   Patch  12 MICROgram(s)/Hr 1 Patch Transdermal every 72 hours  HYDROmorphone  Injectable 0.5 milliGRAM(s) IV Push every 6 hours PRN  iron sucrose Injectable 100 milliGRAM(s) IV Push every 24 hours  metoclopramide Injectable 10 milliGRAM(s) IV Push three times a day  ondansetron Injectable 4 milliGRAM(s) IV Push every 6 hours PRN  pantoprazole  Injectable 40 milliGRAM(s) IV Push two times a day      11-30 @ 07:01  -  12-01 @ 07:00  --------------------------------------------------------  IN: 360 mL / OUT: 300 mL / NET: 60 mL      Weights:  Daily Height in cm: 185.42 (30 Nov 2024 12:23)    Daily   Admit Wt: Drug Dosing Weight  Height (cm): 185.4 (30 Nov 2024 12:23)  Weight (kg): 86.1 (30 Nov 2024 12:23)  BMI (kg/m2): 25 (30 Nov 2024 12:23)  BSA (m2): 2.1 (30 Nov 2024 12:23)    LABS  12-01    134[L]  |  98  |  16.0  ----------------------------<  91  3.9   |  24.0  |  0.75    Ca    8.4      01 Dec 2024 04:45    TPro  6.6  /  Alb  3.9  /  TBili  0.5  /  DBili  x   /  AST  16  /  ALT  18  /  AlkPhos  99  12-01                                 10.8   7.95  )-----------( 272      ( 01 Dec 2024 04:45 )             34.8            Bilirubin Total: 0.5 mg/dL (12-01 @ 04:45)      DIAGNOSTICS:  All laboratory results, radiology and medications reviewed.    < from: CT Abdomen and Pelvis w/ Oral Cont (11.30.24 @ 10:53) >  IMPRESSION:  Esophagectomy with gastric pull-through. No extravasation of oral   contrast.  No acute abnormality in the chest, abdomen and pelvis.      --- End of Report ---    < end of copied text >      PHYSICAL EXAM  General: NAD  Neurology: Awake, nonfocal, STONER x 4  Respiratory: CTA B/L, No wheezing, rales, rhonchi  CV: RRR, S1S2, no murmurs, rubs or gallops  Abdominal: Soft, NT, ND +BS  Extremities: No edema, + peripheral pulses

## 2024-12-01 NOTE — DISCHARGE NOTE NURSING/CASE MANAGEMENT/SOCIAL WORK - FINANCIAL ASSISTANCE
[facility] provides services at a reduced cost to those who are determined to be eligible through [facility]’s financial assistance program. Information regarding [facility]’s financial assistance program can be found by going to [email or web address] or by calling [phone].

## 2024-12-01 NOTE — PROGRESS NOTE ADULT - REASON FOR ADMISSION
Nausea/vomiting

## 2024-12-10 NOTE — ED ADULT NURSE REASSESSMENT NOTE - TEMPLATE LIST FOR HEAD TO TOE ASSESSMENT
#1 allergic rhinitis  see above clinical history.  Contact hives with dogs as well as seasonal concerns.  There are dogs in the home  See above skin testing to screen for allergic triggers.  Reviewed avoidance measures and potential treatment option immunotherapy  Zyrtec once a day up to 2-4 times per needed if hives are not controlled  Add trial of Astelin nasal spray 2 sprays per nostril up to twice a day if having prominent nasal congestion postnasal drip    2.  Asthma  Stable at this time with current Advair twice a day.  Continue with Advair  Reviewed signs and symptoms of persistent asthma including the rules of 2  Albuterol 2 puffs every 4-6 hours if having active coughing wheezing or shortness of breath    3.  Flu vaccine up-to-date from the fall    4.  COVID-vaccine booster recommended.  Most recent booster is in 2024.  Please check with local pharmacy as we do not stock the vaccine    5.  Pneumonia vaccine up-to-date from 2024 given history of asthma  Prevnar 20 given in office today given history of asthma           Orders This Visit:  Orders Placed This Encounter   Procedures    Prevnar 20 (PCV20) [39992]       Meds This Visit:  Requested Prescriptions     Signed Prescriptions Disp Refills    azelastine 0.1 % Nasal Solution 3 each 0     Si sprays by Nasal route 2 (two) times daily.       Imaging & Referrals:  PCV20 VACCINE FOR INTRAMUSCULAR USE   
Abdominal Pain, N/V/D

## 2025-01-02 NOTE — PATIENT PROFILE ADULT - NSTRANSFERBELONGINGSDISPO_GEN_A_NUR
Jian Guerrero was seen and treated in our emergency department on 1/2/2025.                Diagnosis:     Jian  may return to school on return date.    He may return on this date: 01/06/2025         If you have any questions or concerns, please don't hesitate to call.      Marco Tariq PA-C    ______________________________           _______________          _______________  Hospital Representative                              Date                                Time
with patient

## 2025-01-09 NOTE — ED ADULT TRIAGE NOTE - ESI TRIAGE ACUITY LEVEL, MLM
2
Adjusted  2. Patient will demonstrate increased AROM of shoulder to 150 deg elevation and 75 deg PROM ER without pain to allow for proper joint functioning to enable patient to reach overhead.   [x] Progressing: [] Met: [] Not Met: [] Adjusted  3. Patient will demonstrate increased Strength of shoulder to at least 4/5 throughout without pain to allow for proper functional mobility to enable patient to return to lifting.   [x] Progressing: [] Met: [] Not Met: [] Adjusted  4. Patient will return to putting objects into cabinets and refrigerator without increased symptoms or restriction.   [x] Progressing: [] Met: [] Not Met: [] Adjusted  5. Patient will return to modified gym workouts keeping in safe shoulder zone.    [] Progressing: [] Met: [x] Not Met: [] Adjusted     Overall Progression Towards Functional goals/ Treatment Progress Update:  [x] Patient is progressing as expected towards functional goals listed.    [] Progression is slowed due to complexities/Impairments listed.  [] Progression has been slowed due to co-morbidities.  [] Plan just implemented, too soon (<30days) to assess goals progression   [] Goals require adjustment due to lack of progress  [] Patient is not progressing as expected and requires additional follow up with physician  [] Other:     TREATMENT PLAN     Frequency/Duration: 1-2x/week for  16  weeks for the following treatment interventions:    Interventions:  Therapeutic Exercise (15016) including: strength training, ROM, and functional mobility  Therapeutic Activities (45153) including: functional mobility training and education.  Neuromuscular Re-education (30135) activation and proprioception, including postural re-education.    Manual Therapy (26880) as indicated to include: Passive Range of Motion, Gr I-IV mobilizations, and Soft Tissue Mobilization  Modalities as needed that may include: Cryotherapy, Electrical Stimulation, and Vasoneumatic Compression  Patient education on joint

## 2025-02-06 ENCOUNTER — APPOINTMENT (OUTPATIENT)
Dept: GASTROENTEROLOGY | Facility: CLINIC | Age: 60
End: 2025-02-06

## 2025-03-26 ENCOUNTER — INPATIENT (INPATIENT)
Facility: HOSPITAL | Age: 60
LOS: 4 days | Discharge: HOME | DRG: 392 | End: 2025-03-31
Attending: STUDENT IN AN ORGANIZED HEALTH CARE EDUCATION/TRAINING PROGRAM | Admitting: STUDENT IN AN ORGANIZED HEALTH CARE EDUCATION/TRAINING PROGRAM
Payer: MEDICARE

## 2025-03-26 VITALS
DIASTOLIC BLOOD PRESSURE: 92 MMHG | OXYGEN SATURATION: 98 % | HEART RATE: 76 BPM | SYSTOLIC BLOOD PRESSURE: 170 MMHG | WEIGHT: 192.68 LBS | TEMPERATURE: 98 F | RESPIRATION RATE: 20 BRPM

## 2025-03-26 DIAGNOSIS — Z90.49 ACQUIRED ABSENCE OF OTHER SPECIFIED PARTS OF DIGESTIVE TRACT: Chronic | ICD-10-CM

## 2025-03-26 DIAGNOSIS — Z98.890 OTHER SPECIFIED POSTPROCEDURAL STATES: Chronic | ICD-10-CM

## 2025-03-26 LAB
ALBUMIN SERPL ELPH-MCNC: 5 G/DL — SIGNIFICANT CHANGE UP (ref 3.3–5.2)
ALP SERPL-CCNC: 128 U/L — HIGH (ref 40–120)
ALT FLD-CCNC: 48 U/L — HIGH
ANION GAP SERPL CALC-SCNC: 19 MMOL/L — HIGH (ref 5–17)
AST SERPL-CCNC: 47 U/L — HIGH
BASOPHILS # BLD AUTO: 0.02 K/UL — SIGNIFICANT CHANGE UP (ref 0–0.2)
BASOPHILS NFR BLD AUTO: 0.2 % — SIGNIFICANT CHANGE UP (ref 0–2)
BILIRUB SERPL-MCNC: 0.4 MG/DL — SIGNIFICANT CHANGE UP (ref 0.4–2)
BUN SERPL-MCNC: 9 MG/DL — SIGNIFICANT CHANGE UP (ref 8–20)
CALCIUM SERPL-MCNC: 9.7 MG/DL — SIGNIFICANT CHANGE UP (ref 8.4–10.5)
CHLORIDE SERPL-SCNC: 98 MMOL/L — SIGNIFICANT CHANGE UP (ref 96–108)
CO2 SERPL-SCNC: 19 MMOL/L — LOW (ref 22–29)
CREAT SERPL-MCNC: 0.63 MG/DL — SIGNIFICANT CHANGE UP (ref 0.5–1.3)
EGFR: 111 ML/MIN/1.73M2 — SIGNIFICANT CHANGE UP
EGFR: 111 ML/MIN/1.73M2 — SIGNIFICANT CHANGE UP
EOSINOPHIL # BLD AUTO: 0 K/UL — SIGNIFICANT CHANGE UP (ref 0–0.5)
EOSINOPHIL NFR BLD AUTO: 0 % — SIGNIFICANT CHANGE UP (ref 0–6)
GLUCOSE SERPL-MCNC: 142 MG/DL — HIGH (ref 70–99)
HCT VFR BLD CALC: 44 % — SIGNIFICANT CHANGE UP (ref 39–50)
HGB BLD-MCNC: 14.7 G/DL — SIGNIFICANT CHANGE UP (ref 13–17)
IMM GRANULOCYTES # BLD AUTO: 0.03 K/UL — SIGNIFICANT CHANGE UP (ref 0–0.07)
IMM GRANULOCYTES NFR BLD AUTO: 0.3 % — SIGNIFICANT CHANGE UP (ref 0–0.9)
LIDOCAIN IGE QN: 15 U/L — LOW (ref 22–51)
LYMPHOCYTES # BLD AUTO: 0.79 K/UL — LOW (ref 1–3.3)
LYMPHOCYTES NFR BLD AUTO: 6.8 % — LOW (ref 13–44)
MCHC RBC-ENTMCNC: 27 PG — SIGNIFICANT CHANGE UP (ref 27–34)
MCHC RBC-ENTMCNC: 33.4 G/DL — SIGNIFICANT CHANGE UP (ref 32–36)
MCV RBC AUTO: 80.9 FL — SIGNIFICANT CHANGE UP (ref 80–100)
MONOCYTES # BLD AUTO: 0.31 K/UL — SIGNIFICANT CHANGE UP (ref 0–0.9)
MONOCYTES NFR BLD AUTO: 2.7 % — SIGNIFICANT CHANGE UP (ref 2–14)
NEUTROPHILS # BLD AUTO: 10.54 K/UL — HIGH (ref 1.8–7.4)
NEUTROPHILS NFR BLD AUTO: 90 % — HIGH (ref 43–77)
NRBC # BLD AUTO: 0 K/UL — SIGNIFICANT CHANGE UP (ref 0–0)
NRBC # FLD: 0 K/UL — SIGNIFICANT CHANGE UP (ref 0–0)
NRBC BLD AUTO-RTO: 0 /100 WBCS — SIGNIFICANT CHANGE UP (ref 0–0)
PLATELET # BLD AUTO: 267 K/UL — SIGNIFICANT CHANGE UP (ref 150–400)
PMV BLD: 10.1 FL — SIGNIFICANT CHANGE UP (ref 7–13)
POTASSIUM SERPL-MCNC: 4.8 MMOL/L — SIGNIFICANT CHANGE UP (ref 3.5–5.3)
POTASSIUM SERPL-SCNC: 4.8 MMOL/L — SIGNIFICANT CHANGE UP (ref 3.5–5.3)
PROT SERPL-MCNC: 8.4 G/DL — SIGNIFICANT CHANGE UP (ref 6.6–8.7)
RBC # BLD: 5.44 M/UL — SIGNIFICANT CHANGE UP (ref 4.2–5.8)
RBC # FLD: 14.3 % — SIGNIFICANT CHANGE UP (ref 10.3–14.5)
SODIUM SERPL-SCNC: 136 MMOL/L — SIGNIFICANT CHANGE UP (ref 135–145)
WBC # BLD: 11.69 K/UL — HIGH (ref 3.8–10.5)
WBC # FLD AUTO: 11.69 K/UL — HIGH (ref 3.8–10.5)

## 2025-03-26 PROCEDURE — 99285 EMERGENCY DEPT VISIT HI MDM: CPT

## 2025-03-26 PROCEDURE — 71045 X-RAY EXAM CHEST 1 VIEW: CPT | Mod: 26

## 2025-03-26 RX ORDER — METOCLOPRAMIDE HCL 10 MG
10 TABLET ORAL ONCE
Refills: 0 | Status: COMPLETED | OUTPATIENT
Start: 2025-03-26 | End: 2025-03-26

## 2025-03-26 RX ORDER — HYDROMORPHONE/SOD CHLOR,ISO/PF 2 MG/10 ML
0.5 SYRINGE (ML) INJECTION ONCE
Refills: 0 | Status: DISCONTINUED | OUTPATIENT
Start: 2025-03-26 | End: 2025-03-26

## 2025-03-26 RX ORDER — HYDROMORPHONE/SOD CHLOR,ISO/PF 2 MG/10 ML
1 SYRINGE (ML) INJECTION ONCE
Refills: 0 | Status: DISCONTINUED | OUTPATIENT
Start: 2025-03-26 | End: 2025-03-26

## 2025-03-26 RX ORDER — ACETAMINOPHEN 500 MG/5ML
1000 LIQUID (ML) ORAL ONCE
Refills: 0 | Status: COMPLETED | OUTPATIENT
Start: 2025-03-26 | End: 2025-03-26

## 2025-03-26 RX ADMIN — Medication 40 MILLIGRAM(S): at 21:22

## 2025-03-26 RX ADMIN — Medication 1000 MILLILITER(S): at 22:00

## 2025-03-26 RX ADMIN — Medication 1000 MILLIGRAM(S): at 21:51

## 2025-03-26 RX ADMIN — Medication 0.5 MILLIGRAM(S): at 21:21

## 2025-03-26 RX ADMIN — Medication 1000 MILLIGRAM(S): at 21:30

## 2025-03-26 RX ADMIN — Medication 1000 MILLILITER(S): at 21:21

## 2025-03-26 RX ADMIN — Medication 10 MILLIGRAM(S): at 21:21

## 2025-03-26 RX ADMIN — Medication 400 MILLIGRAM(S): at 21:21

## 2025-03-26 RX ADMIN — Medication 0.5 MILLIGRAM(S): at 22:53

## 2025-03-26 RX ADMIN — Medication 0.5 MILLIGRAM(S): at 21:51

## 2025-03-26 RX ADMIN — Medication 0.5 MILLIGRAM(S): at 22:23

## 2025-03-26 NOTE — ED ADULT NURSE NOTE - OBJECTIVE STATEMENT
pt is a&oX4, 60 yo M c/o generalized abdominal pain, N/V/D X 1 day. HX esophogeal CA and surgery. per pt he was hospitalized a few months ago for similar compliant. denies SOB, CP, dizziness, urinary symptoms, blood in stool. resp even and unlabored on ra. abdomen soft, nondistended and tender to palpation.   NAD. bed in lowest position with wheels locked. pt able to make needs known.

## 2025-03-26 NOTE — ED ADULT NURSE NOTE - NSFALLUNIVINTERV_ED_ALL_ED
Bed/Stretcher in lowest position, wheels locked, appropriate side rails in place/Call bell, personal items and telephone in reach/Instruct patient to call for assistance before getting out of bed/chair/stretcher/Non-slip footwear applied when patient is off stretcher/[place] to call system/Physically safe environment - no spills, clutter or unnecessary equipment/Purposeful proactive rounding/Room/bathroom lighting operational, light cord in reach

## 2025-03-26 NOTE — ED ADULT TRIAGE NOTE - CHIEF COMPLAINT QUOTE
From home c/o abdominal pain with vomiting since this morning, Hx of esophageal cancer, whole esophagus was removed in 2016.

## 2025-03-26 NOTE — ED PROVIDER NOTE - OBJECTIVE STATEMENT
59y M w/ hx esophageal carcinoma s/p esophagectomy with gastric pull through (2016), gastroparesis, HTN; presents for abdominal pain, nausea/vomiting x 1 day. Reports prior history of similar pain 2/2 gastroparesis. Says he previously had multiple pyloric Botox injections and balloon dilations with Dr. Bustillos of GI. No fever.

## 2025-03-26 NOTE — ED PROVIDER NOTE - CLINICAL SUMMARY MEDICAL DECISION MAKING FREE TEXT BOX
DIAGNOSIS:   1. High grade urothelial carcinoma, 12/2021, T3 N1 M0     CURRENT THERAPY:  Plan for neoadjuvant chemo followed by adjuvant cystectomy    BRIEF CASE HISTORY:   Gaby Gómez is a very pleasant 74 y.o. male who is referred to us for bladder cancer. Patient presented with hematuria 02/2021 and was seen by urology, it was felt to be renal calculi and he was started on Flomax but continued to pass clots. He was later in [facility] and had urinary blockage, passed very large clot and then able to urinate and presented again to ER with continued hematuria, CT showed severe right hydronephrosis with tissue thickening/mass seen in right side of bladder, he underwent nephrostomy placement, stent was put in place and cystoscopy done showing fairly large papillary tumor occupying right side posterior wall and even the dome of the bladder. Pathology showed high grade urothelial carcinoma invading muscularis propria. Over the next month he needed 2 repeat cystoscopies and extensive fulguration of the tumor, again pathology showed high grade urothelial carcinoma. CT of the chest abdomen pelvis did not show metastatic disease. He has intermittent bladder pain and continues to have hematuria in Muñoz catheter. His appetite is poor. His father had bladder cancer, he did not have smoking history. The patient has smoking history but quit 30 years ago. He had COVID infection in 09/2021, he lost significant weight and continues to recover. He has arthralgias in the knees and uses cane. Staging PET scan was done and showed the bladder tumor and right pelvic sidewall lymph node metastases 4 of clinical staging of T3 N1 M0. We discussed neoadjuvant chemotherapy to be followed by surgery if he has a good response. INTERIM HISTORY: The patient presents for follow up with toxicity check for day #8, cycle #1. He reports he experienced nausea, weakness and fatigue from Sunday to Tuesday.  He had UTI with cloudy urine, hematuria and fever, started on antibiotics and has cleared. He continues to have right lower abdominal pain with full bladder. His shortness of breath is unchanged from baseline. PAST MEDICAL HISTORY: has a past medical history of Arthritis, BPH with obstruction/lower urinary tract symptoms, Caffeine use, Cancer ([address] Depression, GERD (gastroesophageal reflux disease), High cholesterol, Kidney calculi, and Sleep apnea. PAST SURGICAL HISTORY: has a past surgical history that includes Knee arthroscopy; Cardiac catheterization; Colonoscopy; and IR PORT PLACEMENT > 5 YEARS (2/25/2022). CURRENT MEDICATIONS:  has a current medication list which includes the following prescription(s): ciprofloxacin, hydrocodone-acetaminophen, lidocaine-prilocaine, ondansetron, omeprazole, finasteride, tamsulosin, simvastatin, bupropion, gemtesa, and clobetasol. ALLERGIES:  is allergic to meloxicam.    FAMILY HISTORY: Father had bladder cancer     SOCIAL HISTORY:  reports that he quit smoking about 30 years ago. He has never used smokeless tobacco. He reports that he does not drink alcohol and does not use drugs. REVIEW OF SYSTEMS:   General: No fever or night sweats. Weight is stable. +fatigue and generalized weakness   ENT: No double or blurred vision, no tinnitus or hearing problem, no dysphagia or sore throat   Respiratory: No chest pain, no shortness of breath, no cough or hemoptysis. Cardiovascular: Denies chest pain, PND or orthopnea. No L E swelling or palpitations. Gastrointestinal: No nausea or vomiting, diarrhea or constipation. +right lower abdominal pain   Genitourinary: Denies dysuria, frequency, urgency or incontinence. +penil pain; hematuria and cloudy urine resolved  Neurological: Denies headaches, decreased LOC, no sensory or motor focal deficits. Musculoskeletal: No arthralgia no back pain or joint swelling. Skin: There are no rashes or bleeding. Psychiatric: No anxiety, no depression. Endocrine: No diabetes or thyroid disease. Hematologic: No bleeding, no adenopathy. PHYSICAL EXAM: Shows a well appearing 74y.o.-year-old male who is not in pain or distress. Vital Signs: Blood pressure 129/88, pulse 106, temperature 97.5 °F (36.4 °C), resp. rate 16, weight 268 lb 12.8 oz (121.9 kg). HEENT: Normocephalic and atraumatic. Pupils are equal, round, reactive to light and accommodation. Extraocular muscles are intact. Neck: Showed no JVD, no carotid bruit . Lungs: Clear to auscultation bilaterally. Heart: Regular without any murmur. Abdomen: Soft, nontender. No hepatosplenomegaly. Extremities: Lower extremities show no edema, clubbing, or cyanosis. Breasts: Examination not done today. Neuro exam: intact cranial nerves bilaterally no motor or sensory deficit, gait is normal. Lymphatic: no adenopathy appreciated in the supraclavicular, axillary, cervical or inguinal area      REVIEW OF LABORATORY DATA:   Lab Results   Component Value Date    WBC 13.3 (H) 03/03/2022    HGB 10.8 (L) 03/03/2022    HCT 33.0 (L) 03/03/2022    MCV 79.2 (L) 03/03/2022     03/03/2022       Chemistry        Component Value Date/Time     03/03/2022 0808    K 4.0 03/03/2022 0808    CL 99 03/03/2022 0808    CO2 24 03/03/2022 0808    BUN 13 03/03/2022 0808    CREATININE 1.31 (H) 03/03/2022 0808        Component Value Date/Time    CALCIUM 9.2 03/03/2022 0808    ALKPHOS 87 03/03/2022 0808    AST 8 03/03/2022 0808    ALT 9 03/03/2022 0808    BILITOT 0.47 03/03/2022 0808        PATHOLOGY:     REVIEW OF RADIOLOGICAL RESULTS:         IMPRESSION:   1. High grade urothelial carcinoma   2. Neoadjuvant chemo to be followed by cystectomy     PLAN:   1. His lab work was reviewed, counts are adequate, electrolytes are in range. 2. I completed toxicity check. 3. We discussed recent UTI. 4. His nausea is controlled.    5. I explained his shortness of breath is not related to treatment as it is unchanged from baseline, I recommend he follow up with pulmonology for management. 6. We reviewed monitoring for response. 7. We will treat today as per orders. 8. Return in 2 weeks. Scribe Attestation   This note was created by Denia Kaminski acting as scribe for the physician signing this note  Electronically Signed  Denia Kaminski, 3/10/2022  Scribe, [employer]. Attending Attestation   Note was reviewed and edited.   I am in agreement with the note as entered    [place] Nico Grayson MD  Hematologist/Medical [address] [place] [address] hematology oncology physicians 59y M w/ hx esophageal CA s/p esophagectomy with gastric pull-through; gastroparesis; presents for abdominal pain and vomiting c/w prior episodes of gastroparesis. Will treat symptomatically, check labs and CT. 59y M w/ hx esophageal CA s/p esophagectomy with gastric pull-through; gastroparesis; presents for abdominal pain and vomiting c/w prior episodes of gastroparesis. Will treat symptomatically, check labs and CT.    No emergent findings on labs/CT. Pt with persistent pain despite multiple rounds of treatment. GI consult placed. Will place in observation.

## 2025-03-26 NOTE — ED PROVIDER NOTE - PHYSICAL EXAMINATION
Constitutional: Awake, alert, in no acute distress  Eyes: no scleral icterus  HENT: normocephalic, atraumatic, moist oral mucosa  Neck: supple  CV: RRR, no murmur  Pulm: non-labored respirations, CTAB  Abdomen: soft, +mild epigastric tenderness, no rebound or guarding, non-distended  Extremities: no edema, no deformity  Skin: no rash, no jaundice  Neuro: AAOx3, moving all extremities equally

## 2025-03-27 LAB
ALBUMIN SERPL ELPH-MCNC: 4.8 G/DL — SIGNIFICANT CHANGE UP (ref 3.3–5.2)
ALP SERPL-CCNC: 121 U/L — HIGH (ref 40–120)
ALT FLD-CCNC: 44 U/L — HIGH
ANION GAP SERPL CALC-SCNC: 15 MMOL/L — SIGNIFICANT CHANGE UP (ref 5–17)
ANION GAP SERPL CALC-SCNC: 17 MMOL/L — SIGNIFICANT CHANGE UP (ref 5–17)
APAP SERPL-MCNC: <3 UG/ML — LOW (ref 10–26)
APPEARANCE UR: CLEAR — SIGNIFICANT CHANGE UP
APTT BLD: 29.2 SEC — SIGNIFICANT CHANGE UP (ref 24.5–35.6)
AST SERPL-CCNC: 34 U/L — SIGNIFICANT CHANGE UP
BACTERIA # UR AUTO: NEGATIVE /HPF — SIGNIFICANT CHANGE UP
BASOPHILS # BLD AUTO: 0.02 K/UL — SIGNIFICANT CHANGE UP (ref 0–0.2)
BASOPHILS NFR BLD AUTO: 0.2 % — SIGNIFICANT CHANGE UP (ref 0–2)
BILIRUB SERPL-MCNC: 0.6 MG/DL — SIGNIFICANT CHANGE UP (ref 0.4–2)
BILIRUB UR-MCNC: NEGATIVE — SIGNIFICANT CHANGE UP
BLD GP AB SCN SERPL QL: SIGNIFICANT CHANGE UP
BUN SERPL-MCNC: 8.6 MG/DL — SIGNIFICANT CHANGE UP (ref 8–20)
BUN SERPL-MCNC: 9.4 MG/DL — SIGNIFICANT CHANGE UP (ref 8–20)
CALCIUM SERPL-MCNC: 8.9 MG/DL — SIGNIFICANT CHANGE UP (ref 8.4–10.5)
CALCIUM SERPL-MCNC: 9.3 MG/DL — SIGNIFICANT CHANGE UP (ref 8.4–10.5)
CAST: 0 /LPF — SIGNIFICANT CHANGE UP (ref 0–4)
CHLORIDE SERPL-SCNC: 100 MMOL/L — SIGNIFICANT CHANGE UP (ref 96–108)
CHLORIDE SERPL-SCNC: 99 MMOL/L — SIGNIFICANT CHANGE UP (ref 96–108)
CO2 SERPL-SCNC: 19 MMOL/L — LOW (ref 22–29)
CO2 SERPL-SCNC: 21 MMOL/L — LOW (ref 22–29)
COLOR SPEC: YELLOW — SIGNIFICANT CHANGE UP
CREAT SERPL-MCNC: 0.62 MG/DL — SIGNIFICANT CHANGE UP (ref 0.5–1.3)
CREAT SERPL-MCNC: 0.62 MG/DL — SIGNIFICANT CHANGE UP (ref 0.5–1.3)
DIFF PNL FLD: ABNORMAL
EGFR: 111 ML/MIN/1.73M2 — SIGNIFICANT CHANGE UP
EOSINOPHIL # BLD AUTO: 0 K/UL — SIGNIFICANT CHANGE UP (ref 0–0.5)
EOSINOPHIL NFR BLD AUTO: 0 % — SIGNIFICANT CHANGE UP (ref 0–6)
FLUAV AG NPH QL: SIGNIFICANT CHANGE UP
FLUBV AG NPH QL: SIGNIFICANT CHANGE UP
GLUCOSE SERPL-MCNC: 136 MG/DL — HIGH (ref 70–99)
GLUCOSE SERPL-MCNC: 159 MG/DL — HIGH (ref 70–99)
GLUCOSE UR QL: NEGATIVE MG/DL — SIGNIFICANT CHANGE UP
HCT VFR BLD CALC: 42 % — SIGNIFICANT CHANGE UP (ref 39–50)
HGB BLD-MCNC: 14.3 G/DL — SIGNIFICANT CHANGE UP (ref 13–17)
IMM GRANULOCYTES # BLD AUTO: 0.05 K/UL — SIGNIFICANT CHANGE UP (ref 0–0.07)
IMM GRANULOCYTES NFR BLD AUTO: 0.4 % — SIGNIFICANT CHANGE UP (ref 0–0.9)
INR BLD: 1.1 RATIO — SIGNIFICANT CHANGE UP (ref 0.85–1.16)
KETONES UR-MCNC: 40 MG/DL
LACTATE SERPL-SCNC: 1 MMOL/L — SIGNIFICANT CHANGE UP (ref 0.5–2)
LEUKOCYTE ESTERASE UR-ACNC: NEGATIVE — SIGNIFICANT CHANGE UP
LYMPHOCYTES # BLD AUTO: 0.96 K/UL — LOW (ref 1–3.3)
LYMPHOCYTES NFR BLD AUTO: 7.7 % — LOW (ref 13–44)
MCHC RBC-ENTMCNC: 27.1 PG — SIGNIFICANT CHANGE UP (ref 27–34)
MCHC RBC-ENTMCNC: 34 G/DL — SIGNIFICANT CHANGE UP (ref 32–36)
MCV RBC AUTO: 79.7 FL — LOW (ref 80–100)
MONOCYTES # BLD AUTO: 0.5 K/UL — SIGNIFICANT CHANGE UP (ref 0–0.9)
MONOCYTES NFR BLD AUTO: 4 % — SIGNIFICANT CHANGE UP (ref 2–14)
NEUTROPHILS # BLD AUTO: 11 K/UL — HIGH (ref 1.8–7.4)
NEUTROPHILS NFR BLD AUTO: 87.7 % — HIGH (ref 43–77)
NITRITE UR-MCNC: NEGATIVE — SIGNIFICANT CHANGE UP
NRBC # BLD AUTO: 0 K/UL — SIGNIFICANT CHANGE UP (ref 0–0)
NRBC # FLD: 0 K/UL — SIGNIFICANT CHANGE UP (ref 0–0)
NRBC BLD AUTO-RTO: 0 /100 WBCS — SIGNIFICANT CHANGE UP (ref 0–0)
PH UR: 7 — SIGNIFICANT CHANGE UP (ref 5–8)
PLATELET # BLD AUTO: 289 K/UL — SIGNIFICANT CHANGE UP (ref 150–400)
PMV BLD: 10.5 FL — SIGNIFICANT CHANGE UP (ref 7–13)
POTASSIUM SERPL-MCNC: 3.9 MMOL/L — SIGNIFICANT CHANGE UP (ref 3.5–5.3)
POTASSIUM SERPL-MCNC: 4.1 MMOL/L — SIGNIFICANT CHANGE UP (ref 3.5–5.3)
POTASSIUM SERPL-SCNC: 3.9 MMOL/L — SIGNIFICANT CHANGE UP (ref 3.5–5.3)
POTASSIUM SERPL-SCNC: 4.1 MMOL/L — SIGNIFICANT CHANGE UP (ref 3.5–5.3)
PROT SERPL-MCNC: 8.2 G/DL — SIGNIFICANT CHANGE UP (ref 6.6–8.7)
PROT UR-MCNC: 100 MG/DL
PROTHROM AB SERPL-ACNC: 12.8 SEC — SIGNIFICANT CHANGE UP (ref 9.9–13.4)
RBC # BLD: 5.27 M/UL — SIGNIFICANT CHANGE UP (ref 4.2–5.8)
RBC # FLD: 14.4 % — SIGNIFICANT CHANGE UP (ref 10.3–14.5)
RBC CASTS # UR COMP ASSIST: 5 /HPF — HIGH (ref 0–4)
RSV RNA NPH QL NAA+NON-PROBE: SIGNIFICANT CHANGE UP
SARS-COV-2 RNA SPEC QL NAA+PROBE: SIGNIFICANT CHANGE UP
SODIUM SERPL-SCNC: 135 MMOL/L — SIGNIFICANT CHANGE UP (ref 135–145)
SODIUM SERPL-SCNC: 136 MMOL/L — SIGNIFICANT CHANGE UP (ref 135–145)
SOURCE RESPIRATORY: SIGNIFICANT CHANGE UP
SP GR SPEC: >1.03 — HIGH (ref 1–1.03)
SQUAMOUS # UR AUTO: 0 /HPF — SIGNIFICANT CHANGE UP (ref 0–5)
UROBILINOGEN FLD QL: 1 MG/DL — SIGNIFICANT CHANGE UP (ref 0.2–1)
WBC # BLD: 12.53 K/UL — HIGH (ref 3.8–10.5)
WBC # FLD AUTO: 12.53 K/UL — HIGH (ref 3.8–10.5)
WBC UR QL: 0 /HPF — SIGNIFICANT CHANGE UP (ref 0–5)

## 2025-03-27 PROCEDURE — 74177 CT ABD & PELVIS W/CONTRAST: CPT | Mod: 26

## 2025-03-27 PROCEDURE — 99223 1ST HOSP IP/OBS HIGH 75: CPT

## 2025-03-27 RX ORDER — AMLODIPINE BESYLATE 10 MG/1
5 TABLET ORAL DAILY
Refills: 0 | Status: DISCONTINUED | OUTPATIENT
Start: 2025-03-27 | End: 2025-03-31

## 2025-03-27 RX ORDER — HYDROMORPHONE/SOD CHLOR,ISO/PF 2 MG/10 ML
1 SYRINGE (ML) INJECTION ONCE
Refills: 0 | Status: DISCONTINUED | OUTPATIENT
Start: 2025-03-27 | End: 2025-03-27

## 2025-03-27 RX ORDER — METOCLOPRAMIDE HCL 10 MG
10 TABLET ORAL EVERY 8 HOURS
Refills: 0 | Status: DISCONTINUED | OUTPATIENT
Start: 2025-03-27 | End: 2025-03-31

## 2025-03-27 RX ORDER — HYDROMORPHONE/SOD CHLOR,ISO/PF 2 MG/10 ML
0.5 SYRINGE (ML) INJECTION ONCE
Refills: 0 | Status: DISCONTINUED | OUTPATIENT
Start: 2025-03-27 | End: 2025-03-27

## 2025-03-27 RX ORDER — ONDANSETRON HCL/PF 4 MG/2 ML
4 VIAL (ML) INJECTION ONCE
Refills: 0 | Status: COMPLETED | OUTPATIENT
Start: 2025-03-27 | End: 2025-03-27

## 2025-03-27 RX ORDER — HYDROMORPHONE/SOD CHLOR,ISO/PF 2 MG/10 ML
1 SYRINGE (ML) INJECTION EVERY 4 HOURS
Refills: 0 | Status: DISCONTINUED | OUTPATIENT
Start: 2025-03-27 | End: 2025-03-28

## 2025-03-27 RX ADMIN — Medication 1 MILLIGRAM(S): at 12:01

## 2025-03-27 RX ADMIN — Medication 1 MILLIGRAM(S): at 04:00

## 2025-03-27 RX ADMIN — Medication 40 MILLIGRAM(S): at 17:13

## 2025-03-27 RX ADMIN — Medication 0.5 MILLIGRAM(S): at 08:03

## 2025-03-27 RX ADMIN — Medication 4 MILLIGRAM(S): at 20:13

## 2025-03-27 RX ADMIN — Medication 10 MILLIGRAM(S): at 22:31

## 2025-03-27 RX ADMIN — Medication 1 MILLIGRAM(S): at 03:43

## 2025-03-27 RX ADMIN — Medication 1 MILLIGRAM(S): at 00:07

## 2025-03-27 RX ADMIN — Medication 1 MILLIGRAM(S): at 00:37

## 2025-03-27 RX ADMIN — Medication 1 MILLIGRAM(S): at 20:23

## 2025-03-27 RX ADMIN — Medication 4 MILLIGRAM(S): at 05:16

## 2025-03-27 RX ADMIN — Medication 0.5 MILLIGRAM(S): at 08:45

## 2025-03-27 RX ADMIN — Medication 1 MILLIGRAM(S): at 13:21

## 2025-03-27 RX ADMIN — Medication 40 MILLIGRAM(S): at 05:00

## 2025-03-27 RX ADMIN — Medication 1 MILLIGRAM(S): at 17:13

## 2025-03-27 RX ADMIN — Medication 150 MILLILITER(S): at 11:53

## 2025-03-27 RX ADMIN — Medication 10 MILLIGRAM(S): at 13:27

## 2025-03-27 RX ADMIN — Medication 4 MILLIGRAM(S): at 05:55

## 2025-03-27 RX ADMIN — Medication 10 MILLIGRAM(S): at 05:00

## 2025-03-27 RX ADMIN — Medication 1 MILLIGRAM(S): at 21:00

## 2025-03-27 NOTE — CONSULT NOTE ADULT - SUBJECTIVE AND OBJECTIVE BOX
HISTORY OF PRESENT ILLNESS: 58 y/o M with PMHx esophageal carcinoma s/p esophagectomy with gastric pull through (2016), gastroparesis, s/p Pre Pyloric Botox Injection and Balloon Dilation x 4 (last April 2024) repeat EGD on November last year shows patent pylorus , cholecystectomy presented to ED with worsening nausea, vomiting and abdominal pain x 1 day. Symptoms started yesterday morning. Denies sick contact long distance travel or possibility of food poisoning.  Denies fever chills. His prior EGD on April 2024 with CRE balloon dilation utilizing a wire guided technique, the pylorus was dilated to a maximal diameter of 18 mm and Botox injection. His last EGD was on November 2024 showed patent pylorus and therefore no intervention was required a that time. In ED, CT abd with no acute process. Vomiting is now improved, last episode was last night. Reported abdominal 10/10 upper abdominal pain.  Denies use of NSAIDs, AC, opioids. Denies use of alcohol, smoking or illicit drugs. Patient endorsed compliance with Reglan PRN, Erythromycin daily and Omeprazole 40 mg Daily.     Review of Systems:  . Constitutional: No fever, chills  . HEENT: Negative  · Respiratory and Thorax: No shortness of breath, no cough  · Cardiovascular: No chest pain, palpitation, no dizziness  · Gastrointestinal: see above  · Genitourinary: No hematuria  · Musculoskeletal: Negative  · Neurological: negative  · Psychiatric: no agitation, no anxiety    PAST MEDICAL/SURGICAL HISTORY:  Hypertension    Esophageal cancer    Gastroparesis    S/P cholecystectomy    H/O esophagectomy      SOCIAL HISTORY:  - TOBACCO: Denies  - ALCOHOL: Denies  - ILLICIT DRUG USE: Denies    FAMILY HISTORY:  No known history of gastrointestinal or liver disease;  FH: HTN (hypertension) (Father)        HOME MEDICATIONS:  amLODIPine 5 mg oral tablet: 1 tab(s) orally once a day (27 Nov 2024 14:39)  omeprazole 40 mg oral delayed release capsule: 1 cap(s) orally 2 times a day (27 Nov 2024 15:48)  Reglan 10 mg oral tablet: 1 tab(s) orally 3 times a day (before meals), As Needed (27 Nov 2024 14:39)    INPATIENT MEDICATIONS:  MEDICATIONS  (STANDING):  amLODIPine   Tablet 5 milliGRAM(s) Oral daily  metoclopramide Injectable 10 milliGRAM(s) IV Push every 8 hours  pantoprazole  Injectable 40 milliGRAM(s) IV Push two times a day    MEDICATIONS  (PRN):  morphine  - Injectable 4 milliGRAM(s) IV Push every 6 hours PRN Severe Pain (7 - 10)    ALLERGIES:  No Known Allergies    T(C): 36.5 (03-27-25 @ 07:25), Max: 37.9 (03-26-25 @ 23:18)  HR: 78 (03-27-25 @ 07:25) (76 - 81)  BP: 158/89 (03-27-25 @ 07:25) (150/91 - 175/90)  RR: 20 (03-27-25 @ 07:25) (18 - 20)  SpO2: 96% (03-27-25 @ 07:25) (96% - 98%)      PHYSICAL EXAM:    Constitutional: No acute distress  Neuro: Awake alert, oriented  HEENT: anicteric sclerae  CV: regular rate, regular rhythm  Pulm/chest: lung sounds diminished bilaterally, no accessory muscle use noted  Abd: soft, upper abdominal tenderness, nondistended +bowel sounds. No rigidity, rebound tenderness, or guarding, midabdominal pulsation  Ext: no edema  Skin: warm, no jaundice   Psych: calm, cooperative      LABS:             14.3   12.53 )-----------( 289      ( 03-27 @ 04:55 )             42.0                14.7   11.69 )-----------( 267      ( 03-26 @ 21:20 )             44.0       PT/INR - ( 27 Mar 2025 04:55 )   PT: 12.8 sec;   INR: 1.10 ratio         PTT - ( 27 Mar 2025 04:55 )  PTT:29.2 sec  03-27    135  |  99  |  9.4  ----------------------------<  159[H]  3.9   |  21.0[L]  |  0.62    Ca    9.3      27 Mar 2025 04:55    TPro  8.2  /  Alb  4.8  /  TBili  0.6  /  DBili  x   /  AST  34  /  ALT  44[H]  /  AlkPhos  121[H]  03-27    LIVER FUNCTIONS - ( 27 Mar 2025 04:55 )  Alb: 4.8 g/dL / Pro: 8.2 g/dL / ALK PHOS: 121 U/L / ALT: 44 U/L / AST: 34 U/L / GGT: x             Lipase: 15 U/L (03-26-25 @ 21:20)    Urinalysis Basic - ( 27 Mar 2025 04:55 )    Color: x / Appearance: x / SG: x / pH: x  Gluc: 159 mg/dL / Ketone: x  / Bili: x / Urobili: x   Blood: x / Protein: x / Nitrite: x   Leuk Esterase: x / RBC: x / WBC x   Sq Epi: x / Non Sq Epi: x / Bacteria: x      < from: CT Abdomen and Pelvis w/ IV Cont (03.27.25 @ 01:28) >    PROCEDURE:  CT of the Abdomen and Pelvis was performed.  Sagittal and coronal reformats were performed.    FINDINGS:  LOWER CHEST: Partially demonstrated gastric pull-through. Clear lung   bases. Normal heart size. No pleural/pericardial effusions.    LIVER: Within normal limits.  BILE DUCTS: Normal caliber.  GALLBLADDER: Within normal limits.  SPLEEN: Within normal limits.  PANCREAS: Moderately atrophic and infiltrated by fat.  ADRENALS: Stable indeterminate right adrenal gland nodule measuring up to   1.5 cm.  KIDNEYS/URETERS: Within normal limits.    BLADDER: Within normal limits.  REPRODUCTIVE ORGANS: Prostate is enlarged.    BOWEL: No bowel obstruction. Appendix is normal. A few colonic   diverticula without acute diverticulitis.  PERITONEUM/RETROPERITONEUM: Within normal limits.  VESSELS: Atherosclerotic changes. IVC filter in place, without CT evident   complications.  LYMPH NODES: No lymphadenopathy.  ABDOMINAL WALL: Small fat-containing clinical hernia.  BONES: Degenerative changes. Chronic deformities of the left pubic rami;   likely related to distant trauma    IMPRESSION:  No acute abnormality detected.    < end of copied text >    < from: EGD (11.27.24 @ 14:24) >      Findings:        Esophagus Additional esophagus findings Evidence of prior esophagectomy, mucosa    appeared healthy and intact..        Stomach Additional stomach findings There was diffuse gastric erythema and    retained contents (food/fluid) within the gastric body suggestive of    gastroparesis, there were significant changes in gastric anatomy as suspected    given surgical history, the mid stomach was notable for a significant upward    deflection possibly due to post surgical scaring / changes..      The pylorus was notably widely patent, essentially unchanged s/p previous    dilation and Botox injection..    Duodenum Normal duodenum.    Impressions:      Normal duodenum.      Evidence of prior esophagectomy, mucosa appeared healthy and intact. .    There was diffuse gastric erythema and retained contents (food/fluid) within    the gastric body suggestive of gastroparesis, there were significant changes in    gastric anatomy as suspected given surgical history, the mid stomach was notable    for a significant upward deflection possibly due to post surgical scaring /    changes. .      The pylorus was notably widely patent, essentially unchanged s/p previous    dilation and Botox injection.     Plan:    Return to floor for further management    Continue acid suppression, current motility regimen.    Anti-emetics PRN     OK for discharge home from a GI stand point      Josemanuel Cm MD    < end of copied text >        < from: EGD (04.05.24 @ 19:51) >    Findings:      Esophagus Additional esophagus findings - The proximal portion of retained    esophageal mucosa appeared endoscopically normal. There was evidence of prior    surgery (Esophagectomy) at approximately 15 cm from the incisors. There was a    visible staple in the mid esophagus/gastric pull up..      Stomach Additional stomach findings - There was significant fluid / gastric    contents retained within the stomach / farrah-esophagus. The pylorus was patent and    the endoscope was able to traverse without difficulty. The decision was made to    proceed with CRE balloon dilation utilizing a wire guided technique, the pylorus    was dilated to a maximal diameter of 18 mm. Subsequently, 100 units of Botulinum    diluted in 10 cc of sterile water was injected in a four quadrant fashion into    the pylorus..      Duodenum Normal duodenum.      Impressions:    The proximal portion of retained esophageal mucosa appeared endoscopically    normal. There was evidence of prior surgery (Esophagectomy) at approximately 15    cm from the incisors. There was a visible staple in the mid esophagus/gastric    pull up.      There was significant fluid / gastric contents retained within the stomach /    farrah-esophagus. The pylorus was patent and the endoscope was able to traverse    without difficulty. The decision was made to proceed with CRE balloon dilation    utilizing a wire guided technique, the pylorus was dilated to a maximal diameter    of 18 mm. 100 units of Botulinum diluted in 10 cc of sterile water was injected    in a four quadrant fashion into the pylorus.      Plan:    NPO overnight, can advance diet as tolerated tomorrow morning.      Continue aspiration precautions and PPI      Pending response to recent intervention will likely need repeat therapy. Can    follow up outpatient with Dr. Moe regarding alternative versus repeat    therapies.      Return to floor for further management      Josemanuel Cm MD    < end of copied text >       HISTORY OF PRESENT ILLNESS: 60 y/o M with PMHx esophageal carcinoma s/p esophagectomy with gastric pull through (2016), gastroparesis, s/p Pre Pyloric Botox Injection and Balloon Dilation x 4 (last April 2024) repeat EGD on November last year shows patent pylorus , cholecystectomy presented to ED with worsening nausea, vomiting and abdominal pain x 1 day. Symptoms started yesterday morning. Denies sick contact long distance travel or possibility of food poisoning.  Denies fever chills. His prior EGD on April 2024 with CRE balloon dilation utilizing a wire guided technique, the pylorus was dilated to a maximal diameter of 18 mm and Botox injection. His last EGD was on November 2024 showed patent pylorus and therefore no intervention was required a that time. Patient follows with Dr. Cm and his last visit was on November 2024. In ED, CT abd with no acute process. Vomiting is now improved, last episode was last night. Reported abdominal 10/10 upper abdominal pain.  Denies use of NSAIDs, AC, opioids. Denies use of alcohol, smoking or illicit drugs. Patient endorsed compliance with Reglan PRN, Erythromycin daily and Omeprazole 40 mg Daily.     Review of Systems:  . Constitutional: No fever, chills  . HEENT: Negative  · Respiratory and Thorax: No shortness of breath, no cough  · Cardiovascular: No chest pain, palpitation, no dizziness  · Gastrointestinal: see above  · Genitourinary: No hematuria  · Musculoskeletal: Negative  · Neurological: negative  · Psychiatric: no agitation, no anxiety    PAST MEDICAL/SURGICAL HISTORY:  Hypertension    Esophageal cancer    Gastroparesis    S/P cholecystectomy    H/O esophagectomy      SOCIAL HISTORY:  - TOBACCO: Denies  - ALCOHOL: Denies  - ILLICIT DRUG USE: Denies    FAMILY HISTORY:  No known history of gastrointestinal or liver disease;  FH: HTN (hypertension) (Father)        HOME MEDICATIONS:  amLODIPine 5 mg oral tablet: 1 tab(s) orally once a day (27 Nov 2024 14:39)  omeprazole 40 mg oral delayed release capsule: 1 cap(s) orally 2 times a day (27 Nov 2024 15:48)  Reglan 10 mg oral tablet: 1 tab(s) orally 3 times a day (before meals), As Needed (27 Nov 2024 14:39)    INPATIENT MEDICATIONS:  MEDICATIONS  (STANDING):  amLODIPine   Tablet 5 milliGRAM(s) Oral daily  metoclopramide Injectable 10 milliGRAM(s) IV Push every 8 hours  pantoprazole  Injectable 40 milliGRAM(s) IV Push two times a day    MEDICATIONS  (PRN):  morphine  - Injectable 4 milliGRAM(s) IV Push every 6 hours PRN Severe Pain (7 - 10)    ALLERGIES:  No Known Allergies    T(C): 36.5 (03-27-25 @ 07:25), Max: 37.9 (03-26-25 @ 23:18)  HR: 78 (03-27-25 @ 07:25) (76 - 81)  BP: 158/89 (03-27-25 @ 07:25) (150/91 - 175/90)  RR: 20 (03-27-25 @ 07:25) (18 - 20)  SpO2: 96% (03-27-25 @ 07:25) (96% - 98%)      PHYSICAL EXAM:    Constitutional: No acute distress  Neuro: Awake alert, oriented  HEENT: anicteric sclerae  CV: regular rate, regular rhythm  Pulm/chest: lung sounds diminished bilaterally, no accessory muscle use noted  Abd: soft, upper abdominal tenderness, nondistended +bowel sounds. No rigidity, rebound tenderness, or guarding, midabdominal pulsation  Ext: no edema  Skin: warm, no jaundice   Psych: calm, cooperative      LABS:             14.3   12.53 )-----------( 289      ( 03-27 @ 04:55 )             42.0                14.7   11.69 )-----------( 267      ( 03-26 @ 21:20 )             44.0       PT/INR - ( 27 Mar 2025 04:55 )   PT: 12.8 sec;   INR: 1.10 ratio         PTT - ( 27 Mar 2025 04:55 )  PTT:29.2 sec  03-27    135  |  99  |  9.4  ----------------------------<  159[H]  3.9   |  21.0[L]  |  0.62    Ca    9.3      27 Mar 2025 04:55    TPro  8.2  /  Alb  4.8  /  TBili  0.6  /  DBili  x   /  AST  34  /  ALT  44[H]  /  AlkPhos  121[H]  03-27    LIVER FUNCTIONS - ( 27 Mar 2025 04:55 )  Alb: 4.8 g/dL / Pro: 8.2 g/dL / ALK PHOS: 121 U/L / ALT: 44 U/L / AST: 34 U/L / GGT: x             Lipase: 15 U/L (03-26-25 @ 21:20)    Urinalysis Basic - ( 27 Mar 2025 04:55 )    Color: x / Appearance: x / SG: x / pH: x  Gluc: 159 mg/dL / Ketone: x  / Bili: x / Urobili: x   Blood: x / Protein: x / Nitrite: x   Leuk Esterase: x / RBC: x / WBC x   Sq Epi: x / Non Sq Epi: x / Bacteria: x      < from: CT Abdomen and Pelvis w/ IV Cont (03.27.25 @ 01:28) >    PROCEDURE:  CT of the Abdomen and Pelvis was performed.  Sagittal and coronal reformats were performed.    FINDINGS:  LOWER CHEST: Partially demonstrated gastric pull-through. Clear lung   bases. Normal heart size. No pleural/pericardial effusions.    LIVER: Within normal limits.  BILE DUCTS: Normal caliber.  GALLBLADDER: Within normal limits.  SPLEEN: Within normal limits.  PANCREAS: Moderately atrophic and infiltrated by fat.  ADRENALS: Stable indeterminate right adrenal gland nodule measuring up to   1.5 cm.  KIDNEYS/URETERS: Within normal limits.    BLADDER: Within normal limits.  REPRODUCTIVE ORGANS: Prostate is enlarged.    BOWEL: No bowel obstruction. Appendix is normal. A few colonic   diverticula without acute diverticulitis.  PERITONEUM/RETROPERITONEUM: Within normal limits.  VESSELS: Atherosclerotic changes. IVC filter in place, without CT evident   complications.  LYMPH NODES: No lymphadenopathy.  ABDOMINAL WALL: Small fat-containing clinical hernia.  BONES: Degenerative changes. Chronic deformities of the left pubic rami;   likely related to distant trauma    IMPRESSION:  No acute abnormality detected.    < end of copied text >    < from: EGD (11.27.24 @ 14:24) >      Findings:        Esophagus Additional esophagus findings Evidence of prior esophagectomy, mucosa    appeared healthy and intact..        Stomach Additional stomach findings There was diffuse gastric erythema and    retained contents (food/fluid) within the gastric body suggestive of    gastroparesis, there were significant changes in gastric anatomy as suspected    given surgical history, the mid stomach was notable for a significant upward    deflection possibly due to post surgical scaring / changes..      The pylorus was notably widely patent, essentially unchanged s/p previous    dilation and Botox injection..    Duodenum Normal duodenum.    Impressions:      Normal duodenum.      Evidence of prior esophagectomy, mucosa appeared healthy and intact. .    There was diffuse gastric erythema and retained contents (food/fluid) within    the gastric body suggestive of gastroparesis, there were significant changes in    gastric anatomy as suspected given surgical history, the mid stomach was notable    for a significant upward deflection possibly due to post surgical scaring /    changes. .      The pylorus was notably widely patent, essentially unchanged s/p previous    dilation and Botox injection.     Plan:    Return to floor for further management    Continue acid suppression, current motility regimen.    Anti-emetics PRN     OK for discharge home from a GI stand point      Josemanuel Cm MD    < end of copied text >        < from: EGD (04.05.24 @ 19:51) >    Findings:      Esophagus Additional esophagus findings - The proximal portion of retained    esophageal mucosa appeared endoscopically normal. There was evidence of prior    surgery (Esophagectomy) at approximately 15 cm from the incisors. There was a    visible staple in the mid esophagus/gastric pull up..      Stomach Additional stomach findings - There was significant fluid / gastric    contents retained within the stomach / farrah-esophagus. The pylorus was patent and    the endoscope was able to traverse without difficulty. The decision was made to    proceed with CRE balloon dilation utilizing a wire guided technique, the pylorus    was dilated to a maximal diameter of 18 mm. Subsequently, 100 units of Botulinum    diluted in 10 cc of sterile water was injected in a four quadrant fashion into    the pylorus..      Duodenum Normal duodenum.      Impressions:    The proximal portion of retained esophageal mucosa appeared endoscopically    normal. There was evidence of prior surgery (Esophagectomy) at approximately 15    cm from the incisors. There was a visible staple in the mid esophagus/gastric    pull up.      There was significant fluid / gastric contents retained within the stomach /    farrah-esophagus. The pylorus was patent and the endoscope was able to traverse    without difficulty. The decision was made to proceed with CRE balloon dilation    utilizing a wire guided technique, the pylorus was dilated to a maximal diameter    of 18 mm. 100 units of Botulinum diluted in 10 cc of sterile water was injected    in a four quadrant fashion into the pylorus.      Plan:    NPO overnight, can advance diet as tolerated tomorrow morning.      Continue aspiration precautions and PPI      Pending response to recent intervention will likely need repeat therapy. Can    follow up outpatient with Dr. Moe regarding alternative versus repeat    therapies.      Return to floor for further management      Josemanuel Cm MD    < end of copied text >

## 2025-03-27 NOTE — CONSULT NOTE ADULT - NS ATTEND AMEND GEN_ALL_CORE FT
Patient seen and examined.  Patient with history of esophageal cancer status post esophagectomy with gastric pull-up and gastroparesis with multiple Botox injections in the past.  He has been evaluated for SARAH KAYE as well.  However he has presented with similar symptoms.  He is feeling nauseous.  We will continue with the Reglan and possibly perform endoscopy with pyloric channel dilation and Botox injection in the prepyloric area tomorrow.  Monitor labs.  Keep n.p.o. after midnight.  We will follow the patient.

## 2025-03-27 NOTE — ED CDU PROVIDER INITIAL DAY NOTE - ATTENDING APP SHARED VISIT CONTRIBUTION OF CARE
59y M w/ hx esophageal CA s/p esophagectomy with gastric pull-through; gastroparesis; presents for abdominal pain and vomiting c/w prior episodes of gastroparesis. Reports having prior Botox injections and balloon dilations performed by GI. On my exam pt in no acute distress, abdomen soft with +epigastric tenderness. No emergent findings on labs/CT. Pt with persistent pain despite multiple rounds of treatment. Will place in observation pending GI evaluation.

## 2025-03-27 NOTE — ED CDU PROVIDER INITIAL DAY NOTE - PHYSICAL EXAMINATION
Gen: Appears uncomfortable.   HEENT: Mucous membranes moist, pink conjunctivae, EOMI. PERRL. Airway patent  Neck: trachea midline   CV: RRR, nl s1/s2.  Resp: CTAB, normal rate and effort. no wheezes, rhonchi or crackles.   GI: Abdomen soft, nondistended. +ttp in the epigastric area. No rebound. no ecchymosis   Neuro: A&O x 3, moving all 4 extremities. nonfocal   MSK: No spine or joint ttp. No visualized deformities.   Skin: No rashes. intact and perfused. cap refill <2sec

## 2025-03-27 NOTE — CONSULT NOTE ADULT - ASSESSMENT
58 y/o M with PMHx esophageal carcinoma s/p esophagectomy with gastric pull through (2016), gastroparesis, s/p Pre Pyloric Botox Injection and Balloon Dilation x 4 (last April 2024) repeat EGD on November last year shows patent pylorus , cholecystectomy presented to ED with worsening nausea, vomiting and abdominal pain x 1 day.     Nausea, vomiting   Abdominal pain   Hx of Esophageal Cancer s/p esophagectomy  Hx of pyloric dilation/ Botox injection  EGD (April 2024) with CRE balloon dilation utilizing a wire guided technique, the pylorus was dilated to a maximal diameter of 18 mm and Botox injection.  EGD (November 2024) showed patent pylorus and therefore no intervention was required a that time  CT abd with no acute process  Nausea and vomiting now resolved. Reported abdominal 10/10 upper abdominal pain.   -cont Zofran 4 mg every 8 hours  -Cont Reglan  10mg every 8 hours  -Protonix 40 mg IV BID-Avoid opioids if possible. Avoid NSAIDs.    -NPO, IV fluids for hydration. Clear liquids once able to tolerate orally  -May need EGD with ?dilation/?Botox injection if symptoms not improving  - Please eval mid abdominal pulsation.   Plan d/q the patient and ED attending     60 y/o M with PMHx esophageal carcinoma s/p esophagectomy with gastric pull through (2016), gastroparesis, s/p Pre Pyloric Botox Injection and Balloon Dilation x 4 (last April 2024) repeat EGD on November last year shows patent pylorus , cholecystectomy presented to ED with worsening nausea, vomiting and abdominal pain x 1 day.     Nausea, vomiting   Abdominal pain   Hx of Esophageal Cancer s/p esophagectomy  Hx of pyloric dilation/ Botox injection  EGD (April 2024) with CRE balloon dilation utilizing a wire guided technique, the pylorus was dilated to a maximal diameter of 18 mm and Botox injection.  EGD (November 2024) showed patent pylorus and therefore no intervention was required a that time  CT abd with no acute process  Nausea and vomiting now resolved. Reported abdominal 10/10 upper abdominal pain.   -cont Zofran 4 mg every 8 hours  -Cont Reglan  10mg every 8 hours  -Protonix 40 mg IV BID-Avoid opioids if possible. Avoid NSAIDs.    -NPO, IV fluids for hydration. Clear liquids once able to tolerate orally  -will plan for EGD with possible dilation/Botox injection tomorrow  -please keep NPO after midnight. Can have clear liquids today as tolerating  -Active type and screen/INR <1.5, Plt count >50 K for procedure   - Please eval mid abdominal pulsation. D/w ED attending

## 2025-03-27 NOTE — ED CDU PROVIDER INITIAL DAY NOTE - PROGRESS NOTE DETAILS
pt evaluated on AM rounds, still reporting epigastric abdominal pain although states it is a little better after Dilaudid. GI recommending continued meds for now GI planning for EGD with possible dilation/Botox injection tomorrow. NPO after midnight, clear liquids today as tolerating

## 2025-03-27 NOTE — ED ADULT NURSE REASSESSMENT NOTE - NS ED NURSE REASSESS COMMENT FT1
Pt a&oX4. resp even and unlabored on ra. updated on the plan of care, review of results of testing performed, pt states understanding of education, pt is not in any distress, pt resting comfortably, call bell and personal belongings within reach, bed locked and in the lowest position.

## 2025-03-27 NOTE — ED ADULT NURSE REASSESSMENT NOTE - NS ED NURSE REASSESS COMMENT FT1
Pt resting comfortably in stretcher, respirations are even and unlabored, offers no complaints at this time. Pt denies nausea and vomiting. VS are stable and pt aware of plan of care. Will continue to monitor

## 2025-03-27 NOTE — ED ADULT NURSE REASSESSMENT NOTE - NS ED NURSE REASSESS COMMENT FT1
pt a&oX4. resp even and unlabored on ra. pt states he has abdominal pain. KALEIGH bloom aware. pt medicated per EMR. Pt updated on the plan of care, review of results of testing performed, pt states understanding of education, pt is not in any distress, pt resting comfortably, call bell and personal belongings within reach, bed locked and in the lowest position.

## 2025-03-27 NOTE — ED ADULT NURSE REASSESSMENT NOTE - NS ED NURSE REASSESS COMMENT FT1
Assumed care of patient at 19:15 as stated in report from RILEY Senior. Charting as noted. Patient A&O x4, presents to ED c/o abdominal pain/vomiting. At time of assesment, patient complains of pain/discomfort, denies CP/SOB. PRN Dilaudid given. Patient updated on the plan of care, awating EGD In morning. NPO after midnight. Stretcher locked in lowest position, IV site flushed w/ NS. No redness, swelling or pain noted to site. No signs of acute distress noted, safety maintained. Wife at bedside.

## 2025-03-27 NOTE — ED CDU PROVIDER INITIAL DAY NOTE - OBJECTIVE STATEMENT
60 yo male with pmhx of esophageal carcinoma s/p esophagectomy with gastric pull through (2016), gastroparesis s/p Pre Pyloric Botox Injection and Balloon Dilation x4 (last was 04/2024) presents for abdominal pain, nausea/vomiting x 1 day. Pt reports that yesterday morning when he woke up, was having upper abdominal discomfort that has now become sharp, crampy constant pain. Associated with multiple episodes of N/V, unable to tolerate anything PO. States he has had similar episodes in the past with his gastroparesis. Follows with Dr. Cm. Denies fever, chills, body aches, dizziness, LOC, vision changes, dysphagia, cough, sore throat, URI symptoms, cp, palpitations, SOB, diarrhea, dysuria, hematuria. paresthesias in the extremities, rashes. Pshx: cholecystectomy. Denies ETOH use.

## 2025-03-27 NOTE — ED ADULT NURSE REASSESSMENT NOTE - NS ED NURSE REASSESS COMMENT FT1
Assumed care of patient at 1528 as stated in report from RILEY Dill  Charting as noted. Patient A&O x4,  VSS afebrile. on RA presents to ED c/o_abdominal pain, Nause and vomiting,  At time of assessment, patient complains slight pain/discomfort, denies CP/SOB. Patient updated on the plan of care, awating ENDO scope. NPO effectively midnight. Strecher locked in lowest position, IV site flushed w/ NS. No redness, swelling or pain noted to site. IVFS infusing as ordered No signs of acute distress noted, safety maintained.

## 2025-03-27 NOTE — ED ADULT NURSE REASSESSMENT NOTE - NS ED NURSE REASSESS COMMENT FT1
assumed care for pt at 0730 from RILEY Westfall, charting as noted. pt reported he was in a lot of pain, PA made aware & pt medicated for pain. pt now resting comfortably in stretcher. pt refused AM med, provider made aware. pt awaiting GI consult, pt updated on plan of care & is in agreement. respirations even and unlabored. pt shows no s/s of acute distress at this time. safety precautions maintained.

## 2025-03-27 NOTE — ED CDU PROVIDER INITIAL DAY NOTE - CLINICAL SUMMARY MEDICAL DECISION MAKING FREE TEXT BOX
58 yo male with pmhx of esophageal carcinoma s/p esophagectomy with gastric pull through (2016), gastroparesis s/p Pre Pyloric Botox Injection and Balloon Dilation x4 (last was 04/2024) presents for abdominal pain, nausea/vomiting x 1 day. Has had similar symptoms in the past with his gastroparesis. Pt with noted leukocytosis, with left shift. no electrolyte abnl. mild elevation in LFT's. No acute pathology visualized on CT. Despite multiple rounds of pain meds, pt still in pain. requesting dilaudid for pain. placed in obs for GI consult and pain control.  on previous vitals, oral temperature was documented as 100.3. Blood culture and lactate ordered. hemodynamically stable.

## 2025-03-27 NOTE — ED CDU PROVIDER INITIAL DAY NOTE - NS ED ROS FT
Gen: denies fever, chills, fatigue, weight loss  Skin: denies rashes  HEENT: denies visual changes, ear pain, nasal congestion, throat pain  Respiratory: denies WEBER, SOB, cough, wheezing  Cardiovascular: denies chest pain, palpitations, diaphoresis, LE edema  GI: +abdominal pain, n/v  : denies dysuria, frequency, urgency, bowel/bladder incontinence  MSK: denies joint swelling/pain, back pain, neck pain  Neuro: denies headache, dizziness, weakness, numbness  Psych: denies anxiety, depression, SI/HI, visual/auditory hallucinations

## 2025-03-28 ENCOUNTER — TRANSCRIPTION ENCOUNTER (OUTPATIENT)
Age: 60
End: 2025-03-28

## 2025-03-28 LAB
ALBUMIN SERPL ELPH-MCNC: 4.6 G/DL — SIGNIFICANT CHANGE UP (ref 3.3–5.2)
ALP SERPL-CCNC: 107 U/L — SIGNIFICANT CHANGE UP (ref 40–120)
ALT FLD-CCNC: 38 U/L — SIGNIFICANT CHANGE UP
ANION GAP SERPL CALC-SCNC: 16 MMOL/L — SIGNIFICANT CHANGE UP (ref 5–17)
APTT BLD: 26.3 SEC — SIGNIFICANT CHANGE UP (ref 24.5–35.6)
AST SERPL-CCNC: 25 U/L — SIGNIFICANT CHANGE UP
BILIRUB SERPL-MCNC: 0.5 MG/DL — SIGNIFICANT CHANGE UP (ref 0.4–2)
BUN SERPL-MCNC: 16.2 MG/DL — SIGNIFICANT CHANGE UP (ref 8–20)
CALCIUM SERPL-MCNC: 8.8 MG/DL — SIGNIFICANT CHANGE UP (ref 8.4–10.5)
CHLORIDE SERPL-SCNC: 99 MMOL/L — SIGNIFICANT CHANGE UP (ref 96–108)
CO2 SERPL-SCNC: 21 MMOL/L — LOW (ref 22–29)
CREAT SERPL-MCNC: 0.61 MG/DL — SIGNIFICANT CHANGE UP (ref 0.5–1.3)
CULTURE RESULTS: SIGNIFICANT CHANGE UP
EGFR: 112 ML/MIN/1.73M2 — SIGNIFICANT CHANGE UP
EGFR: 112 ML/MIN/1.73M2 — SIGNIFICANT CHANGE UP
GLUCOSE SERPL-MCNC: 141 MG/DL — HIGH (ref 70–99)
HAV IGM SER-ACNC: SIGNIFICANT CHANGE UP
HBV CORE IGM SER-ACNC: SIGNIFICANT CHANGE UP
HBV SURFACE AG SER-ACNC: SIGNIFICANT CHANGE UP
HCT VFR BLD CALC: 41.7 % — SIGNIFICANT CHANGE UP (ref 39–50)
HCV AB S/CO SERPL IA: 0.16 S/CO — SIGNIFICANT CHANGE UP (ref 0–0.79)
HCV AB SERPL-IMP: SIGNIFICANT CHANGE UP
HGB BLD-MCNC: 14.3 G/DL — SIGNIFICANT CHANGE UP (ref 13–17)
INR BLD: 1.13 RATIO — SIGNIFICANT CHANGE UP (ref 0.85–1.16)
MCHC RBC-ENTMCNC: 27.8 PG — SIGNIFICANT CHANGE UP (ref 27–34)
MCHC RBC-ENTMCNC: 34.3 G/DL — SIGNIFICANT CHANGE UP (ref 32–36)
MCV RBC AUTO: 81 FL — SIGNIFICANT CHANGE UP (ref 80–100)
NRBC # BLD AUTO: 0 K/UL — SIGNIFICANT CHANGE UP (ref 0–0)
NRBC # FLD: 0 K/UL — SIGNIFICANT CHANGE UP (ref 0–0)
NRBC BLD AUTO-RTO: 0 /100 WBCS — SIGNIFICANT CHANGE UP (ref 0–0)
PLATELET # BLD AUTO: 309 K/UL — SIGNIFICANT CHANGE UP (ref 150–400)
PMV BLD: 10.7 FL — SIGNIFICANT CHANGE UP (ref 7–13)
POTASSIUM SERPL-MCNC: 4.2 MMOL/L — SIGNIFICANT CHANGE UP (ref 3.5–5.3)
POTASSIUM SERPL-SCNC: 4.2 MMOL/L — SIGNIFICANT CHANGE UP (ref 3.5–5.3)
PROT SERPL-MCNC: 7.7 G/DL — SIGNIFICANT CHANGE UP (ref 6.6–8.7)
PROTHROM AB SERPL-ACNC: 12.7 SEC — SIGNIFICANT CHANGE UP (ref 9.9–13.4)
RBC # BLD: 5.15 M/UL — SIGNIFICANT CHANGE UP (ref 4.2–5.8)
RBC # FLD: 14.6 % — HIGH (ref 10.3–14.5)
SODIUM SERPL-SCNC: 136 MMOL/L — SIGNIFICANT CHANGE UP (ref 135–145)
SPECIMEN SOURCE: SIGNIFICANT CHANGE UP
WBC # BLD: 13.05 K/UL — HIGH (ref 3.8–10.5)
WBC # FLD AUTO: 13.05 K/UL — HIGH (ref 3.8–10.5)

## 2025-03-28 PROCEDURE — 83540 ASSAY OF IRON: CPT

## 2025-03-28 PROCEDURE — 93005 ELECTROCARDIOGRAM TRACING: CPT

## 2025-03-28 PROCEDURE — 85610 PROTHROMBIN TIME: CPT

## 2025-03-28 PROCEDURE — 82746 ASSAY OF FOLIC ACID SERUM: CPT

## 2025-03-28 PROCEDURE — 85027 COMPLETE CBC AUTOMATED: CPT

## 2025-03-28 PROCEDURE — 82272 OCCULT BLD FECES 1-3 TESTS: CPT

## 2025-03-28 PROCEDURE — C9399: CPT

## 2025-03-28 PROCEDURE — 36415 COLL VENOUS BLD VENIPUNCTURE: CPT

## 2025-03-28 PROCEDURE — 96374 THER/PROPH/DIAG INJ IV PUSH: CPT

## 2025-03-28 PROCEDURE — 86850 RBC ANTIBODY SCREEN: CPT

## 2025-03-28 PROCEDURE — 86900 BLOOD TYPING SEROLOGIC ABO: CPT

## 2025-03-28 PROCEDURE — 82728 ASSAY OF FERRITIN: CPT

## 2025-03-28 PROCEDURE — 86901 BLOOD TYPING SEROLOGIC RH(D): CPT

## 2025-03-28 PROCEDURE — 99233 SBSQ HOSP IP/OBS HIGH 50: CPT

## 2025-03-28 PROCEDURE — 99285 EMERGENCY DEPT VISIT HI MDM: CPT | Mod: 25

## 2025-03-28 PROCEDURE — 83550 IRON BINDING TEST: CPT

## 2025-03-28 PROCEDURE — 71260 CT THORAX DX C+: CPT | Mod: MC

## 2025-03-28 PROCEDURE — 74176 CT ABD & PELVIS W/O CONTRAST: CPT | Mod: MC

## 2025-03-28 PROCEDURE — 84466 ASSAY OF TRANSFERRIN: CPT

## 2025-03-28 PROCEDURE — 82607 VITAMIN B-12: CPT

## 2025-03-28 PROCEDURE — 74177 CT ABD & PELVIS W/CONTRAST: CPT | Mod: MC

## 2025-03-28 PROCEDURE — 71250 CT THORAX DX C-: CPT | Mod: MC

## 2025-03-28 PROCEDURE — 96375 TX/PRO/DX INJ NEW DRUG ADDON: CPT

## 2025-03-28 PROCEDURE — 83690 ASSAY OF LIPASE: CPT

## 2025-03-28 PROCEDURE — 80053 COMPREHEN METABOLIC PANEL: CPT

## 2025-03-28 PROCEDURE — 83735 ASSAY OF MAGNESIUM: CPT

## 2025-03-28 PROCEDURE — 85025 COMPLETE CBC W/AUTO DIFF WBC: CPT

## 2025-03-28 PROCEDURE — 84100 ASSAY OF PHOSPHORUS: CPT

## 2025-03-28 DEVICE — CATH BLLN CRE .035INX7.7FR 15-16.5-18MM: Type: IMPLANTABLE DEVICE | Status: FUNCTIONAL

## 2025-03-28 RX ORDER — ONABOTULINUMTOXINA 50 [USP'U]/1
100 INJECTION, POWDER, LYOPHILIZED, FOR SOLUTION INTRAMUSCULAR ONCE
Refills: 0 | Status: DISCONTINUED | OUTPATIENT
Start: 2025-03-28 | End: 2025-03-31

## 2025-03-28 RX ORDER — HYDROMORPHONE/SOD CHLOR,ISO/PF 2 MG/10 ML
1 SYRINGE (ML) INJECTION EVERY 6 HOURS
Refills: 0 | Status: DISCONTINUED | OUTPATIENT
Start: 2025-03-28 | End: 2025-03-30

## 2025-03-28 RX ORDER — ONDANSETRON HCL/PF 4 MG/2 ML
4 VIAL (ML) INJECTION ONCE
Refills: 0 | Status: COMPLETED | OUTPATIENT
Start: 2025-03-28 | End: 2025-03-28

## 2025-03-28 RX ADMIN — Medication 1 MILLIGRAM(S): at 12:28

## 2025-03-28 RX ADMIN — Medication 4 MILLIGRAM(S): at 10:57

## 2025-03-28 RX ADMIN — Medication 1 MILLIGRAM(S): at 02:04

## 2025-03-28 RX ADMIN — Medication 10 MILLIGRAM(S): at 14:08

## 2025-03-28 RX ADMIN — Medication 1 MILLIGRAM(S): at 08:45

## 2025-03-28 RX ADMIN — Medication 40 MILLIGRAM(S): at 05:42

## 2025-03-28 RX ADMIN — Medication 1 MILLIGRAM(S): at 03:37

## 2025-03-28 RX ADMIN — Medication 1 MILLIGRAM(S): at 17:55

## 2025-03-28 RX ADMIN — Medication 10 MILLIGRAM(S): at 21:48

## 2025-03-28 RX ADMIN — Medication 40 MILLIGRAM(S): at 17:57

## 2025-03-28 RX ADMIN — Medication 1 MILLIGRAM(S): at 07:47

## 2025-03-28 RX ADMIN — Medication 10 MILLIGRAM(S): at 05:42

## 2025-03-28 RX ADMIN — Medication 1 MILLIGRAM(S): at 23:51

## 2025-03-28 RX ADMIN — Medication 1 MILLIGRAM(S): at 12:12

## 2025-03-28 NOTE — ED CDU PROVIDER SUBSEQUENT DAY NOTE - PROGRESS NOTE DETAILS
GI planning for EGD with possible dilation/Botox injection tomorrow. NPO after midnight, clear liquids today as tolerating pt evaluated on AM rounds, still reporting epigastric abdominal pain although states it is a little better after Dilaudid. GI recommending continued meds for now pt evaluated on AM rounds, resting comfortably. pending EGD EGD with pyloric channel dilation and prepyloric area Botox injection performed by GI. GI recommending clear liquid diet, IV reglan and advance diet as tolerated tomorrow. Pt resting comfortably at time of re-assessment. No acute complaints. Plan to continue with clear diet overnight, IV reglan, and advance diet as tolerated tomorrow. Will continue to monitor.

## 2025-03-28 NOTE — ED ADULT NURSE REASSESSMENT NOTE - NS ED NURSE REASSESS COMMENT FT1
A&Ox4. pt calm and cooperative. pt not in acute distress, resps even and unlabored b/l, no SOB, NO CP. CM in place. VSS. POC reviewed. call bell within reach, safety maintained. Plan of care ongoing.

## 2025-03-28 NOTE — ED ADULT NURSE REASSESSMENT NOTE - NS ED NURSE REASSESS COMMENT FT1
Pt resting comfortably in stretcher, respirations even and unlabored, offers no complaints at this time. Pt states his pain is okay at this time. Pt aware of plan of care for EGD today and has been NPO since midnight. Will continue to monitor. Nurse/CARINA Gonzalez RN Neonatologist/Dr. Baeza

## 2025-03-28 NOTE — ED CDU PROVIDER SUBSEQUENT DAY NOTE - ATTENDING APP SHARED VISIT CONTRIBUTION OF CARE
59y M w/ hx esophageal CA s/p esophagectomy with gastric pull-through; gastroparesis; presents for abdominal pain and vomiting c/w prior episodes of gastroparesis. Reports having prior Botox injections and balloon dilations performed by GI. On my exam pt in no acute distress, abdomen soft with +epigastric tenderness. No emergent findings on labs/CT. Pt with persistent pain despite multiple rounds of treatment. Will place in observation pending GI evaluation. I performed a history and physical exam of the patient and discussed their management with the advanced care provider. I reviewed the advanced care provider's note and agree with the documented findings and plan of care. My medical decision making and objective findings are found below.    59-year-old male past medical history of esophageal cancer status post  esophagectomy with gastric pull-through, gastroparesis, presenting with abdominal pain associated with nausea and vomiting.  Patient seen by GI, who plan for EGD today with possible dilation/Botox.  Seen and examined this morning, complaining of mild nausea, otherwise no acute complaints.

## 2025-03-28 NOTE — CHART NOTE - NSCHARTNOTEFT_GEN_A_CORE
EGD with pyloric channel dilation and prepyloric area Botox injection performed.  Please advance to clear liquid diet.  Continue IV Reglan.  Advance diet as tolerated from tomorrow

## 2025-03-28 NOTE — ED CDU PROVIDER SUBSEQUENT DAY NOTE - CLINICAL SUMMARY MEDICAL DECISION MAKING FREE TEXT BOX
60yo M pmh esophageal Ca s/p esophagectomy with gastric pull through (2016), gastroparesis s/p Pre Pyloric Botox Injection and Balloon Dilation x4 (last was 04/2024) p/w ascites. ED labs reviewed; leukocytosis (12-> 13), with left shift. no electrolyte abnorm, elevated gap (19) which closed on rpt labs, mild elevation in LFT's. No acute pathology visualized on CT. pt placed in obs for pain control and GI cs. pt was evaluated by GI, pending AM labs and EGD for possible dilation/botox today. 60yo M pmh esophageal Ca s/p esophagectomy with gastric pull through (2016), gastroparesis s/p Pre Pyloric Botox Injection and Balloon Dilation x4 (last was 04/2024) p/w gastroparesis. ED labs reviewed; leukocytosis (12-> 13), with left shift. no electrolyte abnorm, elevated gap (19) which closed on rpt labs, mild elevation in LFT's. No acute pathology visualized on CT. pt placed in obs for pain control and GI cs. pt was evaluated by GI, pending AM labs and EGD for possible dilation/botox today.

## 2025-03-28 NOTE — ED ADULT NURSE REASSESSMENT NOTE - NS ED NURSE REASSESS COMMENT FT1
assumed care of pt from night shift RN. pt a&ox4, awaiting ESR on this date. remains NPO at this time. morning dose of amlodipine held as per MD.  ambulatory on unit, medicated fort pain as per emar with good effect.

## 2025-03-29 PROCEDURE — 99232 SBSQ HOSP IP/OBS MODERATE 35: CPT

## 2025-03-29 RX ORDER — ONDANSETRON HCL/PF 4 MG/2 ML
4 VIAL (ML) INJECTION ONCE
Refills: 0 | Status: COMPLETED | OUTPATIENT
Start: 2025-03-29 | End: 2025-03-29

## 2025-03-29 RX ADMIN — Medication 4 MILLIGRAM(S): at 03:33

## 2025-03-29 RX ADMIN — Medication 10 MILLIGRAM(S): at 05:21

## 2025-03-29 RX ADMIN — Medication 10 MILLIGRAM(S): at 13:30

## 2025-03-29 RX ADMIN — Medication 10 MILLIGRAM(S): at 21:23

## 2025-03-29 RX ADMIN — Medication 40 MILLIGRAM(S): at 05:21

## 2025-03-29 RX ADMIN — Medication 1 MILLIGRAM(S): at 18:09

## 2025-03-29 RX ADMIN — Medication 1 MILLIGRAM(S): at 11:22

## 2025-03-29 RX ADMIN — Medication 4 MILLIGRAM(S): at 17:02

## 2025-03-29 RX ADMIN — Medication 40 MILLIGRAM(S): at 17:02

## 2025-03-29 RX ADMIN — Medication 1 MILLIGRAM(S): at 11:50

## 2025-03-29 RX ADMIN — Medication 1 MILLIGRAM(S): at 18:06

## 2025-03-29 RX ADMIN — AMLODIPINE BESYLATE 5 MILLIGRAM(S): 10 TABLET ORAL at 08:50

## 2025-03-29 RX ADMIN — Medication 1 MILLIGRAM(S): at 05:20

## 2025-03-29 NOTE — ED CDU PROVIDER SUBSEQUENT DAY NOTE - NS ED MD CDU HISTORY DATE TIME DT
Patient discharged with v/s stable. Written and verbal after care instructions 
given and explained. 

Patient alert, oriented and verbalized understanding of instructions. 
Ambulatory with steady gait. All questions addressed prior to discharge. ID 
band removed. Patient advised to follow up with PMD. Rx of ATIVAN,NAPROSYN 
given. Patient educated on indication of medication including possible reaction 
and side effects. Opportunity to ask questions provided and answered. 29-Mar-2025 05:10

## 2025-03-29 NOTE — ED ADULT NURSE REASSESSMENT NOTE - NS ED NURSE REASSESS COMMENT FT1
Assumed care for patient from RILEY Stoll @6330. Patient AXOX4, RR even and unlabored. Patient calm, cooperative, and ambulatory. Denies pain at this time. NAD noted.

## 2025-03-29 NOTE — ED ADULT NURSE REASSESSMENT NOTE - NS ED NURSE REASSESS COMMENT FT1
Report received from Merced Yee. PT remains baseline mental status, Axo4.  Respirations even and unlabored. Denies any pain/discomfort at this time.

## 2025-03-29 NOTE — ED ADULT NURSE REASSESSMENT NOTE - NS ED NURSE REASSESS COMMENT FT1
Pt A&Ox4. pt calm and cooperative. pt not in acute distress, resps even and unlabored b/l, no SOB, NO CP. VSS. POC reviewed. call bell within reach, safety maintained. Plan of care ongoing.

## 2025-03-29 NOTE — PROGRESS NOTE ADULT - ASSESSMENT
Post operative gastroparesis s/p pyloric channel dilation  and pre-pyloric Botox injection with some symptomatic improvement. Would continue CLD today with slow diet advancement as tolerated. I reviewed his EGD report from yesterday as well as his CT images from earlier this admission and agree with the radiologist's interpretation.

## 2025-03-29 NOTE — ED CDU PROVIDER SUBSEQUENT DAY NOTE - CLINICAL SUMMARY MEDICAL DECISION MAKING FREE TEXT BOX
60yo male w pmhx esophageal CA s/p esophagectomy with gastric pull through (2016), gastroparesis s/p Pre Pyloric Botox Injection and Balloon Dilation x4 (last was 04/2024) p/w gastroparesis. Pt placed in obs for pain control and GI consult. pt was evaluated by GI, had EGD with pyloric channel dilation and prepyloric area botox injection completed. GI recommending clear liquid diet overnight, IV reglan, advance diet as tolerated today. Will continue to monitor.

## 2025-03-30 DIAGNOSIS — K31.84 GASTROPARESIS: ICD-10-CM

## 2025-03-30 LAB
ALBUMIN SERPL ELPH-MCNC: 4.7 G/DL — SIGNIFICANT CHANGE UP (ref 3.3–5.2)
ALP SERPL-CCNC: 97 U/L — SIGNIFICANT CHANGE UP (ref 40–120)
ALT FLD-CCNC: 31 U/L — SIGNIFICANT CHANGE UP
ANION GAP SERPL CALC-SCNC: 16 MMOL/L — SIGNIFICANT CHANGE UP (ref 5–17)
AST SERPL-CCNC: 22 U/L — SIGNIFICANT CHANGE UP
BILIRUB DIRECT SERPL-MCNC: 0.2 MG/DL — SIGNIFICANT CHANGE UP (ref 0–0.3)
BILIRUB INDIRECT FLD-MCNC: 0.6 MG/DL — SIGNIFICANT CHANGE UP (ref 0.2–1)
BILIRUB SERPL-MCNC: 0.8 MG/DL — SIGNIFICANT CHANGE UP (ref 0.4–2)
BUN SERPL-MCNC: 19 MG/DL — SIGNIFICANT CHANGE UP (ref 8–20)
CALCIUM SERPL-MCNC: 8.7 MG/DL — SIGNIFICANT CHANGE UP (ref 8.4–10.5)
CHLORIDE SERPL-SCNC: 94 MMOL/L — LOW (ref 96–108)
CO2 SERPL-SCNC: 23 MMOL/L — SIGNIFICANT CHANGE UP (ref 22–29)
CREAT SERPL-MCNC: 0.66 MG/DL — SIGNIFICANT CHANGE UP (ref 0.5–1.3)
EGFR: 109 ML/MIN/1.73M2 — SIGNIFICANT CHANGE UP
EGFR: 109 ML/MIN/1.73M2 — SIGNIFICANT CHANGE UP
GLUCOSE SERPL-MCNC: 130 MG/DL — HIGH (ref 70–99)
HCT VFR BLD CALC: 42.2 % — SIGNIFICANT CHANGE UP (ref 39–50)
HGB BLD-MCNC: 14.5 G/DL — SIGNIFICANT CHANGE UP (ref 13–17)
MCHC RBC-ENTMCNC: 27.6 PG — SIGNIFICANT CHANGE UP (ref 27–34)
MCHC RBC-ENTMCNC: 34.4 G/DL — SIGNIFICANT CHANGE UP (ref 32–36)
MCV RBC AUTO: 80.2 FL — SIGNIFICANT CHANGE UP (ref 80–100)
NRBC # BLD AUTO: 0 K/UL — SIGNIFICANT CHANGE UP (ref 0–0)
NRBC # FLD: 0 K/UL — SIGNIFICANT CHANGE UP (ref 0–0)
NRBC BLD AUTO-RTO: 0 /100 WBCS — SIGNIFICANT CHANGE UP (ref 0–0)
PLATELET # BLD AUTO: 303 K/UL — SIGNIFICANT CHANGE UP (ref 150–400)
PMV BLD: 10.5 FL — SIGNIFICANT CHANGE UP (ref 7–13)
POTASSIUM SERPL-MCNC: 3.5 MMOL/L — SIGNIFICANT CHANGE UP (ref 3.5–5.3)
POTASSIUM SERPL-SCNC: 3.5 MMOL/L — SIGNIFICANT CHANGE UP (ref 3.5–5.3)
PROT SERPL-MCNC: 7.4 G/DL — SIGNIFICANT CHANGE UP (ref 6.6–8.7)
RBC # BLD: 5.26 M/UL — SIGNIFICANT CHANGE UP (ref 4.2–5.8)
RBC # FLD: 13.7 % — SIGNIFICANT CHANGE UP (ref 10.3–14.5)
SODIUM SERPL-SCNC: 133 MMOL/L — LOW (ref 135–145)
WBC # BLD: 12.76 K/UL — HIGH (ref 3.8–10.5)
WBC # FLD AUTO: 12.76 K/UL — HIGH (ref 3.8–10.5)

## 2025-03-30 PROCEDURE — 99233 SBSQ HOSP IP/OBS HIGH 50: CPT

## 2025-03-30 PROCEDURE — 99232 SBSQ HOSP IP/OBS MODERATE 35: CPT

## 2025-03-30 PROCEDURE — 99223 1ST HOSP IP/OBS HIGH 75: CPT

## 2025-03-30 RX ORDER — METOCLOPRAMIDE HCL 10 MG
2 TABLET ORAL
Refills: 0 | DISCHARGE

## 2025-03-30 RX ORDER — ERYTHROMYCIN ETHYLSUCCINATE 200 MG/5ML
1 SUSPENSION, RECONSTITUTED, ORAL (ML) ORAL
Refills: 0 | DISCHARGE

## 2025-03-30 RX ORDER — KETOROLAC TROMETHAMINE 30 MG/ML
15 INJECTION, SOLUTION INTRAMUSCULAR; INTRAVENOUS EVERY 4 HOURS
Refills: 0 | Status: DISCONTINUED | OUTPATIENT
Start: 2025-03-30 | End: 2025-03-31

## 2025-03-30 RX ORDER — ENOXAPARIN SODIUM 100 MG/ML
40 INJECTION SUBCUTANEOUS EVERY 24 HOURS
Refills: 0 | Status: DISCONTINUED | OUTPATIENT
Start: 2025-03-31 | End: 2025-03-31

## 2025-03-30 RX ORDER — ONDANSETRON HCL/PF 4 MG/2 ML
4 VIAL (ML) INJECTION EVERY 8 HOURS
Refills: 0 | Status: DISCONTINUED | OUTPATIENT
Start: 2025-03-30 | End: 2025-03-31

## 2025-03-30 RX ORDER — HYDROMORPHONE/SOD CHLOR,ISO/PF 2 MG/10 ML
1 SYRINGE (ML) INJECTION ONCE
Refills: 0 | Status: DISCONTINUED | OUTPATIENT
Start: 2025-03-30 | End: 2025-03-30

## 2025-03-30 RX ORDER — MELATONIN 5 MG
3 TABLET ORAL AT BEDTIME
Refills: 0 | Status: DISCONTINUED | OUTPATIENT
Start: 2025-03-30 | End: 2025-03-31

## 2025-03-30 RX ORDER — ACETAMINOPHEN 500 MG/5ML
650 LIQUID (ML) ORAL EVERY 6 HOURS
Refills: 0 | Status: DISCONTINUED | OUTPATIENT
Start: 2025-03-30 | End: 2025-03-31

## 2025-03-30 RX ORDER — SODIUM CHLORIDE 9 G/1000ML
1000 INJECTION, SOLUTION INTRAVENOUS
Refills: 0 | Status: DISCONTINUED | OUTPATIENT
Start: 2025-03-30 | End: 2025-03-31

## 2025-03-30 RX ORDER — MAGNESIUM, ALUMINUM HYDROXIDE 200-200 MG
30 TABLET,CHEWABLE ORAL EVERY 4 HOURS
Refills: 0 | Status: DISCONTINUED | OUTPATIENT
Start: 2025-03-30 | End: 2025-03-31

## 2025-03-30 RX ORDER — HYPROMELLOSE 0.4 %
1 DROPS OPHTHALMIC (EYE)
Refills: 0 | DISCHARGE

## 2025-03-30 RX ADMIN — Medication 1 MILLIGRAM(S): at 09:53

## 2025-03-30 RX ADMIN — KETOROLAC TROMETHAMINE 15 MILLIGRAM(S): 30 INJECTION, SOLUTION INTRAMUSCULAR; INTRAVENOUS at 10:01

## 2025-03-30 RX ADMIN — Medication 40 MILLIGRAM(S): at 17:00

## 2025-03-30 RX ADMIN — Medication 4 MILLIGRAM(S): at 12:04

## 2025-03-30 RX ADMIN — KETOROLAC TROMETHAMINE 15 MILLIGRAM(S): 30 INJECTION, SOLUTION INTRAMUSCULAR; INTRAVENOUS at 10:11

## 2025-03-30 RX ADMIN — Medication 1 MILLIGRAM(S): at 12:54

## 2025-03-30 RX ADMIN — Medication 40 MILLIGRAM(S): at 05:12

## 2025-03-30 RX ADMIN — Medication 1 MILLIGRAM(S): at 00:46

## 2025-03-30 RX ADMIN — Medication 1 MILLIGRAM(S): at 08:13

## 2025-03-30 RX ADMIN — Medication 10 MILLIGRAM(S): at 12:58

## 2025-03-30 RX ADMIN — Medication 10 MILLIGRAM(S): at 05:12

## 2025-03-30 RX ADMIN — AMLODIPINE BESYLATE 5 MILLIGRAM(S): 10 TABLET ORAL at 05:13

## 2025-03-30 RX ADMIN — Medication 1 MILLIGRAM(S): at 12:29

## 2025-03-30 RX ADMIN — Medication 10 MILLIGRAM(S): at 21:44

## 2025-03-30 NOTE — PATIENT PROFILE ADULT - HAS THE PATIENT RECEIVED THE INFLUENZA VACCINE THIS SEASON?
I am wondering if Dr. Gotti could give some recommendations for this patient.  She has had multiple ASCUS Paps, all with negative HPV.  I am wondering if referral to gynecology is best or continue with routine follow-up/guidelines? Thank you.   yes...

## 2025-03-30 NOTE — ED CDU PROVIDER SUBSEQUENT DAY NOTE - WR INTERPRETATION 1
CXR negative - No pneumothorax, No opacities, No free air

## 2025-03-30 NOTE — H&P ADULT - NSHPLABSRESULTS_GEN_ALL_CORE
LABS:                        14.5   12.76 )-----------( 303      ( 30 Mar 2025 08:55 )             42.2     03-30    133[L]  |  94[L]  |  19.0  ----------------------------<  130[H]  3.5   |  23.0  |  0.66    Ca    8.7      30 Mar 2025 08:55    TPro  7.4  /  Alb  4.7  /  TBili  0.8  /  DBili  0.2  /  AST  22  /  ALT  31  /  AlkPhos  97  03-30

## 2025-03-30 NOTE — H&P ADULT - ASSESSMENT
Patient is a 59 year old male with a past medical history of esophageal CA s/p esophagectomy with gastric pull through (2016), gastroparesis s/p Pre Pyloric Botox Injection and Balloon Dilation x4 (last was 04/2024) p/w gastroparesis. He was in observation for several days and underwent EGD on 3/28 with pyloric channel dilation and prepyloric area botox injection completed. Patient now requiring admission from observation unit as he is unable to tolerate oral feeds.    Nausea and vomiting  Gastroparesis s/p Botox injection and balloon dilation x4  Esophageal carcinoma s/p esophagectomy with gastric pull through (2016)  - EGD (April 2024) with CRE balloon dilation utilizing a wire guided technique, the pylorus was dilated to a maximal diameter of 18 mm and Botox injection  - EGD (November 2024) showed patent pylorus and therefore no intervention was required a that time  - CT abd with no acute process  - EGD again repeated on 3/28/25 with dilation and Botox injection again  - Patient still unable to tolerate solid food, on clear liquids   - Continue Reglan 10mg IV Q8H   - Continue Protonix 40mg IV BID  - Started LR infusion while patient is not tolerating feeds  - Discontinued Dilaudid IV and started Tylenol and Toradol for pain; Will monitor for need to escalate    HTN  - Continue amlodipine 5mg daily  - Hemodynamically stable for now    Leukocytosis  - Likely reactive  - No signs of infectious process at this time  - Monitor WBC and fever curve      DVT/GI ppx: Lovenox  Diet: Clear liquids, ADAT  Code Status: Full code  Dispo: Home in 1-2 days pending symptom improvement

## 2025-03-30 NOTE — ED CDU PROVIDER SUBSEQUENT DAY NOTE - NS ED ROS FT
Gen: denies fever, chills, fatigue, weight loss  Skin: denies rashes, laceration, bruising  HEENT: denies visual changes, ear pain, nasal congestion, throat pain  Respiratory: denies WEBER, SOB, cough, wheezing  Cardiovascular: denies chest pain, palpitations, diaphoresis, LE edema  GI: +abdominal pain, +nausea  : denies dysuria, frequency, urgency, bowel/bladder incontinence  MSK: denies joint swelling/pain, back pain, neck pain  Neuro: denies headache, dizziness, weakness, numbness
Gen: denies fever, chills, fatigue, weight loss  Skin: denies rashes, laceration, bruising  HEENT: denies visual changes, ear pain, nasal congestion, throat pain  Respiratory: denies WEBER, SOB, cough, wheezing  Cardiovascular: denies chest pain, palpitations, diaphoresis, LE edema  GI: +abdominal pain, +nausea  : denies dysuria, frequency, urgency, bowel/bladder incontinence  MSK: denies joint swelling/pain, back pain, neck pain  Neuro: denies headache, dizziness, weakness, numbness
Gen: denies fever, chills, fatigue, weight loss  Skin: denies rashes  HEENT: denies visual changes, ear pain, nasal congestion, throat pain  Respiratory: denies WEBER, SOB, cough, wheezing  Cardiovascular: denies chest pain, palpitations, diaphoresis, LE edema  GI: +abdominal pain, n/v  : denies dysuria, frequency, urgency, bowel/bladder incontinence  MSK: denies joint swelling/pain, back pain, neck pain  Neuro: denies headache, dizziness, weakness, numbness  Psych: denies anxiety, depression, SI/HI, visual/auditory hallucinations

## 2025-03-30 NOTE — H&P ADULT - NSHPPHYSICALEXAM_GEN_ALL_CORE
T(C): 36.6 (03-30-25 @ 09:36), Max: 37.1 (03-29-25 @ 16:13)  HR: 79 (03-30-25 @ 09:36) (70 - 92)  BP: 144/87 (03-30-25 @ 09:36) (144/87 - 183/98)  RR: 14 (03-30-25 @ 09:36) (14 - 18)  SpO2: 97% (03-30-25 @ 09:36) (96% - 98%)    CONSTITUTIONAL: Well groomed, no apparent distress  EYES: PERRLA and symmetric, EOMI, No conjunctival or scleral injection, non-icteric  ENMT: Oral mucosa with moist membranes  NECK: Supple, symmetric and without tracheal deviation   RESP: No respiratory distress, no use of accessory muscles; CTA b/l, no WRR  CV: RRR, +S1S2, no MRG; no JVD; no peripheral edema  GI: Soft, ND, no rebound, no guarding; no palpable masses; mild tenderness in epigastric region  LYMPH: No cervical LAD or tenderness  MSK: No digital clubbing or cyanosis  SKIN: No rashes or ulcers noted; no subcutaneous nodules or induration palpable  NEURO: CN II-XII intact; sensation intact in upper and lower extremities b/l to light touch   PSYCH: Appropriate insight/judgment; A+O x 3

## 2025-03-30 NOTE — ED CDU PROVIDER SUBSEQUENT DAY NOTE - NSICDXFAMILYHX_GEN_ALL_CORE_FT
FAMILY HISTORY:  Father  Still living? Unknown  FH: HTN (hypertension), Age at diagnosis: Age Unknown    

## 2025-03-30 NOTE — PROGRESS NOTE ADULT - NS ATTEND AMEND GEN_ALL_CORE FT
I evaluated this pt. with my ACP and agree with the above assessment and management plan. Pt with persistent symptoms with inability to keep most anything down except water s/p EGD with pyloric channel dilatation and pre-pyloric Botox injection for gastroparesis. Pt. now needs to be admitted and should have IV fluids despite the fact that he is presently refusing them Dr. Judd Moe to re-evaluate pt. tomorrow. Repeat labs ordered for the AM. I reviewed his EGD report from 03/28/25. I evaluated this pt. with my ACP and agree with the above assessment and management plan. Pt with persistent symptoms with inability to keep most anything down except water s/p EGD with pyloric channel dilatation and pre-pyloric Botox injection for gastroparesis. Pt. now needs to be admitted and should have IV fluids despite the fact that he is presently refusing them Dr. Judd Moe to re-evaluate pt. tomorrow. Repeat labs ordered for the AM. I reviewed his EGD report from 03/28/25 and his CT images and agree with the radiologist's interpretation.

## 2025-03-30 NOTE — ED CDU PROVIDER SUBSEQUENT DAY NOTE - NSICDXPASTSURGICALHX_GEN_ALL_CORE_FT
PAST SURGICAL HISTORY:  H/O esophagectomy     S/P cholecystectomy     

## 2025-03-30 NOTE — ED CDU PROVIDER SUBSEQUENT DAY NOTE - NS ED ATTENDING STATEMENT MOD
This was a shared visit with the JAGJIT. I reviewed and verified the documentation.

## 2025-03-30 NOTE — ED CDU PROVIDER SUBSEQUENT DAY NOTE - CLINICAL SUMMARY MEDICAL DECISION MAKING FREE TEXT BOX
60yo male w pmhx esophageal CA s/p esophagectomy with gastric pull through (2016), gastroparesis s/p Pre Pyloric Botox Injection and Balloon Dilation x4 (last was 04/2024) p/w gastroparesis. Pt placed in obs for pain control and GI consult. pt was evaluated by GI, had EGD with pyloric channel dilation and prepyloric area botox injection completed. GI recommending clear liquid diet overnight, IV reglan, advance diet as tolerated today. Will continue to monitor. GI to follow in AM

## 2025-03-30 NOTE — ED CDU PROVIDER SUBSEQUENT DAY NOTE - HISTORY
No acute events overnight. patient awaiting oral tolerance and GI reassessment.
Pt resting comfortably at time of re-assessment. No events overnight. Pending AM labs and EGD. Will continue to monitor.
No acute events overnight. patient awaiting oral tolerance and GI reassessment.

## 2025-03-30 NOTE — PROGRESS NOTE ADULT - ASSESSMENT
60 y/o M with PMHx esophageal carcinoma s/p esophagectomy with gastric pull through (2016), gastroparesis, s/p Pre Pyloric Botox Injection and Balloon Dilation x 4 (last April 2024) repeat EGD on November last year shows patent pylorus , cholecystectomy presented to ED with worsening nausea, vomiting and abdominal pain x 1 day.     Nausea, vomiting   Abdominal pain   Hx of Esophageal Cancer s/p esophagectomy  Hx of pyloric dilation/ Botox injection  EGD (April 2024) with CRE balloon dilation utilizing a wire guided technique, the pylorus was dilated to a maximal diameter of 18 mm and Botox injection.  EGD (November 2024) showed patent pylorus and therefore no intervention was required a that time  CT abd with no acute process  3/28 S/P EGD with  dilation/Botox injection   Reports continued nausea, unable to take any solids>water(sips) only  -cont Zofran 4 mg every 8 hours  -Cont Reglan  10mg every 8 hours  -Protonix 40 mg IV BID-Avoid opioids if possible. Avoid NSAIDs.    Needs to be admitted  IVF ordered >RN states pt refusing> should be hydrated parenterally until diet is tolerated to avoid MARCELINA/dehydration  Will follow

## 2025-03-30 NOTE — ED CDU PROVIDER SUBSEQUENT DAY NOTE - NSICDXPASTMEDICALHX_GEN_ALL_CORE_FT
PAST MEDICAL HISTORY:  Esophageal cancer     Gastroparesis     Hypertension     

## 2025-03-30 NOTE — ED CDU PROVIDER SUBSEQUENT DAY NOTE - PHYSICAL EXAMINATION
Gen: No acute distress, non toxic  HENT: NCAT, Mucous membranes moist, Oropharynx without exudates, uvula midline  Eyes: pink conjunctivae, EOMI, PERRL  CV: RRR, nl s1/s2.  Resp: CTAB, normal rate and effort  GI: +mild ttp epigastric region. Abdomen soft, ND. No rebound, no guarding  Neuro: A&O x 3  MSK: No spine or joint tenderness to palpation, Full ROM ext x 4  Skin: No rashes. intact and perfused.
Gen: No acute distress, non toxic  HENT: NCAT, Mucous membranes moist, Oropharynx without exudates, uvula midline  Eyes: pink conjunctivae, EOMI, PERRL  CV: RRR, nl s1/s2.  Resp: CTAB, normal rate and effort  GI: +mild ttp epigastric region. Abdomen soft, ND. No rebound, no guarding  Neuro: A&O x 3  MSK: No spine or joint tenderness to palpation, Full ROM ext x 4  Skin: No rashes. intact and perfused.
Gen: Appears uncomfortable.   HEENT: Mucous membranes moist, pink conjunctivae, EOMI. PERRL. Airway patent  Neck: trachea midline   CV: RRR, nl s1/s2.  Resp: CTAB, normal rate and effort. no wheezes, rhonchi or crackles.   GI: Abdomen soft, nondistended. +ttp in the epigastric area. No rebound. no ecchymosis   Neuro: A&O x 3, moving all 4 extremities. nonfocal   MSK: No spine or joint ttp. No visualized deformities.   Skin: No rashes. intact and perfused. cap refill <2sec

## 2025-03-30 NOTE — PROGRESS NOTE ADULT - TIME BILLING
Reviewing his EGD report and deciding upon admission to the hospital. Reviewing his EGD report and CT images and deciding upon admission to the hospital.

## 2025-03-30 NOTE — ED CDU PROVIDER DISPOSITION NOTE - WR INTERPRETATION 1
CXR negative - No pneumothorax, No opacities, No free air
The patient is a 38y Male complaining of ear pain.

## 2025-03-30 NOTE — ED ADULT NURSE REASSESSMENT NOTE - NS ED NURSE REASSESS COMMENT FT1
Pt received A&O x's 3 c/o diffuse abdominal pain. No N/V. Pt drinking water and tolerating well. Will be admitted for further management

## 2025-03-30 NOTE — ED CDU PROVIDER DISPOSITION NOTE - CLINICAL COURSE
58yo male w pmhx esophageal CA s/p esophagectomy with gastric pull through (2016), gastroparesis s/p Pre Pyloric Botox Injection and Balloon Dilation x4 (last was 04/2024) p/w gastroparesis. Pt placed in obs for pain control and GI consult. pt was evaluated by GI, had EGD with pyloric channel dilation and prepyloric area botox injection completed. GI recommending clear liquid diet overnight, IV reglan, advance diet as tolerated today. Will continue to monitor. GI reassessed this am and requesting that patient be admitted to the hospital for further management. Discussed case with Dr. Moe.

## 2025-03-30 NOTE — H&P ADULT - HISTORY OF PRESENT ILLNESS
Patient is a 59 year old male with a past medical history of esophageal CA s/p esophagectomy with gastric pull through (2016), gastroparesis s/p Pre Pyloric Botox Injection and Balloon Dilation x4 (last was 04/2024) p/w gastroparesis. He was in observation for several days and underwent EGD on 3/28 with pyloric channel dilation and prepyloric area botox injection completed. Patient continues to be on IV Reglan, IV Protonix, and pain control. He continues to be unable to tolerate diet, so he is being referred for admission from observation. Patient denies chest pain, shortness of breath, but endorses mild epigastric abdominal pain and says that he still is unable to tolerate oral feeds. He can hold down fluids but says that he needs time to be able to keep food down.  Labs overall unremarkable aside from mildly elevated white count. Patient for admission to the general floors.

## 2025-03-30 NOTE — ED CDU PROVIDER DISPOSITION NOTE - ATTENDING CONTRIBUTION TO CARE
60yo male w pmhx esophageal CA s/p esophagectomy with gastric pull through (2016), gastroparesis s/p Pre Pyloric Botox Injection and Balloon Dilation x4 (last was 04/2024) p/w gastroparesis. Pt placed in obs for pain control and GI consult. pt was evaluated by GI, had EGD with pyloric channel dilation and prepyloric area botox injection completed. GI recommending clear liquid diet overnight, IV reglan, advance diet as tolerated today. Will continue to monitor. GI reassessed this am and requesting that patient be admitted to the hospital for further management. Discussed case with Dr. Moe.

## 2025-03-31 ENCOUNTER — TRANSCRIPTION ENCOUNTER (OUTPATIENT)
Age: 60
End: 2025-03-31

## 2025-03-31 VITALS
TEMPERATURE: 98 F | DIASTOLIC BLOOD PRESSURE: 78 MMHG | HEART RATE: 88 BPM | OXYGEN SATURATION: 98 % | RESPIRATION RATE: 18 BRPM | SYSTOLIC BLOOD PRESSURE: 123 MMHG

## 2025-03-31 DIAGNOSIS — R11.2 NAUSEA WITH VOMITING, UNSPECIFIED: ICD-10-CM

## 2025-03-31 LAB
ALBUMIN SERPL ELPH-MCNC: 4.3 G/DL — SIGNIFICANT CHANGE UP (ref 3.3–5.2)
ALP SERPL-CCNC: 87 U/L — SIGNIFICANT CHANGE UP (ref 40–120)
ALT FLD-CCNC: 26 U/L — SIGNIFICANT CHANGE UP
ANION GAP SERPL CALC-SCNC: 13 MMOL/L — SIGNIFICANT CHANGE UP (ref 5–17)
AST SERPL-CCNC: 21 U/L — SIGNIFICANT CHANGE UP
BASOPHILS # BLD AUTO: 0.03 K/UL — SIGNIFICANT CHANGE UP (ref 0–0.2)
BASOPHILS NFR BLD AUTO: 0.4 % — SIGNIFICANT CHANGE UP (ref 0–2)
BILIRUB SERPL-MCNC: 0.8 MG/DL — SIGNIFICANT CHANGE UP (ref 0.4–2)
BUN SERPL-MCNC: 17.5 MG/DL — SIGNIFICANT CHANGE UP (ref 8–20)
CALCIUM SERPL-MCNC: 8.6 MG/DL — SIGNIFICANT CHANGE UP (ref 8.4–10.5)
CHLORIDE SERPL-SCNC: 94 MMOL/L — LOW (ref 96–108)
CO2 SERPL-SCNC: 26 MMOL/L — SIGNIFICANT CHANGE UP (ref 22–29)
CREAT SERPL-MCNC: 0.61 MG/DL — SIGNIFICANT CHANGE UP (ref 0.5–1.3)
EGFR: 112 ML/MIN/1.73M2 — SIGNIFICANT CHANGE UP
EGFR: 112 ML/MIN/1.73M2 — SIGNIFICANT CHANGE UP
EOSINOPHIL # BLD AUTO: 0.02 K/UL — SIGNIFICANT CHANGE UP (ref 0–0.5)
EOSINOPHIL NFR BLD AUTO: 0.3 % — SIGNIFICANT CHANGE UP (ref 0–6)
GLUCOSE SERPL-MCNC: 112 MG/DL — HIGH (ref 70–99)
HCT VFR BLD CALC: 39.6 % — SIGNIFICANT CHANGE UP (ref 39–50)
HGB BLD-MCNC: 13.6 G/DL — SIGNIFICANT CHANGE UP (ref 13–17)
IMM GRANULOCYTES # BLD AUTO: 0.02 K/UL — SIGNIFICANT CHANGE UP (ref 0–0.07)
IMM GRANULOCYTES NFR BLD AUTO: 0.3 % — SIGNIFICANT CHANGE UP (ref 0–0.9)
LYMPHOCYTES # BLD AUTO: 1.6 K/UL — SIGNIFICANT CHANGE UP (ref 1–3.3)
LYMPHOCYTES NFR BLD AUTO: 20.1 % — SIGNIFICANT CHANGE UP (ref 13–44)
MAGNESIUM SERPL-MCNC: 2.2 MG/DL — SIGNIFICANT CHANGE UP (ref 1.6–2.6)
MCHC RBC-ENTMCNC: 27.3 PG — SIGNIFICANT CHANGE UP (ref 27–34)
MCHC RBC-ENTMCNC: 34.3 G/DL — SIGNIFICANT CHANGE UP (ref 32–36)
MCV RBC AUTO: 79.4 FL — LOW (ref 80–100)
MONOCYTES # BLD AUTO: 0.83 K/UL — SIGNIFICANT CHANGE UP (ref 0–0.9)
MONOCYTES NFR BLD AUTO: 10.4 % — SIGNIFICANT CHANGE UP (ref 2–14)
NEUTROPHILS # BLD AUTO: 5.47 K/UL — SIGNIFICANT CHANGE UP (ref 1.8–7.4)
NEUTROPHILS NFR BLD AUTO: 68.5 % — SIGNIFICANT CHANGE UP (ref 43–77)
NRBC # BLD AUTO: 0 K/UL — SIGNIFICANT CHANGE UP (ref 0–0)
NRBC # FLD: 0 K/UL — SIGNIFICANT CHANGE UP (ref 0–0)
NRBC BLD AUTO-RTO: 0 /100 WBCS — SIGNIFICANT CHANGE UP (ref 0–0)
PHOSPHATE SERPL-MCNC: 2.9 MG/DL — SIGNIFICANT CHANGE UP (ref 2.4–4.7)
PLATELET # BLD AUTO: 269 K/UL — SIGNIFICANT CHANGE UP (ref 150–400)
PMV BLD: 10.6 FL — SIGNIFICANT CHANGE UP (ref 7–13)
POTASSIUM SERPL-MCNC: 3.3 MMOL/L — LOW (ref 3.5–5.3)
POTASSIUM SERPL-SCNC: 3.3 MMOL/L — LOW (ref 3.5–5.3)
PROT SERPL-MCNC: 6.9 G/DL — SIGNIFICANT CHANGE UP (ref 6.6–8.7)
RBC # BLD: 4.99 M/UL — SIGNIFICANT CHANGE UP (ref 4.2–5.8)
RBC # FLD: 13.3 % — SIGNIFICANT CHANGE UP (ref 10.3–14.5)
SODIUM SERPL-SCNC: 133 MMOL/L — LOW (ref 135–145)
WBC # BLD: 7.97 K/UL — SIGNIFICANT CHANGE UP (ref 3.8–10.5)
WBC # FLD AUTO: 7.97 K/UL — SIGNIFICANT CHANGE UP (ref 3.8–10.5)

## 2025-03-31 PROCEDURE — 80074 ACUTE HEPATITIS PANEL: CPT

## 2025-03-31 PROCEDURE — 71045 X-RAY EXAM CHEST 1 VIEW: CPT

## 2025-03-31 PROCEDURE — C1726: CPT

## 2025-03-31 PROCEDURE — 84100 ASSAY OF PHOSPHORUS: CPT

## 2025-03-31 PROCEDURE — 86850 RBC ANTIBODY SCREEN: CPT

## 2025-03-31 PROCEDURE — 93005 ELECTROCARDIOGRAM TRACING: CPT

## 2025-03-31 PROCEDURE — 85027 COMPLETE CBC AUTOMATED: CPT

## 2025-03-31 PROCEDURE — 80307 DRUG TEST PRSMV CHEM ANLYZR: CPT

## 2025-03-31 PROCEDURE — 81001 URINALYSIS AUTO W/SCOPE: CPT

## 2025-03-31 PROCEDURE — 36415 COLL VENOUS BLD VENIPUNCTURE: CPT

## 2025-03-31 PROCEDURE — 87086 URINE CULTURE/COLONY COUNT: CPT

## 2025-03-31 PROCEDURE — 99285 EMERGENCY DEPT VISIT HI MDM: CPT

## 2025-03-31 PROCEDURE — 87637 SARSCOV2&INF A&B&RSV AMP PRB: CPT

## 2025-03-31 PROCEDURE — 87040 BLOOD CULTURE FOR BACTERIA: CPT

## 2025-03-31 PROCEDURE — 74177 CT ABD & PELVIS W/CONTRAST: CPT | Mod: MC

## 2025-03-31 PROCEDURE — 96376 TX/PRO/DX INJ SAME DRUG ADON: CPT

## 2025-03-31 PROCEDURE — 85730 THROMBOPLASTIN TIME PARTIAL: CPT

## 2025-03-31 PROCEDURE — G0378: CPT

## 2025-03-31 PROCEDURE — 85025 COMPLETE CBC W/AUTO DIFF WBC: CPT

## 2025-03-31 PROCEDURE — 99239 HOSP IP/OBS DSCHRG MGMT >30: CPT

## 2025-03-31 PROCEDURE — 83605 ASSAY OF LACTIC ACID: CPT

## 2025-03-31 PROCEDURE — 99231 SBSQ HOSP IP/OBS SF/LOW 25: CPT

## 2025-03-31 PROCEDURE — 83690 ASSAY OF LIPASE: CPT

## 2025-03-31 PROCEDURE — 85610 PROTHROMBIN TIME: CPT

## 2025-03-31 PROCEDURE — 43249 ESOPH EGD DILATION <30 MM: CPT

## 2025-03-31 PROCEDURE — 82248 BILIRUBIN DIRECT: CPT

## 2025-03-31 PROCEDURE — 96375 TX/PRO/DX INJ NEW DRUG ADDON: CPT

## 2025-03-31 PROCEDURE — 86900 BLOOD TYPING SEROLOGIC ABO: CPT

## 2025-03-31 PROCEDURE — 83735 ASSAY OF MAGNESIUM: CPT

## 2025-03-31 PROCEDURE — 86901 BLOOD TYPING SEROLOGIC RH(D): CPT

## 2025-03-31 PROCEDURE — 80053 COMPREHEN METABOLIC PANEL: CPT

## 2025-03-31 PROCEDURE — 80048 BASIC METABOLIC PNL TOTAL CA: CPT

## 2025-03-31 PROCEDURE — 96374 THER/PROPH/DIAG INJ IV PUSH: CPT

## 2025-03-31 RX ORDER — ACETAMINOPHEN 500 MG/5ML
1000 LIQUID (ML) ORAL ONCE
Refills: 0 | Status: COMPLETED | OUTPATIENT
Start: 2025-03-31 | End: 2025-03-31

## 2025-03-31 RX ADMIN — Medication 400 MILLIGRAM(S): at 05:59

## 2025-03-31 RX ADMIN — Medication 4 MILLIGRAM(S): at 02:32

## 2025-03-31 RX ADMIN — AMLODIPINE BESYLATE 5 MILLIGRAM(S): 10 TABLET ORAL at 05:59

## 2025-03-31 RX ADMIN — Medication 10 MILLIGRAM(S): at 05:59

## 2025-03-31 RX ADMIN — KETOROLAC TROMETHAMINE 15 MILLIGRAM(S): 30 INJECTION, SOLUTION INTRAMUSCULAR; INTRAVENOUS at 02:32

## 2025-03-31 RX ADMIN — Medication 40 MILLIGRAM(S): at 05:59

## 2025-03-31 RX ADMIN — ENOXAPARIN SODIUM 40 MILLIGRAM(S): 100 INJECTION SUBCUTANEOUS at 13:06

## 2025-03-31 RX ADMIN — Medication 10 MILLIGRAM(S): at 13:25

## 2025-03-31 NOTE — PROGRESS NOTE ADULT - ASSESSMENT
58 y/o M with PMHx esophageal carcinoma s/p esophagectomy with gastric pull through (2016), gastroparesis, s/p Pre Pyloric Botox Injection and Balloon Dilation x 4 (last April 2024) repeat EGD on November last year shows patent pylorus , cholecystectomy presented to ED with worsening nausea, vomiting and abdominal pain x 1 day.     #Nausea, vomiting   #Abdominal pain   #Hx of Esophageal Cancer s/p esophagectomy  #Hx of pyloric dilation/ Botox injection  EGD (April 2024) with CRE balloon dilation utilizing a wire guided technique, the pylorus was dilated to a maximal diameter of 18 mm and Botox injection.  EGD (November 2024) showed patent pylorus and therefore no intervention was required a that time  CT abd with no acute process  3/28 S/P EGD with  dilation/Botox injection   -Trend CBC and BMP daily, monitor electrolytes   -cont Zofran 4 mg every 8 hours  -Cont Reglan 10mg every 8 hours  -Protonix 40 mg IV BID  -Continue full liquid diet for now  -will follow closely   _________________________________________________________________  Assessment and recommendations are final when note is signed by the attending physician.   58 y/o M with PMHx esophageal carcinoma s/p esophagectomy with gastric pull through (2016), gastroparesis, s/p Pre Pyloric Botox Injection and Balloon Dilation x 4 (last April 2024) repeat EGD on November last year shows patent pylorus , cholecystectomy presented to ED with worsening nausea, vomiting and abdominal pain x 1 day.     #Nausea, vomiting   #Abdominal pain   #Hx of Esophageal Cancer s/p esophagectomy  #Hx of pyloric dilation/ Botox injection  EGD (April 2024) with CRE balloon dilation utilizing a wire guided technique, the pylorus was dilated to a maximal diameter of 18 mm and Botox injection.  EGD (November 2024) showed patent pylorus and therefore no intervention was required a that time  CT abd with no acute process  3/28 S/P EGD with  dilation/Botox injection   -Trend CBC and BMP daily, monitor electrolytes   -cont Zofran 4 mg every 8 hours  -Cont Reglan 10mg every 8 hours  -Protonix 40 mg IV BID  -advance to soft diet, low fiber/low fat  -If tolerating diet can be discharged from GI standpoint   -Will need outpatient follow up with Dr. Moe   _________________________________________________________________  Assessment and recommendations are final when note is signed by the attending physician.

## 2025-03-31 NOTE — DISCHARGE NOTE PROVIDER - NSDCMRMEDTOKEN_GEN_ALL_CORE_FT
amLODIPine 5 mg oral tablet: 1 tab(s) orally once a day  erythromycin 250 mg oral tablet: 1 tab(s) orally 4 times a day  metoclopramide 5 mg oral tablet: 2 tab(s) orally every 8 hours as needed for  nausea  ocular lubricant preserved ophthalmic solution: 1 drop(s) in each eye once a day as needed for  dry eyes   amLODIPine 5 mg oral tablet: 1 tab(s) orally once a day  erythromycin 250 mg oral tablet: 1 tab(s) orally 4 times a day  metoclopramide 5 mg oral tablet: 2 tab(s) orally every 8 hours as needed for  nausea  ocular lubricant preserved ophthalmic solution: 1 drop(s) in each eye once a day as needed for  dry eyes  Protonix 40 mg oral delayed release tablet: 1 tab(s) orally once a day

## 2025-03-31 NOTE — PROGRESS NOTE ADULT - NS ATTEST RISK PROBLEM GEN_ALL_CORE FT
hx of esophageal cancer with gastric pull up - has poor motility  has had dilation of the pylorus  tolerating full liquids  will advance to sof tfood, now fat, low fiber  if tolerates D/C home  today with F/u with Dr mukherjee/tre

## 2025-03-31 NOTE — DISCHARGE NOTE NURSING/CASE MANAGEMENT/SOCIAL WORK - FINANCIAL ASSISTANCE
[facility] provides services at a reduced cost to those who are determined to be eligible through [facility]’s financial assistance program. Information regarding [facility]’s financial assistance program can be found by going to [email or web address] or by calling [phone].

## 2025-03-31 NOTE — CHART NOTE - NSCHARTNOTEFT_GEN_A_CORE
RN called stating pt complains of upper abdominal pain. Pt has been having this pain throughout admission and hospitalist is aware. Denies vomiting, diarrhea, chest pain or headaches. Pts vitals are stable.   Pt is here for gastroparesis s/p Botox injection and balloon dilation. Pt has a h/o of esophogeal cancer s/p esophagectomy with gastric pull through (2016). Was receiving Dilaudid IV for pain control. Attempting to wean to Ketorolac and Tylenol as needed.   Ofirmev x1 dose ordered  will continue to monitor patients pain, if severe will escalate pain meds   RN to monitor pt for new or worsening symptoms

## 2025-03-31 NOTE — DISCHARGE NOTE PROVIDER - ATTENDING DISCHARGE PHYSICAL EXAMINATION:
T(C): 36.6 (03-31-25 @ 03:58), Max: 37.2 (03-30-25 @ 21:17)  HR: 83 (03-31-25 @ 03:58) (77 - 85)  BP: 129/78 (03-31-25 @ 03:58) (129/78 - 164/90)  RR: 17 (03-31-25 @ 03:58) (14 - 17)  SpO2: 96% (03-31-25 @ 03:58) (96% - 98%)    CONSTITUTIONAL: Well groomed, no apparent distress  EYES: PERRLA and symmetric, EOMI, No conjunctival or scleral injection, non-icteric  ENMT: Oral mucosa with moist membranes. Normal dentition; no pharyngeal injection or exudates             NECK: Supple, symmetric and without tracheal deviation   RESP: No respiratory distress, no use of accessory muscles; CTA b/l, no WRR  CV: RRR, +S1S2, no MRG; no JVD; no peripheral edema  GI: Soft, NT, ND, no rebound, no guarding; no palpable masses; no hepatosplenomegaly; no hernia palpated  LYMPH: No cervical LAD or tenderness; no axillary LAD or tenderness; no inguinal LAD or tenderness  MSK: Normal gait; No digital clubbing or cyanosis; examination of the (head/neck/spine/ribs/pelvis, RUE, LUE, RLE, LLE) without misalignment,            Normal ROM without pain, no spinal tenderness, normal muscle strength/tone  SKIN: No rashes or ulcers noted; no subcutaneous nodules or induration palpable  NEURO: CN II-XII intact; normal reflexes in upper and lower extremities, sensation intact in upper and lower extremities b/l to light touch   PSYCH: Appropriate insight/judgment; A+O x 3, mood and affect appropriate, recent/remote memory intact

## 2025-03-31 NOTE — DISCHARGE NOTE PROVIDER - HOSPITAL COURSE
A 59-year-old male with a history of esophageal carcinoma status-post esophagectomy with gastric pull-through, gastroparesis status-post pre-pyloric Botox injection and balloon dilation, and cholecystectomy presented with worsening nausea, vomiting, and abdominal pain. A CT abdomen showed no acute process. He underwent a repeat EGD on 3/28/25 with balloon dilation and Botox injection to the pylorus. He was treated with antiemetics, a proton pump inhibitor, and started on a full liquid diet. His CBC and BMP were monitored. He will follow up closely with gastroenterology as an outpatient. A 59-year-old male with a history of esophageal carcinoma status-post esophagectomy with gastric pull-through, gastroparesis status-post pre-pyloric Botox injection and balloon dilation, and cholecystectomy presented with worsening nausea, vomiting, and abdominal pain. A CT abdomen showed no acute process. He underwent a repeat EGD on 3/28/25 with balloon dilation and Botox injection to the pylorus. He was treated with antiemetics, a proton pump inhibitor, and started on a full liquid diet. His CBC and BMP were monitored. He will follow up closely with gastroenterology as an outpatient. Patient to continue with soft low fiber diet indefinitely until advanced by GI.

## 2025-03-31 NOTE — DISCHARGE NOTE NURSING/CASE MANAGEMENT/SOCIAL WORK - PATIENT PORTAL LINK FT
You can access the FollowMyHealth Patient Portal offered by [facility] by registering at the following website: [email or web address]. By joining [employer]’s FollowMyHealth portal, you will also be able to view your health information using other applications (apps) compatible with our system.

## 2025-03-31 NOTE — PROGRESS NOTE ADULT - SUBJECTIVE AND OBJECTIVE BOX
Chief Complaint:  Patient is a 59y old  Male who presents with a chief complaint of Gastroparesis (30 Mar 2025 09:37)      HPI/ 24 hr events: Patient seen and examined at bedside. Pt reports abdominal pain overnight, relieved with tylenol. Tolerating full liquid diet. Denies nausea, vomiting, diarrhea, abdominal pain, hematemesis, hematochezia, melena. Vitals are overall stable, no leukocytosis.       REVIEW OF SYSTEMS:   General: Negative  HEENT: Negative  CV: Negative  Respiratory: Negative  GI: See HPI  : Negative  MSK: Negative  Hematologic: Negative  Skin: Negative    MEDICATIONS:   MEDICATIONS  (STANDING):  amLODIPine   Tablet 5 milliGRAM(s) Oral daily  enoxaparin Injectable 40 milliGRAM(s) SubCutaneous every 24 hours  lactated ringers. 1000 milliLiter(s) (150 mL/Hr) IV Continuous <Continuous>  metoclopramide Injectable 10 milliGRAM(s) IV Push every 8 hours  onabotulinumtoxinA Injectable 100 Unit(s) IntraDermal once  pantoprazole  Injectable 40 milliGRAM(s) IV Push two times a day    MEDICATIONS  (PRN):  acetaminophen     Tablet .. 650 milliGRAM(s) Oral every 6 hours PRN Mild Pain (1 - 3)  aluminum hydroxide/magnesium hydroxide/simethicone Suspension 30 milliLiter(s) Oral every 4 hours PRN Dyspepsia  ketorolac   Injectable 15 milliGRAM(s) IV Push every 4 hours PRN Moderate Pain (4 - 6)  melatonin 3 milliGRAM(s) Oral at bedtime PRN Insomnia  ondansetron Injectable 4 milliGRAM(s) IV Push every 8 hours PRN Nausea and/or Vomiting            DIET:  Diet, Regular (25 @ 17:37) [Available for Activation]  Diet, Clear Liquid (25 @ 17:37) [Available for Activation]          ALLERGIES:   Allergies    No Known Allergies    Intolerances        VITAL SIGNS:   Vital Signs Last 24 Hrs  T(C): 36.6 (31 Mar 2025 03:58), Max: 37.2 (30 Mar 2025 21:17)  T(F): 97.8 (31 Mar 2025 03:58), Max: 99 (30 Mar 2025 21:17)  HR: 83 (31 Mar 2025 03:58) (77 - 85)  BP: 129/78 (31 Mar 2025 03:58) (129/78 - 164/90)  BP(mean): --  RR: 17 (31 Mar 2025 03:58) (14 - 17)  SpO2: 96% (31 Mar 2025 03:58) (96% - 98%)    Parameters below as of 31 Mar 2025 03:58  Patient On (Oxygen Delivery Method): room air      I&O's Summary      PHYSICAL EXAM:   GENERAL:  No acute distress  HEENT:  NC/AT, conjunctiva clear, sclera anicteric  CHEST:  No increased effort  HEART:  Regular rate  ABDOMEN:  Soft, non-tender, non-distended, normoactive bowel sounds, no rebound or guarding  EXTREMITIES: No edema  SKIN:  Warm, dry  NEURO:  Calm, cooperative    LABS:                        13.6   7.97  )-----------( 269      ( 31 Mar 2025 04:36 )             39.6     Hemoglobin: 13.6 g/dL (25 @ 04:36)  Hemoglobin: 14.5 g/dL (25 @ 08:55)        133[L]  |  94[L]  |  17.5  ----------------------------<  112[H]  3.3[L]   |  26.0  |  0.61    Ca    8.6      31 Mar 2025 04:36  Phos  2.9       Mg     2.2         TPro  6.9  /  Alb  4.3  /  TBili  0.8  /  DBili  x   /  AST  21  /  ALT  26  /  AlkPhos  87      LIVER FUNCTIONS - ( 31 Mar 2025 04:36 )  Alb: 4.3 g/dL / Pro: 6.9 g/dL / ALK PHOS: 87 U/L / ALT: 26 U/L / AST: 21 U/L / GGT: x                                                     RADIOLOGY & ADDITIONAL STUDIES:          EGD Report        Date: 3/28/2025 3:39 PM        Patient Name: MIAH GIL        MRN: [id number]        Account Number:        [id number]        Gender: Male         (age): 1965 (59)        Instrument(s):        [id number] [id number]        Attending/Fellow:        Judd Moe MD                Procedure Room #:        PROCEDURE ROOM 1                        ASA Class:        P2 - 3/28/2025 4:08 PM Judd Moe MD        History of Present Illness:        History of esophagectomy with associated post surgical gastroparesis presenting    with intractable nausea and vomiting here for upper endoscopy possible dilation,    possible Botox injection.        Administered Medications:        As per Anesthesiology Record        Indications:        Gastroparesis: 536.3 - K31.84        Procedure:        The procedure, indications, preparation and potential complications were    explained to the patient, who indicated understanding and signed the    corresponding consent forms. MAC was administered by anesthesiologist.    Continuous pulse oximetry and blood pressure monitoring were used throughout the    procedure. Supplemental oxygen was used. Patient was placed in the left lateral    decubitus position. The endoscope was introduced through the mouth and advanced    under direct visualization until the second part of the duodenum was reached.    Patient tolerance to the procedure was good. The procedure was not difficult.    Blood loss was minimal.        Limitations/Complications:        There were no apparent limitations or complications        Findings:        Esophagus Additional esophagus findings - Evidence of prior esophagectomy,    mucosa appeared healthy and intact..        Stomach Additional stomach findings - There was diffuse gastric erythema and    retained contents (food/fluid) within the gastric body suggestive of    gastroparesis, there were significant changes in gastric anatomy as suspected    given surgical history, the mid stomach was notable for a significant upward    deflection possibly due to post surgical scaring / changes..        - The pylorus was patent with mild resistance to passage of the endoscope.. A    stepwise 15-18 balloon was introduced for dilation successfully to 18 with    improvement in patency of the pylorus. No visible mucosal tear noted.    Subsequently 100u of Botox was injected circumfrentially in the pylorus..        Duodenum Normal duodenum.        Impressions:        Normal duodenum.        Evidence of prior esophagectomy, mucosa appeared healthy and intact. .        There was diffuse gastric erythema and retained contents (food/fluid) within    the gastric body suggestive of gastroparesis, there were significant changes in    gastric anatomy as suspected given surgical history, the mid stomach was notable    for a significant upward deflection possibly due to post surgical scaring /    changes. .        The pylorus was patent with mild resistance to passage of the endoscope.    (Dilation and Botox injection).        Plan:        Return to floor for further management        Clear Liquid Diet        Advance diet tomorrow if tolerates and no longer nauseous        Continue mateo Moe MD        Version 1, Electronically signed on 3/28/2025 5:22:24 PM by Judd Moe MD  
Patient is a 59y old  Male who presents with a chief complaint of gastroparesis    HPI: Evaluated in ER> waiting to be admitted  Status post EGD with pyloric channel dilation and pre-pyloric Botox injection for gastroparesis. He states that he is feeling slightly better but is able to handle only sips water   . No N/V , reports some  abdominal pain post procedure.      REVIEW OF SYSTEMS:  Constitutional: No fever, weight loss or fatigue  Cardiovascular: No chest pain, palpitations, dizziness or leg swelling  Gastrointestinal: No abdominal or epigastric pain. No nausea, vomiting or hematemesis;   No diarrhea or constipation. No melena or hematochezia.  Skin: No itching, burning, rashes or lesions   Remainder of 14 point ROS: Negative.        PAST MEDICAL & SURGICAL HISTORY:  Hypertension      Esophageal cancer      Gastroparesis      S/P cholecystectomy      H/O esophagectomy          FAMILY HISTORY:  FH: HTN (hypertension) (Father)          MEDICATIONS:  MEDICATIONS  (STANDING):  amLODIPine   Tablet 5 milliGRAM(s) Oral daily  metoclopramide Injectable 10 milliGRAM(s) IV Push every 8 hours  onabotulinumtoxinA Injectable 100 Unit(s) IntraDermal once  pantoprazole  Injectable 40 milliGRAM(s) IV Push two times a day  sodium chloride 0.9%. 1000 milliLiter(s) (150 mL/Hr) IV Continuous <Continuous>    MEDICATIONS  (PRN):  HYDROmorphone  Injectable 1 milliGRAM(s) IV Push every 6 hours PRN Severe Pain (7 - 10)      Allergies    No Known Allergies    Intolerances        Vital Signs Last 24 Hrs  T(C): 36.6 (30 Mar 2025 07:31), Max: 37.1 (29 Mar 2025 16:13)  T(F): 97.9 (30 Mar 2025 07:31), Max: 98.7 (29 Mar 2025 16:13)  HR: 86 (30 Mar 2025 07:31) (70 - 92)  BP: 178/99 (30 Mar 2025 07:31) (145/89 - 183/98)  BP(mean): --  RR: 18 (30 Mar 2025 07:31) (18 - 18)  SpO2: 98% (30 Mar 2025 07:31) (96% - 98%)    Parameters below as of 30 Mar 2025 07:31  Patient On (Oxygen Delivery Method): room air            PHYSICAL EXAM:    General: Well developed; =OOB chair, appears uncomfortable, states can only tolerate sips water  HEENT: MMM, conjunctiva pink and sclera anicteric.  Lungs: clear to auscultation bilaterally.  Cor: RRR S1, S2 only.  Gastrointestinal: Abdomen: Soft non-tender non-distended; hypoactive  bowel sounds; No hepatosplenomegaly.  Extremities: no cyanosis, clubbing or edema.  Skin: Warm and dry. No obvious rash  Neuro: Alert, oriented                RADIOLOGY & ADDITIONAL STUDIES:       EGD Report        Date: 3/28/2025 3:39 PM        Patient Name: MIAH GIL        MRN: [id number]        Account Number:        [id number]        Gender: Male         (age): 1965 (59)        Instrument(s):        GIF [id number]        Attending/Fellow:        Judd Moe MD                Procedure Room #:        PROCEDURE ROOM 1                        ASA Class:        P2 - 3/28/2025 4:08 PM Judd Moe MD        History of Present Illness:        History of esophagectomy with associated post surgical gastroparesis presenting    with intractable nausea and vomiting here for upper endoscopy possible dilation,    possible Botox injection.        Administered Medications:        As per Anesthesiology Record        Indications:        Gastroparesis: 536.3 - K31.84        Procedure:        The procedure, indications, preparation and potential complications were    explained to the patient, who indicated understanding and signed the    corresponding consent forms. MAC was administered by anesthesiologist.    Continuous pulse oximetry and blood pressure monitoring were used throughout the    procedure. Supplemental oxygen was used. Patient was placed in the left lateral    decubitus position. The endoscope was introduced through the mouth and advanced    under direct visualization until the second part of the duodenum was reached.    Patient tolerance to the procedure was good. The procedure was not difficult.    Blood loss was minimal.        Limitations/Complications:        There were no apparent limitations or complications        Findings:        Esophagus Additional esophagus findings - Evidence of prior esophagectomy,    mucosa appeared healthy and intact..        Stomach Additional stomach findings - There was diffuse gastric erythema and    retained contents (food/fluid) within the gastric body suggestive of    gastroparesis, there were significant changes in gastric anatomy as suspected    given surgical history, the mid stomach was notable for a significant upward    deflection possibly due to post surgical scaring / changes..        - The pylorus was patent with mild resistance to passage of the endoscope.. A    stepwise 15-18 balloon was introduced for dilation successfully to 18 with    improvement in patency of the pylorus. No visible mucosal tear noted.    Subsequently 100u of Botox was injected circumfrentially in the pylorus..        Duodenum Normal duodenum.        Impressions:        Normal duodenum.        Evidence of prior esophagectomy, mucosa appeared healthy and intact. .        There was diffuse gastric erythema and retained contents (food/fluid) within    the gastric body suggestive of gastroparesis, there were significant changes in    gastric anatomy as suspected given surgical history, the mid stomach was notable    for a significant upward deflection possibly due to post surgical scaring /    changes. .        The pylorus was patent with mild resistance to passage of the endoscope.    (Dilation and Botox injection).        Plan:        Return to floor for further management        Clear Liquid Diet        Advance diet tomorrow if tolerates and no longer nauseous        Continue mateo Moe MD        Version 1, Electronically signed on 3/28/2025 5:22:24 PM by Judd Moe MD
Pt seen and examined for gastroparesis.    Pt. seen this AM. Status post EGD with pyloric channel dilation and pre-pyloric Botox injection for gastroparesis. He states that he is feeling slightly better but is able to handle only clear liquids. No N/V or abdominal pain post procedure.    REVIEW OF SYSTEMS:  Constitutional: No fever, weight loss or fatigue  Cardiovascular: No chest pain, palpitations, dizziness or leg swelling  Gastrointestinal: No abdominal or epigastric pain. No nausea, vomiting or hematemesis; No diarrhea or constipation. No melena or hematochezia.  Skin: No itching, burning, rashes or lesions   Remainder of 14 point ROS: Negative.      MEDICATIONS:  MEDICATIONS  (STANDING):  amLODIPine   Tablet 5 milliGRAM(s) Oral daily  metoclopramide Injectable 10 milliGRAM(s) IV Push every 8 hours  onabotulinumtoxinA Injectable 100 Unit(s) IntraDermal once  pantoprazole  Injectable 40 milliGRAM(s) IV Push two times a day  sodium chloride 0.9%. 1000 milliLiter(s) (150 mL/Hr) IV Continuous <Continuous>    MEDICATIONS  (PRN):  HYDROmorphone  Injectable 1 milliGRAM(s) IV Push every 6 hours PRN Severe Pain (7 - 10)      Allergies    No Known Allergies    Intolerances        Vital Signs Last 24 Hrs  T(C): 36.8 (29 Mar 2025 07:33), Max: 37.5 (28 Mar 2025 15:24)  T(F): 98.2 (29 Mar 2025 07:33), Max: 99.5 (28 Mar 2025 15:24)  HR: 94 (29 Mar 2025 07:33) (80 - 99)  BP: 161/98 (29 Mar 2025 07:33) (83/62 - 188/91)  BP(mean): --  RR: 18 (29 Mar 2025 07:33) (12 - 18)  SpO2: 98% (29 Mar 2025 07:33) (94% - 100%)    Parameters below as of 29 Mar 2025 07:33  Patient On (Oxygen Delivery Method): room air          PHYSICAL EXAM:    General: Well developed; in no acute distress  HEENT: MMM, conjunctiva pink and sclera anicteric.  Lungs: clear to auscultation bilaterally.  Cor: RRR S1, S2 only.  Gastrointestinal: Abdomen: Soft non-tender non-distended; Normal bowel sounds; No hepatosplenomegaly.  Extremities: no cyanosis, clubbing or edema.  Skin: Warm and dry. No obvious rash  Neuro: Pt. a + o x 3.    LABS:  CBC Full  -  ( 28 Mar 2025 05:52 )  WBC Count : 13.05 K/uL  RBC Count : 5.15 M/uL  Hemoglobin : 14.3 g/dL  Hematocrit : 41.7 %  Platelet Count - Automated : 309 K/uL  Mean Cell Volume : 81.0 fl  Mean Cell Hemoglobin : 27.8 pg  Mean Cell Hemoglobin Concentration : 34.3 g/dL  Auto Neutrophil # : x  Auto Lymphocyte # : x  Auto Monocyte # : x  Auto Eosinophil # : x  Auto Basophil # : x  Auto Neutrophil % : x  Auto Lymphocyte % : x  Auto Monocyte % : x  Auto Eosinophil % : x  Auto Basophil % : x        136  |  99  |  16.2  ----------------------------<  141[H]  4.2   |  21.0[L]  |  0.61    Ca    8.8      28 Mar 2025 05:52    TPro  7.7  /  Alb  4.6  /  TBili  0.5  /  DBili  x   /  AST  25  /  ALT  38  /  AlkPhos  107      PT/INR - ( 28 Mar 2025 05:52 )   PT: 12.7 sec;   INR: 1.13 ratio         PTT - ( 28 Mar 2025 05:52 )  PTT:26.3 sec      Urinalysis Basic - ( 28 Mar 2025 05:52 )    Color: x / Appearance: x / SG: x / pH: x  Gluc: 141 mg/dL / Ketone: x  / Bili: x / Urobili: x   Blood: x / Protein: x / Nitrite: x   Leuk Esterase: x / RBC: x / WBC x   Sq Epi: x / Non Sq Epi: x / Bacteria: x                RADIOLOGY & ADDITIONAL STUDIES (The following images were personally reviewed):< from: EGD (25 @ 15:39) >  EGD Report        Date: 3/28/2025 3:39 PM        Patient Name: MIAH GIL        MRN: [id number]        Account Number:        [id number]        Gender: Male         (age): 1965 (59)        Instrument(s):        GIF [id number]        Attending/Fellow:        Judd Moe MD                Procedure Room #:        PROCEDURE ROOM 1                        ASA Class:        P2 - 3 4:08 PM Judd Moe MD        History of Present Illness:        History of esophagectomy with associated post surgical gastroparesis presenting    with intractable nausea and vomiting here for upper endoscopy possible dilation,    possible Botox injection.        Administered Medications:        As per Anesthesiology Record        Indications:        Gastroparesis: 536.3 - K31.84        Procedure:        The procedure, indications, preparation and potential complications were    explained to the patient, who indicated understanding and signed the    corresponding consent forms. MAC was administered by anesthesiologist.    Continuous pulse oximetry and blood pressure monitoring were used throughout the    procedure. Supplemental oxygen was used. Patient was placed in the left lateral    decubitus position. The endoscope was introduced through the mouth and advanced    under direct visualization until the second part of the duodenum was reached.    Patient tolerance to the procedure was good. The procedure was not difficult.    Blood loss was minimal.        Limitations/Complications:        There were no apparent limitations or complications        Findings:        Esophagus Additional esophagus findings - Evidence of prior esophagectomy,    mucosa appeared healthy and intact..        Stomach Additional stomach findings - There was diffuse gastric erythema and    retained contents (food/fluid) within the gastric body suggestive of    gastroparesis, there were significant changes in gastric anatomy as suspected    given surgical history, the mid stomach was notable for a significant upward    deflection possibly due to post surgical scaring / changes..        - The pylorus was patent with mild resistance to passage of the endoscope.. A    stepwise 15-18 balloon was introduced for dilation successfully to 18 with    improvement in patency of the pylorus. No visible mucosal tear noted.    Subsequently 100u of Botox was injected circumfrentially in the pylorus..        Duodenum Normal duodenum.        Impressions:        Normal duodenum.        Evidence of prior esophagectomy, mucosa appeared healthy and intact. .        There was diffuse gastric erythema and retained contents (food/fluid) within    the gastric body suggestive of gastroparesis, there were significant changes in    gastric anatomy as suspected given surgical history, the mid stomach was notable    for a significant upward deflection possibly due to post surgical scaring /    changes. .        The pylorus was patent with mild resistance to passage of the endoscope.    (Dilation and Botox injection).        Plan:        Return to floor for further management        Clear Liquid Diet        Advance diet tomorrow if tolerates and no longer nauseous        Continue mateo Moe MD        Version 1, Electronically signed on 3/28/2025 5:22:24 PM by Judd Moe MD    < end of copied text >

## 2025-03-31 NOTE — PROGRESS NOTE ADULT - NS ATTEND AMEND GEN_ALL_CORE FT
hx of esophageal cancer with gastric pull up - has poor motility  has had dilation of the pylorus  tolerating full liquids  will advance to sof tfood, now fat, low fiber  if tolerates D/C home  today with F/u with Dr mukherjee/tre     A- +Bs, soft, non tender

## 2025-03-31 NOTE — DISCHARGE NOTE PROVIDER - NSDCCPCAREPLAN_GEN_ALL_CORE_FT
PRINCIPAL DISCHARGE DIAGNOSIS  Diagnosis: Gastroparesis  Assessment and Plan of Treatment: Gastroparesis means your stomach empties slowly. Eat small, frequent meals, low in fat and fiber. Avoid lying down after eating. Follow up with your doctor as directed.     PRINCIPAL DISCHARGE DIAGNOSIS  Diagnosis: Gastroparesis  Assessment and Plan of Treatment: Gastroparesis means your stomach empties slowly. Eat small, frequent meals, low in fat and fiber. Avoid lying down after eating. Follow up with your doctor as directed.      SECONDARY DISCHARGE DIAGNOSES  Diagnosis: HTN (hypertension)  Assessment and Plan of Treatment: Follow a low sodium diet. Continue taking your blood pressure medications as prescribed. Follow up with your Primary Care Doctor to continue having your blood pressure checked on a continual basis.

## 2025-04-01 LAB
CULTURE RESULTS: SIGNIFICANT CHANGE UP
SPECIMEN SOURCE: SIGNIFICANT CHANGE UP

## 2025-04-24 ENCOUNTER — APPOINTMENT (OUTPATIENT)
Dept: GASTROENTEROLOGY | Facility: CLINIC | Age: 60
End: 2025-04-24
Payer: MEDICARE

## 2025-04-24 VITALS
OXYGEN SATURATION: 98 % | WEIGHT: 193 LBS | DIASTOLIC BLOOD PRESSURE: 88 MMHG | RESPIRATION RATE: 14 BRPM | HEIGHT: 72 IN | TEMPERATURE: 97.3 F | BODY MASS INDEX: 26.14 KG/M2 | HEART RATE: 70 BPM | SYSTOLIC BLOOD PRESSURE: 133 MMHG

## 2025-04-24 DIAGNOSIS — K31.84 GASTROPARESIS: ICD-10-CM

## 2025-04-24 DIAGNOSIS — Z98.890 OTHER SPECIFIED POSTPROCEDURAL STATES: ICD-10-CM

## 2025-04-24 DIAGNOSIS — K31.1 ADULT HYPERTROPHIC PYLORIC STENOSIS: ICD-10-CM

## 2025-04-24 PROCEDURE — 99213 OFFICE O/P EST LOW 20 MIN: CPT

## 2025-04-24 RX ORDER — ERYTHROMYCIN 250 MG/1
250 TABLET, DELAYED RELEASE ORAL
Refills: 0 | Status: ACTIVE | COMMUNITY

## 2025-06-07 ENCOUNTER — INPATIENT (INPATIENT)
Facility: HOSPITAL | Age: 60
LOS: 4 days | Discharge: HOME | DRG: 392 | End: 2025-06-12
Attending: HOSPITALIST | Admitting: STUDENT IN AN ORGANIZED HEALTH CARE EDUCATION/TRAINING PROGRAM
Payer: MEDICARE

## 2025-06-07 VITALS
SYSTOLIC BLOOD PRESSURE: 200 MMHG | WEIGHT: 183.2 LBS | HEART RATE: 73 BPM | TEMPERATURE: 99 F | DIASTOLIC BLOOD PRESSURE: 102 MMHG | HEIGHT: 65 IN | OXYGEN SATURATION: 100 %

## 2025-06-07 DIAGNOSIS — Z98.890 OTHER SPECIFIED POSTPROCEDURAL STATES: Chronic | ICD-10-CM

## 2025-06-07 DIAGNOSIS — Z90.49 ACQUIRED ABSENCE OF OTHER SPECIFIED PARTS OF DIGESTIVE TRACT: Chronic | ICD-10-CM

## 2025-06-07 LAB
ALBUMIN SERPL ELPH-MCNC: 5.1 G/DL — SIGNIFICANT CHANGE UP (ref 3.3–5.2)
ALP SERPL-CCNC: 114 U/L — SIGNIFICANT CHANGE UP (ref 40–120)
ALT FLD-CCNC: 25 U/L — SIGNIFICANT CHANGE UP
ANION GAP SERPL CALC-SCNC: 21 MMOL/L — HIGH (ref 5–17)
APTT BLD: 27.8 SEC — SIGNIFICANT CHANGE UP (ref 26.1–36.8)
AST SERPL-CCNC: 30 U/L — SIGNIFICANT CHANGE UP
BASOPHILS # BLD AUTO: 0.03 K/UL — SIGNIFICANT CHANGE UP (ref 0–0.2)
BASOPHILS NFR BLD AUTO: 0.3 % — SIGNIFICANT CHANGE UP (ref 0–2)
BILIRUB SERPL-MCNC: 0.4 MG/DL — SIGNIFICANT CHANGE UP (ref 0.4–2)
BLD GP AB SCN SERPL QL: SIGNIFICANT CHANGE UP
BUN SERPL-MCNC: 9.8 MG/DL — SIGNIFICANT CHANGE UP (ref 8–20)
CALCIUM SERPL-MCNC: 9.8 MG/DL — SIGNIFICANT CHANGE UP (ref 8.4–10.5)
CHLORIDE SERPL-SCNC: 98 MMOL/L — SIGNIFICANT CHANGE UP (ref 96–108)
CO2 SERPL-SCNC: 19 MMOL/L — LOW (ref 22–29)
CREAT SERPL-MCNC: 0.68 MG/DL — SIGNIFICANT CHANGE UP (ref 0.5–1.3)
EGFR: 108 ML/MIN/1.73M2 — SIGNIFICANT CHANGE UP
EGFR: 108 ML/MIN/1.73M2 — SIGNIFICANT CHANGE UP
EOSINOPHIL # BLD AUTO: 0 K/UL — SIGNIFICANT CHANGE UP (ref 0–0.5)
EOSINOPHIL NFR BLD AUTO: 0 % — SIGNIFICANT CHANGE UP (ref 0–6)
GLUCOSE SERPL-MCNC: 129 MG/DL — HIGH (ref 70–99)
HCT VFR BLD CALC: 44.1 % — SIGNIFICANT CHANGE UP (ref 39–50)
HGB BLD-MCNC: 15.1 G/DL — SIGNIFICANT CHANGE UP (ref 13–17)
IMM GRANULOCYTES # BLD AUTO: 0.04 K/UL — SIGNIFICANT CHANGE UP (ref 0–0.07)
IMM GRANULOCYTES NFR BLD AUTO: 0.4 % — SIGNIFICANT CHANGE UP (ref 0–0.9)
INR BLD: 1.02 RATIO — SIGNIFICANT CHANGE UP (ref 0.85–1.16)
LIDOCAIN IGE QN: 20 U/L — LOW (ref 22–51)
LYMPHOCYTES # BLD AUTO: 0.99 K/UL — LOW (ref 1–3.3)
LYMPHOCYTES NFR BLD AUTO: 9.1 % — LOW (ref 13–44)
MCHC RBC-ENTMCNC: 27.9 PG — SIGNIFICANT CHANGE UP (ref 27–34)
MCHC RBC-ENTMCNC: 34.2 G/DL — SIGNIFICANT CHANGE UP (ref 32–36)
MCV RBC AUTO: 81.4 FL — SIGNIFICANT CHANGE UP (ref 80–100)
MONOCYTES # BLD AUTO: 0.49 K/UL — SIGNIFICANT CHANGE UP (ref 0–0.9)
MONOCYTES NFR BLD AUTO: 4.5 % — SIGNIFICANT CHANGE UP (ref 2–14)
NEUTROPHILS # BLD AUTO: 9.31 K/UL — HIGH (ref 1.8–7.4)
NEUTROPHILS NFR BLD AUTO: 85.7 % — HIGH (ref 43–77)
NRBC # BLD AUTO: 0 K/UL — SIGNIFICANT CHANGE UP (ref 0–0)
NRBC # FLD: 0 K/UL — SIGNIFICANT CHANGE UP (ref 0–0)
NRBC BLD AUTO-RTO: 0 /100 WBCS — SIGNIFICANT CHANGE UP (ref 0–0)
PLATELET # BLD AUTO: 278 K/UL — SIGNIFICANT CHANGE UP (ref 150–400)
PMV BLD: 10.7 FL — SIGNIFICANT CHANGE UP (ref 7–13)
POTASSIUM SERPL-MCNC: 4.4 MMOL/L — SIGNIFICANT CHANGE UP (ref 3.5–5.3)
POTASSIUM SERPL-SCNC: 4.4 MMOL/L — SIGNIFICANT CHANGE UP (ref 3.5–5.3)
PROT SERPL-MCNC: 8.5 G/DL — SIGNIFICANT CHANGE UP (ref 6.6–8.7)
PROTHROM AB SERPL-ACNC: 11.8 SEC — SIGNIFICANT CHANGE UP (ref 9.9–13.4)
RBC # BLD: 5.42 M/UL — SIGNIFICANT CHANGE UP (ref 4.2–5.8)
RBC # FLD: 13.2 % — SIGNIFICANT CHANGE UP (ref 10.3–14.5)
SODIUM SERPL-SCNC: 138 MMOL/L — SIGNIFICANT CHANGE UP (ref 135–145)
WBC # BLD: 10.86 K/UL — HIGH (ref 3.8–10.5)
WBC # FLD AUTO: 10.86 K/UL — HIGH (ref 3.8–10.5)

## 2025-06-07 PROCEDURE — 99285 EMERGENCY DEPT VISIT HI MDM: CPT

## 2025-06-07 PROCEDURE — 71045 X-RAY EXAM CHEST 1 VIEW: CPT | Mod: 26

## 2025-06-07 RX ORDER — HYDROMORPHONE/SOD CHLOR,ISO/PF 2 MG/10 ML
1 SYRINGE (ML) INJECTION ONCE
Refills: 0 | Status: DISCONTINUED | OUTPATIENT
Start: 2025-06-07 | End: 2025-06-07

## 2025-06-07 RX ORDER — ONDANSETRON HCL/PF 4 MG/2 ML
4 VIAL (ML) INJECTION ONCE
Refills: 0 | Status: COMPLETED | OUTPATIENT
Start: 2025-06-07 | End: 2025-06-07

## 2025-06-07 RX ORDER — IOHEXOL 350 MG/ML
30 INJECTION, SOLUTION INTRAVENOUS ONCE
Refills: 0 | Status: COMPLETED | OUTPATIENT
Start: 2025-06-07 | End: 2025-06-07

## 2025-06-07 RX ADMIN — IOHEXOL 30 MILLILITER(S): 350 INJECTION, SOLUTION INTRAVENOUS at 20:25

## 2025-06-07 RX ADMIN — Medication 1 MILLIGRAM(S): at 20:24

## 2025-06-07 RX ADMIN — Medication 4 MILLIGRAM(S): at 20:23

## 2025-06-07 RX ADMIN — Medication 1 MILLIGRAM(S): at 21:15

## 2025-06-07 NOTE — ED ADULT TRIAGE NOTE - BMI (KG/M2)
09/09/24                            Zaynab Andrae  [address] [place] [id number]    To Whom It May Concern:    This is to certify Zaynab Abebe was evaluated with Sheila Mejia DO on 09/09/24 and can return to regular work once symptoms have improved.                       Sheila Mejia DO  [facility] [place] [facility]  [address] [address]  Dept Phone: [phone]      
30.5

## 2025-06-07 NOTE — ED ADULT NURSE NOTE - OBJECTIVE STATEMENT
pt. is aaox4. states he has severe central upper abd pain. c/o nausea. iv access obtained, meds administered. safety maintained.

## 2025-06-07 NOTE — ED PROVIDER NOTE - OBJECTIVE STATEMENT
59 yom w/ hx esophageal carcinoma s/p esophagectomy with gastric pull through (2016), gastroparesis, HTN presents for abdominal pain, nausea/vomiting since yesterday. States he previously had multiple pyloric Botox injections and balloon dilations with Dr. Bustillos of GI, most recently in march. denies fever, cp, sob, back pain, lower abd pain, dysuria, diarrhea.

## 2025-06-07 NOTE — ED PROVIDER NOTE - PHYSICAL EXAMINATION
Vital Signs per nursing documentation  Gen: well appearing, no acute distress  HEENT: NCAT, MMM  Cardiac: regular rate rhythm, normal S1S2  Chest: clear to auscultation bilateral, no wheezes or crackles  Abdomen: soft, tender in epigastrum   Extremity: no gross deformity, good perfusion  Skin: no rash  Neuro: nonfocal neuro exam, gait steady

## 2025-06-07 NOTE — ED PROVIDER NOTE - NSCAREINITIATED _GEN_ER
Patient: Christiano Masters    Procedure Summary       Date: 05/22/25 Room / Location: [facility] [id number] EP PRE/POST BAY 1 / Virtual [facility] MAT 3529 Cardiac Cath Lab    Anesthesia Start: 0940 Anesthesia Stop: 0956    Procedure: Cardioversion Diagnosis:       Paroxysmal atrial fibrillation (Multi)      (PAF)    Providers: Mitch Vazquez MD Responsible Provider: Darion Perez MD    Anesthesia Type: MAC ASA Status: Not recorded            Anesthesia Type: MAC    Vitals Value Taken Time   BP 59 05/22/25 09:56   Temp 36.2 05/22/25 09:56   Pulse 60 05/22/25 09:56   Resp 16 05/22/25 09:56   SpO2 99% 05/22/25 09:56       Anesthesia Post Evaluation    Patient location during evaluation: bedside  Patient participation: complete - patient participated  Level of consciousness: awake and alert  Pain score: 0  Pain management: adequate  Airway patency: patent  Cardiovascular status: acceptable and hemodynamically stable  Respiratory status: acceptable and room air  Hydration status: acceptable  Postoperative Nausea and Vomiting: none  Comments: Patient is awake, stable, pain free, alert and oriented in time, person and place.        There were no known notable events for this encounter.    
Leah Jennings(Attending)

## 2025-06-07 NOTE — ED PROVIDER NOTE - PROGRESS NOTE DETAILS
Chad: received sign out from Dr. Alejandre. Patient still having pain and not tolerating po. Gasto consult. Admit for intractable pain. CT negative.

## 2025-06-07 NOTE — ED ADULT TRIAGE NOTE - CHIEF COMPLAINT QUOTE
c/o generalized abd pain, nausea, vomiting since yesterday. pt reports being diagnosed with gastroparesis in march. pmh of esophogeal cancer 5 days

## 2025-06-07 NOTE — ED PROVIDER NOTE - CLINICAL SUMMARY MEDICAL DECISION MAKING FREE TEXT BOX
59 yom w/ hx esophageal carcinoma s/p esophagectomy with gastric pull through (2016), gastroparesis, HTN presents for abdominal pain, nausea/vomiting since yesterday. States he previously had multiple pyloric Botox injections and balloon dilations with Dr. Bustillos of GI, most recently in march. denies fever, cp, sob, back pain, lower abd pain, dysuria, diarrhea.   AP - history of similar symptoms. will need pain control. check CT and likely will need GI Eval 59 yom w/ hx esophageal carcinoma s/p esophagectomy with gastric pull through (2016), gastroparesis, HTN presents for abdominal pain, nausea/vomiting since yesterday. States he previously had multiple pyloric Botox injections and balloon dilations with Dr. Bustillos of GI, most recently in march. denies fever, cp, sob, back pain, lower abd pain, dysuria, diarrhea.   AP - history of similar symptoms. will need pain control. check CT and likely will need GI Eval. Patient still having pain and not tolerating po. Gasto consult. Admit for intractable pain.

## 2025-06-08 DIAGNOSIS — R10.9 UNSPECIFIED ABDOMINAL PAIN: ICD-10-CM

## 2025-06-08 LAB
ALBUMIN SERPL ELPH-MCNC: 4.8 G/DL — SIGNIFICANT CHANGE UP (ref 3.3–5.2)
ALP SERPL-CCNC: 104 U/L — SIGNIFICANT CHANGE UP (ref 40–120)
ALT FLD-CCNC: 27 U/L — SIGNIFICANT CHANGE UP
ANION GAP SERPL CALC-SCNC: 15 MMOL/L — SIGNIFICANT CHANGE UP (ref 5–17)
AST SERPL-CCNC: 32 U/L — SIGNIFICANT CHANGE UP
BASOPHILS # BLD AUTO: 0.02 K/UL — SIGNIFICANT CHANGE UP (ref 0–0.2)
BASOPHILS NFR BLD AUTO: 0.2 % — SIGNIFICANT CHANGE UP (ref 0–2)
BILIRUB SERPL-MCNC: 0.5 MG/DL — SIGNIFICANT CHANGE UP (ref 0.4–2)
BUN SERPL-MCNC: 13.6 MG/DL — SIGNIFICANT CHANGE UP (ref 8–20)
CALCIUM SERPL-MCNC: 9.4 MG/DL — SIGNIFICANT CHANGE UP (ref 8.4–10.5)
CHLORIDE SERPL-SCNC: 97 MMOL/L — SIGNIFICANT CHANGE UP (ref 96–108)
CO2 SERPL-SCNC: 19 MMOL/L — LOW (ref 22–29)
CREAT SERPL-MCNC: 0.57 MG/DL — SIGNIFICANT CHANGE UP (ref 0.5–1.3)
EGFR: 114 ML/MIN/1.73M2 — SIGNIFICANT CHANGE UP
EGFR: 114 ML/MIN/1.73M2 — SIGNIFICANT CHANGE UP
EOSINOPHIL # BLD AUTO: 0 K/UL — SIGNIFICANT CHANGE UP (ref 0–0.5)
EOSINOPHIL NFR BLD AUTO: 0 % — SIGNIFICANT CHANGE UP (ref 0–6)
GLUCOSE SERPL-MCNC: 145 MG/DL — HIGH (ref 70–99)
HCT VFR BLD CALC: 42.9 % — SIGNIFICANT CHANGE UP (ref 39–50)
HGB BLD-MCNC: 14.8 G/DL — SIGNIFICANT CHANGE UP (ref 13–17)
IMM GRANULOCYTES # BLD AUTO: 0.05 K/UL — SIGNIFICANT CHANGE UP (ref 0–0.07)
IMM GRANULOCYTES NFR BLD AUTO: 0.4 % — SIGNIFICANT CHANGE UP (ref 0–0.9)
LYMPHOCYTES # BLD AUTO: 0.88 K/UL — LOW (ref 1–3.3)
LYMPHOCYTES NFR BLD AUTO: 7.6 % — LOW (ref 13–44)
MAGNESIUM SERPL-MCNC: 2 MG/DL — SIGNIFICANT CHANGE UP (ref 1.6–2.6)
MCHC RBC-ENTMCNC: 28.1 PG — SIGNIFICANT CHANGE UP (ref 27–34)
MCHC RBC-ENTMCNC: 34.5 G/DL — SIGNIFICANT CHANGE UP (ref 32–36)
MCV RBC AUTO: 81.4 FL — SIGNIFICANT CHANGE UP (ref 80–100)
MONOCYTES # BLD AUTO: 0.39 K/UL — SIGNIFICANT CHANGE UP (ref 0–0.9)
MONOCYTES NFR BLD AUTO: 3.4 % — SIGNIFICANT CHANGE UP (ref 2–14)
NEUTROPHILS # BLD AUTO: 10.25 K/UL — HIGH (ref 1.8–7.4)
NEUTROPHILS NFR BLD AUTO: 88.4 % — HIGH (ref 43–77)
NRBC # BLD AUTO: 0 K/UL — SIGNIFICANT CHANGE UP (ref 0–0)
NRBC # FLD: 0 K/UL — SIGNIFICANT CHANGE UP (ref 0–0)
NRBC BLD AUTO-RTO: 0 /100 WBCS — SIGNIFICANT CHANGE UP (ref 0–0)
PHOSPHATE SERPL-MCNC: 2.8 MG/DL — SIGNIFICANT CHANGE UP (ref 2.4–4.7)
PLATELET # BLD AUTO: 302 K/UL — SIGNIFICANT CHANGE UP (ref 150–400)
PMV BLD: 10.3 FL — SIGNIFICANT CHANGE UP (ref 7–13)
POTASSIUM SERPL-MCNC: 3.9 MMOL/L — SIGNIFICANT CHANGE UP (ref 3.5–5.3)
POTASSIUM SERPL-SCNC: 3.9 MMOL/L — SIGNIFICANT CHANGE UP (ref 3.5–5.3)
PROT SERPL-MCNC: 8.3 G/DL — SIGNIFICANT CHANGE UP (ref 6.6–8.7)
RBC # BLD: 5.27 M/UL — SIGNIFICANT CHANGE UP (ref 4.2–5.8)
RBC # FLD: 13.3 % — SIGNIFICANT CHANGE UP (ref 10.3–14.5)
SODIUM SERPL-SCNC: 131 MMOL/L — LOW (ref 135–145)
WBC # BLD: 11.59 K/UL — HIGH (ref 3.8–10.5)
WBC # FLD AUTO: 11.59 K/UL — HIGH (ref 3.8–10.5)

## 2025-06-08 PROCEDURE — 99223 1ST HOSP IP/OBS HIGH 75: CPT

## 2025-06-08 PROCEDURE — 93010 ELECTROCARDIOGRAM REPORT: CPT

## 2025-06-08 PROCEDURE — 74177 CT ABD & PELVIS W/CONTRAST: CPT | Mod: 26

## 2025-06-08 RX ORDER — HYDROMORPHONE/SOD CHLOR,ISO/PF 2 MG/10 ML
1 SYRINGE (ML) INJECTION ONCE
Refills: 0 | Status: DISCONTINUED | OUTPATIENT
Start: 2025-06-08 | End: 2025-06-08

## 2025-06-08 RX ORDER — HYDROMORPHONE/SOD CHLOR,ISO/PF 2 MG/10 ML
1 SYRINGE (ML) INJECTION
Refills: 0 | Status: DISCONTINUED | OUTPATIENT
Start: 2025-06-08 | End: 2025-06-12

## 2025-06-08 RX ORDER — ONDANSETRON HCL/PF 4 MG/2 ML
4 VIAL (ML) INJECTION ONCE
Refills: 0 | Status: COMPLETED | OUTPATIENT
Start: 2025-06-08 | End: 2025-06-08

## 2025-06-08 RX ORDER — ONDANSETRON HCL/PF 4 MG/2 ML
4 VIAL (ML) INJECTION EVERY 8 HOURS
Refills: 0 | Status: DISCONTINUED | OUTPATIENT
Start: 2025-06-08 | End: 2025-06-12

## 2025-06-08 RX ORDER — SODIUM CHLORIDE 9 G/1000ML
1000 INJECTION, SOLUTION INTRAVENOUS
Refills: 0 | Status: DISCONTINUED | OUTPATIENT
Start: 2025-06-08 | End: 2025-06-09

## 2025-06-08 RX ORDER — METOCLOPRAMIDE HCL 10 MG
10 TABLET ORAL EVERY 6 HOURS
Refills: 0 | Status: COMPLETED | OUTPATIENT
Start: 2025-06-08 | End: 2025-06-10

## 2025-06-08 RX ORDER — HYDROMORPHONE/SOD CHLOR,ISO/PF 2 MG/10 ML
2 SYRINGE (ML) INJECTION
Refills: 0 | Status: DISCONTINUED | OUTPATIENT
Start: 2025-06-08 | End: 2025-06-08

## 2025-06-08 RX ORDER — KETOROLAC TROMETHAMINE 30 MG/ML
30 INJECTION, SOLUTION INTRAMUSCULAR; INTRAVENOUS EVERY 6 HOURS
Refills: 0 | Status: DISCONTINUED | OUTPATIENT
Start: 2025-06-08 | End: 2025-06-10

## 2025-06-08 RX ORDER — MAGNESIUM, ALUMINUM HYDROXIDE 200-200 MG
30 TABLET,CHEWABLE ORAL EVERY 4 HOURS
Refills: 0 | Status: DISCONTINUED | OUTPATIENT
Start: 2025-06-08 | End: 2025-06-12

## 2025-06-08 RX ORDER — KETOROLAC TROMETHAMINE 30 MG/ML
30 INJECTION, SOLUTION INTRAMUSCULAR; INTRAVENOUS ONCE
Refills: 0 | Status: DISCONTINUED | OUTPATIENT
Start: 2025-06-08 | End: 2025-06-08

## 2025-06-08 RX ORDER — NALOXONE HYDROCHLORIDE 0.4 MG/ML
0.4 INJECTION, SOLUTION INTRAMUSCULAR; INTRAVENOUS; SUBCUTANEOUS
Refills: 0 | Status: DISCONTINUED | OUTPATIENT
Start: 2025-06-08 | End: 2025-06-12

## 2025-06-08 RX ORDER — HYDROMORPHONE/SOD CHLOR,ISO/PF 2 MG/10 ML
1 SYRINGE (ML) INJECTION
Refills: 0 | Status: DISCONTINUED | OUTPATIENT
Start: 2025-06-08 | End: 2025-06-08

## 2025-06-08 RX ADMIN — Medication 1 MILLIGRAM(S): at 00:57

## 2025-06-08 RX ADMIN — Medication 1 MILLIGRAM(S): at 13:00

## 2025-06-08 RX ADMIN — Medication 1 MILLIGRAM(S): at 00:56

## 2025-06-08 RX ADMIN — Medication 1 MILLIGRAM(S): at 06:09

## 2025-06-08 RX ADMIN — Medication 1 MILLIGRAM(S): at 05:43

## 2025-06-08 RX ADMIN — Medication 4 MILLIGRAM(S): at 00:56

## 2025-06-08 RX ADMIN — Medication 1 MILLIGRAM(S): at 23:28

## 2025-06-08 RX ADMIN — Medication 1 MILLIGRAM(S): at 12:28

## 2025-06-08 RX ADMIN — Medication 10 MILLIGRAM(S): at 23:28

## 2025-06-08 RX ADMIN — Medication 10 MILLIGRAM(S): at 12:28

## 2025-06-08 RX ADMIN — Medication 1 MILLIGRAM(S): at 21:24

## 2025-06-08 RX ADMIN — Medication 4 MILLIGRAM(S): at 06:09

## 2025-06-08 RX ADMIN — KETOROLAC TROMETHAMINE 30 MILLIGRAM(S): 30 INJECTION, SOLUTION INTRAMUSCULAR; INTRAVENOUS at 09:07

## 2025-06-08 RX ADMIN — Medication 1 MILLIGRAM(S): at 17:21

## 2025-06-08 RX ADMIN — Medication 1 MILLIGRAM(S): at 03:57

## 2025-06-08 RX ADMIN — Medication 10 MILLIGRAM(S): at 17:21

## 2025-06-08 RX ADMIN — Medication 1 MILLIGRAM(S): at 18:00

## 2025-06-08 RX ADMIN — Medication 1 MILLIGRAM(S): at 09:09

## 2025-06-08 RX ADMIN — Medication 1 MILLIGRAM(S): at 06:08

## 2025-06-08 RX ADMIN — Medication 40 MILLIGRAM(S): at 17:21

## 2025-06-08 RX ADMIN — SODIUM CHLORIDE 75 MILLILITER(S): 9 INJECTION, SOLUTION INTRAVENOUS at 09:07

## 2025-06-08 RX ADMIN — Medication 1 MILLIGRAM(S): at 20:24

## 2025-06-08 NOTE — H&P ADULT - ASSESSMENT
60 y/o M w/ PMH of esophageal carcinoma s/p esophagectomy with gastric pull through (2016), gastroparesis, s/p Pre Pyloric Botox Injection and Balloon Dilation, cholecystectomy w/ mx recent admissions for same presentation 3/30/25-3/31/25 and underwent EGD on 3/28/25 with balloon dilation and Botox injection to the pylorus presents for epigastric abd pain and intractable neasuea and NBNB emesis w/ inability to tolerate PO.  VS on arrival notable for SBP>200 and sinus tachy but was reported to be wretching and in pain.  BP and HR now improved s/p pain control.   Clinically appears in moderate distress and dehydrated appearing w/ + epigastric pain.  Initial w/u significant for WBC 10.86, serum HCO3 19, AG 21, lipase nL.  CT a/p w/ no evidence of bowel obstruction, active inflammatory process, or intra-abdominal source for infection. s/p 1mg IV dilaudid x 5 doses, zofran x3 and 1L NS in ED.  Will admit for intractable abd pain, N/V.      Intractable abd pain/nausea/vomiting 2/2 gastroparesis  AGMA 2/2 starvation ketosis   SIRS 2/2 abd pain/N/V  - Hx esophageal cancer s/p esophagectomy   - Multiple EGDs, w/ most recent one 3/28/25 w/ balloon dilation and Botox injection to the pylorus  - CT abd showed no acute findings in the abdomen or pelvis, partial esophagectomy and gastric pull-through  - Sinus tachy from pain and hypovolemia and mild leukocytosis likely reactive 2/2 N/V   - AGMA likely 2/2 starvation ketosis   - Clinically hypovolemic therefore will place on IV LR   - c/w Reglan and zosyn prn   - c/w PPI IV BID   - Short course of prn IV dilaudid for today given sever pain but will try to avoid opioid   - PRN IV toradol   - EKG ordered to assess QTc  - NPO for now pending GI eval   - GI consulted       HTN urgency, resolved  - Likely 2/2 pain and improved w/ pain control   - c/w amlodipine once cleared for PO         VTE ppx: lovenox unless pt going for repeat EGD then will defer     Dispo: home in 1-3 days pending symptom improvement and pt being able to tolerate diet.

## 2025-06-08 NOTE — H&P ADULT - NSHPPHYSICALEXAM_GEN_ALL_CORE
GENERAL: pt examined bedside, laying comfortably in bed in NAD  HEENT: NC/AT, moist oral mucosa, clear conjunctiva, sclera nonicteric  RESPIRATORY: Normal respiratory effort; CTA b/l, no wheezing, rhonchi, rales  CARDIOVASCULAR: RRR, normal S1 and S2, no murmur/rub/gallop  ABDOMEN: soft, NT/ND, normoactive bowel sounds, no rebound/guarding  MSK: No joint deformities, edema, erythema  EXTREMITIES: No cynaosis, no clubbing, no lower extremity edema  PSYCH: affect appropriate and cooperative  NEUROLOGY: A+O to person, place, and time, no focal neurologic deficits appreciated  SKIN: No rashes or no palpable lesions GENERAL: pt examined bedside, appears uncomfortable in moderate distress  HEENT: NC/AT, very dry oral mucosa, clear conjunctiva, sclera nonicteric  RESPIRATORY: Normal respiratory effort, no wheezing, rhonchi, rales  CARDIOVASCULAR: RRR, normal S1 and S2  ABDOMEN: soft, nondistended, but +epigastric tenderness to palpation, +BS, no rebound/guarding  MSK: No joint deformities, edema, erythema  EXTREMITIES: No cynaosis, no clubbing, no lower extremity edema  PSYCH: affect appropriate and cooperative  NEUROLOGY: A+O to person, place, and time, no focal neurologic deficits appreciated  SKIN: poor turgor

## 2025-06-08 NOTE — H&P ADULT - HISTORY OF PRESENT ILLNESS
58 y/o M w/ PMH of esophageal carcinoma s/p esophagectomy with gastric pull through (2016), gastroparesis, s/p Pre Pyloric Botox Injection and Balloon Dilation, cholecystectomy w/ mx recent admissions for same presentation 3/30/25-3/31/25 and underwent EGD on 3/28/25 with balloon dilation and Botox injection to the pylorus presents for epigastric abd pain and intractable neasuea and NBNB emesis w/ inability to tolerate PO x 2 days.  Pt is able to swallow his saliva but can not tolerate solids or liquids bc he vomits it up immediately after ingesting.  Pts abd pain does not radiate but is 10/10.  He denies fevers, chills, sick contacts, diarrhea, cp, palpitations, sob, cough.

## 2025-06-08 NOTE — ED ADULT NURSE REASSESSMENT NOTE - NS ED NURSE REASSESS COMMENT FT1
Patient a&ox4, no acute distress, resp nonlabored, resting comfortably in bed.  C/o abdominal pain.  Denies headache, dizziness, chest pain, palpitations, SOB  at this time. Pt remains on continuos cardiac monitoring, HR 89,  97% RA. Safety maintained. Patient a&ox4, no acute distress, resp nonlabored, resting comfortably in bed.  C/o abdominal pain, MD Wise made aware and awaiting further orders.  Denies headache, dizziness, chest pain, palpitations, SOB  at this time. Pt remains on continuos cardiac monitoring, HR 89,  97% RA. Safety maintained.

## 2025-06-08 NOTE — H&P ADULT - NSHPLABSRESULTS_GEN_ALL_CORE
15.1   10.86 )-----------( 278      ( 07 Jun 2025 20:20 )             44.1         06-07    138  |  98  |  9.8  ----------------------------<  129[H]  4.4   |  19.0[L]  |  0.68    Ca    9.8      07 Jun 2025 20:20    TPro  8.5  /  Alb  5.1  /  TBili  0.4  /  DBili  x   /  AST  30  /  ALT  25  /  AlkPhos  114  06-07      < from: CT Abdomen and Pelvis w/ Oral Cont and w/ IV Cont (06.08.25 @ 05:52) >    IMPRESSION:  No evidence of bowel obstruction, active inflammatory process, or   intra-abdominal source for infection.    Partially imaged esophagectomy and gastric pull-through is grossly   unchanged in appearance dating back to 05/18/2020.    < end of copied text >

## 2025-06-09 DIAGNOSIS — E87.20 ACIDOSIS, UNSPECIFIED: ICD-10-CM

## 2025-06-09 DIAGNOSIS — R11.2 NAUSEA WITH VOMITING, UNSPECIFIED: ICD-10-CM

## 2025-06-09 DIAGNOSIS — D72.829 ELEVATED WHITE BLOOD CELL COUNT, UNSPECIFIED: ICD-10-CM

## 2025-06-09 DIAGNOSIS — Z86.79 PERSONAL HISTORY OF OTHER DISEASES OF THE CIRCULATORY SYSTEM: ICD-10-CM

## 2025-06-09 DIAGNOSIS — K31.84 GASTROPARESIS: ICD-10-CM

## 2025-06-09 DIAGNOSIS — Z98.890 OTHER SPECIFIED POSTPROCEDURAL STATES: ICD-10-CM

## 2025-06-09 LAB
ALBUMIN SERPL ELPH-MCNC: 4.3 G/DL — SIGNIFICANT CHANGE UP (ref 3.3–5.2)
ALP SERPL-CCNC: 87 U/L — SIGNIFICANT CHANGE UP (ref 40–120)
ALT FLD-CCNC: 24 U/L — SIGNIFICANT CHANGE UP
ANION GAP SERPL CALC-SCNC: 14 MMOL/L — SIGNIFICANT CHANGE UP (ref 5–17)
AST SERPL-CCNC: 28 U/L — SIGNIFICANT CHANGE UP
BASOPHILS # BLD AUTO: 0.04 K/UL — SIGNIFICANT CHANGE UP (ref 0–0.2)
BASOPHILS NFR BLD AUTO: 0.4 % — SIGNIFICANT CHANGE UP (ref 0–2)
BILIRUB SERPL-MCNC: 0.5 MG/DL — SIGNIFICANT CHANGE UP (ref 0.4–2)
BUN SERPL-MCNC: 20.5 MG/DL — HIGH (ref 8–20)
CALCIUM SERPL-MCNC: 8.7 MG/DL — SIGNIFICANT CHANGE UP (ref 8.4–10.5)
CHLORIDE SERPL-SCNC: 98 MMOL/L — SIGNIFICANT CHANGE UP (ref 96–108)
CO2 SERPL-SCNC: 23 MMOL/L — SIGNIFICANT CHANGE UP (ref 22–29)
CREAT SERPL-MCNC: 0.67 MG/DL — SIGNIFICANT CHANGE UP (ref 0.5–1.3)
EGFR: 109 ML/MIN/1.73M2 — SIGNIFICANT CHANGE UP
EGFR: 109 ML/MIN/1.73M2 — SIGNIFICANT CHANGE UP
EOSINOPHIL # BLD AUTO: 0.02 K/UL — SIGNIFICANT CHANGE UP (ref 0–0.5)
EOSINOPHIL NFR BLD AUTO: 0.2 % — SIGNIFICANT CHANGE UP (ref 0–6)
GLUCOSE SERPL-MCNC: 109 MG/DL — HIGH (ref 70–99)
HCT VFR BLD CALC: 41 % — SIGNIFICANT CHANGE UP (ref 39–50)
HGB BLD-MCNC: 13.8 G/DL — SIGNIFICANT CHANGE UP (ref 13–17)
IMM GRANULOCYTES # BLD AUTO: 0.03 K/UL — SIGNIFICANT CHANGE UP (ref 0–0.07)
IMM GRANULOCYTES NFR BLD AUTO: 0.3 % — SIGNIFICANT CHANGE UP (ref 0–0.9)
LYMPHOCYTES # BLD AUTO: 2.09 K/UL — SIGNIFICANT CHANGE UP (ref 1–3.3)
LYMPHOCYTES NFR BLD AUTO: 20.8 % — SIGNIFICANT CHANGE UP (ref 13–44)
MAGNESIUM SERPL-MCNC: 2.1 MG/DL — SIGNIFICANT CHANGE UP (ref 1.6–2.6)
MCHC RBC-ENTMCNC: 27.8 PG — SIGNIFICANT CHANGE UP (ref 27–34)
MCHC RBC-ENTMCNC: 33.7 G/DL — SIGNIFICANT CHANGE UP (ref 32–36)
MCV RBC AUTO: 82.7 FL — SIGNIFICANT CHANGE UP (ref 80–100)
MONOCYTES # BLD AUTO: 0.74 K/UL — SIGNIFICANT CHANGE UP (ref 0–0.9)
MONOCYTES NFR BLD AUTO: 7.4 % — SIGNIFICANT CHANGE UP (ref 2–14)
NEUTROPHILS # BLD AUTO: 7.13 K/UL — SIGNIFICANT CHANGE UP (ref 1.8–7.4)
NEUTROPHILS NFR BLD AUTO: 70.9 % — SIGNIFICANT CHANGE UP (ref 43–77)
NRBC # BLD AUTO: 0 K/UL — SIGNIFICANT CHANGE UP (ref 0–0)
NRBC # FLD: 0 K/UL — SIGNIFICANT CHANGE UP (ref 0–0)
NRBC BLD AUTO-RTO: 0 /100 WBCS — SIGNIFICANT CHANGE UP (ref 0–0)
PHOSPHATE SERPL-MCNC: 2.4 MG/DL — SIGNIFICANT CHANGE UP (ref 2.4–4.7)
PLATELET # BLD AUTO: 279 K/UL — SIGNIFICANT CHANGE UP (ref 150–400)
PMV BLD: 10.8 FL — SIGNIFICANT CHANGE UP (ref 7–13)
POTASSIUM SERPL-MCNC: 4 MMOL/L — SIGNIFICANT CHANGE UP (ref 3.5–5.3)
POTASSIUM SERPL-SCNC: 4 MMOL/L — SIGNIFICANT CHANGE UP (ref 3.5–5.3)
PROT SERPL-MCNC: 7.1 G/DL — SIGNIFICANT CHANGE UP (ref 6.6–8.7)
RBC # BLD: 4.96 M/UL — SIGNIFICANT CHANGE UP (ref 4.2–5.8)
RBC # FLD: 13.6 % — SIGNIFICANT CHANGE UP (ref 10.3–14.5)
SODIUM SERPL-SCNC: 135 MMOL/L — SIGNIFICANT CHANGE UP (ref 135–145)
WBC # BLD: 10.05 K/UL — SIGNIFICANT CHANGE UP (ref 3.8–10.5)
WBC # FLD AUTO: 10.05 K/UL — SIGNIFICANT CHANGE UP (ref 3.8–10.5)

## 2025-06-09 PROCEDURE — 99223 1ST HOSP IP/OBS HIGH 75: CPT

## 2025-06-09 PROCEDURE — 99232 SBSQ HOSP IP/OBS MODERATE 35: CPT

## 2025-06-09 RX ORDER — SODIUM CHLORIDE 9 G/1000ML
1000 INJECTION, SOLUTION INTRAVENOUS
Refills: 0 | Status: DISCONTINUED | OUTPATIENT
Start: 2025-06-09 | End: 2025-06-12

## 2025-06-09 RX ORDER — ONDANSETRON HCL/PF 4 MG/2 ML
4 VIAL (ML) INJECTION ONCE
Refills: 0 | Status: COMPLETED | OUTPATIENT
Start: 2025-06-09 | End: 2025-06-09

## 2025-06-09 RX ADMIN — Medication 1 MILLIGRAM(S): at 19:37

## 2025-06-09 RX ADMIN — Medication 10 MILLIGRAM(S): at 04:59

## 2025-06-09 RX ADMIN — Medication 1 MILLIGRAM(S): at 03:22

## 2025-06-09 RX ADMIN — Medication 40 MILLIGRAM(S): at 04:59

## 2025-06-09 RX ADMIN — Medication 1 MILLIGRAM(S): at 23:34

## 2025-06-09 RX ADMIN — KETOROLAC TROMETHAMINE 30 MILLIGRAM(S): 30 INJECTION, SOLUTION INTRAMUSCULAR; INTRAVENOUS at 05:37

## 2025-06-09 RX ADMIN — Medication 10 MILLIGRAM(S): at 16:49

## 2025-06-09 RX ADMIN — Medication 4 MILLIGRAM(S): at 22:34

## 2025-06-09 RX ADMIN — Medication 1 MILLIGRAM(S): at 15:29

## 2025-06-09 RX ADMIN — Medication 1 MILLIGRAM(S): at 06:48

## 2025-06-09 RX ADMIN — KETOROLAC TROMETHAMINE 30 MILLIGRAM(S): 30 INJECTION, SOLUTION INTRAMUSCULAR; INTRAVENOUS at 17:26

## 2025-06-09 RX ADMIN — Medication 1 MILLIGRAM(S): at 04:22

## 2025-06-09 RX ADMIN — KETOROLAC TROMETHAMINE 30 MILLIGRAM(S): 30 INJECTION, SOLUTION INTRAMUSCULAR; INTRAVENOUS at 17:56

## 2025-06-09 RX ADMIN — Medication 4 MILLIGRAM(S): at 07:24

## 2025-06-09 RX ADMIN — KETOROLAC TROMETHAMINE 30 MILLIGRAM(S): 30 INJECTION, SOLUTION INTRAMUSCULAR; INTRAVENOUS at 04:37

## 2025-06-09 RX ADMIN — Medication 4 MILLIGRAM(S): at 15:29

## 2025-06-09 RX ADMIN — Medication 1 MILLIGRAM(S): at 15:59

## 2025-06-09 RX ADMIN — Medication 1 MILLIGRAM(S): at 00:28

## 2025-06-09 RX ADMIN — Medication 1 MILLIGRAM(S): at 19:07

## 2025-06-09 RX ADMIN — Medication 1 MILLIGRAM(S): at 22:34

## 2025-06-09 RX ADMIN — Medication 10 MILLIGRAM(S): at 10:57

## 2025-06-09 RX ADMIN — Medication 40 MILLIGRAM(S): at 16:49

## 2025-06-09 NOTE — CONSULT NOTE ADULT - SUBJECTIVE AND OBJECTIVE BOX
HPI:  60 y/o M w/ PMH of esophageal carcinoma s/p esophagectomy with gastric pull through (2016), gastroparesis, s/p Pre Pyloric Botox Injection and Balloon Dilation, cholecystectomy w/ mx recent admissions for same presentation 3/30/25-3/31/25 and underwent EGD on 3/28/25 with balloon dilation and Botox injection to the pylorus presents for epigastric abd pain and intractable neasuea and NBNB emesis w/ inability to tolerate PO x 2 days.  Pt is able to swallow his saliva but can not tolerate solids or liquids bc he vomits it up immediately after ingesting.  Pts abd pain does not radiate but is 10/10.  He denies fevers, chills, sick contacts, diarrhea, cp, palpitations, sob, cough.     Patient has been evaluated by Dr. Cm for possible G POEM.  As per the patient he has not responded well for the last EGD with pyloric channel dilation and Botox injection.  He is having abdominal pain as well.  PAST MEDICAL & SURGICAL HISTORY:  Hypertension      Esophageal cancer      Gastroparesis      S/P cholecystectomy      H/O esophagectomy          ROS:  No Heartburn, regurgitation, dysphagia, odynophagia.  No dyspepsia  + abdominal pain.    + Nausea, vomiting.  No Bleeding.  No hematemesis.   No diarrhea.    No hematochesia.  No weight loss, anorexia.  No edema.      MEDICATIONS  (STANDING):  dextrose 5% + sodium chloride 0.45%. 1000 milliLiter(s) (75 mL/Hr) IV Continuous <Continuous>  metoclopramide Injectable 10 milliGRAM(s) IV Push every 6 hours  pantoprazole  Injectable 40 milliGRAM(s) IV Push every 12 hours    MEDICATIONS  (PRN):  aluminum hydroxide/magnesium hydroxide/simethicone Suspension 30 milliLiter(s) Oral every 4 hours PRN Dyspepsia  HYDROmorphone  Injectable 1 milliGRAM(s) IV Push every 3 hours PRN Severe Pain (7 - 10)  ketorolac   Injectable 30 milliGRAM(s) IV Push every 6 hours PRN Moderate Pain (4 - 6)  naloxone Injectable 0.4 milliGRAM(s) IV Push every 3 minutes PRN opioid oversedation  ondansetron Injectable 4 milliGRAM(s) IV Push every 8 hours PRN Nausea and/or Vomiting      Allergies    No Known Allergies    Intolerances        SOCIAL HISTORY:    ENDOSCOPIC/GI HISTORY:    FAMILY HISTORY:  FH: HTN (hypertension) (Father)        Vital Signs Last 24 Hrs  T(C): 36.9 (09 Jun 2025 19:28), Max: 37 (08 Jun 2025 23:46)  T(F): 98.4 (09 Jun 2025 19:28), Max: 98.6 (08 Jun 2025 23:46)  HR: 96 (09 Jun 2025 19:28) (80 - 96)  BP: 176/94 (09 Jun 2025 19:28) (120/84 - 176/94)  BP(mean): 122 (09 Jun 2025 15:40) (111 - 122)  RR: 18 (09 Jun 2025 19:28) (18 - 18)  SpO2: 97% (09 Jun 2025 19:28) (94% - 99%)    Parameters below as of 09 Jun 2025 19:28  Patient On (Oxygen Delivery Method): room air        PHYSICAL EXAM:    GENERAL: NAD, well-groomed, well-developed  HEAD:  Atraumatic, Normocephalic  EYES: EOMI, PERRLA, conjunctiva and sclera clear  ENMT: No tonsillar erythema, exudates, or enlargement; Moist mucous membranes, Good dentition, No lesions  NECK: Supple, No JVD, Normal thyroid  CHEST/LUNG: Clear to percussion bilaterally; No rales, rhonchi, wheezing, or rubs  HEART: Regular rate and rhythm; No murmurs, rubs, or gallops  ABDOMEN: Soft, Nontender, Nondistended; Bowel sounds present  EXTREMITIES:  2+ Peripheral Pulses, No clubbing, cyanosis, or edema  LYMPH: No lymphadenopathy noted  SKIN: No rashes or lesions      LABS:                        13.8   10.05 )-----------( 279      ( 09 Jun 2025 04:20 )             41.0     06-09    135  |  98  |  20.5[H]  ----------------------------<  109[H]  4.0   |  23.0  |  0.67    Ca    8.7      09 Jun 2025 04:20  Phos  2.4     06-09  Mg     2.1     06-09    TPro  7.1  /  Alb  4.3  /  TBili  0.5  /  DBili  x   /  AST  28  /  ALT  24  /  AlkPhos  87  06-09       Urinalysis Basic - ( 09 Jun 2025 04:20 )    Color: x / Appearance: x / SG: x / pH: x  Gluc: 109 mg/dL / Ketone: x  / Bili: x / Urobili: x   Blood: x / Protein: x / Nitrite: x   Leuk Esterase: x / RBC: x / WBC x   Sq Epi: x / Non Sq Epi: x / Bacteria: x        LIVER FUNCTIONS - ( 09 Jun 2025 04:20 )  Alb: 4.3 g/dL / Pro: 7.1 g/dL / ALK PHOS: 87 U/L / ALT: 24 U/L / AST: 28 U/L / GGT: x               RADIOLOGY & ADDITIONAL STUDIES:  < from: CT Abdomen and Pelvis w/ Oral Cont and w/ IV Cont (06.08.25 @ 05:52) >    ACC: [id number] EXAM:  CT ABDOMEN AND PELVIS OC IC   ORDERED BY: WOLF CANAS     PROCEDURE DATE:  06/08/2025          INTERPRETATION:  CLINICAL INFORMATION: Abdominal pain.    COMPARISON: CT scans from 03/27/2025, 11/30/2024, 04/04/2024, 05/18/2020    CONTRAST/COMPLICATIONS:  IV Contrast: Omnipaque 350  90 cc administered   10 cc discarded  Oral Contrast: Omnipaque 300   Fruit 2o.    PROCEDURE:  CT of the Abdomen and Pelvis was performed.  Sagittal and coronal reformats were performed.    FINDINGS:  LOWER CHEST: Partially imaged esophagectomy and gastric pull-through is   grossly stable dating back to 05/18/2020.    LIVER: Within normal limits.  BILE DUCTS: Normal caliber.  GALLBLADDER: Cholecystectomy.  SPLEEN: Within normal limits.  PANCREAS: Atrophic with fatty infiltration, grossly stable dating back to   05/18/2020.  ADRENALS: Left adrenal gland thickening and nodular right adrenal gland   thickening appears stable in appearance and back to 05/18/2020.  KIDNEYS/URETERS: Symmetric renal enhancement.  No hydronephrosis or   calculi.  Trace perinephric fluid bilaterally, grossly unchanged from   03/27/2025.  An approximately 9 x 10 mm hypoattenuating focus in the   interpolar region left kidney has indeterminate CT attenuation butis   grossly stable dating back to 05/18/2020; this likely represents a   proteinaceous cyst.    BLADDER: Within normal limits.  REPRODUCTIVE ORGANS: The prostate appears normal in size.    BOWEL: Oral contrast has progressed to the ascending colon.No bowel   obstruction. Appendix is normal.  Left-sided colonic diverticulosis   without evidence for acute diverticulitis.  PERITONEUM/RETROPERITONEUM: Within normal limits.  VESSELS: Ectatic infrarenal abdominal aorta is fusiformly dilated to   approximately 2.4 cm.  No abdominal aortic aneurysm.  Scattered   atherosclerotic calcifications of the aortoiliac tree and proximal thigh   vasculature.  Infrarenal IVC filter in place.  LYMPH NODES: No lymphadenopathy.  ABDOMINAL WALL: Tiny fat-containing supraumbilical ventral abdominal   hernia.  BONES: Chronic deformity the pubic symphysis and chronic deformity of the   left superior and inferior pubic rami compatible with old trauma.  No   acute fracture or suspicious osseous lesion mild lumbarspine   degenerative changes; no significant spinal canal stenosis is visualized   by CT technique.  Mild neural foraminal narrowing at L4-L5 and the right   and mild to moderate neural foraminal narrowing at L5-S1 left.    IMPRESSION:  No evidence of bowel obstruction, active inflammatory process, or   intra-abdominal source for infection.    Partially imaged esophagectomy and gastric pull-through is grossly   unchanged in appearance dating back to 05/18/2020.      --- End of Report ---            WILLIE LAGOS MD; Attending Radiologist  This document has been electronically signed. Jun 8 2025  6:18AM    < end of copied text >

## 2025-06-09 NOTE — PROGRESS NOTE ADULT - ASSESSMENT
59 year old male with Esophageal cancer s/p esophagectomy and gastric pull through presented with abdominal pain, nausea and vomiting x 3 days.   Leukocytosis and metabolic acidosis in ER. No obstruction on CT. Admitted for GI consultation, made NPO and prescribed IVF and analgesics.    # Intractable Nausea and vomiting.  # Abdominal Pain  # History of esophageal cancer s/p esophagectomy and gastric pull  # Gastroparesis  # Leukocytosis  # Metabolic acidosis  # History of Hypertension  - start liquids and ice chips  - continue antiemetics, analgesics  - Hydration  - PPI  - GI consult for EGD; no absolute medical contraindication for anesthesia, procedure. Low risk  - Monitor lytes  - VTE prophylaxis    GI Consult called - no consult received over weekend    Disposition - Pending clinical course; possible EGD.      Discussed with GI NP Sayda as well as RILEY Meehan

## 2025-06-09 NOTE — CONSULT NOTE ADULT - ASSESSMENT
Patient with recurrence of the symptoms in the setting of post esophagectomy and gastric pull-up and with ongoing abdominal pain.  He has not responded well to the last EGD with Botox injection and pyloric channel dilation.  Dr. Cm is here tomorrow and will most likely evaluate the patient for further course of action.The patient can have options of including pyloric channel stenting with fixation of the stent and also possible G POEM

## 2025-06-10 LAB
ANION GAP SERPL CALC-SCNC: 16 MMOL/L — SIGNIFICANT CHANGE UP (ref 5–17)
BUN SERPL-MCNC: 19.3 MG/DL — SIGNIFICANT CHANGE UP (ref 8–20)
CALCIUM SERPL-MCNC: 8.7 MG/DL — SIGNIFICANT CHANGE UP (ref 8.4–10.5)
CHLORIDE SERPL-SCNC: 97 MMOL/L — SIGNIFICANT CHANGE UP (ref 96–108)
CO2 SERPL-SCNC: 20 MMOL/L — LOW (ref 22–29)
CREAT SERPL-MCNC: 0.62 MG/DL — SIGNIFICANT CHANGE UP (ref 0.5–1.3)
EGFR: 111 ML/MIN/1.73M2 — SIGNIFICANT CHANGE UP
EGFR: 111 ML/MIN/1.73M2 — SIGNIFICANT CHANGE UP
GLUCOSE SERPL-MCNC: 125 MG/DL — HIGH (ref 70–99)
POTASSIUM SERPL-MCNC: 3.9 MMOL/L — SIGNIFICANT CHANGE UP (ref 3.5–5.3)
POTASSIUM SERPL-SCNC: 3.9 MMOL/L — SIGNIFICANT CHANGE UP (ref 3.5–5.3)
SODIUM SERPL-SCNC: 133 MMOL/L — LOW (ref 135–145)

## 2025-06-10 PROCEDURE — 99232 SBSQ HOSP IP/OBS MODERATE 35: CPT

## 2025-06-10 RX ORDER — METOCLOPRAMIDE HCL 10 MG
10 TABLET ORAL EVERY 6 HOURS
Refills: 0 | Status: DISCONTINUED | OUTPATIENT
Start: 2025-06-10 | End: 2025-06-12

## 2025-06-10 RX ORDER — LABETALOL HYDROCHLORIDE 200 MG/1
100 TABLET, FILM COATED ORAL EVERY 4 HOURS
Refills: 0 | Status: DISCONTINUED | OUTPATIENT
Start: 2025-06-10 | End: 2025-06-12

## 2025-06-10 RX ORDER — SUCRALFATE 1 G
1 TABLET ORAL
Refills: 0 | Status: DISCONTINUED | OUTPATIENT
Start: 2025-06-10 | End: 2025-06-12

## 2025-06-10 RX ORDER — AMLODIPINE BESYLATE 10 MG/1
5 TABLET ORAL DAILY
Refills: 0 | Status: DISCONTINUED | OUTPATIENT
Start: 2025-06-10 | End: 2025-06-11

## 2025-06-10 RX ADMIN — SODIUM CHLORIDE 75 MILLILITER(S): 9 INJECTION, SOLUTION INTRAVENOUS at 09:09

## 2025-06-10 RX ADMIN — SODIUM CHLORIDE 75 MILLILITER(S): 9 INJECTION, SOLUTION INTRAVENOUS at 21:37

## 2025-06-10 RX ADMIN — AMLODIPINE BESYLATE 5 MILLIGRAM(S): 10 TABLET ORAL at 09:09

## 2025-06-10 RX ADMIN — KETOROLAC TROMETHAMINE 30 MILLIGRAM(S): 30 INJECTION, SOLUTION INTRAMUSCULAR; INTRAVENOUS at 01:26

## 2025-06-10 RX ADMIN — Medication 1 MILLIGRAM(S): at 22:06

## 2025-06-10 RX ADMIN — Medication 1 MILLIGRAM(S): at 09:09

## 2025-06-10 RX ADMIN — LABETALOL HYDROCHLORIDE 100 MILLIGRAM(S): 200 TABLET, FILM COATED ORAL at 13:53

## 2025-06-10 RX ADMIN — SODIUM CHLORIDE 75 MILLILITER(S): 9 INJECTION, SOLUTION INTRAVENOUS at 01:59

## 2025-06-10 RX ADMIN — Medication 1 GRAM(S): at 23:24

## 2025-06-10 RX ADMIN — Medication 4 MILLIGRAM(S): at 09:09

## 2025-06-10 RX ADMIN — Medication 1 MILLIGRAM(S): at 06:05

## 2025-06-10 RX ADMIN — KETOROLAC TROMETHAMINE 30 MILLIGRAM(S): 30 INJECTION, SOLUTION INTRAMUSCULAR; INTRAVENOUS at 13:49

## 2025-06-10 RX ADMIN — Medication 40 MILLIGRAM(S): at 18:20

## 2025-06-10 RX ADMIN — Medication 40 MILLIGRAM(S): at 05:18

## 2025-06-10 RX ADMIN — Medication 1 MILLIGRAM(S): at 15:50

## 2025-06-10 RX ADMIN — Medication 10 MILLIGRAM(S): at 13:49

## 2025-06-10 RX ADMIN — Medication 1 MILLIGRAM(S): at 07:05

## 2025-06-10 RX ADMIN — Medication 1 MILLIGRAM(S): at 18:47

## 2025-06-10 RX ADMIN — Medication 1 MILLIGRAM(S): at 12:30

## 2025-06-10 RX ADMIN — KETOROLAC TROMETHAMINE 30 MILLIGRAM(S): 30 INJECTION, SOLUTION INTRAMUSCULAR; INTRAVENOUS at 00:26

## 2025-06-10 RX ADMIN — Medication 1 MILLIGRAM(S): at 09:39

## 2025-06-10 RX ADMIN — Medication 10 MILLIGRAM(S): at 21:36

## 2025-06-10 RX ADMIN — Medication 1 MILLIGRAM(S): at 03:35

## 2025-06-10 RX ADMIN — Medication 1 MILLIGRAM(S): at 21:36

## 2025-06-10 RX ADMIN — Medication 10 MILLIGRAM(S): at 00:26

## 2025-06-10 RX ADMIN — Medication 10 MILLIGRAM(S): at 05:18

## 2025-06-10 RX ADMIN — Medication 1 MILLIGRAM(S): at 02:35

## 2025-06-10 NOTE — PROGRESS NOTE ADULT - ASSESSMENT
58 y/o M w/ PMH of esophageal carcinoma s/p esophagectomy with gastric pull through (2016), gastroparesis, s/p Pre Pyloric Botox Injection and Balloon Dilation, cholecystectomy w/ mx recent admissions for same presentation 3/30/25-3/31/25 and underwent EGD on 3/28/25 with balloon dilation and Botox injection to the pylorus presents for epigastric abd pain and intractable neasuea and NBNB emesis w/ inability to tolerate PO x 2 days. GI asked to consult or further management.    #Gastroparesis   #nausea/vomiting    EGD on 3/28/25 with balloon dilation and Botox injection to the pylorus  -Trend CBC and BMP daily   -PPI daily for Gi mucosal cytoprotection   -Avoid NSAIDs  -Antiemetics PRN   -CLD as tolerated   -Further management and plan pending discussion with Dr. Cm   _________________________________________________________________  Assessment and recommendations are final when note is signed by the attending physician.  58 y/o M w/ PMH of esophageal carcinoma s/p esophagectomy with gastric pull through (2016), gastroparesis, s/p Pre Pyloric Botox Injection and Balloon Dilation, cholecystectomy w/ mx recent admissions for same presentation 3/30/25-3/31/25 and underwent EGD on 3/28/25 with balloon dilation and Botox injection to the pylorus presents for epigastric abd pain and intractable neasuea and NBNB emesis w/ inability to tolerate PO x 2 days. GI asked to consult or further management.    #Gastroparesis   #nausea/vomiting    EGD on 3/28/25 with balloon dilation and Botox injection to the pylorus  -Trend CBC and BMP daily   -PPI daily for Gi mucosal cytoprotection   -Avoid NSAIDs  -Antiemetics PRN   - NPO at midnight empirically  -Further management and plan pending discussion with Dr. Cm   _________________________________________________________________  Assessment and recommendations are final when note is signed by the attending physician.

## 2025-06-10 NOTE — PROGRESS NOTE ADULT - SUBJECTIVE AND OBJECTIVE BOX
HOSPITALIST PROGRESS NOTE    MIAH GIL  [id number]  59yMale    Patient is a 59y old  Male who presents with a chief complaint of intractable pain, N/V (09 Jun 2025 22:56)      SUBJECTIVE:   Chart reviewed since last visit.   Patient seen and examined at bedside for intractable nausea and pain.  Continues to have nausea and retching and epigastric pain.  Passing flatus, last bowel movement 2 days ago.  Awaiting GI plan      OBJECTIVE:  Vital Signs Last 24 Hrs  T(C): 36.8 (10 Severiano 2025 09:18), Max: 36.9 (09 Jun 2025 11:52)  T(F): 98.3 (10 Severiano 2025 09:18), Max: 98.5 (09 Jun 2025 11:52)  HR: 96 (10 Severiano 2025 09:18) (87 - 97)  BP: 178/93 (10 Severiano 2025 09:18) (136/86 - 178/93)  BP(mean): 103 (10 Severiano 2025 07:24) (103 - 122)  RR: 17 (10 Severiano 2025 09:18) (17 - 18)  SpO2: 96% (10 Severiano 2025 09:18) (94% - 97%)    Parameters below as of 10 Severiano 2025 09:18  Patient On (Oxygen Delivery Method): room air      PHYSICAL EXAMINATION  General: Middle aged male lying on bed, comfortable  HEENT:  Anicteric sclera  NECK:  Supple  CVS: regular rate and rhythm S1 S2  RESP:  Fair air entry bilaterally  GI:  Soft with epigastric tenderness BS+  : No suprapubic tenderness  MSK:  Moves all extremities  CNS:  Awake, alert, oriented. No gross focal or global deficit   INTEG:  warm skin  PSYCH:  Fair mood    MONITOR:  CAPILLARY BLOOD GLUCOSE            I&O's Summary                          13.8   10.05 )-----------( 279      ( 09 Jun 2025 04:20 )             41.0       06-10    133[L]  |  97  |  19.3  ----------------------------<  125[H]  3.9   |  20.0[L]  |  0.62    Ca    8.7      10 Severiano 2025 02:40  Phos  2.4     06-09  Mg     2.1     06-09    TPro  7.1  /  Alb  4.3  /  TBili  0.5  /  DBili  x   /  AST  28  /  ALT  24  /  AlkPhos  87  06-09        Urinalysis Basic - ( 10 Severiano 2025 02:40 )    Color: x / Appearance: x / SG: x / pH: x  Gluc: 125 mg/dL / Ketone: x  / Bili: x / Urobili: x   Blood: x / Protein: x / Nitrite: x   Leuk Esterase: x / RBC: x / WBC x   Sq Epi: x / Non Sq Epi: x / Bacteria: x            TTE:    RADIOLOGY        MEDICATIONS  (STANDING):  amLODIPine   Tablet 5 milliGRAM(s) Oral daily  dextrose 5% + sodium chloride 0.45%. 1000 milliLiter(s) (75 mL/Hr) IV Continuous <Continuous>  pantoprazole  Injectable 40 milliGRAM(s) IV Push every 12 hours      MEDICATIONS  (PRN):  aluminum hydroxide/magnesium hydroxide/simethicone Suspension 30 milliLiter(s) Oral every 4 hours PRN Dyspepsia  HYDROmorphone  Injectable 1 milliGRAM(s) IV Push every 3 hours PRN Severe Pain (7 - 10)  ketorolac   Injectable 30 milliGRAM(s) IV Push every 6 hours PRN Moderate Pain (4 - 6)  labetalol 100 milliGRAM(s) Oral every 4 hours PRN SBP>160  naloxone Injectable 0.4 milliGRAM(s) IV Push every 3 minutes PRN opioid oversedation  ondansetron Injectable 4 milliGRAM(s) IV Push every 8 hours PRN Nausea and/or Vomiting

## 2025-06-10 NOTE — PROGRESS NOTE ADULT - NS ATTEND AMEND GEN_ALL_CORE FT
Mr. Schwarz is a 59 year old gentleman well known to the service with a history of esophagectomy (UZIEL meera) with gastric pull and pyloroplasty further complicated by gastroparesis s/p multiple interventions with intermittent improvement in symptoms, however more recently has been having recurrent nausea and vomiting, admitted for recurrent symptoms. Will plan to keep NPO empirically, possible upper endoscopy with endoflip tomorrow. Likely discharge on liquid diet with subsequent outpatient repeat endoscopy with pyloromyotomy pending findings on endoflip. In the interim continue supportive care. We will continue to follow along with you.

## 2025-06-10 NOTE — PROGRESS NOTE ADULT - ASSESSMENT
59 year old male with Esophageal cancer s/p esophagectomy and gastric pull through presented with abdominal pain, nausea and vomiting x 3 days.   Leukocytosis and metabolic acidosis in ER. No obstruction on CT. Admitted for GI consultation, made NPO and prescribed IVF and analgesics.    # Intractable Nausea and vomiting.  # Abdominal Pain  # History of esophageal cancer s/p esophagectomy and gastric pull  # Gastroparesis  - Liquids and ice chips  - antiemetics, analgesics  - Hydration IVF  - PPI  - GI consult for EGD; no absolute medical contraindication for anesthesia, procedure. Low risk    # Leukocytosis. Likely reactive  Resolved.    # Metabolic acidosis  # Hyponatremia  - Hydration  - Monitor lytes    # Hypertension, Uncontrolled  - Amlodipine resumed  - PRN Labetalol    VTE prophylaxis - Intermittent pneumatic compression  Ambulate ad corine    Disposition - Pending clinical course; possible EGD.      Discussed with GI NP Debra; awaiting Dr Cabrera

## 2025-06-10 NOTE — PROGRESS NOTE ADULT - SUBJECTIVE AND OBJECTIVE BOX
Chief Complaint:  Patient is a 59y old  Male who presents with a chief complaint of intractable pain, N/V (10 Severiano 2025 11:14)      HPI/ 24 hr events: Patient seen and examined at bedside. Pt not feeling great. He attempted CLD but was unable to tolerate. Denies nausea, diarrhea, abdominal pain. Vitals are overall stable, no leukocytosis, Hgb, LFTs stable.       REVIEW OF SYSTEMS:   General: Negative  HEENT: Negative  CV: Negative  Respiratory: Negative  GI: See HPI  : Negative  MSK: Negative  Hematologic: Negative  Skin: Negative    MEDICATIONS:   MEDICATIONS  (STANDING):  amLODIPine   Tablet 5 milliGRAM(s) Oral daily  dextrose 5% + sodium chloride 0.45%. 1000 milliLiter(s) (75 mL/Hr) IV Continuous <Continuous>  metoclopramide Injectable 10 milliGRAM(s) IV Push every 6 hours  pantoprazole  Injectable 40 milliGRAM(s) IV Push every 12 hours  sucralfate 1 Gram(s) Oral four times a day    MEDICATIONS  (PRN):  aluminum hydroxide/magnesium hydroxide/simethicone Suspension 30 milliLiter(s) Oral every 4 hours PRN Dyspepsia  HYDROmorphone  Injectable 1 milliGRAM(s) IV Push every 3 hours PRN Severe Pain (7 - 10)  labetalol 100 milliGRAM(s) Oral every 4 hours PRN SBP>160  naloxone Injectable 0.4 milliGRAM(s) IV Push every 3 minutes PRN opioid oversedation  ondansetron Injectable 4 milliGRAM(s) IV Push every 8 hours PRN Nausea and/or Vomiting            DIET:  Diet, Clear Liquid (06-09-25 @ 12:23) [Active]          ALLERGIES:   Allergies    No Known Allergies    Intolerances        VITAL SIGNS:   Vital Signs Last 24 Hrs  T(C): 36.8 (10 Severiano 2025 09:18), Max: 36.9 (09 Jun 2025 19:28)  T(F): 98.3 (10 Severiano 2025 09:18), Max: 98.4 (09 Jun 2025 19:28)  HR: 94 (10 Severiano 2025 12:25) (87 - 97)  BP: 172/94 (10 Severiano 2025 12:25) (136/86 - 178/93)  BP(mean): 103 (10 Severiano 2025 07:24) (103 - 122)  RR: 17 (10 Severiano 2025 09:18) (17 - 18)  SpO2: 96% (10 Severiano 2025 09:18) (94% - 97%)    Parameters below as of 10 Severiano 2025 09:18  Patient On (Oxygen Delivery Method): room air      I&O's Summary      PHYSICAL EXAM:   GENERAL:  No acute distress  HEENT:  NC/AT, conjunctiva clear, sclera anicteric  CHEST:  No increased effort  HEART:  Regular rate  ABDOMEN:  Soft, non-tender, non-distended, normoactive bowel sounds, no rebound or guarding  EXTREMITIES: No edema  SKIN:  Warm, dry  NEURO:  Calm, cooperative    LABS:                        13.8   10.05 )-----------( 279      ( 09 Jun 2025 04:20 )             41.0     Hemoglobin: 13.8 g/dL (06-09-25 @ 04:20)  Hemoglobin: 14.8 g/dL (06-08-25 @ 09:00)  Hemoglobin: 15.1 g/dL (06-07-25 @ 20:20)    06-10    133[L]  |  97  |  19.3  ----------------------------<  125[H]  3.9   |  20.0[L]  |  0.62    Ca    8.7      10 Severiano 2025 02:40  Phos  2.4     06-09  Mg     2.1     06-09    TPro  7.1  /  Alb  4.3  /  TBili  0.5  /  DBili  x   /  AST  28  /  ALT  24  /  AlkPhos  87  06-09    LIVER FUNCTIONS - ( 09 Jun 2025 04:20 )  Alb: 4.3 g/dL / Pro: 7.1 g/dL / ALK PHOS: 87 U/L / ALT: 24 U/L / AST: 28 U/L / GGT: x             RADIOLOGY & ADDITIONAL STUDIES:      ACC: [id number] EXAM:  CT ABDOMEN AND PELVIS OC IC   ORDERED BY: WOLF CANAS     PROCEDURE DATE:  06/08/2025          INTERPRETATION:  CLINICAL INFORMATION: Abdominal pain.    COMPARISON: CT scans from 03/27/2025, 11/30/2024, 04/04/2024, 05/18/2020    CONTRAST/COMPLICATIONS:  IV Contrast: Omnipaque 350  90 cc administered   10 cc discarded  Oral Contrast: Omnipaque 300   Fruit 2o.    PROCEDURE:  CT of the Abdomen and Pelvis was performed.  Sagittal and coronal reformats were performed.    FINDINGS:  LOWER CHEST: Partially imaged esophagectomy and gastric pull-through is   grossly stable dating back to 05/18/2020.    LIVER: Within normal limits.  BILE DUCTS: Normal caliber.  GALLBLADDER: Cholecystectomy.  SPLEEN: Within normal limits.  PANCREAS: Atrophic with fatty infiltration, grossly stable dating back to   05/18/2020.  ADRENALS: Left adrenal gland thickening and nodular right adrenal gland   thickening appears stable in appearance and back to 05/18/2020.  KIDNEYS/URETERS: Symmetric renal enhancement.  No hydronephrosis or   calculi.  Trace perinephric fluid bilaterally, grossly unchanged from   03/27/2025.  An approximately 9 x 10 mm hypoattenuating focus in the   interpolar region left kidney has indeterminate CT attenuation butis   grossly stable dating back to 05/18/2020; this likely represents a   proteinaceous cyst.    BLADDER: Within normal limits.  REPRODUCTIVE ORGANS: The prostate appears normal in size.    BOWEL: Oral contrast has progressed to the ascending colon.No bowel   obstruction. Appendix is normal.  Left-sided colonic diverticulosis   without evidence for acute diverticulitis.  PERITONEUM/RETROPERITONEUM: Within normal limits.  VESSELS: Ectatic infrarenal abdominal aorta is fusiformly dilated to   approximately 2.4 cm.  No abdominal aortic aneurysm.  Scattered   atherosclerotic calcifications of the aortoiliac tree and proximal thigh   vasculature.  Infrarenal IVC filter in place.  LYMPH NODES: No lymphadenopathy.  ABDOMINAL WALL: Tiny fat-containing supraumbilical ventral abdominal   hernia.  BONES: Chronic deformity the pubic symphysis and chronic deformity of the   left superior and inferior pubic rami compatible with old trauma.  No   acute fracture or suspicious osseous lesion mild lumbarspine   degenerative changes; no significant spinal canal stenosis is visualized   by CT technique.  Mild neural foraminal narrowing at L4-L5 and the right   and mild to moderate neural foraminal narrowing at L5-S1 left.    IMPRESSION:  No evidence of bowel obstruction, active inflammatory process, or   intra-abdominal source for infection.    Partially imaged esophagectomy and gastric pull-through is grossly   unchanged in appearance dating back to 05/18/2020.      --- End of Report ---            WILLIE LAGOS MD; Attending Radiologist  This document has been electronically signed. Jun 8 2025  6:18AM  06-08-25 @ 05:52

## 2025-06-11 ENCOUNTER — TRANSCRIPTION ENCOUNTER (OUTPATIENT)
Age: 60
End: 2025-06-11

## 2025-06-11 LAB
ANION GAP SERPL CALC-SCNC: 11 MMOL/L — SIGNIFICANT CHANGE UP (ref 5–17)
BUN SERPL-MCNC: 13.8 MG/DL — SIGNIFICANT CHANGE UP (ref 8–20)
CALCIUM SERPL-MCNC: 8.4 MG/DL — SIGNIFICANT CHANGE UP (ref 8.4–10.5)
CHLORIDE SERPL-SCNC: 97 MMOL/L — SIGNIFICANT CHANGE UP (ref 96–108)
CO2 SERPL-SCNC: 24 MMOL/L — SIGNIFICANT CHANGE UP (ref 22–29)
CREAT SERPL-MCNC: 0.61 MG/DL — SIGNIFICANT CHANGE UP (ref 0.5–1.3)
EGFR: 112 ML/MIN/1.73M2 — SIGNIFICANT CHANGE UP
EGFR: 112 ML/MIN/1.73M2 — SIGNIFICANT CHANGE UP
GLUCOSE SERPL-MCNC: 125 MG/DL — HIGH (ref 70–99)
POTASSIUM SERPL-MCNC: 3.7 MMOL/L — SIGNIFICANT CHANGE UP (ref 3.5–5.3)
POTASSIUM SERPL-SCNC: 3.7 MMOL/L — SIGNIFICANT CHANGE UP (ref 3.5–5.3)
SODIUM SERPL-SCNC: 132 MMOL/L — LOW (ref 135–145)

## 2025-06-11 PROCEDURE — 91040 ESOPH BALLOON DISTENSION TST: CPT | Mod: 26

## 2025-06-11 PROCEDURE — 43235 EGD DIAGNOSTIC BRUSH WASH: CPT | Mod: 59

## 2025-06-11 PROCEDURE — 99232 SBSQ HOSP IP/OBS MODERATE 35: CPT

## 2025-06-11 DEVICE — CATH ENDOFLIP MEASURE 16MM: Type: IMPLANTABLE DEVICE | Status: FUNCTIONAL

## 2025-06-11 RX ORDER — AMLODIPINE BESYLATE 10 MG/1
10 TABLET ORAL DAILY
Refills: 0 | Status: DISCONTINUED | OUTPATIENT
Start: 2025-06-11 | End: 2025-06-12

## 2025-06-11 RX ORDER — HYDROMORPHONE/SOD CHLOR,ISO/PF 2 MG/10 ML
0.5 SYRINGE (ML) INJECTION
Refills: 0 | Status: DISCONTINUED | OUTPATIENT
Start: 2025-06-11 | End: 2025-06-12

## 2025-06-11 RX ADMIN — Medication 1 MILLIGRAM(S): at 08:07

## 2025-06-11 RX ADMIN — Medication 1 MILLIGRAM(S): at 18:03

## 2025-06-11 RX ADMIN — Medication 1 GRAM(S): at 22:47

## 2025-06-11 RX ADMIN — Medication 1 MILLIGRAM(S): at 13:41

## 2025-06-11 RX ADMIN — Medication 1 MILLIGRAM(S): at 01:22

## 2025-06-11 RX ADMIN — Medication 40 MILLIGRAM(S): at 05:41

## 2025-06-11 RX ADMIN — Medication 1 MILLIGRAM(S): at 21:44

## 2025-06-11 RX ADMIN — Medication 1 MILLIGRAM(S): at 04:17

## 2025-06-11 RX ADMIN — AMLODIPINE BESYLATE 5 MILLIGRAM(S): 10 TABLET ORAL at 05:41

## 2025-06-11 RX ADMIN — Medication 40 MILLIGRAM(S): at 18:14

## 2025-06-11 RX ADMIN — Medication 10 MILLIGRAM(S): at 08:07

## 2025-06-11 RX ADMIN — Medication 1 GRAM(S): at 13:00

## 2025-06-11 RX ADMIN — Medication 1 GRAM(S): at 05:41

## 2025-06-11 RX ADMIN — Medication 10 MILLIGRAM(S): at 00:52

## 2025-06-11 RX ADMIN — Medication 1 MILLIGRAM(S): at 08:48

## 2025-06-11 RX ADMIN — Medication 1 MILLIGRAM(S): at 00:52

## 2025-06-11 RX ADMIN — Medication 1 MILLIGRAM(S): at 04:47

## 2025-06-11 RX ADMIN — Medication 1 GRAM(S): at 18:20

## 2025-06-11 RX ADMIN — Medication 1 MILLIGRAM(S): at 21:24

## 2025-06-11 NOTE — PRE-OP CHECKLIST - HAND OFF
Unit RN to OR RN/Holding RN to OR RN Niacinamide Pregnancy And Lactation Text: These medications are considered safe during pregnancy.

## 2025-06-11 NOTE — PROGRESS NOTE ADULT - SUBJECTIVE AND OBJECTIVE BOX
HOSPITALIST PROGRESS NOTE    MIAH GIL  [id number]  59yMale    Patient is a 59y old  Male who presents with a chief complaint of intractable pain, N/V (10 Severiano 2025 13:59)      SUBJECTIVE:   Chart reviewed since last visit.   Patient seen and examined at bedside for nausea, vomiting, pain  Had EGD earlier - feels much better.  Denies any nausea, vomiting or abdominal pain currently      OBJECTIVE:  Vital Signs Last 24 Hrs  T(C): 37.1 (11 Jun 2025 11:11), Max: 37.1 (10 Severiano 2025 20:00)  T(F): 98.7 (11 Jun 2025 11:11), Max: 98.8 (10 Severiano 2025 20:00)  HR: 80 (11 Jun 2025 11:41) (67 - 92)  BP: 136/79 (11 Jun 2025 11:41) (116/79 - 180/98)   RR: 13 (11 Jun 2025 11:41) (12 - 19)  SpO2: 98% (11 Jun 2025 11:41) (96% - 100%)    Parameters below as of 11 Jun 2025 11:21  Patient On (Oxygen Delivery Method): room air    PHYSICAL EXAMINATION  General: Middle aged male lying on bed, comfortable  HEENT:  Anicteric sclera  NECK:  Supple  CVS: regular rate and rhythm S1 S2  RESP:  Fair air entry bilaterally  GI:  Soft with interval resolution epigastric tenderness BS+  : No suprapubic tenderness  MSK:  Moves all extremities  CNS:  Awake, alert, oriented. No gross focal or global deficit   INTEG:  warm skin  PSYCH:  Fair mood        MONITOR:  CAPILLARY BLOOD GLUCOSE            I&O's Summary    10 Severiano 2025 07:01  -  11 Jun 2025 07:00  --------------------------------------------------------  IN: 240 mL / OUT: 0 mL / NET: 240 mL          06-11    132[L]  |  97  |  13.8  ----------------------------<  125[H]  3.7   |  24.0  |  0.61    Ca    8.4      11 Jun 2025 05:55          Urinalysis Basic - ( 11 Jun 2025 05:55 )    Color: x / Appearance: x / SG: x / pH: x  Gluc: 125 mg/dL / Ketone: x  / Bili: x / Urobili: x   Blood: x / Protein: x / Nitrite: x   Leuk Esterase: x / RBC: x / WBC x   Sq Epi: x / Non Sq Epi: x / Bacteria: x            TTE:    RADIOLOGY        MEDICATIONS  (STANDING):  amLODIPine   Tablet 5 milliGRAM(s) Oral daily  dextrose 5% + sodium chloride 0.45%. 1000 milliLiter(s) (75 mL/Hr) IV Continuous <Continuous>  metoclopramide Injectable 10 milliGRAM(s) IV Push every 6 hours  pantoprazole  Injectable 40 milliGRAM(s) IV Push every 12 hours  sucralfate 1 Gram(s) Oral four times a day      MEDICATIONS  (PRN):  aluminum hydroxide/magnesium hydroxide/simethicone Suspension 30 milliLiter(s) Oral every 4 hours PRN Dyspepsia  HYDROmorphone  Injectable 1 milliGRAM(s) IV Push every 3 hours PRN Severe Pain (7 - 10)  HYDROmorphone  Injectable 0.5 milliGRAM(s) IV Push every 3 hours PRN Moderate Pain (4 - 6)  labetalol 100 milliGRAM(s) Oral every 4 hours PRN SBP>160  naloxone Injectable 0.4 milliGRAM(s) IV Push every 3 minutes PRN opioid oversedation  ondansetron Injectable 4 milliGRAM(s) IV Push every 8 hours PRN Nausea and/or Vomiting

## 2025-06-11 NOTE — PROGRESS NOTE ADULT - ASSESSMENT
59 year old male with Esophageal cancer s/p esophagectomy and gastric pull through presented with abdominal pain, nausea and vomiting x 3 days.   Leukocytosis and metabolic acidosis in ER. No obstruction on CT. Admitted for GI consultation, made NPO and prescribed IVF and analgesics.    # Intractable Nausea and vomiting.  # Abdominal Pain  # History of esophageal cancer s/p esophagectomy and gastric pull  # Gastroparesis  EGD performed - pending official report  - Liquids and ice chips  - antiemetics, analgesics  - Hydration IVF; lock once adequate oral intake  - PPI  - GI consult for EGD; no absolute medical contraindication for anesthesia, procedure. Low risk    # Malnutrition, at risk  - Ensure clear three times a day  - MVI + minerals  - Nutrition evaluation    # Leukocytosis. Likely reactive  Resolved.    # Metabolic acidosis  # Hyponatremia  - Hydration  - Monitor lytes    # Hypertension, Uncontrolled  - Amlodipine resumed; increase dose if remains elevated  - PRN Labetalol    VTE prophylaxis - Intermittent pneumatic compression  Ambulate ad corine    Disposition - Pending establishment of adequate intake, nutrition consult, GI final recommendations and plan.    Discussed with patient and spouse at bedside      Discussed with GI NP Debra; awaiting Dr Cabrera

## 2025-06-12 ENCOUNTER — TRANSCRIPTION ENCOUNTER (OUTPATIENT)
Age: 60
End: 2025-06-12

## 2025-06-12 VITALS
TEMPERATURE: 98 F | OXYGEN SATURATION: 98 % | SYSTOLIC BLOOD PRESSURE: 136 MMHG | RESPIRATION RATE: 18 BRPM | HEART RATE: 70 BPM | DIASTOLIC BLOOD PRESSURE: 85 MMHG

## 2025-06-12 LAB
ANION GAP SERPL CALC-SCNC: 12 MMOL/L — SIGNIFICANT CHANGE UP (ref 5–17)
BUN SERPL-MCNC: 11.2 MG/DL — SIGNIFICANT CHANGE UP (ref 8–20)
CALCIUM SERPL-MCNC: 9.1 MG/DL — SIGNIFICANT CHANGE UP (ref 8.4–10.5)
CHLORIDE SERPL-SCNC: 95 MMOL/L — LOW (ref 96–108)
CO2 SERPL-SCNC: 24 MMOL/L — SIGNIFICANT CHANGE UP (ref 22–29)
CREAT SERPL-MCNC: 0.65 MG/DL — SIGNIFICANT CHANGE UP (ref 0.5–1.3)
EGFR: 110 ML/MIN/1.73M2 — SIGNIFICANT CHANGE UP
EGFR: 110 ML/MIN/1.73M2 — SIGNIFICANT CHANGE UP
GLUCOSE SERPL-MCNC: 113 MG/DL — HIGH (ref 70–99)
HCT VFR BLD CALC: 41.1 % — SIGNIFICANT CHANGE UP (ref 39–50)
HGB BLD-MCNC: 13.9 G/DL — SIGNIFICANT CHANGE UP (ref 13–17)
MCHC RBC-ENTMCNC: 27.8 PG — SIGNIFICANT CHANGE UP (ref 27–34)
MCHC RBC-ENTMCNC: 33.8 G/DL — SIGNIFICANT CHANGE UP (ref 32–36)
MCV RBC AUTO: 82.2 FL — SIGNIFICANT CHANGE UP (ref 80–100)
NRBC # BLD AUTO: 0 K/UL — SIGNIFICANT CHANGE UP (ref 0–0)
NRBC # FLD: 0 K/UL — SIGNIFICANT CHANGE UP (ref 0–0)
NRBC BLD AUTO-RTO: 0 /100 WBCS — SIGNIFICANT CHANGE UP (ref 0–0)
PLATELET # BLD AUTO: 273 K/UL — SIGNIFICANT CHANGE UP (ref 150–400)
PMV BLD: 10.6 FL — SIGNIFICANT CHANGE UP (ref 7–13)
POTASSIUM SERPL-MCNC: 3.9 MMOL/L — SIGNIFICANT CHANGE UP (ref 3.5–5.3)
POTASSIUM SERPL-SCNC: 3.9 MMOL/L — SIGNIFICANT CHANGE UP (ref 3.5–5.3)
RBC # BLD: 5 M/UL — SIGNIFICANT CHANGE UP (ref 4.2–5.8)
RBC # FLD: 12.8 % — SIGNIFICANT CHANGE UP (ref 10.3–14.5)
SODIUM SERPL-SCNC: 131 MMOL/L — LOW (ref 135–145)
WBC # BLD: 9.7 K/UL — SIGNIFICANT CHANGE UP (ref 3.8–10.5)
WBC # FLD AUTO: 9.7 K/UL — SIGNIFICANT CHANGE UP (ref 3.8–10.5)

## 2025-06-12 PROCEDURE — 83690 ASSAY OF LIPASE: CPT

## 2025-06-12 PROCEDURE — 93005 ELECTROCARDIOGRAM TRACING: CPT

## 2025-06-12 PROCEDURE — 74177 CT ABD & PELVIS W/CONTRAST: CPT

## 2025-06-12 PROCEDURE — 85025 COMPLETE CBC W/AUTO DIFF WBC: CPT

## 2025-06-12 PROCEDURE — 85610 PROTHROMBIN TIME: CPT

## 2025-06-12 PROCEDURE — 86850 RBC ANTIBODY SCREEN: CPT

## 2025-06-12 PROCEDURE — 84100 ASSAY OF PHOSPHORUS: CPT

## 2025-06-12 PROCEDURE — 96374 THER/PROPH/DIAG INJ IV PUSH: CPT

## 2025-06-12 PROCEDURE — 83735 ASSAY OF MAGNESIUM: CPT

## 2025-06-12 PROCEDURE — 99239 HOSP IP/OBS DSCHRG MGMT >30: CPT

## 2025-06-12 PROCEDURE — 86901 BLOOD TYPING SEROLOGIC RH(D): CPT

## 2025-06-12 PROCEDURE — 80048 BASIC METABOLIC PNL TOTAL CA: CPT

## 2025-06-12 PROCEDURE — 85027 COMPLETE CBC AUTOMATED: CPT

## 2025-06-12 PROCEDURE — 96375 TX/PRO/DX INJ NEW DRUG ADDON: CPT

## 2025-06-12 PROCEDURE — 80053 COMPREHEN METABOLIC PANEL: CPT

## 2025-06-12 PROCEDURE — 99285 EMERGENCY DEPT VISIT HI MDM: CPT | Mod: 25

## 2025-06-12 PROCEDURE — 36415 COLL VENOUS BLD VENIPUNCTURE: CPT

## 2025-06-12 PROCEDURE — C1889: CPT

## 2025-06-12 PROCEDURE — 96376 TX/PRO/DX INJ SAME DRUG ADON: CPT

## 2025-06-12 PROCEDURE — 71045 X-RAY EXAM CHEST 1 VIEW: CPT

## 2025-06-12 PROCEDURE — 85730 THROMBOPLASTIN TIME PARTIAL: CPT

## 2025-06-12 PROCEDURE — 86900 BLOOD TYPING SEROLOGIC ABO: CPT

## 2025-06-12 RX ORDER — AMLODIPINE BESYLATE 10 MG/1
1 TABLET ORAL
Qty: 30 | Refills: 0
Start: 2025-06-12 | End: 2025-07-11

## 2025-06-12 RX ORDER — SUCRALFATE 1 G
1 TABLET ORAL
Qty: 120 | Refills: 0
Start: 2025-06-12 | End: 2025-07-11

## 2025-06-12 RX ADMIN — Medication 40 MILLIGRAM(S): at 05:27

## 2025-06-12 RX ADMIN — Medication 10 MILLIGRAM(S): at 00:31

## 2025-06-12 RX ADMIN — Medication 1 MILLIGRAM(S): at 00:35

## 2025-06-12 RX ADMIN — Medication 1 MILLIGRAM(S): at 05:33

## 2025-06-12 RX ADMIN — AMLODIPINE BESYLATE 10 MILLIGRAM(S): 10 TABLET ORAL at 05:27

## 2025-06-12 RX ADMIN — Medication 1 MILLIGRAM(S): at 06:00

## 2025-06-12 RX ADMIN — Medication 1 GRAM(S): at 05:27

## 2025-06-12 RX ADMIN — Medication 1 MILLIGRAM(S): at 01:00

## 2025-06-12 RX ADMIN — Medication 1 GRAM(S): at 12:39

## 2025-06-12 NOTE — DISCHARGE NOTE PROVIDER - NSDCMRMEDTOKEN_GEN_ALL_CORE_FT
amLODIPine 5 mg oral tablet: 1 tab(s) orally once a day  erythromycin 250 mg oral tablet: 1 tab(s) orally 4 times a day  metoclopramide 5 mg oral tablet: 2 tab(s) orally every 8 hours as needed for  nausea  Protonix 40 mg oral delayed release tablet: 1 tab(s) orally once a day   amLODIPine 10 mg oral tablet: 1 tab(s) orally once a day  erythromycin 250 mg oral tablet: 1 tab(s) orally 4 times a day  metoclopramide 5 mg oral tablet: 2 tab(s) orally every 8 hours as needed for  nausea  Protonix 40 mg oral delayed release tablet: 1 tab(s) orally once a day  sucralfate 1 g oral tablet: 1 tab(s) orally 4 times a day

## 2025-06-12 NOTE — DISCHARGE NOTE PROVIDER - NSDCFUADDAPPT_GEN_ALL_CORE_FT
APPTS ARE READY TO BE MADE: [X] YES    Best Family or Patient Contact (if needed):    Additional Information about above appointments (if needed):    1: Toi in 1-2 weeks  2:   3:     Other comments or requests:    APPTS ARE READY TO BE MADE: [X] YES    Best Family or Patient Contact (if needed):    Additional Information about above appointments (if needed):    1: Toi in 1-2 weeks  2:   3:     Other comments or requests:       Patient informed us they already have secured a follow up appointment which was not scheduled by our team.  Dr. Cm on 6/18 at 9:15am    Patient informed us they already have secured a follow up appointment which is not visible on [facility] and declined to provide appointment details.

## 2025-06-12 NOTE — DISCHARGE NOTE PROVIDER - PROVIDER TOKENS
PROVIDER:[TOKEN:[[id number]],FOLLOWUP:[1-3 days]] PROVIDER:[TOKEN:[[id number]],FOLLOWUP:[1-3 days]],PROVIDER:[TOKEN:[[id number]]]

## 2025-06-12 NOTE — DISCHARGE NOTE PROVIDER - HOSPITAL COURSE
59 year old male with a PMHx of Esophageal cancer s/p esophagectomy and gastric pull through, gastroparesis, who presented to [facility] ED with c/o abdominal pain, nausea and vomiting. In the ED, noted to have metabolic acidosis and leukocytosis on lab work. CT a/p negative for obstruction. GI was consulted, patient was made NPO and initiated on IVF. Admitted for management of intractable nausea and vomiting. Patient underwent EGD which revealed only gastroparesis. Diet was advanced as tolerated. Leukocytosis likely reactive, resolved. Noted to have poorly controlled blood pressures, amlodipine was increased. At this time, patient is medically stable for discharge with close outpatient follow up. 59 year old male with a PMHx of Esophageal cancer s/p esophagectomy and gastric pull through, gastroparesis, who presented to [facility] ED with c/o abdominal pain, nausea and vomiting. In the ED, noted to have metabolic acidosis and leukocytosis on lab work. CT a/p negative for obstruction. GI was consulted, patient was made NPO and initiated on IVF. Admitted for management of intractable nausea and vomiting. Patient underwent EGD, report for which is still pending. Recommended liquid diet and follow up in office for further procedures.  Leukocytosis likely reactive, resolved. Noted to have poorly controlled blood pressures, amlodipine was increased. At this time, patient is medically stable for discharge with close outpatient follow up.

## 2025-06-12 NOTE — DISCHARGE NOTE PROVIDER - CARE PROVIDERS DIRECT ADDRESSES
,[email or web address]

## 2025-06-12 NOTE — DISCHARGE NOTE NURSING/CASE MANAGEMENT/SOCIAL WORK - FINANCIAL ASSISTANCE
[facility] provides services at a reduced cost to those who are determined to be eligible through [facility]’s financial assistance program. Information regarding [facility]’s financial assistance program can be found by going to [email or web address] or by calling [phone].

## 2025-06-12 NOTE — DISCHARGE NOTE NURSING/CASE MANAGEMENT/SOCIAL WORK - NSDCFUADDAPPT_GEN_ALL_CORE_FT
APPTS ARE READY TO BE MADE: [X] YES    Best Family or Patient Contact (if needed):    Additional Information about above appointments (if needed):    1: Toi in 1-2 weeks  2:   3:     Other comments or requests:

## 2025-06-12 NOTE — DISCHARGE NOTE PROVIDER - ATTENDING DISCHARGE PHYSICAL EXAMINATION:
Vital Signs Last 24 Hrs  T(C): 36.5 (12 Jun 2025 11:00), Max: 37 (11 Jun 2025 17:03)  T(F): 97.7 (12 Jun 2025 11:00), Max: 98.6 (11 Jun 2025 17:03)  HR: 70 (12 Jun 2025 11:00) (64 - 89)  BP: 136/85 (12 Jun 2025 11:00) (134/69 - 156/89)  BP(mean): --  RR: 18 (12 Jun 2025 11:00) (17 - 18)  SpO2: 98% (12 Jun 2025 11:00) (94% - 99%)    Parameters below as of 12 Jun 2025 11:00  Patient On (Oxygen Delivery Method): room air    PHYSICAL EXAMINATION  General: Middle aged male lying on bed, comfortable  HEENT:  Anicteric sclera  NECK:  Supple  CVS: regular rate and rhythm S1 S2  RESP:  Fair air entry bilaterally  GI:  Soft with interval resolution epigastric tenderness BS+  : No suprapubic tenderness  MSK:  Moves all extremities  CNS:  Awake, alert, oriented. No gross focal or global deficit   INTEG:  warm skin  PSYCH:  Fair mood

## 2025-06-12 NOTE — DISCHARGE NOTE PROVIDER - NSDCFUSCHEDAPPT_GEN_ALL_CORE_FT
[facility]  [address]  Scheduled Appointment: 06/18/2025     Doctor, Unknown  [facility]  Scheduled Appointment: 07/02/2025    [facility]  [address]  Scheduled Appointment: 08/06/2025

## 2025-06-12 NOTE — DISCHARGE NOTE PROVIDER - CARE PROVIDER_API CALL
[facility]  [address] [place] [address]  Phone: [phone]  Fax: [phone]  Follow Up Time: 1-3 days   [facility]  [address] [place] [address]  Phone: [phone]  Fax: [phone]  Follow Up Time: 1-3 days    [facility]  Gastroenterology  [address] [facility] [facility] [address]  [place] [address]  Phone: [phone]  Fax: [phone]  Follow Up Time:

## 2025-06-12 NOTE — DISCHARGE NOTE NURSING/CASE MANAGEMENT/SOCIAL WORK - PATIENT PORTAL LINK FT
You can access the FollowMyHealth Patient Portal offered by [facility] by registering at the following website: [email or web address]. By joining [email or web address]’s FollowMyHealth portal, you will also be able to view your health information using other applications (apps) compatible with our system.

## 2025-06-12 NOTE — DISCHARGE NOTE PROVIDER - NSDCCPCAREPLAN_GEN_ALL_CORE_FT
PRINCIPAL DISCHARGE DIAGNOSIS  Diagnosis: Intractable abdominal pain  Assessment and Plan of Treatment: - In the setting of esophageal cancer and gastroparesis. EGD demonstrated gastroparesis. No active bleeding or ulceration. Started on clear liquid diet, diet advanced as tolerated. Evaluated by nutrition team. Continue with medication as directed and follow up outpatient with GI and your PCP.      SECONDARY DISCHARGE DIAGNOSES  Diagnosis: Intractable nausea and vomiting  Assessment and Plan of Treatment: - Treated as noted above. Follow up outpatient for monitoring.    Diagnosis: Gastroparesis  Assessment and Plan of Treatment: - Treated as noted above. Follow up outpatient for monitoring.    Diagnosis: Metabolic acidosis  Assessment and Plan of Treatment: - Treated with IVF.    Diagnosis: Leukocytosis  Assessment and Plan of Treatment: - Improved, reactive.    Diagnosis: History of hypertension  Assessment and Plan of Treatment: - Increased Amlodipine for better BP control. Continue with home medications as directed.   - Follow up outpatient with your PCP and Cardiology for monitoring and medication optimization. Follow a heart healthy, low sodium diet.

## 2025-06-18 ENCOUNTER — APPOINTMENT (OUTPATIENT)
Dept: GASTROENTEROLOGY | Facility: CLINIC | Age: 60
End: 2025-06-18
Payer: MEDICARE

## 2025-06-18 VITALS
HEART RATE: 78 BPM | SYSTOLIC BLOOD PRESSURE: 131 MMHG | DIASTOLIC BLOOD PRESSURE: 82 MMHG | HEIGHT: 72 IN | BODY MASS INDEX: 24.38 KG/M2 | WEIGHT: 180 LBS | OXYGEN SATURATION: 98 % | RESPIRATION RATE: 14 BRPM

## 2025-06-18 PROCEDURE — 99214 OFFICE O/P EST MOD 30 MIN: CPT

## 2025-06-19 ENCOUNTER — APPOINTMENT (OUTPATIENT)
Facility: CLINIC | Age: 60
End: 2025-06-19
Payer: MEDICARE

## 2025-06-19 VITALS
HEIGHT: 72 IN | DIASTOLIC BLOOD PRESSURE: 70 MMHG | SYSTOLIC BLOOD PRESSURE: 130 MMHG | BODY MASS INDEX: 19.37 KG/M2 | OXYGEN SATURATION: 96 % | HEART RATE: 102 BPM | WEIGHT: 143 LBS

## 2025-06-19 PROBLEM — Z85.01 HISTORY OF MALIGNANT NEOPLASM OF ESOPHAGUS: Status: RESOLVED | Noted: 2023-01-17 | Resolved: 2025-06-19

## 2025-06-19 PROBLEM — Z86.79 HISTORY OF ESSENTIAL HYPERTENSION: Status: RESOLVED | Noted: 2025-06-19 | Resolved: 2025-06-19

## 2025-06-19 PROCEDURE — 99204 OFFICE O/P NEW MOD 45 MIN: CPT

## 2025-06-19 RX ORDER — METOCLOPRAMIDE HYDROCHLORIDE 5 MG/1
5 TABLET ORAL 3 TIMES DAILY
Refills: 0 | Status: ACTIVE | COMMUNITY
Start: 2025-06-19

## 2025-06-26 ENCOUNTER — APPOINTMENT (OUTPATIENT)
Dept: GASTROENTEROLOGY | Facility: CLINIC | Age: 60
End: 2025-06-26
Payer: MEDICARE

## 2025-06-26 VITALS
SYSTOLIC BLOOD PRESSURE: 145 MMHG | TEMPERATURE: 97.2 F | WEIGHT: 183 LBS | RESPIRATION RATE: 14 BRPM | HEART RATE: 74 BPM | DIASTOLIC BLOOD PRESSURE: 95 MMHG | HEIGHT: 72 IN | OXYGEN SATURATION: 98 % | BODY MASS INDEX: 24.79 KG/M2

## 2025-06-26 PROBLEM — Z71.9 ENCOUNTER FOR CONSULTATION: Status: ACTIVE | Noted: 2025-06-26

## 2025-06-26 PROCEDURE — 99214 OFFICE O/P EST MOD 30 MIN: CPT

## 2025-07-02 ENCOUNTER — OUTPATIENT (OUTPATIENT)
Dept: OUTPATIENT SERVICES | Facility: HOSPITAL | Age: 60
LOS: 1 days | End: 2025-07-02
Payer: MEDICARE

## 2025-07-02 DIAGNOSIS — C15.9 MALIGNANT NEOPLASM OF ESOPHAGUS, UNSPECIFIED: ICD-10-CM

## 2025-07-02 DIAGNOSIS — Z90.49 ACQUIRED ABSENCE OF OTHER SPECIFIED PARTS OF DIGESTIVE TRACT: Chronic | ICD-10-CM

## 2025-07-02 DIAGNOSIS — Z98.890 OTHER SPECIFIED POSTPROCEDURAL STATES: Chronic | ICD-10-CM

## 2025-07-02 PROCEDURE — 74220 X-RAY XM ESOPHAGUS 1CNTRST: CPT

## 2025-07-02 PROCEDURE — 74220 X-RAY XM ESOPHAGUS 1CNTRST: CPT | Mod: 26

## 2025-08-06 ENCOUNTER — APPOINTMENT (OUTPATIENT)
Dept: GASTROENTEROLOGY | Facility: CLINIC | Age: 60
End: 2025-08-06

## (undated) DEVICE — Device

## (undated) DEVICE — CATH IV SAFE BC 22G X 1" (BLUE)

## (undated) DEVICE — SENSOR O2 FINGER ADULT

## (undated) DEVICE — TUBING ALARIS PUMP MODULE NON-DEHP

## (undated) DEVICE — BITE BLOCK ADULT 20 X 27MM (GREEN)

## (undated) DEVICE — SOL IRR BAG H2O 1000ML

## (undated) DEVICE — FORCEP RADIAL JAW 4 W NDL 2.4MM 2.8MM 240CM ORANGE DISP

## (undated) DEVICE — PACK IV START WITH CHG

## (undated) DEVICE — TUBING IV EXTENSION MACRO W CLAVE 7"

## (undated) DEVICE — DENTURE CUP PINK

## (undated) DEVICE — VENODYNE/SCD SLEEVE CALF MEDIUM

## (undated) DEVICE — SYR ALLIANCE II INFLATION 60ML

## (undated) DEVICE — DRSG 2X2

## (undated) DEVICE — SOL IRR BAG NS 0.9% 1000ML

## (undated) DEVICE — UNDERPAD LINEN SAVER 23 X 36"

## (undated) DEVICE — SOL BAG NS 0.9% 1000ML

## (undated) DEVICE — DRSG CURITY GAUZE SPONGE 4 X 4" 12-PLY NON-STERILE

## (undated) DEVICE — SYR IV FLUSH SALINE 10ML 30/TY

## (undated) DEVICE — SYR LUER SLIP TIP 50CC

## (undated) DEVICE — WARMING BLANKET FULL ADULT

## (undated) DEVICE — KIT DEFENDO 4 OLY 4 PC

## (undated) DEVICE — SYR SLIP 10CC

## (undated) DEVICE — DEVICE GRASPING RAPTOR

## (undated) DEVICE — MASK PROCEED EARLOOP LVL 2 50/BX

## (undated) DEVICE — GOWN IMPERV XL

## (undated) DEVICE — MASK PROCEDURE EARLOOP LVL 2 50/BX

## (undated) DEVICE — MASK PROC EAR LOOP

## (undated) DEVICE — DVC INFLATION CRE STERIFLATE

## (undated) DEVICE — MASK PROCED EARLOOP 50/BX LRC COVID ADD

## (undated) DEVICE — GUN DIL ALLIANCE